# Patient Record
Sex: FEMALE | Race: WHITE | NOT HISPANIC OR LATINO | Employment: UNEMPLOYED | URBAN - METROPOLITAN AREA
[De-identification: names, ages, dates, MRNs, and addresses within clinical notes are randomized per-mention and may not be internally consistent; named-entity substitution may affect disease eponyms.]

---

## 2017-05-14 PROBLEM — M81.0 AGE-RELATED OSTEOPOROSIS WITHOUT CURRENT PATHOLOGICAL FRACTURE: Status: ACTIVE | Noted: 2017-05-14

## 2017-05-22 PROBLEM — M54.40 ACUTE MIDLINE LOW BACK PAIN WITH SCIATICA: Status: ACTIVE | Noted: 2017-05-22

## 2017-05-23 PROBLEM — M48.061 LUMBAR STENOSIS: Status: ACTIVE | Noted: 2017-05-23

## 2017-05-31 PROBLEM — I50.9 CHF (CONGESTIVE HEART FAILURE), NYHA CLASS IV: Status: ACTIVE | Noted: 2017-05-31

## 2017-06-06 PROBLEM — I27.20 PULMONARY HYPERTENSION: Status: ACTIVE | Noted: 2017-06-06

## 2017-09-19 PROBLEM — S72.002A CLOSED LEFT HIP FRACTURE: Status: ACTIVE | Noted: 2017-09-19

## 2017-09-20 PROBLEM — L89.302 DECUBITUS ULCER OF BUTTOCK, STAGE 2: Status: ACTIVE | Noted: 2017-09-20

## 2017-09-22 PROBLEM — R23.9 SUSPECTED PRESSURE INJURY OF DEEP TISSUE: Status: ACTIVE | Noted: 2017-09-22

## 2017-09-23 PROBLEM — I48.0 PAF (PAROXYSMAL ATRIAL FIBRILLATION): Status: ACTIVE | Noted: 2017-09-23

## 2017-10-31 PROBLEM — R41.82 ALTERED MENTAL STATUS: Status: ACTIVE | Noted: 2017-01-01

## 2017-10-31 PROBLEM — E86.0 DEHYDRATION: Status: ACTIVE | Noted: 2017-01-01

## 2017-10-31 PROBLEM — N19 RENAL FAILURE SYNDROME: Status: ACTIVE | Noted: 2017-01-01

## 2017-11-01 PROBLEM — L89.92 PRESSURE ULCER, STAGE 2: Status: ACTIVE | Noted: 2017-01-01

## 2017-11-02 PROBLEM — N30.00 ACUTE CYSTITIS WITHOUT HEMATURIA: Status: ACTIVE | Noted: 2017-01-01

## 2017-11-07 PROBLEM — N19 RENAL FAILURE: Status: RESOLVED | Noted: 2017-01-01 | Resolved: 2017-01-01

## 2018-01-01 ENCOUNTER — HOSPITAL ENCOUNTER (INPATIENT)
Facility: HOSPITAL | Age: 83
LOS: 16 days | DRG: 870 | End: 2018-09-24
Attending: HOSPITALIST | Admitting: HOSPITALIST
Payer: MEDICARE

## 2018-01-01 ENCOUNTER — ANESTHESIA EVENT (OUTPATIENT)
Dept: SURGERY | Facility: HOSPITAL | Age: 83
End: 2018-01-01

## 2018-01-01 ENCOUNTER — ANESTHESIA (OUTPATIENT)
Dept: SURGERY | Facility: HOSPITAL | Age: 83
End: 2018-01-01

## 2018-01-01 ENCOUNTER — ANESTHESIA (OUTPATIENT)
Dept: SURGERY | Facility: HOSPITAL | Age: 83
DRG: 870 | End: 2018-01-01
Payer: MEDICARE

## 2018-01-01 ENCOUNTER — ANESTHESIA EVENT (OUTPATIENT)
Dept: SURGERY | Facility: HOSPITAL | Age: 83
DRG: 870 | End: 2018-01-01
Payer: MEDICARE

## 2018-01-01 VITALS
DIASTOLIC BLOOD PRESSURE: 62 MMHG | TEMPERATURE: 97 F | SYSTOLIC BLOOD PRESSURE: 100 MMHG | OXYGEN SATURATION: 19 % | BODY MASS INDEX: 46.3 KG/M2 | RESPIRATION RATE: 26 BRPM | HEIGHT: 64 IN | WEIGHT: 271.19 LBS

## 2018-01-01 DIAGNOSIS — R07.9 CHEST PAIN AT REST: ICD-10-CM

## 2018-01-01 DIAGNOSIS — E87.5 HYPERKALEMIA: ICD-10-CM

## 2018-01-01 DIAGNOSIS — M86.9 OSTEOMYELITIS: ICD-10-CM

## 2018-01-01 DIAGNOSIS — R65.21 SEPTIC SHOCK: ICD-10-CM

## 2018-01-01 DIAGNOSIS — R07.9 CHEST PAIN: ICD-10-CM

## 2018-01-01 DIAGNOSIS — J96.01 ACUTE HYPOXEMIC RESPIRATORY FAILURE: Primary | ICD-10-CM

## 2018-01-01 DIAGNOSIS — R78.81 GRAM-NEGATIVE BACTEREMIA: ICD-10-CM

## 2018-01-01 DIAGNOSIS — G06.1 SPINAL EPIDURAL ABSCESS: ICD-10-CM

## 2018-01-01 DIAGNOSIS — M46.20 SPINAL ABSCESS: ICD-10-CM

## 2018-01-01 DIAGNOSIS — A41.9 SEPTIC SHOCK: ICD-10-CM

## 2018-01-01 DIAGNOSIS — I26.99 PE (PULMONARY THROMBOEMBOLISM): ICD-10-CM

## 2018-01-01 DIAGNOSIS — N17.9 AKI (ACUTE KIDNEY INJURY): ICD-10-CM

## 2018-01-01 DIAGNOSIS — I50.9 HEART FAILURE: ICD-10-CM

## 2018-01-01 DIAGNOSIS — J96.02 ACUTE HYPERCAPNIC RESPIRATORY FAILURE: ICD-10-CM

## 2018-01-01 DIAGNOSIS — R57.9 SHOCK: ICD-10-CM

## 2018-01-01 LAB
ABO + RH BLD: NORMAL
ALBUMIN SERPL BCP-MCNC: 1.5 G/DL
ALBUMIN SERPL BCP-MCNC: 1.6 G/DL
ALBUMIN SERPL BCP-MCNC: 1.7 G/DL
ALBUMIN SERPL BCP-MCNC: 1.8 G/DL
ALBUMIN SERPL BCP-MCNC: 1.9 G/DL
ALBUMIN SERPL BCP-MCNC: 2 G/DL
ALBUMIN SERPL BCP-MCNC: 2.1 G/DL
ALBUMIN SERPL BCP-MCNC: 2.2 G/DL
ALLENS TEST: ABNORMAL
ALLENS TEST: NORMAL
ALP SERPL-CCNC: 100 U/L
ALP SERPL-CCNC: 100 U/L
ALP SERPL-CCNC: 106 U/L
ALP SERPL-CCNC: 109 U/L
ALP SERPL-CCNC: 110 U/L
ALP SERPL-CCNC: 114 U/L
ALP SERPL-CCNC: 117 U/L
ALP SERPL-CCNC: 120 U/L
ALP SERPL-CCNC: 123 U/L
ALP SERPL-CCNC: 135 U/L
ALP SERPL-CCNC: 140 U/L
ALP SERPL-CCNC: 144 U/L
ALT SERPL W/O P-5'-P-CCNC: 13 U/L
ALT SERPL W/O P-5'-P-CCNC: 25 U/L
ALT SERPL W/O P-5'-P-CCNC: 43 U/L
ALT SERPL W/O P-5'-P-CCNC: 52 U/L
ALT SERPL W/O P-5'-P-CCNC: 6 U/L
ALT SERPL W/O P-5'-P-CCNC: 7 U/L
ALT SERPL W/O P-5'-P-CCNC: 8 U/L
ANION GAP SERPL CALC-SCNC: 10 MMOL/L
ANION GAP SERPL CALC-SCNC: 11 MMOL/L
ANION GAP SERPL CALC-SCNC: 12 MMOL/L
ANION GAP SERPL CALC-SCNC: 13 MMOL/L
ANION GAP SERPL CALC-SCNC: 14 MMOL/L
ANION GAP SERPL CALC-SCNC: 15 MMOL/L
ANION GAP SERPL CALC-SCNC: 16 MMOL/L
ANION GAP SERPL CALC-SCNC: 17 MMOL/L
ANION GAP SERPL CALC-SCNC: 18 MMOL/L
ANION GAP SERPL CALC-SCNC: 19 MMOL/L
ANION GAP SERPL CALC-SCNC: 20 MMOL/L
ANION GAP SERPL CALC-SCNC: 21 MMOL/L
ANION GAP SERPL CALC-SCNC: 21 MMOL/L
ANION GAP SERPL CALC-SCNC: 5 MMOL/L
ANION GAP SERPL CALC-SCNC: 6 MMOL/L
ANION GAP SERPL CALC-SCNC: 7 MMOL/L
ANION GAP SERPL CALC-SCNC: 8 MMOL/L
ANION GAP SERPL CALC-SCNC: 9 MMOL/L
ANISOCYTOSIS BLD QL SMEAR: SLIGHT
ANISOCYTOSIS BLD QL SMEAR: SLIGHT
APPEARANCE FLD: NORMAL
APTT BLDCRRT: 22.3 SEC
APTT BLDCRRT: 23.2 SEC
APTT BLDCRRT: 23.5 SEC
APTT BLDCRRT: 24.2 SEC
APTT BLDCRRT: 24.5 SEC
APTT BLDCRRT: 27.2 SEC
APTT BLDCRRT: 30.4 SEC
APTT BLDCRRT: 34.3 SEC
APTT BLDCRRT: 37.1 SEC
APTT BLDCRRT: 45.6 SEC
APTT BLDCRRT: 46.9 SEC
APTT BLDCRRT: 57.9 SEC
APTT BLDCRRT: 86.1 SEC
APTT BLDCRRT: 95.3 SEC
APTT BLDCRRT: <21 SEC
AST SERPL-CCNC: 10 U/L
AST SERPL-CCNC: 12 U/L
AST SERPL-CCNC: 12 U/L
AST SERPL-CCNC: 13 U/L
AST SERPL-CCNC: 13 U/L
AST SERPL-CCNC: 16 U/L
AST SERPL-CCNC: 17 U/L
AST SERPL-CCNC: 18 U/L
AST SERPL-CCNC: 21 U/L
AST SERPL-CCNC: 51 U/L
AST SERPL-CCNC: 71 U/L
AST SERPL-CCNC: 72 U/L
BACTERIA #/AREA URNS AUTO: ABNORMAL /HPF
BACTERIA BLD CULT: NORMAL
BACTERIA SPEC AEROBE CULT: NO GROWTH
BACTERIA SPEC ANAEROBE CULT: NORMAL
BACTERIA UR CULT: NORMAL
BASOPHILS # BLD AUTO: 0.01 K/UL
BASOPHILS # BLD AUTO: 0.02 K/UL
BASOPHILS # BLD AUTO: 0.03 K/UL
BASOPHILS # BLD AUTO: 0.04 K/UL
BASOPHILS # BLD AUTO: 0.05 K/UL
BASOPHILS # BLD AUTO: 0.05 K/UL
BASOPHILS NFR BLD: 0.1 %
BASOPHILS NFR BLD: 0.2 %
BILIRUB DIRECT SERPL-MCNC: 0.4 MG/DL
BILIRUB DIRECT SERPL-MCNC: 0.5 MG/DL
BILIRUB DIRECT SERPL-MCNC: 0.5 MG/DL
BILIRUB DIRECT SERPL-MCNC: 0.7 MG/DL
BILIRUB DIRECT SERPL-MCNC: 0.8 MG/DL
BILIRUB DIRECT SERPL-MCNC: 0.9 MG/DL
BILIRUB DIRECT SERPL-MCNC: 1.1 MG/DL
BILIRUB SERPL-MCNC: 0.5 MG/DL
BILIRUB SERPL-MCNC: 0.6 MG/DL
BILIRUB SERPL-MCNC: 0.7 MG/DL
BILIRUB SERPL-MCNC: 0.7 MG/DL
BILIRUB SERPL-MCNC: 0.8 MG/DL
BILIRUB SERPL-MCNC: 0.9 MG/DL
BILIRUB SERPL-MCNC: 1.2 MG/DL
BILIRUB SERPL-MCNC: 1.3 MG/DL
BILIRUB SERPL-MCNC: 1.6 MG/DL
BILIRUB UR QL STRIP: NEGATIVE
BLD GP AB SCN CELLS X3 SERPL QL: NORMAL
BLD PROD TYP BPU: NORMAL
BLOOD UNIT EXPIRATION DATE: NORMAL
BLOOD UNIT TYPE CODE: 5100
BLOOD UNIT TYPE: NORMAL
BNP SERPL-MCNC: 756 PG/ML
BNP SERPL-MCNC: 972 PG/ML
BODY FLD TYPE: NORMAL
BODY FLUID COMMENTS: NORMAL
BUN SERPL-MCNC: 10 MG/DL
BUN SERPL-MCNC: 11 MG/DL
BUN SERPL-MCNC: 12 MG/DL
BUN SERPL-MCNC: 13 MG/DL
BUN SERPL-MCNC: 13 MG/DL
BUN SERPL-MCNC: 14 MG/DL
BUN SERPL-MCNC: 15 MG/DL
BUN SERPL-MCNC: 15 MG/DL
BUN SERPL-MCNC: 17 MG/DL
BUN SERPL-MCNC: 18 MG/DL
BUN SERPL-MCNC: 19 MG/DL
BUN SERPL-MCNC: 20 MG/DL
BUN SERPL-MCNC: 20 MG/DL
BUN SERPL-MCNC: 21 MG/DL
BUN SERPL-MCNC: 22 MG/DL
BUN SERPL-MCNC: 27 MG/DL
BUN SERPL-MCNC: 27 MG/DL
BUN SERPL-MCNC: 3 MG/DL
BUN SERPL-MCNC: 4 MG/DL
BUN SERPL-MCNC: 52 MG/DL
BUN SERPL-MCNC: 52 MG/DL
BUN SERPL-MCNC: 53 MG/DL
BUN SERPL-MCNC: 53 MG/DL
BUN SERPL-MCNC: 54 MG/DL
BUN SERPL-MCNC: 54 MG/DL
BUN SERPL-MCNC: 55 MG/DL
BUN SERPL-MCNC: 55 MG/DL
BUN SERPL-MCNC: 56 MG/DL
BUN SERPL-MCNC: 56 MG/DL
BUN SERPL-MCNC: 57 MG/DL
BUN SERPL-MCNC: 58 MG/DL
BUN SERPL-MCNC: 59 MG/DL
BUN SERPL-MCNC: 59 MG/DL
BUN SERPL-MCNC: 6 MG/DL
BUN SERPL-MCNC: 6 MG/DL
BUN SERPL-MCNC: 60 MG/DL
BUN SERPL-MCNC: 62 MG/DL
BUN SERPL-MCNC: 63 MG/DL
BUN SERPL-MCNC: 64 MG/DL
BUN SERPL-MCNC: 65 MG/DL
BUN SERPL-MCNC: 65 MG/DL
BUN SERPL-MCNC: 66 MG/DL
BUN SERPL-MCNC: 67 MG/DL
BUN SERPL-MCNC: 68 MG/DL
BUN SERPL-MCNC: 7 MG/DL
BUN SERPL-MCNC: 70 MG/DL
BUN SERPL-MCNC: 72 MG/DL
BUN SERPL-MCNC: 73 MG/DL
BUN SERPL-MCNC: 76 MG/DL
BUN SERPL-MCNC: 77 MG/DL
BUN SERPL-MCNC: 79 MG/DL
BUN SERPL-MCNC: 8 MG/DL
BUN SERPL-MCNC: 8 MG/DL
BUN SERPL-MCNC: 84 MG/DL
BUN SERPL-MCNC: 86 MG/DL
BUN SERPL-MCNC: 9 MG/DL
BUN SERPL-MCNC: 92 MG/DL
BURR CELLS BLD QL SMEAR: ABNORMAL
BURR CELLS BLD QL SMEAR: ABNORMAL
CALCIUM SERPL-MCNC: 7.2 MG/DL
CALCIUM SERPL-MCNC: 7.3 MG/DL
CALCIUM SERPL-MCNC: 7.5 MG/DL
CALCIUM SERPL-MCNC: 7.6 MG/DL
CALCIUM SERPL-MCNC: 7.6 MG/DL
CALCIUM SERPL-MCNC: 7.7 MG/DL
CALCIUM SERPL-MCNC: 7.7 MG/DL
CALCIUM SERPL-MCNC: 7.8 MG/DL
CALCIUM SERPL-MCNC: 7.9 MG/DL
CALCIUM SERPL-MCNC: 8 MG/DL
CALCIUM SERPL-MCNC: 8 MG/DL
CALCIUM SERPL-MCNC: 8.1 MG/DL
CALCIUM SERPL-MCNC: 8.2 MG/DL
CALCIUM SERPL-MCNC: 8.3 MG/DL
CALCIUM SERPL-MCNC: 8.4 MG/DL
CALCIUM SERPL-MCNC: 8.5 MG/DL
CALCIUM SERPL-MCNC: 8.6 MG/DL
CALCIUM SERPL-MCNC: 8.7 MG/DL
CALCIUM SERPL-MCNC: 8.8 MG/DL
CALCIUM SERPL-MCNC: 8.8 MG/DL
CALCIUM SERPL-MCNC: 8.9 MG/DL
CALCIUM SERPL-MCNC: 9.1 MG/DL
CALCIUM SERPL-MCNC: 9.3 MG/DL
CALCIUM SERPL-MCNC: 9.5 MG/DL
CALCIUM SERPL-MCNC: 9.5 MG/DL
CALCIUM SERPL-MCNC: 9.6 MG/DL
CHLORIDE SERPL-SCNC: 102 MMOL/L
CHLORIDE SERPL-SCNC: 102 MMOL/L
CHLORIDE SERPL-SCNC: 105 MMOL/L
CHLORIDE SERPL-SCNC: 106 MMOL/L
CHLORIDE SERPL-SCNC: 106 MMOL/L
CHLORIDE SERPL-SCNC: 107 MMOL/L
CHLORIDE SERPL-SCNC: 108 MMOL/L
CHLORIDE SERPL-SCNC: 109 MMOL/L
CHLORIDE SERPL-SCNC: 110 MMOL/L
CHLORIDE SERPL-SCNC: 111 MMOL/L
CHLORIDE SERPL-SCNC: 112 MMOL/L
CHLORIDE SERPL-SCNC: 113 MMOL/L
CHLORIDE SERPL-SCNC: 114 MMOL/L
CHLORIDE SERPL-SCNC: 83 MMOL/L
CHLORIDE SERPL-SCNC: 84 MMOL/L
CHLORIDE SERPL-SCNC: 85 MMOL/L
CHLORIDE SERPL-SCNC: 87 MMOL/L
CHLORIDE SERPL-SCNC: 88 MMOL/L
CHLORIDE SERPL-SCNC: 90 MMOL/L
CHLORIDE SERPL-SCNC: 94 MMOL/L
CLARITY UR REFRACT.AUTO: ABNORMAL
CO2 SERPL-SCNC: 14 MMOL/L
CO2 SERPL-SCNC: 15 MMOL/L
CO2 SERPL-SCNC: 16 MMOL/L
CO2 SERPL-SCNC: 17 MMOL/L
CO2 SERPL-SCNC: 17 MMOL/L
CO2 SERPL-SCNC: 18 MMOL/L
CO2 SERPL-SCNC: 19 MMOL/L
CO2 SERPL-SCNC: 20 MMOL/L
CO2 SERPL-SCNC: 21 MMOL/L
CO2 SERPL-SCNC: 22 MMOL/L
CO2 SERPL-SCNC: 23 MMOL/L
CO2 SERPL-SCNC: 24 MMOL/L
CO2 SERPL-SCNC: 25 MMOL/L
CO2 SERPL-SCNC: 26 MMOL/L
CO2 SERPL-SCNC: 27 MMOL/L
CO2 SERPL-SCNC: 28 MMOL/L
CODING SYSTEM: NORMAL
COLOR FLD: NORMAL
COLOR UR AUTO: YELLOW
CORTIS SERPL-MCNC: 17 UG/DL
CREAT SERPL-MCNC: 0.4 MG/DL
CREAT SERPL-MCNC: 0.4 MG/DL
CREAT SERPL-MCNC: 0.5 MG/DL
CREAT SERPL-MCNC: 0.6 MG/DL
CREAT SERPL-MCNC: 0.7 MG/DL
CREAT SERPL-MCNC: 0.8 MG/DL
CREAT SERPL-MCNC: 0.9 MG/DL
CREAT SERPL-MCNC: 1 MG/DL
CREAT SERPL-MCNC: 1.1 MG/DL
CREAT SERPL-MCNC: 1.2 MG/DL
CREAT SERPL-MCNC: 1.2 MG/DL
CREAT SERPL-MCNC: 1.3 MG/DL
CREAT SERPL-MCNC: 1.4 MG/DL
CREAT SERPL-MCNC: 1.5 MG/DL
CREAT SERPL-MCNC: 1.6 MG/DL
CREAT SERPL-MCNC: 1.7 MG/DL
CREAT SERPL-MCNC: 2.9 MG/DL
CREAT SERPL-MCNC: 3 MG/DL
CREAT SERPL-MCNC: 3.1 MG/DL
CREAT SERPL-MCNC: 3.2 MG/DL
CREAT SERPL-MCNC: 3.3 MG/DL
CREAT SERPL-MCNC: 3.3 MG/DL
CREAT SERPL-MCNC: 3.4 MG/DL
CREAT SERPL-MCNC: 3.6 MG/DL
CREAT SERPL-MCNC: 3.7 MG/DL
CREAT SERPL-MCNC: 3.7 MG/DL
CREAT SERPL-MCNC: 4 MG/DL
CREAT SERPL-MCNC: 4 MG/DL
CREAT SERPL-MCNC: 4.2 MG/DL
CREAT SERPL-MCNC: 4.2 MG/DL
CREAT UR-MCNC: 13 MG/DL
CREAT UR-MCNC: 71 MG/DL
CRP SERPL-MCNC: 87 MG/L
DELSYS: ABNORMAL
DELSYS: NORMAL
DIFFERENTIAL METHOD: ABNORMAL
DISPENSE STATUS: NORMAL
EOSINOPHIL # BLD AUTO: 0 K/UL
EOSINOPHIL # BLD AUTO: 0.1 K/UL
EOSINOPHIL # BLD AUTO: 0.1 K/UL
EOSINOPHIL # BLD AUTO: 0.2 K/UL
EOSINOPHIL # BLD AUTO: 0.3 K/UL
EOSINOPHIL # BLD AUTO: 0.3 K/UL
EOSINOPHIL NFR BLD: 0 %
EOSINOPHIL NFR BLD: 0.1 %
EOSINOPHIL NFR BLD: 0.2 %
EOSINOPHIL NFR BLD: 0.3 %
EOSINOPHIL NFR BLD: 0.3 %
EOSINOPHIL NFR BLD: 0.5 %
EOSINOPHIL NFR BLD: 0.9 %
EOSINOPHIL NFR BLD: 1.3 %
EP: 8
ERYTHROCYTE [DISTWIDTH] IN BLOOD BY AUTOMATED COUNT: 17.2 %
ERYTHROCYTE [DISTWIDTH] IN BLOOD BY AUTOMATED COUNT: 17.3 %
ERYTHROCYTE [DISTWIDTH] IN BLOOD BY AUTOMATED COUNT: 17.4 %
ERYTHROCYTE [DISTWIDTH] IN BLOOD BY AUTOMATED COUNT: 17.4 %
ERYTHROCYTE [DISTWIDTH] IN BLOOD BY AUTOMATED COUNT: 17.5 %
ERYTHROCYTE [DISTWIDTH] IN BLOOD BY AUTOMATED COUNT: 17.5 %
ERYTHROCYTE [DISTWIDTH] IN BLOOD BY AUTOMATED COUNT: 17.6 %
ERYTHROCYTE [DISTWIDTH] IN BLOOD BY AUTOMATED COUNT: 17.8 %
ERYTHROCYTE [DISTWIDTH] IN BLOOD BY AUTOMATED COUNT: 18.1 %
ERYTHROCYTE [DISTWIDTH] IN BLOOD BY AUTOMATED COUNT: 18.7 %
ERYTHROCYTE [DISTWIDTH] IN BLOOD BY AUTOMATED COUNT: 19.1 %
ERYTHROCYTE [DISTWIDTH] IN BLOOD BY AUTOMATED COUNT: 19.5 %
ERYTHROCYTE [DISTWIDTH] IN BLOOD BY AUTOMATED COUNT: 19.8 %
ERYTHROCYTE [DISTWIDTH] IN BLOOD BY AUTOMATED COUNT: 19.9 %
ERYTHROCYTE [DISTWIDTH] IN BLOOD BY AUTOMATED COUNT: 20 %
ERYTHROCYTE [SEDIMENTATION RATE] IN BLOOD BY WESTERGREN METHOD: 12 MM/H
ERYTHROCYTE [SEDIMENTATION RATE] IN BLOOD BY WESTERGREN METHOD: 16 MM/H
ERYTHROCYTE [SEDIMENTATION RATE] IN BLOOD BY WESTERGREN METHOD: 20 MM/H
ERYTHROCYTE [SEDIMENTATION RATE] IN BLOOD BY WESTERGREN METHOD: 24 MM/H
ERYTHROCYTE [SEDIMENTATION RATE] IN BLOOD BY WESTERGREN METHOD: 60 MM/HR
ERYTHROCYTE [SEDIMENTATION RATE] IN BLOOD BY WESTERGREN METHOD: 9.5 MM/H
EST. GFR  (AFRICAN AMERICAN): 10.8 ML/MIN/1.73 M^2
EST. GFR  (AFRICAN AMERICAN): 10.8 ML/MIN/1.73 M^2
EST. GFR  (AFRICAN AMERICAN): 11.5 ML/MIN/1.73 M^2
EST. GFR  (AFRICAN AMERICAN): 11.5 ML/MIN/1.73 M^2
EST. GFR  (AFRICAN AMERICAN): 12.6 ML/MIN/1.73 M^2
EST. GFR  (AFRICAN AMERICAN): 12.6 ML/MIN/1.73 M^2
EST. GFR  (AFRICAN AMERICAN): 13.1 ML/MIN/1.73 M^2
EST. GFR  (AFRICAN AMERICAN): 14 ML/MIN/1.73 M^2
EST. GFR  (AFRICAN AMERICAN): 14.5 ML/MIN/1.73 M^2
EST. GFR  (AFRICAN AMERICAN): 14.5 ML/MIN/1.73 M^2
EST. GFR  (AFRICAN AMERICAN): 15.1 ML/MIN/1.73 M^2
EST. GFR  (AFRICAN AMERICAN): 15.7 ML/MIN/1.73 M^2
EST. GFR  (AFRICAN AMERICAN): 16.3 ML/MIN/1.73 M^2
EST. GFR  (AFRICAN AMERICAN): 17 ML/MIN/1.73 M^2
EST. GFR  (AFRICAN AMERICAN): 32.4 ML/MIN/1.73 M^2
EST. GFR  (AFRICAN AMERICAN): 34.8 ML/MIN/1.73 M^2
EST. GFR  (AFRICAN AMERICAN): 37.7 ML/MIN/1.73 M^2
EST. GFR  (AFRICAN AMERICAN): 40.9 ML/MIN/1.73 M^2
EST. GFR  (AFRICAN AMERICAN): 44.8 ML/MIN/1.73 M^2
EST. GFR  (AFRICAN AMERICAN): 49.3 ML/MIN/1.73 M^2
EST. GFR  (AFRICAN AMERICAN): 49.3 ML/MIN/1.73 M^2
EST. GFR  (AFRICAN AMERICAN): 54.8 ML/MIN/1.73 M^2
EST. GFR  (AFRICAN AMERICAN): >60 ML/MIN/1.73 M^2
EST. GFR  (NON AFRICAN AMERICAN): 10 ML/MIN/1.73 M^2
EST. GFR  (NON AFRICAN AMERICAN): 10 ML/MIN/1.73 M^2
EST. GFR  (NON AFRICAN AMERICAN): 11 ML/MIN/1.73 M^2
EST. GFR  (NON AFRICAN AMERICAN): 11 ML/MIN/1.73 M^2
EST. GFR  (NON AFRICAN AMERICAN): 11.3 ML/MIN/1.73 M^2
EST. GFR  (NON AFRICAN AMERICAN): 12.1 ML/MIN/1.73 M^2
EST. GFR  (NON AFRICAN AMERICAN): 12.6 ML/MIN/1.73 M^2
EST. GFR  (NON AFRICAN AMERICAN): 12.6 ML/MIN/1.73 M^2
EST. GFR  (NON AFRICAN AMERICAN): 13.1 ML/MIN/1.73 M^2
EST. GFR  (NON AFRICAN AMERICAN): 13.6 ML/MIN/1.73 M^2
EST. GFR  (NON AFRICAN AMERICAN): 14.1 ML/MIN/1.73 M^2
EST. GFR  (NON AFRICAN AMERICAN): 14.7 ML/MIN/1.73 M^2
EST. GFR  (NON AFRICAN AMERICAN): 28.1 ML/MIN/1.73 M^2
EST. GFR  (NON AFRICAN AMERICAN): 30.2 ML/MIN/1.73 M^2
EST. GFR  (NON AFRICAN AMERICAN): 32.7 ML/MIN/1.73 M^2
EST. GFR  (NON AFRICAN AMERICAN): 35.5 ML/MIN/1.73 M^2
EST. GFR  (NON AFRICAN AMERICAN): 38.8 ML/MIN/1.73 M^2
EST. GFR  (NON AFRICAN AMERICAN): 42.8 ML/MIN/1.73 M^2
EST. GFR  (NON AFRICAN AMERICAN): 42.8 ML/MIN/1.73 M^2
EST. GFR  (NON AFRICAN AMERICAN): 47.5 ML/MIN/1.73 M^2
EST. GFR  (NON AFRICAN AMERICAN): 53.3 ML/MIN/1.73 M^2
EST. GFR  (NON AFRICAN AMERICAN): 9.4 ML/MIN/1.73 M^2
EST. GFR  (NON AFRICAN AMERICAN): 9.4 ML/MIN/1.73 M^2
EST. GFR  (NON AFRICAN AMERICAN): >60 ML/MIN/1.73 M^2
ESTIMATED AVG GLUCOSE: 171 MG/DL
ESTIMATED PA SYSTOLIC PRESSURE: 87.59
FIO2: 100
FIO2: 30
FIO2: 40
FIO2: 60
FIO2: 70
FLOW: 10
GLUCOSE SERPL-MCNC: 100 MG/DL
GLUCOSE SERPL-MCNC: 102 MG/DL
GLUCOSE SERPL-MCNC: 105 MG/DL
GLUCOSE SERPL-MCNC: 113 MG/DL
GLUCOSE SERPL-MCNC: 117 MG/DL
GLUCOSE SERPL-MCNC: 129 MG/DL
GLUCOSE SERPL-MCNC: 134 MG/DL
GLUCOSE SERPL-MCNC: 138 MG/DL
GLUCOSE SERPL-MCNC: 139 MG/DL
GLUCOSE SERPL-MCNC: 141 MG/DL
GLUCOSE SERPL-MCNC: 141 MG/DL
GLUCOSE SERPL-MCNC: 143 MG/DL
GLUCOSE SERPL-MCNC: 145 MG/DL
GLUCOSE SERPL-MCNC: 145 MG/DL
GLUCOSE SERPL-MCNC: 149 MG/DL
GLUCOSE SERPL-MCNC: 151 MG/DL
GLUCOSE SERPL-MCNC: 154 MG/DL
GLUCOSE SERPL-MCNC: 157 MG/DL
GLUCOSE SERPL-MCNC: 157 MG/DL
GLUCOSE SERPL-MCNC: 160 MG/DL
GLUCOSE SERPL-MCNC: 160 MG/DL
GLUCOSE SERPL-MCNC: 162 MG/DL
GLUCOSE SERPL-MCNC: 163 MG/DL
GLUCOSE SERPL-MCNC: 165 MG/DL
GLUCOSE SERPL-MCNC: 165 MG/DL
GLUCOSE SERPL-MCNC: 166 MG/DL
GLUCOSE SERPL-MCNC: 167 MG/DL
GLUCOSE SERPL-MCNC: 169 MG/DL
GLUCOSE SERPL-MCNC: 169 MG/DL
GLUCOSE SERPL-MCNC: 172 MG/DL
GLUCOSE SERPL-MCNC: 173 MG/DL
GLUCOSE SERPL-MCNC: 178 MG/DL
GLUCOSE SERPL-MCNC: 182 MG/DL
GLUCOSE SERPL-MCNC: 184 MG/DL
GLUCOSE SERPL-MCNC: 192 MG/DL
GLUCOSE SERPL-MCNC: 199 MG/DL
GLUCOSE SERPL-MCNC: 200 MG/DL
GLUCOSE SERPL-MCNC: 212 MG/DL
GLUCOSE SERPL-MCNC: 213 MG/DL
GLUCOSE SERPL-MCNC: 214 MG/DL
GLUCOSE SERPL-MCNC: 215 MG/DL
GLUCOSE SERPL-MCNC: 216 MG/DL
GLUCOSE SERPL-MCNC: 219 MG/DL
GLUCOSE SERPL-MCNC: 221 MG/DL
GLUCOSE SERPL-MCNC: 223 MG/DL
GLUCOSE SERPL-MCNC: 223 MG/DL
GLUCOSE SERPL-MCNC: 228 MG/DL
GLUCOSE SERPL-MCNC: 231 MG/DL
GLUCOSE SERPL-MCNC: 232 MG/DL
GLUCOSE SERPL-MCNC: 234 MG/DL
GLUCOSE SERPL-MCNC: 236 MG/DL
GLUCOSE SERPL-MCNC: 238 MG/DL
GLUCOSE SERPL-MCNC: 240 MG/DL
GLUCOSE SERPL-MCNC: 243 MG/DL
GLUCOSE SERPL-MCNC: 245 MG/DL
GLUCOSE SERPL-MCNC: 253 MG/DL
GLUCOSE SERPL-MCNC: 254 MG/DL
GLUCOSE SERPL-MCNC: 274 MG/DL
GLUCOSE SERPL-MCNC: 275 MG/DL
GLUCOSE SERPL-MCNC: 280 MG/DL
GLUCOSE SERPL-MCNC: 307 MG/DL
GLUCOSE SERPL-MCNC: 77 MG/DL
GLUCOSE SERPL-MCNC: 81 MG/DL
GLUCOSE SERPL-MCNC: 89 MG/DL
GLUCOSE SERPL-MCNC: 91 MG/DL
GLUCOSE SERPL-MCNC: 93 MG/DL
GLUCOSE SERPL-MCNC: 96 MG/DL
GLUCOSE UR QL STRIP: NEGATIVE
GRAM STN SPEC: NORMAL
HBA1C MFR BLD HPLC: 7.6 %
HCO3 UR-SCNC: 19.7 MMOL/L (ref 24–28)
HCO3 UR-SCNC: 19.9 MMOL/L (ref 24–28)
HCO3 UR-SCNC: 22.5 MMOL/L (ref 24–28)
HCO3 UR-SCNC: 23.1 MMOL/L (ref 24–28)
HCO3 UR-SCNC: 23.5 MMOL/L (ref 24–28)
HCO3 UR-SCNC: 23.5 MMOL/L (ref 24–28)
HCO3 UR-SCNC: 24.4 MMOL/L (ref 24–28)
HCO3 UR-SCNC: 24.9 MMOL/L (ref 24–28)
HCO3 UR-SCNC: 25.1 MMOL/L (ref 24–28)
HCO3 UR-SCNC: 25.5 MMOL/L (ref 24–28)
HCO3 UR-SCNC: 26.4 MMOL/L (ref 24–28)
HCO3 UR-SCNC: 26.5 MMOL/L (ref 24–28)
HCO3 UR-SCNC: 26.8 MMOL/L (ref 24–28)
HCO3 UR-SCNC: 27.5 MMOL/L (ref 24–28)
HCO3 UR-SCNC: 27.6 MMOL/L (ref 24–28)
HCO3 UR-SCNC: 29.1 MMOL/L (ref 24–28)
HCO3 UR-SCNC: 30 MMOL/L (ref 24–28)
HCO3 UR-SCNC: 30.5 MMOL/L (ref 24–28)
HCO3 UR-SCNC: 7.4 MMOL/L (ref 24–28)
HCT VFR BLD AUTO: 21.9 %
HCT VFR BLD AUTO: 22 %
HCT VFR BLD AUTO: 22.1 %
HCT VFR BLD AUTO: 22.3 %
HCT VFR BLD AUTO: 23 %
HCT VFR BLD AUTO: 23.7 %
HCT VFR BLD AUTO: 24.2 %
HCT VFR BLD AUTO: 24.3 %
HCT VFR BLD AUTO: 24.8 %
HCT VFR BLD AUTO: 25.2 %
HCT VFR BLD AUTO: 25.2 %
HCT VFR BLD AUTO: 26.5 %
HCT VFR BLD AUTO: 26.6 %
HCT VFR BLD AUTO: 26.7 %
HCT VFR BLD AUTO: 26.8 %
HCT VFR BLD AUTO: 26.8 %
HCT VFR BLD AUTO: 27 %
HCT VFR BLD AUTO: 27 %
HCT VFR BLD AUTO: 27.2 %
HCT VFR BLD AUTO: 27.7 %
HGB BLD-MCNC: 6.8 G/DL
HGB BLD-MCNC: 6.8 G/DL
HGB BLD-MCNC: 7 G/DL
HGB BLD-MCNC: 7.1 G/DL
HGB BLD-MCNC: 7.3 G/DL
HGB BLD-MCNC: 7.3 G/DL
HGB BLD-MCNC: 7.6 G/DL
HGB BLD-MCNC: 7.8 G/DL
HGB BLD-MCNC: 8.1 G/DL
HGB BLD-MCNC: 8.1 G/DL
HGB BLD-MCNC: 8.2 G/DL
HGB BLD-MCNC: 8.4 G/DL
HGB BLD-MCNC: 8.5 G/DL
HGB BLD-MCNC: 8.7 G/DL
HGB BLD-MCNC: 8.8 G/DL
HGB BLD-MCNC: 8.8 G/DL
HGB UR QL STRIP: ABNORMAL
IMM GRANULOCYTES # BLD AUTO: 0.12 K/UL
IMM GRANULOCYTES # BLD AUTO: 0.14 K/UL
IMM GRANULOCYTES # BLD AUTO: 0.14 K/UL
IMM GRANULOCYTES # BLD AUTO: 0.17 K/UL
IMM GRANULOCYTES # BLD AUTO: 0.18 K/UL
IMM GRANULOCYTES # BLD AUTO: 0.2 K/UL
IMM GRANULOCYTES # BLD AUTO: 0.23 K/UL
IMM GRANULOCYTES # BLD AUTO: 0.25 K/UL
IMM GRANULOCYTES # BLD AUTO: 0.28 K/UL
IMM GRANULOCYTES # BLD AUTO: 0.29 K/UL
IMM GRANULOCYTES # BLD AUTO: 0.29 K/UL
IMM GRANULOCYTES # BLD AUTO: 0.31 K/UL
IMM GRANULOCYTES # BLD AUTO: 0.35 K/UL
IMM GRANULOCYTES # BLD AUTO: 0.41 K/UL
IMM GRANULOCYTES # BLD AUTO: 0.41 K/UL
IMM GRANULOCYTES # BLD AUTO: 0.61 K/UL
IMM GRANULOCYTES # BLD AUTO: 0.61 K/UL
IMM GRANULOCYTES NFR BLD AUTO: 0.7 %
IMM GRANULOCYTES NFR BLD AUTO: 0.9 %
IMM GRANULOCYTES NFR BLD AUTO: 1.1 %
IMM GRANULOCYTES NFR BLD AUTO: 1.3 %
IMM GRANULOCYTES NFR BLD AUTO: 1.4 %
IMM GRANULOCYTES NFR BLD AUTO: 1.5 %
IMM GRANULOCYTES NFR BLD AUTO: 1.6 %
IMM GRANULOCYTES NFR BLD AUTO: 1.7 %
IMM GRANULOCYTES NFR BLD AUTO: 1.8 %
IMM GRANULOCYTES NFR BLD AUTO: 2.1 %
IMM GRANULOCYTES NFR BLD AUTO: 2.1 %
INR PPP: 1.1
INR PPP: 1.2
INR PPP: 1.3
INR PPP: 1.3
INR PPP: 1.4
IP: 16
IP: 20
IT: 1
KETONES UR QL STRIP: NEGATIVE
LACTATE SERPL-SCNC: 0.7 MMOL/L
LACTATE SERPL-SCNC: 0.8 MMOL/L
LACTATE SERPL-SCNC: 0.9 MMOL/L
LACTATE SERPL-SCNC: 1.1 MMOL/L
LEUKOCYTE ESTERASE UR QL STRIP: ABNORMAL
LYMPHOCYTES # BLD AUTO: 0.5 K/UL
LYMPHOCYTES # BLD AUTO: 0.5 K/UL
LYMPHOCYTES # BLD AUTO: 0.6 K/UL
LYMPHOCYTES # BLD AUTO: 0.6 K/UL
LYMPHOCYTES # BLD AUTO: 0.7 K/UL
LYMPHOCYTES # BLD AUTO: 0.8 K/UL
LYMPHOCYTES # BLD AUTO: 0.9 K/UL
LYMPHOCYTES # BLD AUTO: 1 K/UL
LYMPHOCYTES # BLD AUTO: 1.1 K/UL
LYMPHOCYTES # BLD AUTO: 1.1 K/UL
LYMPHOCYTES # BLD AUTO: 1.2 K/UL
LYMPHOCYTES # BLD AUTO: 1.2 K/UL
LYMPHOCYTES # BLD AUTO: 1.5 K/UL
LYMPHOCYTES NFR BLD: 11.9 %
LYMPHOCYTES NFR BLD: 3 %
LYMPHOCYTES NFR BLD: 3.6 %
LYMPHOCYTES NFR BLD: 3.9 %
LYMPHOCYTES NFR BLD: 3.9 %
LYMPHOCYTES NFR BLD: 4.1 %
LYMPHOCYTES NFR BLD: 4.2 %
LYMPHOCYTES NFR BLD: 5.1 %
LYMPHOCYTES NFR BLD: 5.1 %
LYMPHOCYTES NFR BLD: 5.4 %
LYMPHOCYTES NFR BLD: 5.5 %
LYMPHOCYTES NFR BLD: 5.5 %
LYMPHOCYTES NFR BLD: 5.7 %
LYMPHOCYTES NFR BLD: 5.8 %
LYMPHOCYTES NFR BLD: 6.3 %
LYMPHOCYTES NFR BLD: 6.5 %
LYMPHOCYTES NFR BLD: 9.6 %
MAGNESIUM SERPL-MCNC: 1.5 MG/DL
MAGNESIUM SERPL-MCNC: 1.6 MG/DL
MAGNESIUM SERPL-MCNC: 1.7 MG/DL
MAGNESIUM SERPL-MCNC: 1.8 MG/DL
MAGNESIUM SERPL-MCNC: 1.9 MG/DL
MAGNESIUM SERPL-MCNC: 2 MG/DL
MAGNESIUM SERPL-MCNC: 2 MG/DL
MAGNESIUM SERPL-MCNC: 2.1 MG/DL
MAP: 12
MCH RBC QN AUTO: 24.7 PG
MCH RBC QN AUTO: 25.2 PG
MCH RBC QN AUTO: 25.4 PG
MCH RBC QN AUTO: 25.5 PG
MCH RBC QN AUTO: 25.7 PG
MCH RBC QN AUTO: 25.7 PG
MCH RBC QN AUTO: 25.8 PG
MCH RBC QN AUTO: 25.8 PG
MCH RBC QN AUTO: 25.9 PG
MCH RBC QN AUTO: 26.2 PG
MCH RBC QN AUTO: 26.4 PG
MCHC RBC AUTO-ENTMCNC: 29.2 G/DL
MCHC RBC AUTO-ENTMCNC: 30.1 G/DL
MCHC RBC AUTO-ENTMCNC: 30.6 G/DL
MCHC RBC AUTO-ENTMCNC: 30.8 G/DL
MCHC RBC AUTO-ENTMCNC: 31.4 G/DL
MCHC RBC AUTO-ENTMCNC: 31.4 G/DL
MCHC RBC AUTO-ENTMCNC: 31.5 G/DL
MCHC RBC AUTO-ENTMCNC: 31.5 G/DL
MCHC RBC AUTO-ENTMCNC: 31.7 G/DL
MCHC RBC AUTO-ENTMCNC: 31.8 G/DL
MCHC RBC AUTO-ENTMCNC: 31.8 G/DL
MCHC RBC AUTO-ENTMCNC: 32 G/DL
MCHC RBC AUTO-ENTMCNC: 32.1 G/DL
MCHC RBC AUTO-ENTMCNC: 32.1 G/DL
MCHC RBC AUTO-ENTMCNC: 32.7 G/DL
MCHC RBC AUTO-ENTMCNC: 32.8 G/DL
MCHC RBC AUTO-ENTMCNC: 32.8 G/DL
MCHC RBC AUTO-ENTMCNC: 33.9 G/DL
MCHC RBC AUTO-ENTMCNC: 34.4 G/DL
MCHC RBC AUTO-ENTMCNC: 34.7 G/DL
MCV RBC AUTO: 74 FL
MCV RBC AUTO: 75 FL
MCV RBC AUTO: 75 FL
MCV RBC AUTO: 77 FL
MCV RBC AUTO: 78 FL
MCV RBC AUTO: 78 FL
MCV RBC AUTO: 79 FL
MCV RBC AUTO: 79 FL
MCV RBC AUTO: 80 FL
MCV RBC AUTO: 81 FL
MCV RBC AUTO: 82 FL
MCV RBC AUTO: 83 FL
MCV RBC AUTO: 84 FL
MICROSCOPIC COMMENT: ABNORMAL
MIN VOL: 6.37
MIN VOL: 6.6
MODE: ABNORMAL
MODE: NORMAL
MONOCYTES # BLD AUTO: 0.4 K/UL
MONOCYTES # BLD AUTO: 0.5 K/UL
MONOCYTES # BLD AUTO: 0.7 K/UL
MONOCYTES # BLD AUTO: 0.8 K/UL
MONOCYTES # BLD AUTO: 0.9 K/UL
MONOCYTES # BLD AUTO: 1 K/UL
MONOCYTES # BLD AUTO: 1 K/UL
MONOCYTES # BLD AUTO: 1.2 K/UL
MONOCYTES # BLD AUTO: 1.2 K/UL
MONOCYTES # BLD AUTO: 1.3 K/UL
MONOCYTES # BLD AUTO: 1.5 K/UL
MONOCYTES # BLD AUTO: 1.6 K/UL
MONOCYTES # BLD AUTO: 1.6 K/UL
MONOCYTES NFR BLD: 1.9 %
MONOCYTES NFR BLD: 1.9 %
MONOCYTES NFR BLD: 2.6 %
MONOCYTES NFR BLD: 3.2 %
MONOCYTES NFR BLD: 3.7 %
MONOCYTES NFR BLD: 4.3 %
MONOCYTES NFR BLD: 4.6 %
MONOCYTES NFR BLD: 5.7 %
MONOCYTES NFR BLD: 5.7 %
MONOCYTES NFR BLD: 5.9 %
MONOCYTES NFR BLD: 6.1 %
MONOCYTES NFR BLD: 6.3 %
MONOCYTES NFR BLD: 6.5 %
MONOCYTES NFR BLD: 8.3 %
MONOCYTES NFR BLD: 9.1 %
MONOCYTES NFR BLD: 9.3 %
NEUTROPHILS # BLD AUTO: 12.2 K/UL
NEUTROPHILS # BLD AUTO: 12.2 K/UL
NEUTROPHILS # BLD AUTO: 12.9 K/UL
NEUTROPHILS # BLD AUTO: 13.6 K/UL
NEUTROPHILS # BLD AUTO: 14.1 K/UL
NEUTROPHILS # BLD AUTO: 14.7 K/UL
NEUTROPHILS # BLD AUTO: 15.1 K/UL
NEUTROPHILS # BLD AUTO: 15.2 K/UL
NEUTROPHILS # BLD AUTO: 15.5 K/UL
NEUTROPHILS # BLD AUTO: 15.9 K/UL
NEUTROPHILS # BLD AUTO: 16.9 K/UL
NEUTROPHILS # BLD AUTO: 18.2 K/UL
NEUTROPHILS # BLD AUTO: 18.3 K/UL
NEUTROPHILS # BLD AUTO: 19.1 K/UL
NEUTROPHILS # BLD AUTO: 19.1 K/UL
NEUTROPHILS # BLD AUTO: 22.3 K/UL
NEUTROPHILS # BLD AUTO: 22.3 K/UL
NEUTROPHILS # BLD AUTO: 24.5 K/UL
NEUTROPHILS # BLD AUTO: 24.5 K/UL
NEUTROPHILS # BLD AUTO: 8.4 K/UL
NEUTROPHILS # BLD AUTO: 9.9 K/UL
NEUTROPHILS NFR BLD: 76.9 %
NEUTROPHILS NFR BLD: 79.6 %
NEUTROPHILS NFR BLD: 83.7 %
NEUTROPHILS NFR BLD: 85.5 %
NEUTROPHILS NFR BLD: 85.8 %
NEUTROPHILS NFR BLD: 85.8 %
NEUTROPHILS NFR BLD: 86.1 %
NEUTROPHILS NFR BLD: 86.1 %
NEUTROPHILS NFR BLD: 86.3 %
NEUTROPHILS NFR BLD: 86.3 %
NEUTROPHILS NFR BLD: 87.4 %
NEUTROPHILS NFR BLD: 87.8 %
NEUTROPHILS NFR BLD: 87.9 %
NEUTROPHILS NFR BLD: 88.2 %
NEUTROPHILS NFR BLD: 89 %
NEUTROPHILS NFR BLD: 89.7 %
NEUTROPHILS NFR BLD: 90.7 %
NEUTROPHILS NFR BLD: 92.3 %
NEUTROPHILS NFR BLD: 92.3 %
NEUTROPHILS NFR BLD: 93 %
NEUTROPHILS NFR BLD: 93.4 %
NITRITE UR QL STRIP: NEGATIVE
NRBC BLD-RTO: 0 /100 WBC
NRBC BLD-RTO: 1 /100 WBC
NRBC BLD-RTO: 1 /100 WBC
OSMOLALITY SERPL: 269 MOSM/KG
OSMOLALITY UR: 280 MOSM/KG
PCO2 BLDA: 23.9 MMHG (ref 35–45)
PCO2 BLDA: 33.8 MMHG (ref 35–45)
PCO2 BLDA: 34.5 MMHG (ref 35–45)
PCO2 BLDA: 36.3 MMHG (ref 35–45)
PCO2 BLDA: 36.9 MMHG (ref 35–45)
PCO2 BLDA: 37.9 MMHG (ref 35–45)
PCO2 BLDA: 38.5 MMHG (ref 35–45)
PCO2 BLDA: 39.3 MMHG (ref 35–45)
PCO2 BLDA: 39.5 MMHG (ref 35–45)
PCO2 BLDA: 42.5 MMHG (ref 35–45)
PCO2 BLDA: 44.3 MMHG (ref 35–45)
PCO2 BLDA: 45.5 MMHG (ref 35–45)
PCO2 BLDA: 45.7 MMHG (ref 35–45)
PCO2 BLDA: 48 MMHG (ref 35–45)
PCO2 BLDA: 48.4 MMHG (ref 35–45)
PCO2 BLDA: 49.6 MMHG (ref 35–45)
PCO2 BLDA: 52.5 MMHG (ref 35–45)
PCO2 BLDA: 56.2 MMHG (ref 35–45)
PCO2 BLDA: 79.2 MMHG (ref 35–45)
PEEP: 5
PH SMN: 7.1 [PH] (ref 7.35–7.45)
PH SMN: 7.19 [PH] (ref 7.35–7.45)
PH SMN: 7.26 [PH] (ref 7.35–7.45)
PH SMN: 7.28 [PH] (ref 7.35–7.45)
PH SMN: 7.29 [PH] (ref 7.35–7.45)
PH SMN: 7.31 [PH] (ref 7.35–7.45)
PH SMN: 7.33 [PH] (ref 7.35–7.45)
PH SMN: 7.34 [PH] (ref 7.35–7.45)
PH SMN: 7.35 [PH] (ref 7.35–7.45)
PH SMN: 7.35 [PH] (ref 7.35–7.45)
PH SMN: 7.37 [PH] (ref 7.35–7.45)
PH SMN: 7.39 [PH] (ref 7.35–7.45)
PH SMN: 7.4 [PH] (ref 7.35–7.45)
PH SMN: 7.42 [PH] (ref 7.35–7.45)
PH SMN: 7.42 [PH] (ref 7.35–7.45)
PH SMN: 7.44 [PH] (ref 7.35–7.45)
PH SMN: 7.46 [PH] (ref 7.35–7.45)
PH SMN: 7.48 [PH] (ref 7.35–7.45)
PH SMN: 7.52 [PH] (ref 7.35–7.45)
PH UR STRIP: 5 [PH] (ref 5–8)
PHOSPHATE SERPL-MCNC: 1.7 MG/DL
PHOSPHATE SERPL-MCNC: 1.9 MG/DL
PHOSPHATE SERPL-MCNC: 1.9 MG/DL
PHOSPHATE SERPL-MCNC: 2 MG/DL
PHOSPHATE SERPL-MCNC: 2.7 MG/DL
PHOSPHATE SERPL-MCNC: 2.9 MG/DL
PHOSPHATE SERPL-MCNC: 3 MG/DL
PHOSPHATE SERPL-MCNC: 3.1 MG/DL
PHOSPHATE SERPL-MCNC: 3.2 MG/DL
PHOSPHATE SERPL-MCNC: 3.4 MG/DL
PHOSPHATE SERPL-MCNC: 3.5 MG/DL
PHOSPHATE SERPL-MCNC: 3.8 MG/DL
PHOSPHATE SERPL-MCNC: 3.8 MG/DL
PHOSPHATE SERPL-MCNC: 4.1 MG/DL
PHOSPHATE SERPL-MCNC: 4.4 MG/DL
PHOSPHATE SERPL-MCNC: 4.5 MG/DL
PHOSPHATE SERPL-MCNC: 4.6 MG/DL
PHOSPHATE SERPL-MCNC: 4.9 MG/DL
PHOSPHATE SERPL-MCNC: 5 MG/DL
PHOSPHATE SERPL-MCNC: 5.1 MG/DL
PHOSPHATE SERPL-MCNC: 5.6 MG/DL
PHOSPHATE SERPL-MCNC: 5.7 MG/DL
PHOSPHATE SERPL-MCNC: 5.9 MG/DL
PHOSPHATE SERPL-MCNC: 6 MG/DL
PHOSPHATE SERPL-MCNC: 6 MG/DL
PHOSPHATE SERPL-MCNC: 6.1 MG/DL
PHOSPHATE SERPL-MCNC: 6.5 MG/DL
PHOSPHATE SERPL-MCNC: 7.1 MG/DL
PHOSPHATE SERPL-MCNC: 7.2 MG/DL
PHOSPHATE SERPL-MCNC: <1 MG/DL
PIP: 20
PIP: 25
PIP: 32
PLATELET # BLD AUTO: 143 K/UL
PLATELET # BLD AUTO: 158 K/UL
PLATELET # BLD AUTO: 177 K/UL
PLATELET # BLD AUTO: 180 K/UL
PLATELET # BLD AUTO: 183 K/UL
PLATELET # BLD AUTO: 193 K/UL
PLATELET # BLD AUTO: 193 K/UL
PLATELET # BLD AUTO: 198 K/UL
PLATELET # BLD AUTO: 209 K/UL
PLATELET # BLD AUTO: 211 K/UL
PLATELET # BLD AUTO: 213 K/UL
PLATELET # BLD AUTO: 213 K/UL
PLATELET # BLD AUTO: 221 K/UL
PLATELET # BLD AUTO: 221 K/UL
PLATELET # BLD AUTO: 225 K/UL
PLATELET # BLD AUTO: 254 K/UL
PLATELET # BLD AUTO: 264 K/UL
PLATELET # BLD AUTO: 264 K/UL
PLATELET # BLD AUTO: 265 K/UL
PLATELET # BLD AUTO: 265 K/UL
PLATELET # BLD AUTO: 273 K/UL
PLATELET # BLD AUTO: 273 K/UL
PMV BLD AUTO: 10 FL
PMV BLD AUTO: 10.4 FL
PMV BLD AUTO: 10.5 FL
PMV BLD AUTO: 10.5 FL
PMV BLD AUTO: 10.6 FL
PMV BLD AUTO: 10.7 FL
PMV BLD AUTO: 10.7 FL
PMV BLD AUTO: 10.8 FL
PMV BLD AUTO: 11.1 FL
PMV BLD AUTO: 11.3 FL
PMV BLD AUTO: 11.4 FL
PMV BLD AUTO: 9.5 FL
PMV BLD AUTO: 9.5 FL
PMV BLD AUTO: 9.6 FL
PMV BLD AUTO: 9.7 FL
PMV BLD AUTO: 9.8 FL
PMV BLD AUTO: 9.9 FL
PMV BLD AUTO: 9.9 FL
PO2 BLDA: 108 MMHG (ref 80–100)
PO2 BLDA: 125 MMHG (ref 80–100)
PO2 BLDA: 145 MMHG (ref 80–100)
PO2 BLDA: 161 MMHG (ref 80–100)
PO2 BLDA: 207 MMHG (ref 80–100)
PO2 BLDA: 223 MMHG (ref 80–100)
PO2 BLDA: 234 MMHG (ref 80–100)
PO2 BLDA: 31 MMHG (ref 40–60)
PO2 BLDA: 35 MMHG (ref 40–60)
PO2 BLDA: 39 MMHG (ref 40–60)
PO2 BLDA: 49 MMHG (ref 40–60)
PO2 BLDA: 66 MMHG (ref 80–100)
PO2 BLDA: 67 MMHG (ref 80–100)
PO2 BLDA: 72 MMHG (ref 80–100)
PO2 BLDA: 78 MMHG (ref 80–100)
PO2 BLDA: 78 MMHG (ref 80–100)
PO2 BLDA: 86 MMHG (ref 80–100)
PO2 BLDA: 88 MMHG (ref 80–100)
PO2 BLDA: 92 MMHG (ref 80–100)
POC BE: -1 MMOL/L
POC BE: -22 MMOL/L
POC BE: -3 MMOL/L
POC BE: -4 MMOL/L
POC BE: -6 MMOL/L
POC BE: -6 MMOL/L
POC BE: 0 MMOL/L
POC BE: 1 MMOL/L
POC BE: 2 MMOL/L
POC BE: 2 MMOL/L
POC BE: 3 MMOL/L
POC BE: 3 MMOL/L
POC BE: 5 MMOL/L
POC SATURATED O2: 100 % (ref 95–100)
POC SATURATED O2: 54 % (ref 95–100)
POC SATURATED O2: 65 % (ref 95–100)
POC SATURATED O2: 70 % (ref 95–100)
POC SATURATED O2: 77 % (ref 95–100)
POC SATURATED O2: 86 % (ref 95–100)
POC SATURATED O2: 91 % (ref 95–100)
POC SATURATED O2: 94 % (ref 95–100)
POC SATURATED O2: 96 % (ref 95–100)
POC SATURATED O2: 96 % (ref 95–100)
POC SATURATED O2: 97 % (ref 95–100)
POC SATURATED O2: 97 % (ref 95–100)
POC SATURATED O2: 98 % (ref 95–100)
POC SATURATED O2: 99 % (ref 95–100)
POC SATURATED O2: 99 % (ref 95–100)
POC TCO2: 21 MMOL/L (ref 23–27)
POC TCO2: 21 MMOL/L (ref 23–27)
POC TCO2: 24 MMOL/L (ref 23–27)
POC TCO2: 25 MMOL/L (ref 23–27)
POC TCO2: 25 MMOL/L (ref 23–27)
POC TCO2: 25 MMOL/L (ref 24–29)
POC TCO2: 26 MMOL/L (ref 23–27)
POC TCO2: 27 MMOL/L (ref 23–27)
POC TCO2: 28 MMOL/L (ref 23–27)
POC TCO2: 28 MMOL/L (ref 23–27)
POC TCO2: 28 MMOL/L (ref 24–29)
POC TCO2: 29 MMOL/L (ref 23–27)
POC TCO2: 29 MMOL/L (ref 24–29)
POC TCO2: 30 MMOL/L (ref 23–27)
POC TCO2: 32 MMOL/L (ref 23–27)
POC TCO2: 32 MMOL/L (ref 24–29)
POC TCO2: 8 MMOL/L (ref 23–27)
POCT GLUCOSE: 103 MG/DL (ref 70–110)
POCT GLUCOSE: 103 MG/DL (ref 70–110)
POCT GLUCOSE: 107 MG/DL (ref 70–110)
POCT GLUCOSE: 109 MG/DL (ref 70–110)
POCT GLUCOSE: 109 MG/DL (ref 70–110)
POCT GLUCOSE: 111 MG/DL (ref 70–110)
POCT GLUCOSE: 112 MG/DL (ref 70–110)
POCT GLUCOSE: 113 MG/DL (ref 70–110)
POCT GLUCOSE: 125 MG/DL (ref 70–110)
POCT GLUCOSE: 126 MG/DL (ref 70–110)
POCT GLUCOSE: 128 MG/DL (ref 70–110)
POCT GLUCOSE: 129 MG/DL (ref 70–110)
POCT GLUCOSE: 131 MG/DL (ref 70–110)
POCT GLUCOSE: 132 MG/DL (ref 70–110)
POCT GLUCOSE: 132 MG/DL (ref 70–110)
POCT GLUCOSE: 134 MG/DL (ref 70–110)
POCT GLUCOSE: 140 MG/DL (ref 70–110)
POCT GLUCOSE: 141 MG/DL (ref 70–110)
POCT GLUCOSE: 143 MG/DL (ref 70–110)
POCT GLUCOSE: 144 MG/DL (ref 70–110)
POCT GLUCOSE: 145 MG/DL (ref 70–110)
POCT GLUCOSE: 145 MG/DL (ref 70–110)
POCT GLUCOSE: 146 MG/DL (ref 70–110)
POCT GLUCOSE: 146 MG/DL (ref 70–110)
POCT GLUCOSE: 147 MG/DL (ref 70–110)
POCT GLUCOSE: 148 MG/DL (ref 70–110)
POCT GLUCOSE: 149 MG/DL (ref 70–110)
POCT GLUCOSE: 150 MG/DL (ref 70–110)
POCT GLUCOSE: 152 MG/DL (ref 70–110)
POCT GLUCOSE: 154 MG/DL (ref 70–110)
POCT GLUCOSE: 154 MG/DL (ref 70–110)
POCT GLUCOSE: 156 MG/DL (ref 70–110)
POCT GLUCOSE: 157 MG/DL (ref 70–110)
POCT GLUCOSE: 161 MG/DL (ref 70–110)
POCT GLUCOSE: 166 MG/DL (ref 70–110)
POCT GLUCOSE: 167 MG/DL (ref 70–110)
POCT GLUCOSE: 167 MG/DL (ref 70–110)
POCT GLUCOSE: 168 MG/DL (ref 70–110)
POCT GLUCOSE: 169 MG/DL (ref 70–110)
POCT GLUCOSE: 170 MG/DL (ref 70–110)
POCT GLUCOSE: 175 MG/DL (ref 70–110)
POCT GLUCOSE: 177 MG/DL (ref 70–110)
POCT GLUCOSE: 178 MG/DL (ref 70–110)
POCT GLUCOSE: 179 MG/DL (ref 70–110)
POCT GLUCOSE: 183 MG/DL (ref 70–110)
POCT GLUCOSE: 185 MG/DL (ref 70–110)
POCT GLUCOSE: 185 MG/DL (ref 70–110)
POCT GLUCOSE: 188 MG/DL (ref 70–110)
POCT GLUCOSE: 189 MG/DL (ref 70–110)
POCT GLUCOSE: 191 MG/DL (ref 70–110)
POCT GLUCOSE: 193 MG/DL (ref 70–110)
POCT GLUCOSE: 194 MG/DL (ref 70–110)
POCT GLUCOSE: 198 MG/DL (ref 70–110)
POCT GLUCOSE: 202 MG/DL (ref 70–110)
POCT GLUCOSE: 203 MG/DL (ref 70–110)
POCT GLUCOSE: 204 MG/DL (ref 70–110)
POCT GLUCOSE: 205 MG/DL (ref 70–110)
POCT GLUCOSE: 206 MG/DL (ref 70–110)
POCT GLUCOSE: 211 MG/DL (ref 70–110)
POCT GLUCOSE: 214 MG/DL (ref 70–110)
POCT GLUCOSE: 217 MG/DL (ref 70–110)
POCT GLUCOSE: 221 MG/DL (ref 70–110)
POCT GLUCOSE: 222 MG/DL (ref 70–110)
POCT GLUCOSE: 226 MG/DL (ref 70–110)
POCT GLUCOSE: 228 MG/DL (ref 70–110)
POCT GLUCOSE: 232 MG/DL (ref 70–110)
POCT GLUCOSE: 233 MG/DL (ref 70–110)
POCT GLUCOSE: 235 MG/DL (ref 70–110)
POCT GLUCOSE: 235 MG/DL (ref 70–110)
POCT GLUCOSE: 239 MG/DL (ref 70–110)
POCT GLUCOSE: 240 MG/DL (ref 70–110)
POCT GLUCOSE: 240 MG/DL (ref 70–110)
POCT GLUCOSE: 249 MG/DL (ref 70–110)
POCT GLUCOSE: 256 MG/DL (ref 70–110)
POCT GLUCOSE: 256 MG/DL (ref 70–110)
POCT GLUCOSE: 258 MG/DL (ref 70–110)
POCT GLUCOSE: 261 MG/DL (ref 70–110)
POCT GLUCOSE: 267 MG/DL (ref 70–110)
POCT GLUCOSE: 272 MG/DL (ref 70–110)
POCT GLUCOSE: 273 MG/DL (ref 70–110)
POCT GLUCOSE: 280 MG/DL (ref 70–110)
POCT GLUCOSE: 282 MG/DL (ref 70–110)
POCT GLUCOSE: 287 MG/DL (ref 70–110)
POCT GLUCOSE: 288 MG/DL (ref 70–110)
POCT GLUCOSE: 289 MG/DL (ref 70–110)
POCT GLUCOSE: 290 MG/DL (ref 70–110)
POCT GLUCOSE: 292 MG/DL (ref 70–110)
POCT GLUCOSE: 295 MG/DL (ref 70–110)
POCT GLUCOSE: 298 MG/DL (ref 70–110)
POCT GLUCOSE: 302 MG/DL (ref 70–110)
POCT GLUCOSE: 305 MG/DL (ref 70–110)
POCT GLUCOSE: 305 MG/DL (ref 70–110)
POCT GLUCOSE: 316 MG/DL (ref 70–110)
POCT GLUCOSE: 317 MG/DL (ref 70–110)
POCT GLUCOSE: 326 MG/DL (ref 70–110)
POCT GLUCOSE: 332 MG/DL (ref 70–110)
POCT GLUCOSE: 82 MG/DL (ref 70–110)
POCT GLUCOSE: 87 MG/DL (ref 70–110)
POCT GLUCOSE: 89 MG/DL (ref 70–110)
POCT GLUCOSE: 91 MG/DL (ref 70–110)
POCT GLUCOSE: 96 MG/DL (ref 70–110)
POCT GLUCOSE: 98 MG/DL (ref 70–110)
POCT GLUCOSE: 99 MG/DL (ref 70–110)
POIKILOCYTOSIS BLD QL SMEAR: SLIGHT
POIKILOCYTOSIS BLD QL SMEAR: SLIGHT
POTASSIUM SERPL-SCNC: 3.7 MMOL/L
POTASSIUM SERPL-SCNC: 3.8 MMOL/L
POTASSIUM SERPL-SCNC: 3.9 MMOL/L
POTASSIUM SERPL-SCNC: 3.9 MMOL/L
POTASSIUM SERPL-SCNC: 4 MMOL/L
POTASSIUM SERPL-SCNC: 4 MMOL/L
POTASSIUM SERPL-SCNC: 4.1 MMOL/L
POTASSIUM SERPL-SCNC: 4.2 MMOL/L
POTASSIUM SERPL-SCNC: 4.3 MMOL/L
POTASSIUM SERPL-SCNC: 4.4 MMOL/L
POTASSIUM SERPL-SCNC: 4.5 MMOL/L
POTASSIUM SERPL-SCNC: 4.6 MMOL/L
POTASSIUM SERPL-SCNC: 4.7 MMOL/L
POTASSIUM SERPL-SCNC: 4.8 MMOL/L
POTASSIUM SERPL-SCNC: 4.9 MMOL/L
POTASSIUM SERPL-SCNC: 5 MMOL/L
POTASSIUM SERPL-SCNC: 5 MMOL/L
POTASSIUM SERPL-SCNC: 5.1 MMOL/L
POTASSIUM SERPL-SCNC: 5.2 MMOL/L
POTASSIUM SERPL-SCNC: 5.2 MMOL/L
POTASSIUM SERPL-SCNC: 5.3 MMOL/L
POTASSIUM SERPL-SCNC: 5.3 MMOL/L
POTASSIUM SERPL-SCNC: 5.4 MMOL/L
POTASSIUM SERPL-SCNC: 5.6 MMOL/L
POTASSIUM SERPL-SCNC: 5.7 MMOL/L
POTASSIUM SERPL-SCNC: 6.1 MMOL/L
POTASSIUM UR-SCNC: 28 MMOL/L
POTASSIUM UR-SCNC: 34 MMOL/L
PROCALCITONIN SERPL IA-MCNC: 1.67 NG/ML
PROCALCITONIN SERPL IA-MCNC: 19.87 NG/ML
PROT SERPL-MCNC: 5 G/DL
PROT SERPL-MCNC: 5.2 G/DL
PROT SERPL-MCNC: 5.2 G/DL
PROT SERPL-MCNC: 5.3 G/DL
PROT SERPL-MCNC: 5.3 G/DL
PROT SERPL-MCNC: 5.4 G/DL
PROT SERPL-MCNC: 5.4 G/DL
PROT SERPL-MCNC: 5.5 G/DL
PROT SERPL-MCNC: 5.7 G/DL
PROT UR QL STRIP: NEGATIVE
PROTHROMBIN TIME: 11.7 SEC
PROTHROMBIN TIME: 11.9 SEC
PROTHROMBIN TIME: 11.9 SEC
PROTHROMBIN TIME: 12.1 SEC
PROTHROMBIN TIME: 12.2 SEC
PROTHROMBIN TIME: 12.3 SEC
PROTHROMBIN TIME: 12.4 SEC
PROTHROMBIN TIME: 12.6 SEC
PROTHROMBIN TIME: 13.3 SEC
PROTHROMBIN TIME: 13.3 SEC
PROTHROMBIN TIME: 13.8 SEC
PS: 5
RBC # BLD AUTO: 2.7 M/UL
RBC # BLD AUTO: 2.7 M/UL
RBC # BLD AUTO: 2.75 M/UL
RBC # BLD AUTO: 2.84 M/UL
RBC # BLD AUTO: 2.88 M/UL
RBC # BLD AUTO: 2.9 M/UL
RBC # BLD AUTO: 2.95 M/UL
RBC # BLD AUTO: 2.96 M/UL
RBC # BLD AUTO: 2.98 M/UL
RBC # BLD AUTO: 3.01 M/UL
RBC # BLD AUTO: 3.07 M/UL
RBC # BLD AUTO: 3.19 M/UL
RBC # BLD AUTO: 3.19 M/UL
RBC # BLD AUTO: 3.23 M/UL
RBC # BLD AUTO: 3.24 M/UL
RBC # BLD AUTO: 3.25 M/UL
RBC # BLD AUTO: 3.29 M/UL
RBC # BLD AUTO: 3.3 M/UL
RBC # BLD AUTO: 3.37 M/UL
RBC # BLD AUTO: 3.37 M/UL
RBC # BLD AUTO: 3.45 M/UL
RBC # BLD AUTO: 3.45 M/UL
RBC #/AREA URNS AUTO: 64 /HPF (ref 0–4)
RETIRED EF AND QEF - SEE NOTES: 55 (ref 55–65)
SAMPLE: ABNORMAL
SAMPLE: NORMAL
SITE: ABNORMAL
SITE: NORMAL
SODIUM SERPL-SCNC: 117 MMOL/L
SODIUM SERPL-SCNC: 118 MMOL/L
SODIUM SERPL-SCNC: 119 MMOL/L
SODIUM SERPL-SCNC: 120 MMOL/L
SODIUM SERPL-SCNC: 121 MMOL/L
SODIUM SERPL-SCNC: 121 MMOL/L
SODIUM SERPL-SCNC: 122 MMOL/L
SODIUM SERPL-SCNC: 123 MMOL/L
SODIUM SERPL-SCNC: 123 MMOL/L
SODIUM SERPL-SCNC: 124 MMOL/L
SODIUM SERPL-SCNC: 125 MMOL/L
SODIUM SERPL-SCNC: 125 MMOL/L
SODIUM SERPL-SCNC: 126 MMOL/L
SODIUM SERPL-SCNC: 127 MMOL/L
SODIUM SERPL-SCNC: 128 MMOL/L
SODIUM SERPL-SCNC: 129 MMOL/L
SODIUM SERPL-SCNC: 136 MMOL/L
SODIUM SERPL-SCNC: 136 MMOL/L
SODIUM SERPL-SCNC: 137 MMOL/L
SODIUM SERPL-SCNC: 137 MMOL/L
SODIUM SERPL-SCNC: 138 MMOL/L
SODIUM SERPL-SCNC: 139 MMOL/L
SODIUM SERPL-SCNC: 140 MMOL/L
SODIUM SERPL-SCNC: 141 MMOL/L
SODIUM SERPL-SCNC: 142 MMOL/L
SODIUM SERPL-SCNC: 143 MMOL/L
SODIUM SERPL-SCNC: 144 MMOL/L
SODIUM UR-SCNC: <20 MMOL/L
SP GR UR STRIP: 1.02 (ref 1–1.03)
SP02: 100
SP02: 94
SP02: 97
SP02: 99
SP02: 99
SPONT RATE: 14
SPONT RATE: 18
SPONT RATE: 23
SQUAMOUS #/AREA URNS AUTO: 1 /HPF
T4 SERPL-MCNC: 3.2 UG/DL
TRANS ERYTHROCYTES VOL PATIENT: NORMAL ML
TRICUSPID VALVE REGURGITATION: ABNORMAL
TROPONIN I SERPL DL<=0.01 NG/ML-MCNC: 0.01 NG/ML
TROPONIN I SERPL DL<=0.01 NG/ML-MCNC: 0.03 NG/ML
TROPONIN I SERPL DL<=0.01 NG/ML-MCNC: 0.24 NG/ML
TROPONIN I SERPL DL<=0.01 NG/ML-MCNC: 0.29 NG/ML
TSH SERPL DL<=0.005 MIU/L-ACNC: 3.96 UIU/ML
URN SPEC COLLECT METH UR: ABNORMAL
UROBILINOGEN UR STRIP-ACNC: NEGATIVE EU/DL
UUN UR-MCNC: 143 MG/DL
UUN UR-MCNC: 303 MG/DL
UUN UR-MCNC: 369 MG/DL
VANCOMYCIN SERPL-MCNC: 10.1 UG/ML
VANCOMYCIN SERPL-MCNC: 13.1 UG/ML
VANCOMYCIN SERPL-MCNC: 19.3 UG/ML
VANCOMYCIN SERPL-MCNC: 19.5 UG/ML
VANCOMYCIN SERPL-MCNC: 20 UG/ML
VANCOMYCIN SERPL-MCNC: 20.5 UG/ML
VANCOMYCIN SERPL-MCNC: 22 UG/ML
VANCOMYCIN SERPL-MCNC: 22.3 UG/ML
VANCOMYCIN SERPL-MCNC: 27 UG/ML
VANCOMYCIN SERPL-MCNC: 27.7 UG/ML
VANCOMYCIN SERPL-MCNC: 33 UG/ML
VANCOMYCIN TROUGH SERPL-MCNC: 22.6 UG/ML
VT: 380
VT: 400
WBC # BLD AUTO: 10.57 K/UL
WBC # BLD AUTO: 12.85 K/UL
WBC # BLD AUTO: 13.7 K/UL
WBC # BLD AUTO: 14.2 K/UL
WBC # BLD AUTO: 14.24 K/UL
WBC # BLD AUTO: 14.24 K/UL
WBC # BLD AUTO: 15.84 K/UL
WBC # BLD AUTO: 16.02 K/UL
WBC # BLD AUTO: 16.11 K/UL
WBC # BLD AUTO: 16.65 K/UL
WBC # BLD AUTO: 17.36 K/UL
WBC # BLD AUTO: 17.56 K/UL
WBC # BLD AUTO: 18.13 K/UL
WBC # BLD AUTO: 19.17 K/UL
WBC # BLD AUTO: 20.25 K/UL
WBC # BLD AUTO: 20.58 K/UL
WBC # BLD AUTO: 22.1 K/UL
WBC # BLD AUTO: 22.1 K/UL
WBC # BLD AUTO: 24.16 K/UL
WBC # BLD AUTO: 24.16 K/UL
WBC # BLD AUTO: 28.53 K/UL
WBC # BLD AUTO: 28.53 K/UL
WBC # FLD: NORMAL /CU MM
WBC #/AREA URNS AUTO: 48 /HPF (ref 0–5)
WBC CLUMPS UR QL AUTO: ABNORMAL
YEAST UR QL AUTO: ABNORMAL

## 2018-01-01 PROCEDURE — 99233 SBSQ HOSP IP/OBS HIGH 50: CPT | Mod: ,,, | Performed by: INTERNAL MEDICINE

## 2018-01-01 PROCEDURE — 36620 INSERTION CATHETER ARTERY: CPT

## 2018-01-01 PROCEDURE — 80048 BASIC METABOLIC PNL TOTAL CA: CPT | Mod: 91

## 2018-01-01 PROCEDURE — 92610 EVALUATE SWALLOWING FUNCTION: CPT

## 2018-01-01 PROCEDURE — 63600175 PHARM REV CODE 636 W HCPCS: Performed by: PHYSICIAN ASSISTANT

## 2018-01-01 PROCEDURE — 86901 BLOOD TYPING SEROLOGIC RH(D): CPT

## 2018-01-01 PROCEDURE — 99900035 HC TECH TIME PER 15 MIN (STAT)

## 2018-01-01 PROCEDURE — 63600175 PHARM REV CODE 636 W HCPCS: Performed by: HOSPITALIST

## 2018-01-01 PROCEDURE — 94660 CPAP INITIATION&MGMT: CPT

## 2018-01-01 PROCEDURE — 99291 CRITICAL CARE FIRST HOUR: CPT | Mod: ,,, | Performed by: PHYSICIAN ASSISTANT

## 2018-01-01 PROCEDURE — 86920 COMPATIBILITY TEST SPIN: CPT

## 2018-01-01 PROCEDURE — 43752 NASAL/OROGASTRIC W/TUBE PLMT: CPT

## 2018-01-01 PROCEDURE — 87040 BLOOD CULTURE FOR BACTERIA: CPT | Mod: 59

## 2018-01-01 PROCEDURE — 83880 ASSAY OF NATRIURETIC PEPTIDE: CPT

## 2018-01-01 PROCEDURE — 99233 SBSQ HOSP IP/OBS HIGH 50: CPT | Mod: ,,, | Performed by: PHYSICIAN ASSISTANT

## 2018-01-01 PROCEDURE — 83605 ASSAY OF LACTIC ACID: CPT

## 2018-01-01 PROCEDURE — 87070 CULTURE OTHR SPECIMN AEROBIC: CPT

## 2018-01-01 PROCEDURE — 84443 ASSAY THYROID STIM HORMONE: CPT

## 2018-01-01 PROCEDURE — 20000000 HC ICU ROOM

## 2018-01-01 PROCEDURE — 84100 ASSAY OF PHOSPHORUS: CPT

## 2018-01-01 PROCEDURE — 85025 COMPLETE CBC W/AUTO DIFF WBC: CPT

## 2018-01-01 PROCEDURE — 27000190 HC CPAP FULL FACE MASK W/VALVE

## 2018-01-01 PROCEDURE — 25000003 PHARM REV CODE 250: Performed by: NURSE PRACTITIONER

## 2018-01-01 PROCEDURE — 37799 UNLISTED PX VASCULAR SURGERY: CPT

## 2018-01-01 PROCEDURE — 80076 HEPATIC FUNCTION PANEL: CPT

## 2018-01-01 PROCEDURE — 94761 N-INVAS EAR/PLS OXIMETRY MLT: CPT

## 2018-01-01 PROCEDURE — 25000242 PHARM REV CODE 250 ALT 637 W/ HCPCS: Performed by: NURSE PRACTITIONER

## 2018-01-01 PROCEDURE — 83880 ASSAY OF NATRIURETIC PEPTIDE: CPT | Mod: 91

## 2018-01-01 PROCEDURE — 85610 PROTHROMBIN TIME: CPT

## 2018-01-01 PROCEDURE — 25000003 PHARM REV CODE 250: Performed by: GENERAL ACUTE CARE HOSPITAL

## 2018-01-01 PROCEDURE — 27000221 HC OXYGEN, UP TO 24 HOURS

## 2018-01-01 PROCEDURE — 85730 THROMBOPLASTIN TIME PARTIAL: CPT

## 2018-01-01 PROCEDURE — 82803 BLOOD GASES ANY COMBINATION: CPT

## 2018-01-01 PROCEDURE — 25000003 PHARM REV CODE 250: Performed by: STUDENT IN AN ORGANIZED HEALTH CARE EDUCATION/TRAINING PROGRAM

## 2018-01-01 PROCEDURE — 90945 DIALYSIS ONE EVALUATION: CPT

## 2018-01-01 PROCEDURE — 84133 ASSAY OF URINE POTASSIUM: CPT

## 2018-01-01 PROCEDURE — 25000003 PHARM REV CODE 250: Performed by: INTERNAL MEDICINE

## 2018-01-01 PROCEDURE — 83735 ASSAY OF MAGNESIUM: CPT | Mod: 91

## 2018-01-01 PROCEDURE — 99499 UNLISTED E&M SERVICE: CPT | Mod: ,,, | Performed by: NEUROLOGICAL SURGERY

## 2018-01-01 PROCEDURE — 90945 DIALYSIS ONE EVALUATION: CPT | Mod: GC,,, | Performed by: INTERNAL MEDICINE

## 2018-01-01 PROCEDURE — 80069 RENAL FUNCTION PANEL: CPT

## 2018-01-01 PROCEDURE — 99233 SBSQ HOSP IP/OBS HIGH 50: CPT | Mod: ,,, | Performed by: GENERAL ACUTE CARE HOSPITAL

## 2018-01-01 PROCEDURE — 94010 BREATHING CAPACITY TEST: CPT

## 2018-01-01 PROCEDURE — 5A1955Z RESPIRATORY VENTILATION, GREATER THAN 96 CONSECUTIVE HOURS: ICD-10-PCS | Performed by: INTERNAL MEDICINE

## 2018-01-01 PROCEDURE — 97530 THERAPEUTIC ACTIVITIES: CPT

## 2018-01-01 PROCEDURE — 85730 THROMBOPLASTIN TIME PARTIAL: CPT | Mod: 91

## 2018-01-01 PROCEDURE — 94640 AIRWAY INHALATION TREATMENT: CPT

## 2018-01-01 PROCEDURE — 25000003 PHARM REV CODE 250: Performed by: PHYSICIAN ASSISTANT

## 2018-01-01 PROCEDURE — 85027 COMPLETE CBC AUTOMATED: CPT | Mod: 91

## 2018-01-01 PROCEDURE — 83735 ASSAY OF MAGNESIUM: CPT

## 2018-01-01 PROCEDURE — 80100008 HC CRRT DAILY MAINTENANCE

## 2018-01-01 PROCEDURE — P9021 RED BLOOD CELLS UNIT: HCPCS

## 2018-01-01 PROCEDURE — 63600175 PHARM REV CODE 636 W HCPCS: Performed by: STUDENT IN AN ORGANIZED HEALTH CARE EDUCATION/TRAINING PROGRAM

## 2018-01-01 PROCEDURE — 27100171 HC OXYGEN HIGH FLOW UP TO 24 HOURS

## 2018-01-01 PROCEDURE — 99291 CRITICAL CARE FIRST HOUR: CPT | Mod: GC,,, | Performed by: INTERNAL MEDICINE

## 2018-01-01 PROCEDURE — 63600175 PHARM REV CODE 636 W HCPCS: Performed by: INTERNAL MEDICINE

## 2018-01-01 PROCEDURE — 25000003 PHARM REV CODE 250

## 2018-01-01 PROCEDURE — 84300 ASSAY OF URINE SODIUM: CPT

## 2018-01-01 PROCEDURE — 97606 NEG PRS WND THER DME>50 SQCM: CPT

## 2018-01-01 PROCEDURE — 63600175 PHARM REV CODE 636 W HCPCS: Performed by: NURSE PRACTITIONER

## 2018-01-01 PROCEDURE — 99233 SBSQ HOSP IP/OBS HIGH 50: CPT | Mod: GC,,, | Performed by: HOSPITALIST

## 2018-01-01 PROCEDURE — C9113 INJ PANTOPRAZOLE SODIUM, VIA: HCPCS | Performed by: PHYSICIAN ASSISTANT

## 2018-01-01 PROCEDURE — 93010 ELECTROCARDIOGRAM REPORT: CPT | Mod: ,,, | Performed by: INTERNAL MEDICINE

## 2018-01-01 PROCEDURE — 36556 INSERT NON-TUNNEL CV CATH: CPT

## 2018-01-01 PROCEDURE — 84145 PROCALCITONIN (PCT): CPT

## 2018-01-01 PROCEDURE — G8997 SWALLOW GOAL STATUS: HCPCS | Mod: CI

## 2018-01-01 PROCEDURE — 80202 ASSAY OF VANCOMYCIN: CPT

## 2018-01-01 PROCEDURE — 90945 DIALYSIS ONE EVALUATION: CPT | Mod: ,,, | Performed by: INTERNAL MEDICINE

## 2018-01-01 PROCEDURE — 99232 SBSQ HOSP IP/OBS MODERATE 35: CPT | Mod: ,,, | Performed by: INTERNAL MEDICINE

## 2018-01-01 PROCEDURE — 94003 VENT MGMT INPAT SUBQ DAY: CPT

## 2018-01-01 PROCEDURE — 82533 TOTAL CORTISOL: CPT

## 2018-01-01 PROCEDURE — 93306 TTE W/DOPPLER COMPLETE: CPT | Mod: 26,,, | Performed by: INTERNAL MEDICINE

## 2018-01-01 PROCEDURE — 99223 1ST HOSP IP/OBS HIGH 75: CPT | Mod: ,,, | Performed by: INTERNAL MEDICINE

## 2018-01-01 PROCEDURE — 83036 HEMOGLOBIN GLYCOSYLATED A1C: CPT

## 2018-01-01 PROCEDURE — 86140 C-REACTIVE PROTEIN: CPT

## 2018-01-01 PROCEDURE — 99291 CRITICAL CARE FIRST HOUR: CPT | Mod: ,,, | Performed by: NURSE PRACTITIONER

## 2018-01-01 PROCEDURE — 80048 BASIC METABOLIC PNL TOTAL CA: CPT

## 2018-01-01 PROCEDURE — 76937 US GUIDE VASCULAR ACCESS: CPT

## 2018-01-01 PROCEDURE — 99900026 HC AIRWAY MAINTENANCE (STAT)

## 2018-01-01 PROCEDURE — 85652 RBC SED RATE AUTOMATED: CPT

## 2018-01-01 PROCEDURE — 27200966 HC CLOSED SUCTION SYSTEM

## 2018-01-01 PROCEDURE — G8996 SWALLOW CURRENT STATUS: HCPCS | Mod: CK

## 2018-01-01 PROCEDURE — 83930 ASSAY OF BLOOD OSMOLALITY: CPT

## 2018-01-01 PROCEDURE — 99291 CRITICAL CARE FIRST HOUR: CPT | Mod: 25,GC,, | Performed by: INTERNAL MEDICINE

## 2018-01-01 PROCEDURE — 87205 SMEAR GRAM STAIN: CPT | Mod: 59

## 2018-01-01 PROCEDURE — 36620 INSERTION CATHETER ARTERY: CPT | Mod: 59,,, | Performed by: INTERNAL MEDICINE

## 2018-01-01 PROCEDURE — 36415 COLL VENOUS BLD VENIPUNCTURE: CPT

## 2018-01-01 PROCEDURE — 84540 ASSAY OF URINE/UREA-N: CPT

## 2018-01-01 PROCEDURE — 36593 DECLOT VASCULAR DEVICE: CPT

## 2018-01-01 PROCEDURE — 25000003 PHARM REV CODE 250: Performed by: HOSPITALIST

## 2018-01-01 PROCEDURE — 25000242 PHARM REV CODE 250 ALT 637 W/ HCPCS

## 2018-01-01 PROCEDURE — 87075 CULTR BACTERIA EXCEPT BLOOD: CPT

## 2018-01-01 PROCEDURE — 83935 ASSAY OF URINE OSMOLALITY: CPT

## 2018-01-01 PROCEDURE — 99292 CRITICAL CARE ADDL 30 MIN: CPT | Mod: 25,,, | Performed by: NURSE PRACTITIONER

## 2018-01-01 PROCEDURE — 25000242 PHARM REV CODE 250 ALT 637 W/ HCPCS: Performed by: STUDENT IN AN ORGANIZED HEALTH CARE EDUCATION/TRAINING PROGRAM

## 2018-01-01 PROCEDURE — 99232 SBSQ HOSP IP/OBS MODERATE 35: CPT | Mod: ,,, | Performed by: PHYSICIAN ASSISTANT

## 2018-01-01 PROCEDURE — 36600 WITHDRAWAL OF ARTERIAL BLOOD: CPT

## 2018-01-01 PROCEDURE — 87040 BLOOD CULTURE FOR BACTERIA: CPT

## 2018-01-01 PROCEDURE — 82570 ASSAY OF URINE CREATININE: CPT

## 2018-01-01 PROCEDURE — 0BH17EZ INSERTION OF ENDOTRACHEAL AIRWAY INTO TRACHEA, VIA NATURAL OR ARTIFICIAL OPENING: ICD-10-PCS | Performed by: INTERNAL MEDICINE

## 2018-01-01 PROCEDURE — 84484 ASSAY OF TROPONIN QUANT: CPT

## 2018-01-01 PROCEDURE — 63600175 PHARM REV CODE 636 W HCPCS: Mod: JG | Performed by: INTERNAL MEDICINE

## 2018-01-01 PROCEDURE — 94002 VENT MGMT INPAT INIT DAY: CPT

## 2018-01-01 PROCEDURE — 81001 URINALYSIS AUTO W/SCOPE: CPT

## 2018-01-01 PROCEDURE — 93005 ELECTROCARDIOGRAM TRACING: CPT

## 2018-01-01 PROCEDURE — 80069 RENAL FUNCTION PANEL: CPT | Mod: 91

## 2018-01-01 PROCEDURE — 11000001 HC ACUTE MED/SURG PRIVATE ROOM

## 2018-01-01 PROCEDURE — 93306 TTE W/DOPPLER COMPLETE: CPT

## 2018-01-01 PROCEDURE — 99499 UNLISTED E&M SERVICE: CPT | Mod: ,,, | Performed by: PHYSICIAN ASSISTANT

## 2018-01-01 PROCEDURE — 87076 CULTURE ANAEROBE IDENT EACH: CPT

## 2018-01-01 PROCEDURE — P9047 ALBUMIN (HUMAN), 25%, 50ML: HCPCS | Mod: JG | Performed by: INTERNAL MEDICINE

## 2018-01-01 PROCEDURE — 97162 PT EVAL MOD COMPLEX 30 MIN: CPT

## 2018-01-01 PROCEDURE — 99222 1ST HOSP IP/OBS MODERATE 55: CPT | Mod: ,,, | Performed by: INTERNAL MEDICINE

## 2018-01-01 PROCEDURE — 93041 RHYTHM ECG TRACING: CPT

## 2018-01-01 PROCEDURE — 85610 PROTHROMBIN TIME: CPT | Mod: 91

## 2018-01-01 PROCEDURE — 94150 VITAL CAPACITY TEST: CPT

## 2018-01-01 PROCEDURE — 36800 INSERTION OF CANNULA: CPT | Mod: ,,, | Performed by: NURSE PRACTITIONER

## 2018-01-01 PROCEDURE — 36620 INSERTION CATHETER ARTERY: CPT | Mod: 59,,, | Performed by: NURSE PRACTITIONER

## 2018-01-01 PROCEDURE — 99292 CRITICAL CARE ADDL 30 MIN: CPT | Mod: 25,,, | Performed by: INTERNAL MEDICINE

## 2018-01-01 PROCEDURE — 87106 FUNGI IDENTIFICATION YEAST: CPT

## 2018-01-01 PROCEDURE — 94644 CONT INHLJ TX 1ST HOUR: CPT

## 2018-01-01 PROCEDURE — 63600175 PHARM REV CODE 636 W HCPCS

## 2018-01-01 PROCEDURE — 99223 1ST HOSP IP/OBS HIGH 75: CPT | Mod: ,,, | Performed by: HOSPITALIST

## 2018-01-01 PROCEDURE — 31500 INSERT EMERGENCY AIRWAY: CPT | Mod: ,,, | Performed by: INTERNAL MEDICINE

## 2018-01-01 PROCEDURE — 97166 OT EVAL MOD COMPLEX 45 MIN: CPT

## 2018-01-01 PROCEDURE — 009U3ZZ DRAINAGE OF SPINAL CANAL, PERCUTANEOUS APPROACH: ICD-10-PCS | Performed by: RADIOLOGY

## 2018-01-01 PROCEDURE — 27100092 HC HIGH FLOW DELIVERY CANNULA

## 2018-01-01 PROCEDURE — S0030 INJECTION, METRONIDAZOLE: HCPCS | Performed by: PHYSICIAN ASSISTANT

## 2018-01-01 PROCEDURE — 02HV33Z INSERTION OF INFUSION DEVICE INTO SUPERIOR VENA CAVA, PERCUTANEOUS APPROACH: ICD-10-PCS | Performed by: INTERNAL MEDICINE

## 2018-01-01 PROCEDURE — 89051 BODY FLUID CELL COUNT: CPT

## 2018-01-01 PROCEDURE — 84436 ASSAY OF TOTAL THYROXINE: CPT

## 2018-01-01 PROCEDURE — 87088 URINE BACTERIA CULTURE: CPT

## 2018-01-01 PROCEDURE — 63600175 PHARM REV CODE 636 W HCPCS: Performed by: GENERAL ACUTE CARE HOSPITAL

## 2018-01-01 PROCEDURE — 85025 COMPLETE CBC W/AUTO DIFF WBC: CPT | Mod: 91

## 2018-01-01 PROCEDURE — 87086 URINE CULTURE/COLONY COUNT: CPT

## 2018-01-01 PROCEDURE — 99291 CRITICAL CARE FIRST HOUR: CPT | Mod: 25,,, | Performed by: INTERNAL MEDICINE

## 2018-01-01 PROCEDURE — 36569 INSJ PICC 5 YR+ W/O IMAGING: CPT

## 2018-01-01 PROCEDURE — C1751 CATH, INF, PER/CENT/MIDLINE: HCPCS

## 2018-01-01 PROCEDURE — 99291 CRITICAL CARE FIRST HOUR: CPT | Mod: 25,,, | Performed by: NURSE PRACTITIONER

## 2018-01-01 PROCEDURE — 99231 SBSQ HOSP IP/OBS SF/LOW 25: CPT | Mod: GC,,, | Performed by: NEUROLOGICAL SURGERY

## 2018-01-01 PROCEDURE — 87102 FUNGUS ISOLATION CULTURE: CPT

## 2018-01-01 PROCEDURE — 99233 SBSQ HOSP IP/OBS HIGH 50: CPT | Mod: ,,, | Performed by: HOSPITALIST

## 2018-01-01 PROCEDURE — 84132 ASSAY OF SERUM POTASSIUM: CPT

## 2018-01-01 PROCEDURE — 99292 CRITICAL CARE ADDL 30 MIN: CPT | Mod: ,,, | Performed by: INTERNAL MEDICINE

## 2018-01-01 PROCEDURE — 84484 ASSAY OF TROPONIN QUANT: CPT | Mod: 91

## 2018-01-01 RX ORDER — MAGNESIUM SULFATE HEPTAHYDRATE 40 MG/ML
2 INJECTION, SOLUTION INTRAVENOUS ONCE
Status: COMPLETED | OUTPATIENT
Start: 2018-01-01 | End: 2018-01-01

## 2018-01-01 RX ORDER — HYDROCODONE BITARTRATE AND ACETAMINOPHEN 500; 5 MG/1; MG/1
TABLET ORAL
Status: DISCONTINUED | OUTPATIENT
Start: 2018-01-01 | End: 2018-01-01 | Stop reason: HOSPADM

## 2018-01-01 RX ORDER — HEPARIN SODIUM 5000 [USP'U]/ML
5000 INJECTION, SOLUTION INTRAVENOUS; SUBCUTANEOUS EVERY 8 HOURS
Status: DISCONTINUED | OUTPATIENT
Start: 2018-01-01 | End: 2018-01-01

## 2018-01-01 RX ORDER — INSULIN ASPART 100 [IU]/ML
3 INJECTION, SOLUTION INTRAVENOUS; SUBCUTANEOUS
Status: DISCONTINUED | OUTPATIENT
Start: 2018-01-01 | End: 2018-01-01 | Stop reason: HOSPADM

## 2018-01-01 RX ORDER — ETOMIDATE 2 MG/ML
INJECTION INTRAVENOUS
Status: DISCONTINUED
Start: 2018-01-01 | End: 2018-01-01 | Stop reason: WASHOUT

## 2018-01-01 RX ORDER — PROPOFOL 10 MG/ML
5 INJECTION, EMULSION INTRAVENOUS CONTINUOUS
Status: DISCONTINUED | OUTPATIENT
Start: 2018-01-01 | End: 2018-01-01

## 2018-01-01 RX ORDER — FUROSEMIDE 40 MG/1
40 TABLET ORAL EVERY MORNING
Status: DISCONTINUED | OUTPATIENT
Start: 2018-01-01 | End: 2018-01-01

## 2018-01-01 RX ORDER — IBUPROFEN 200 MG
16 TABLET ORAL
Status: DISCONTINUED | OUTPATIENT
Start: 2018-01-01 | End: 2018-01-01 | Stop reason: HOSPADM

## 2018-01-01 RX ORDER — OXYCODONE HYDROCHLORIDE 5 MG/1
10 TABLET ORAL EVERY 8 HOURS PRN
Status: DISCONTINUED | OUTPATIENT
Start: 2018-01-01 | End: 2018-01-01

## 2018-01-01 RX ORDER — IPRATROPIUM BROMIDE AND ALBUTEROL SULFATE 2.5; .5 MG/3ML; MG/3ML
SOLUTION RESPIRATORY (INHALATION)
Status: COMPLETED
Start: 2018-01-01 | End: 2018-01-01

## 2018-01-01 RX ORDER — SUCCINYLCHOLINE CHLORIDE 20 MG/ML
INJECTION INTRAMUSCULAR; INTRAVENOUS
Status: DISCONTINUED
Start: 2018-01-01 | End: 2018-01-01 | Stop reason: WASHOUT

## 2018-01-01 RX ORDER — SPIRONOLACTONE 50 MG/1
50 TABLET, FILM COATED ORAL DAILY
COMMUNITY

## 2018-01-01 RX ORDER — PANTOPRAZOLE SODIUM 40 MG/1
40 FOR SUSPENSION ORAL DAILY
Status: DISCONTINUED | OUTPATIENT
Start: 2018-01-01 | End: 2018-01-01 | Stop reason: HOSPADM

## 2018-01-01 RX ORDER — INSULIN ASPART 100 [IU]/ML
6 INJECTION, SOLUTION INTRAVENOUS; SUBCUTANEOUS 2 TIMES DAILY
Status: DISCONTINUED | OUTPATIENT
Start: 2018-01-01 | End: 2018-01-01

## 2018-01-01 RX ORDER — PHENYLEPHRINE HCL IN 0.9% NACL 1 MG/10 ML
SYRINGE (ML) INTRAVENOUS
Status: COMPLETED
Start: 2018-01-01 | End: 2018-01-01

## 2018-01-01 RX ORDER — ATORVASTATIN CALCIUM 20 MG/1
40 TABLET, FILM COATED ORAL NIGHTLY
Status: DISCONTINUED | OUTPATIENT
Start: 2018-01-01 | End: 2018-01-01

## 2018-01-01 RX ORDER — NOREPINEPHRINE BITARTRATE/D5W 4MG/250ML
0.02 PLASTIC BAG, INJECTION (ML) INTRAVENOUS CONTINUOUS
Status: DISCONTINUED | OUTPATIENT
Start: 2018-01-01 | End: 2018-01-01

## 2018-01-01 RX ORDER — HYDROCODONE BITARTRATE AND ACETAMINOPHEN 7.5; 325 MG/1; MG/1
1 TABLET ORAL EVERY 6 HOURS PRN
Status: DISCONTINUED | OUTPATIENT
Start: 2018-01-01 | End: 2018-01-01

## 2018-01-01 RX ORDER — CLOPIDOGREL BISULFATE 75 MG/1
75 TABLET ORAL EVERY MORNING
Status: DISCONTINUED | OUTPATIENT
Start: 2018-01-01 | End: 2018-01-01

## 2018-01-01 RX ORDER — CARVEDILOL 3.12 MG/1
3.12 TABLET ORAL 2 TIMES DAILY
Status: DISCONTINUED | OUTPATIENT
Start: 2018-01-01 | End: 2018-01-01

## 2018-01-01 RX ORDER — NOREPINEPHRINE BITARTRATE/D5W 4MG/250ML
PLASTIC BAG, INJECTION (ML) INTRAVENOUS
Status: COMPLETED
Start: 2018-01-01 | End: 2018-01-01

## 2018-01-01 RX ORDER — HEPARIN SODIUM,PORCINE/D5W 25000/250
12 INTRAVENOUS SOLUTION INTRAVENOUS CONTINUOUS
Status: DISCONTINUED | OUTPATIENT
Start: 2018-01-01 | End: 2018-01-01

## 2018-01-01 RX ORDER — CEFEPIME HYDROCHLORIDE 2 G/1
2 INJECTION, POWDER, FOR SOLUTION INTRAVENOUS EVERY 24 HOURS
Status: DISCONTINUED | OUTPATIENT
Start: 2018-01-01 | End: 2018-01-01

## 2018-01-01 RX ORDER — GABAPENTIN 100 MG/1
100 CAPSULE ORAL 3 TIMES DAILY
Status: DISCONTINUED | OUTPATIENT
Start: 2018-01-01 | End: 2018-01-01

## 2018-01-01 RX ORDER — SEVELAMER CARBONATE 800 MG/1
800 TABLET, FILM COATED ORAL
Status: DISCONTINUED | OUTPATIENT
Start: 2018-01-01 | End: 2018-01-01

## 2018-01-01 RX ORDER — ROCURONIUM BROMIDE 10 MG/ML
INJECTION, SOLUTION INTRAVENOUS
Status: COMPLETED
Start: 2018-01-01 | End: 2018-01-01

## 2018-01-01 RX ORDER — FUROSEMIDE 10 MG/ML
160 INJECTION INTRAMUSCULAR; INTRAVENOUS 2 TIMES DAILY
Status: DISCONTINUED | OUTPATIENT
Start: 2018-01-01 | End: 2018-01-01

## 2018-01-01 RX ORDER — HYDROCODONE BITARTRATE AND ACETAMINOPHEN 500; 5 MG/1; MG/1
TABLET ORAL CONTINUOUS
Status: ACTIVE | OUTPATIENT
Start: 2018-01-01 | End: 2018-01-01

## 2018-01-01 RX ORDER — CHLORHEXIDINE GLUCONATE ORAL RINSE 1.2 MG/ML
15 SOLUTION DENTAL 2 TIMES DAILY
Status: DISCONTINUED | OUTPATIENT
Start: 2018-01-01 | End: 2018-01-01

## 2018-01-01 RX ORDER — METOPROLOL TARTRATE 1 MG/ML
5 INJECTION, SOLUTION INTRAVENOUS ONCE
Status: COMPLETED | OUTPATIENT
Start: 2018-01-01 | End: 2018-01-01

## 2018-01-01 RX ORDER — MICONAZOLE NITRATE 2 %
POWDER (GRAM) TOPICAL 2 TIMES DAILY
Status: DISCONTINUED | OUTPATIENT
Start: 2018-01-01 | End: 2018-01-01 | Stop reason: HOSPADM

## 2018-01-01 RX ORDER — HALOPERIDOL 5 MG/ML
5 INJECTION INTRAMUSCULAR ONCE
Status: COMPLETED | OUTPATIENT
Start: 2018-01-01 | End: 2018-01-01

## 2018-01-01 RX ORDER — EPINEPHRINE 0.1 MG/ML
0.5 INJECTION INTRAVENOUS ONCE
Status: DISCONTINUED | OUTPATIENT
Start: 2018-01-01 | End: 2018-01-01 | Stop reason: HOSPADM

## 2018-01-01 RX ORDER — METOPROLOL TARTRATE 1 MG/ML
INJECTION, SOLUTION INTRAVENOUS
Status: DISPENSED
Start: 2018-01-01 | End: 2018-01-01

## 2018-01-01 RX ORDER — HYDROCODONE BITARTRATE AND ACETAMINOPHEN 500; 5 MG/1; MG/1
TABLET ORAL CONTINUOUS
Status: DISCONTINUED | OUTPATIENT
Start: 2018-01-01 | End: 2018-01-01

## 2018-01-01 RX ORDER — ADENOSINE 3 MG/ML
6 INJECTION, SOLUTION INTRAVENOUS ONCE
Status: COMPLETED | OUTPATIENT
Start: 2018-01-01 | End: 2018-01-01

## 2018-01-01 RX ORDER — ROCURONIUM BROMIDE 10 MG/ML
100 INJECTION, SOLUTION INTRAVENOUS ONCE
Status: COMPLETED | OUTPATIENT
Start: 2018-01-01 | End: 2018-01-01

## 2018-01-01 RX ORDER — GLUCAGON 1 MG
1 KIT INJECTION
Status: DISCONTINUED | OUTPATIENT
Start: 2018-01-01 | End: 2018-01-01 | Stop reason: HOSPADM

## 2018-01-01 RX ORDER — POLYETHYLENE GLYCOL 3350 17 G/17G
17 POWDER, FOR SOLUTION ORAL DAILY
Status: DISCONTINUED | OUTPATIENT
Start: 2018-01-01 | End: 2018-01-01

## 2018-01-01 RX ORDER — FLUDROCORTISONE ACETATE 0.1 MG/1
100 TABLET ORAL DAILY
Status: DISCONTINUED | OUTPATIENT
Start: 2018-01-01 | End: 2018-01-01

## 2018-01-01 RX ORDER — METOPROLOL TARTRATE 25 MG/1
25 TABLET, FILM COATED ORAL 4 TIMES DAILY
Status: DISCONTINUED | OUTPATIENT
Start: 2018-01-01 | End: 2018-01-01

## 2018-01-01 RX ORDER — ISOSORBIDE DINITRATE 10 MG/1
20 TABLET ORAL 2 TIMES DAILY
Status: DISCONTINUED | OUTPATIENT
Start: 2018-01-01 | End: 2018-01-01

## 2018-01-01 RX ORDER — METOLAZONE 10 MG/1
10 TABLET ORAL 2 TIMES DAILY
Status: DISCONTINUED | OUTPATIENT
Start: 2018-01-01 | End: 2018-01-01

## 2018-01-01 RX ORDER — FUROSEMIDE 10 MG/ML
80 INJECTION INTRAMUSCULAR; INTRAVENOUS ONCE
Status: COMPLETED | OUTPATIENT
Start: 2018-01-01 | End: 2018-01-01

## 2018-01-01 RX ORDER — LEVOTHYROXINE SODIUM 75 UG/1
150 TABLET ORAL
Status: DISCONTINUED | OUTPATIENT
Start: 2018-01-01 | End: 2018-01-01

## 2018-01-01 RX ORDER — INSULIN ASPART 100 [IU]/ML
2 INJECTION, SOLUTION INTRAVENOUS; SUBCUTANEOUS
Status: DISCONTINUED | OUTPATIENT
Start: 2018-01-01 | End: 2018-01-01

## 2018-01-01 RX ORDER — FUROSEMIDE 10 MG/ML
100 INJECTION INTRAMUSCULAR; INTRAVENOUS 2 TIMES DAILY
Status: DISCONTINUED | OUTPATIENT
Start: 2018-01-01 | End: 2018-01-01

## 2018-01-01 RX ORDER — DULOXETIN HYDROCHLORIDE 30 MG/1
30 CAPSULE, DELAYED RELEASE ORAL DAILY
Status: DISCONTINUED | OUTPATIENT
Start: 2018-01-01 | End: 2018-01-01

## 2018-01-01 RX ORDER — SEVELAMER CARBONATE 800 MG/1
800 TABLET, FILM COATED ORAL
Status: DISCONTINUED | OUTPATIENT
Start: 2018-01-01 | End: 2018-01-01 | Stop reason: SDUPTHER

## 2018-01-01 RX ORDER — HEPARIN SODIUM,PORCINE/D5W 25000/250
12 INTRAVENOUS SOLUTION INTRAVENOUS CONTINUOUS
Status: DISCONTINUED | OUTPATIENT
Start: 2018-01-01 | End: 2018-01-01 | Stop reason: HOSPADM

## 2018-01-01 RX ORDER — FENTANYL CITRATE/PF 100MCG/2ML
25 SYRINGE (ML) INTRAVENOUS
Status: DISCONTINUED | OUTPATIENT
Start: 2018-01-01 | End: 2018-01-01 | Stop reason: HOSPADM

## 2018-01-01 RX ORDER — HYDROMORPHONE HYDROCHLORIDE 2 MG/ML
0.2 INJECTION, SOLUTION INTRAMUSCULAR; INTRAVENOUS; SUBCUTANEOUS ONCE
Status: COMPLETED | OUTPATIENT
Start: 2018-01-01 | End: 2018-01-01

## 2018-01-01 RX ORDER — CIPROFLOXACIN 2 MG/ML
400 INJECTION, SOLUTION INTRAVENOUS
Status: DISCONTINUED | OUTPATIENT
Start: 2018-01-01 | End: 2018-01-01

## 2018-01-01 RX ORDER — FUROSEMIDE 10 MG/ML
160 INJECTION INTRAMUSCULAR; INTRAVENOUS ONCE
Status: COMPLETED | OUTPATIENT
Start: 2018-01-01 | End: 2018-01-01

## 2018-01-01 RX ORDER — ONDANSETRON 2 MG/ML
4 INJECTION INTRAMUSCULAR; INTRAVENOUS EVERY 8 HOURS PRN
Status: DISCONTINUED | OUTPATIENT
Start: 2018-01-01 | End: 2018-01-01 | Stop reason: HOSPADM

## 2018-01-01 RX ORDER — METOPROLOL TARTRATE 50 MG/1
50 TABLET ORAL 3 TIMES DAILY
Status: DISCONTINUED | OUTPATIENT
Start: 2018-01-01 | End: 2018-01-01 | Stop reason: HOSPADM

## 2018-01-01 RX ORDER — METOPROLOL TARTRATE 1 MG/ML
5 INJECTION, SOLUTION INTRAVENOUS EVERY 6 HOURS
Status: DISCONTINUED | OUTPATIENT
Start: 2018-01-01 | End: 2018-01-01

## 2018-01-01 RX ORDER — PROPOFOL 10 MG/ML
INJECTION, EMULSION INTRAVENOUS
Status: COMPLETED
Start: 2018-01-01 | End: 2018-01-01

## 2018-01-01 RX ORDER — FUROSEMIDE 10 MG/ML
40 INJECTION INTRAMUSCULAR; INTRAVENOUS 2 TIMES DAILY
Status: DISCONTINUED | OUTPATIENT
Start: 2018-01-01 | End: 2018-01-01

## 2018-01-01 RX ORDER — METOPROLOL TARTRATE 1 MG/ML
INJECTION, SOLUTION INTRAVENOUS
Status: COMPLETED
Start: 2018-01-01 | End: 2018-01-01

## 2018-01-01 RX ORDER — FUROSEMIDE 10 MG/ML
80 INJECTION INTRAMUSCULAR; INTRAVENOUS ONCE
Status: DISCONTINUED | OUTPATIENT
Start: 2018-01-01 | End: 2018-01-01

## 2018-01-01 RX ORDER — PROPOFOL 10 MG/ML
INJECTION, EMULSION INTRAVENOUS
Status: DISCONTINUED
Start: 2018-01-01 | End: 2018-01-01 | Stop reason: WASHOUT

## 2018-01-01 RX ORDER — FUROSEMIDE 10 MG/ML
140 INJECTION INTRAMUSCULAR; INTRAVENOUS ONCE
Status: COMPLETED | OUTPATIENT
Start: 2018-01-01 | End: 2018-01-01

## 2018-01-01 RX ORDER — HEPARIN SODIUM 5000 [USP'U]/ML
5000 INJECTION, SOLUTION INTRAVENOUS; SUBCUTANEOUS EVERY 8 HOURS
Status: DISPENSED | OUTPATIENT
Start: 2018-01-01 | End: 2018-01-01

## 2018-01-01 RX ORDER — INSULIN ASPART 100 [IU]/ML
0-5 INJECTION, SOLUTION INTRAVENOUS; SUBCUTANEOUS EVERY 6 HOURS PRN
Status: DISCONTINUED | OUTPATIENT
Start: 2018-01-01 | End: 2018-01-01

## 2018-01-01 RX ORDER — PANTOPRAZOLE SODIUM 40 MG/1
40 TABLET, DELAYED RELEASE ORAL EVERY MORNING
Status: DISCONTINUED | OUTPATIENT
Start: 2018-01-01 | End: 2018-01-01

## 2018-01-01 RX ORDER — PANTOPRAZOLE SODIUM 40 MG/10ML
40 INJECTION, POWDER, LYOPHILIZED, FOR SOLUTION INTRAVENOUS DAILY
Status: DISCONTINUED | OUTPATIENT
Start: 2018-01-01 | End: 2018-01-01

## 2018-01-01 RX ORDER — DIAZEPAM 2 MG/1
2 TABLET ORAL ONCE
Status: COMPLETED | OUTPATIENT
Start: 2018-01-01 | End: 2018-01-01

## 2018-01-01 RX ORDER — INSULIN ASPART 100 [IU]/ML
0-5 INJECTION, SOLUTION INTRAVENOUS; SUBCUTANEOUS
Status: DISCONTINUED | OUTPATIENT
Start: 2018-01-01 | End: 2018-01-01

## 2018-01-01 RX ORDER — METRONIDAZOLE 500 MG/100ML
500 INJECTION, SOLUTION INTRAVENOUS
Status: DISCONTINUED | OUTPATIENT
Start: 2018-01-01 | End: 2018-01-01

## 2018-01-01 RX ORDER — CARVEDILOL 25 MG/1
25 TABLET ORAL 2 TIMES DAILY
Status: DISCONTINUED | OUTPATIENT
Start: 2018-01-01 | End: 2018-01-01

## 2018-01-01 RX ORDER — FENTANYL CITRATE-0.9 % NACL/PF 10 MCG/ML
12.5 PLASTIC BAG, INJECTION (ML) INTRAVENOUS CONTINUOUS
Status: DISCONTINUED | OUTPATIENT
Start: 2018-01-01 | End: 2018-01-01

## 2018-01-01 RX ORDER — ETOMIDATE 2 MG/ML
INJECTION INTRAVENOUS
Status: COMPLETED
Start: 2018-01-01 | End: 2018-01-01

## 2018-01-01 RX ORDER — OXYCODONE HYDROCHLORIDE 5 MG/1
10 TABLET ORAL EVERY 6 HOURS PRN
Status: DISCONTINUED | OUTPATIENT
Start: 2018-01-01 | End: 2018-01-01

## 2018-01-01 RX ORDER — ALBUTEROL SULFATE 0.83 MG/ML
10 SOLUTION RESPIRATORY (INHALATION) ONCE
Status: COMPLETED | OUTPATIENT
Start: 2018-01-01 | End: 2018-01-01

## 2018-01-01 RX ORDER — FLUCONAZOLE 2 MG/ML
400 INJECTION, SOLUTION INTRAVENOUS
Status: DISCONTINUED | OUTPATIENT
Start: 2018-01-01 | End: 2018-01-01 | Stop reason: ALTCHOICE

## 2018-01-01 RX ORDER — FLUCONAZOLE 2 MG/ML
200 INJECTION, SOLUTION INTRAVENOUS
Status: DISCONTINUED | OUTPATIENT
Start: 2018-01-01 | End: 2018-01-01

## 2018-01-01 RX ORDER — NALOXONE HCL 0.4 MG/ML
0.4 VIAL (ML) INJECTION ONCE
Status: COMPLETED | OUTPATIENT
Start: 2018-01-01 | End: 2018-01-01

## 2018-01-01 RX ORDER — INSULIN ASPART 100 [IU]/ML
1-10 INJECTION, SOLUTION INTRAVENOUS; SUBCUTANEOUS
Status: DISCONTINUED | OUTPATIENT
Start: 2018-01-01 | End: 2018-01-01

## 2018-01-01 RX ORDER — VANCOMYCIN HCL IN 5 % DEXTROSE 1.5G/250ML
15 PLASTIC BAG, INJECTION (ML) INTRAVENOUS ONCE
Status: COMPLETED | OUTPATIENT
Start: 2018-01-01 | End: 2018-01-01

## 2018-01-01 RX ORDER — ROCURONIUM BROMIDE 10 MG/ML
INJECTION, SOLUTION INTRAVENOUS
Status: DISCONTINUED
Start: 2018-01-01 | End: 2018-01-01 | Stop reason: WASHOUT

## 2018-01-01 RX ORDER — NOREPINEPHRINE BITARTRATE/D5W 4MG/250ML
0.02 PLASTIC BAG, INJECTION (ML) INTRAVENOUS CONTINUOUS
Status: DISCONTINUED | OUTPATIENT
Start: 2018-01-01 | End: 2018-01-01 | Stop reason: HOSPADM

## 2018-01-01 RX ORDER — FLUCONAZOLE 2 MG/ML
400 INJECTION, SOLUTION INTRAVENOUS ONCE
Status: COMPLETED | OUTPATIENT
Start: 2018-01-01 | End: 2018-01-01

## 2018-01-01 RX ORDER — IBUPROFEN 200 MG
24 TABLET ORAL
Status: DISCONTINUED | OUTPATIENT
Start: 2018-01-01 | End: 2018-01-01 | Stop reason: HOSPADM

## 2018-01-01 RX ORDER — ALBUMIN HUMAN 250 G/1000ML
25 SOLUTION INTRAVENOUS ONCE
Status: COMPLETED | OUTPATIENT
Start: 2018-01-01 | End: 2018-01-01

## 2018-01-01 RX ORDER — METOLAZONE 10 MG/1
10 TABLET ORAL ONCE
Status: COMPLETED | OUTPATIENT
Start: 2018-01-01 | End: 2018-01-01

## 2018-01-01 RX ORDER — IPRATROPIUM BROMIDE AND ALBUTEROL SULFATE 2.5; .5 MG/3ML; MG/3ML
3 SOLUTION RESPIRATORY (INHALATION) EVERY 4 HOURS
Status: DISCONTINUED | OUTPATIENT
Start: 2018-01-01 | End: 2018-01-01 | Stop reason: HOSPADM

## 2018-01-01 RX ORDER — FUROSEMIDE 10 MG/ML
140 INJECTION INTRAMUSCULAR; INTRAVENOUS EVERY 6 HOURS
Status: DISCONTINUED | OUTPATIENT
Start: 2018-01-01 | End: 2018-01-01

## 2018-01-01 RX ORDER — SODIUM CHLORIDE 0.9 % (FLUSH) 0.9 %
5 SYRINGE (ML) INJECTION
Status: DISCONTINUED | OUTPATIENT
Start: 2018-01-01 | End: 2018-01-01

## 2018-01-01 RX ORDER — NALOXONE HCL 0.4 MG/ML
VIAL (ML) INJECTION
Status: DISPENSED
Start: 2018-01-01 | End: 2018-01-01

## 2018-01-01 RX ORDER — ADENOSINE 3 MG/ML
INJECTION, SOLUTION INTRAVENOUS
Status: COMPLETED
Start: 2018-01-01 | End: 2018-01-01

## 2018-01-01 RX ORDER — LIDOCAINE HYDROCHLORIDE 10 MG/ML
INJECTION INFILTRATION; PERINEURAL
Status: COMPLETED
Start: 2018-01-01 | End: 2018-01-01

## 2018-01-01 RX ORDER — DEXMEDETOMIDINE HYDROCHLORIDE 4 UG/ML
0.2 INJECTION, SOLUTION INTRAVENOUS CONTINUOUS
Status: DISCONTINUED | OUTPATIENT
Start: 2018-01-01 | End: 2018-01-01

## 2018-01-01 RX ORDER — LANOLIN ALCOHOL/MO/W.PET/CERES
400 CREAM (GRAM) TOPICAL ONCE
Status: COMPLETED | OUTPATIENT
Start: 2018-01-01 | End: 2018-01-01

## 2018-01-01 RX ORDER — GABAPENTIN 100 MG/1
100 CAPSULE ORAL 2 TIMES DAILY
Status: DISCONTINUED | OUTPATIENT
Start: 2018-01-01 | End: 2018-01-01

## 2018-01-01 RX ORDER — PANTOPRAZOLE SODIUM 40 MG/1
40 FOR SUSPENSION ORAL DAILY
Status: DISCONTINUED | OUTPATIENT
Start: 2018-01-01 | End: 2018-01-01

## 2018-01-01 RX ORDER — LEVOTHYROXINE SODIUM 150 UG/1
150 TABLET ORAL
Status: DISCONTINUED | OUTPATIENT
Start: 2018-01-01 | End: 2018-01-01 | Stop reason: HOSPADM

## 2018-01-01 RX ORDER — LIDOCAINE HYDROCHLORIDE 10 MG/ML
1 INJECTION INFILTRATION; PERINEURAL ONCE
Status: COMPLETED | OUTPATIENT
Start: 2018-01-01 | End: 2018-01-01

## 2018-01-01 RX ORDER — ATROPINE SULFATE 0.1 MG/ML
INJECTION INTRAVENOUS
Status: DISCONTINUED
Start: 2018-01-01 | End: 2018-01-01 | Stop reason: HOSPADM

## 2018-01-01 RX ORDER — VANCOMYCIN HCL IN 5 % DEXTROSE 1.5G/250ML
1500 PLASTIC BAG, INJECTION (ML) INTRAVENOUS
Status: DISCONTINUED | OUTPATIENT
Start: 2018-01-01 | End: 2018-01-01

## 2018-01-01 RX ORDER — PHENYLEPHRINE HCL IN 0.9% NACL 1 MG/10 ML
SYRINGE (ML) INTRAVENOUS
Status: DISCONTINUED
Start: 2018-01-01 | End: 2018-01-01 | Stop reason: HOSPADM

## 2018-01-01 RX ORDER — VANCOMYCIN HCL IN 5 % DEXTROSE 1G/250ML
1000 PLASTIC BAG, INJECTION (ML) INTRAVENOUS
Status: DISCONTINUED | OUTPATIENT
Start: 2018-01-01 | End: 2018-01-01

## 2018-01-01 RX ORDER — ATROPINE SULFATE 1 MG/ML
1 INJECTION, SOLUTION INTRAMUSCULAR; INTRAVENOUS; SUBCUTANEOUS ONCE
Status: DISCONTINUED | OUTPATIENT
Start: 2018-01-01 | End: 2018-01-01 | Stop reason: HOSPADM

## 2018-01-01 RX ORDER — FUROSEMIDE 10 MG/ML
120 INJECTION INTRAMUSCULAR; INTRAVENOUS ONCE
Status: DISCONTINUED | OUTPATIENT
Start: 2018-01-01 | End: 2018-01-01

## 2018-01-01 RX ORDER — FENTANYL CITRATE 50 UG/ML
INJECTION, SOLUTION INTRAMUSCULAR; INTRAVENOUS
Status: COMPLETED
Start: 2018-01-01 | End: 2018-01-01

## 2018-01-01 RX ORDER — OXYCODONE HYDROCHLORIDE 5 MG/1
5 TABLET ORAL EVERY 6 HOURS PRN
Status: DISCONTINUED | OUTPATIENT
Start: 2018-01-01 | End: 2018-01-01

## 2018-01-01 RX ORDER — INSULIN ASPART 100 [IU]/ML
0-5 INJECTION, SOLUTION INTRAVENOUS; SUBCUTANEOUS EVERY 4 HOURS PRN
Status: DISCONTINUED | OUTPATIENT
Start: 2018-01-01 | End: 2018-01-01 | Stop reason: HOSPADM

## 2018-01-01 RX ORDER — FUROSEMIDE 10 MG/ML
40 INJECTION INTRAMUSCULAR; INTRAVENOUS ONCE
Status: COMPLETED | OUTPATIENT
Start: 2018-01-01 | End: 2018-01-01

## 2018-01-01 RX ORDER — HEPARIN SODIUM 5000 [USP'U]/ML
5000 INJECTION, SOLUTION INTRAVENOUS; SUBCUTANEOUS EVERY 12 HOURS
Status: DISCONTINUED | OUTPATIENT
Start: 2018-01-01 | End: 2018-01-01

## 2018-01-01 RX ORDER — SILDENAFIL CITRATE 20 MG/1
10 TABLET ORAL 3 TIMES DAILY
Status: DISCONTINUED | OUTPATIENT
Start: 2018-01-01 | End: 2018-01-01

## 2018-01-01 RX ADMIN — SODIUM PHOSPHATE, MONOBASIC, MONOHYDRATE AND SODIUM PHOSPHATE, DIBASIC, ANHYDROUS 20.01 MMOL: 276; 142 INJECTION, SOLUTION INTRAVENOUS at 01:09

## 2018-01-01 RX ADMIN — METRONIDAZOLE 500 MG: 500 INJECTION, SOLUTION INTRAVENOUS at 09:09

## 2018-01-01 RX ADMIN — FUROSEMIDE 140 MG: 10 INJECTION, SOLUTION INTRAMUSCULAR; INTRAVENOUS at 12:09

## 2018-01-01 RX ADMIN — INSULIN ASPART 4 UNITS: 100 INJECTION, SOLUTION INTRAVENOUS; SUBCUTANEOUS at 08:09

## 2018-01-01 RX ADMIN — OXYCODONE HYDROCHLORIDE 10 MG: 5 TABLET ORAL at 03:09

## 2018-01-01 RX ADMIN — PROPOFOL 10 MCG/KG/MIN: 10 INJECTION, EMULSION INTRAVENOUS at 08:09

## 2018-01-01 RX ADMIN — PROPOFOL 20 MCG/KG/MIN: 10 INJECTION, EMULSION INTRAVENOUS at 10:09

## 2018-01-01 RX ADMIN — Medication 3 MCG/KG/MIN: at 07:09

## 2018-01-01 RX ADMIN — METOPROLOL TARTRATE 50 MG: 50 TABLET ORAL at 08:09

## 2018-01-01 RX ADMIN — PIPERACILLIN AND TAZOBACTAM 4.5 G: 4; .5 INJECTION, POWDER, LYOPHILIZED, FOR SOLUTION INTRAVENOUS; PARENTERAL at 08:09

## 2018-01-01 RX ADMIN — ISOSORBIDE DINITRATE 20 MG: 20 TABLET ORAL at 08:09

## 2018-01-01 RX ADMIN — IPRATROPIUM BROMIDE AND ALBUTEROL SULFATE 3 ML: .5; 3 SOLUTION RESPIRATORY (INHALATION) at 12:09

## 2018-01-01 RX ADMIN — PANTOPRAZOLE SODIUM 40 MG: 40 TABLET, DELAYED RELEASE ORAL at 06:09

## 2018-01-01 RX ADMIN — INSULIN ASPART 2 UNITS: 100 INJECTION, SOLUTION INTRAVENOUS; SUBCUTANEOUS at 07:09

## 2018-01-01 RX ADMIN — ADENOSINE 6 MG: 3 INJECTION, SOLUTION INTRAVENOUS at 10:09

## 2018-01-01 RX ADMIN — METOPROLOL TARTRATE 5 MG: 1 INJECTION, SOLUTION INTRAVENOUS at 05:09

## 2018-01-01 RX ADMIN — INSULIN DETEMIR 5 UNITS: 100 INJECTION, SOLUTION SUBCUTANEOUS at 09:09

## 2018-01-01 RX ADMIN — FUROSEMIDE 140 MG: 10 INJECTION, SOLUTION INTRAMUSCULAR; INTRAVENOUS at 05:09

## 2018-01-01 RX ADMIN — DIAZEPAM 2 MG: 2 TABLET ORAL at 03:09

## 2018-01-01 RX ADMIN — IPRATROPIUM BROMIDE AND ALBUTEROL SULFATE 3 ML: .5; 3 SOLUTION RESPIRATORY (INHALATION) at 11:09

## 2018-01-01 RX ADMIN — GABAPENTIN 100 MG: 100 CAPSULE ORAL at 10:09

## 2018-01-01 RX ADMIN — SODIUM CHLORIDE: 0.9 INJECTION, SOLUTION INTRAVENOUS at 04:09

## 2018-01-01 RX ADMIN — PIPERACILLIN AND TAZOBACTAM 4.5 G: 4; .5 INJECTION, POWDER, LYOPHILIZED, FOR SOLUTION INTRAVENOUS; PARENTERAL at 05:09

## 2018-01-01 RX ADMIN — ATORVASTATIN CALCIUM 40 MG: 20 TABLET, FILM COATED ORAL at 11:09

## 2018-01-01 RX ADMIN — FUROSEMIDE 140 MG: 10 INJECTION, SOLUTION INTRAMUSCULAR; INTRAVENOUS at 06:09

## 2018-01-01 RX ADMIN — PIPERACILLIN AND TAZOBACTAM 4.5 G: 4; .5 INJECTION, POWDER, LYOPHILIZED, FOR SOLUTION INTRAVENOUS; PARENTERAL at 01:09

## 2018-01-01 RX ADMIN — HEPARIN SODIUM AND DEXTROSE 26 UNITS/KG/HR: 10000; 5 INJECTION INTRAVENOUS at 04:09

## 2018-01-01 RX ADMIN — INSULIN ASPART 2 UNITS: 100 INJECTION, SOLUTION INTRAVENOUS; SUBCUTANEOUS at 04:09

## 2018-01-01 RX ADMIN — LEVOTHYROXINE SODIUM 150 MCG: 150 TABLET ORAL at 06:09

## 2018-01-01 RX ADMIN — MICONAZOLE NITRATE: 20 POWDER TOPICAL at 08:09

## 2018-01-01 RX ADMIN — IPRATROPIUM BROMIDE AND ALBUTEROL SULFATE 3 ML: .5; 3 SOLUTION RESPIRATORY (INHALATION) at 03:09

## 2018-01-01 RX ADMIN — HALOPERIDOL LACTATE 5 MG: 5 INJECTION, SOLUTION INTRAMUSCULAR at 01:09

## 2018-01-01 RX ADMIN — FLUDROCORTISONE ACETATE 100 MCG: 0.1 TABLET ORAL at 12:09

## 2018-01-01 RX ADMIN — LEVOTHYROXINE SODIUM 150 MCG: 75 TABLET ORAL at 05:09

## 2018-01-01 RX ADMIN — INSULIN ASPART 6 UNITS: 100 INJECTION, SOLUTION INTRAVENOUS; SUBCUTANEOUS at 05:09

## 2018-01-01 RX ADMIN — INSULIN ASPART 3 UNITS: 100 INJECTION, SOLUTION INTRAVENOUS; SUBCUTANEOUS at 12:09

## 2018-01-01 RX ADMIN — Medication 0.02 MCG/KG/MIN: at 08:09

## 2018-01-01 RX ADMIN — CHLORHEXIDINE GLUCONATE 0.12% ORAL RINSE 15 ML: 1.2 LIQUID ORAL at 09:09

## 2018-01-01 RX ADMIN — INSULIN ASPART 1 UNITS: 100 INJECTION, SOLUTION INTRAVENOUS; SUBCUTANEOUS at 11:09

## 2018-01-01 RX ADMIN — LEVOTHYROXINE SODIUM 150 MCG: 75 TABLET ORAL at 06:09

## 2018-01-01 RX ADMIN — HEPARIN SODIUM 5000 UNITS: 5000 INJECTION, SOLUTION INTRAVENOUS; SUBCUTANEOUS at 09:09

## 2018-01-01 RX ADMIN — PANTOPRAZOLE SODIUM 40 MG: 40 GRANULE, DELAYED RELEASE ORAL at 08:09

## 2018-01-01 RX ADMIN — INSULIN ASPART 3 UNITS: 100 INJECTION, SOLUTION INTRAVENOUS; SUBCUTANEOUS at 04:09

## 2018-01-01 RX ADMIN — CEFEPIME 2 G: 2 INJECTION, POWDER, FOR SOLUTION INTRAVENOUS at 01:09

## 2018-01-01 RX ADMIN — METOLAZONE 10 MG: 10 TABLET ORAL at 08:09

## 2018-01-01 RX ADMIN — FENTANYL CITRATE 50 MCG/ML: 50 INJECTION, SOLUTION INTRAMUSCULAR; INTRAVENOUS at 07:09

## 2018-01-01 RX ADMIN — VANCOMYCIN HYDROCHLORIDE 1500 MG: 10 INJECTION, POWDER, LYOPHILIZED, FOR SOLUTION INTRAVENOUS at 09:09

## 2018-01-01 RX ADMIN — IPRATROPIUM BROMIDE AND ALBUTEROL SULFATE 3 ML: .5; 3 SOLUTION RESPIRATORY (INHALATION) at 08:09

## 2018-01-01 RX ADMIN — HYDROCORTISONE SODIUM SUCCINATE 100 MG: 100 INJECTION, POWDER, FOR SOLUTION INTRAMUSCULAR; INTRAVENOUS at 09:09

## 2018-01-01 RX ADMIN — SODIUM CHLORIDE 19.5 UNITS/HR: 9 INJECTION, SOLUTION INTRAVENOUS at 03:09

## 2018-01-01 RX ADMIN — INSULIN ASPART 4 UNITS: 100 INJECTION, SOLUTION INTRAVENOUS; SUBCUTANEOUS at 10:09

## 2018-01-01 RX ADMIN — CHLORHEXIDINE GLUCONATE 0.12% ORAL RINSE 15 ML: 1.2 LIQUID ORAL at 08:09

## 2018-01-01 RX ADMIN — METOPROLOL TARTRATE 25 MG: 25 TABLET ORAL at 04:09

## 2018-01-01 RX ADMIN — NOREPINEPHRINE BITARTRATE 0.08 MCG/KG/MIN: 1 INJECTION, SOLUTION, CONCENTRATE INTRAVENOUS at 11:09

## 2018-01-01 RX ADMIN — SILDENAFIL CITRATE 10 MG: 20 TABLET, FILM COATED ORAL at 03:09

## 2018-01-01 RX ADMIN — NOREPINEPHRINE BITARTRATE: 1 INJECTION, SOLUTION, CONCENTRATE INTRAVENOUS at 05:09

## 2018-01-01 RX ADMIN — SILDENAFIL CITRATE 10 MG: 20 TABLET, FILM COATED ORAL at 09:09

## 2018-01-01 RX ADMIN — INSULIN DETEMIR 5 UNITS: 100 INJECTION, SOLUTION SUBCUTANEOUS at 08:09

## 2018-01-01 RX ADMIN — FLUDROCORTISONE ACETATE 100 MCG: 0.1 TABLET ORAL at 08:09

## 2018-01-01 RX ADMIN — HYDROCORTISONE SODIUM SUCCINATE 100 MG: 100 INJECTION, POWDER, FOR SOLUTION INTRAMUSCULAR; INTRAVENOUS at 01:09

## 2018-01-01 RX ADMIN — ALBUMIN HUMAN 25 G: 0.25 SOLUTION INTRAVENOUS at 05:09

## 2018-01-01 RX ADMIN — SODIUM CHLORIDE: 0.9 INJECTION, SOLUTION INTRAVENOUS at 02:09

## 2018-01-01 RX ADMIN — METOPROLOL TARTRATE 5 MG: 5 INJECTION, SOLUTION INTRAVENOUS at 11:09

## 2018-01-01 RX ADMIN — DULOXETINE HYDROCHLORIDE 30 MG: 30 CAPSULE, DELAYED RELEASE ORAL at 08:09

## 2018-01-01 RX ADMIN — FUROSEMIDE 140 MG: 10 INJECTION, SOLUTION INTRAMUSCULAR; INTRAVENOUS at 04:09

## 2018-01-01 RX ADMIN — INSULIN ASPART 2 UNITS: 100 INJECTION, SOLUTION INTRAVENOUS; SUBCUTANEOUS at 08:09

## 2018-01-01 RX ADMIN — METOLAZONE 10 MG: 10 TABLET ORAL at 02:09

## 2018-01-01 RX ADMIN — PANTOPRAZOLE SODIUM 40 MG: 40 INJECTION, POWDER, FOR SOLUTION INTRAVENOUS at 09:09

## 2018-01-01 RX ADMIN — SILDENAFIL CITRATE 10 MG: 20 TABLET, FILM COATED ORAL at 02:09

## 2018-01-01 RX ADMIN — FUROSEMIDE 80 MG: 10 INJECTION, SOLUTION INTRAMUSCULAR; INTRAVENOUS at 11:09

## 2018-01-01 RX ADMIN — METOLAZONE 10 MG: 10 TABLET ORAL at 04:09

## 2018-01-01 RX ADMIN — SODIUM CHLORIDE 2.9 UNITS/HR: 9 INJECTION, SOLUTION INTRAVENOUS at 04:09

## 2018-01-01 RX ADMIN — SODIUM PHOSPHATE, MONOBASIC, MONOHYDRATE 39.99 MMOL: 276; 142 INJECTION, SOLUTION INTRAVENOUS at 05:09

## 2018-01-01 RX ADMIN — POLYETHYLENE GLYCOL 3350 17 G: 17 POWDER, FOR SOLUTION ORAL at 12:09

## 2018-01-01 RX ADMIN — SODIUM CHLORIDE: 0.9 INJECTION, SOLUTION INTRAVENOUS at 11:09

## 2018-01-01 RX ADMIN — Medication 200 MCG/HR: at 05:09

## 2018-01-01 RX ADMIN — ISOSORBIDE DINITRATE 20 MG: 20 TABLET ORAL at 10:09

## 2018-01-01 RX ADMIN — FLUCONAZOLE 200 MG: 2 INJECTION, SOLUTION INTRAVENOUS at 09:09

## 2018-01-01 RX ADMIN — HYDROMORPHONE HYDROCHLORIDE 0.2 MG: 2 INJECTION INTRAMUSCULAR; INTRAVENOUS; SUBCUTANEOUS at 12:09

## 2018-01-01 RX ADMIN — IPRATROPIUM BROMIDE AND ALBUTEROL SULFATE 3 ML: .5; 3 SOLUTION RESPIRATORY (INHALATION) at 04:09

## 2018-01-01 RX ADMIN — AMIODARONE HYDROCHLORIDE 1 MG/MIN: 1.8 INJECTION, SOLUTION INTRAVENOUS at 07:09

## 2018-01-01 RX ADMIN — INSULIN DETEMIR 5 UNITS: 100 INJECTION, SOLUTION SUBCUTANEOUS at 01:09

## 2018-01-01 RX ADMIN — FUROSEMIDE 40 MG: 10 INJECTION, SOLUTION INTRAMUSCULAR; INTRAVENOUS at 05:09

## 2018-01-01 RX ADMIN — SODIUM POLYSTYRENE SULFONATE 15 G: 15 SUSPENSION ORAL; RECTAL at 09:09

## 2018-01-01 RX ADMIN — IPRATROPIUM BROMIDE AND ALBUTEROL SULFATE 3 ML: .5; 3 SOLUTION RESPIRATORY (INHALATION) at 07:09

## 2018-01-01 RX ADMIN — CIPROFLOXACIN 400 MG: 2 INJECTION, SOLUTION INTRAVENOUS at 06:09

## 2018-01-01 RX ADMIN — FUROSEMIDE 40 MG: 10 INJECTION, SOLUTION INTRAMUSCULAR; INTRAVENOUS at 02:09

## 2018-01-01 RX ADMIN — PIPERACILLIN AND TAZOBACTAM 4.5 G: 4; .5 INJECTION, POWDER, LYOPHILIZED, FOR SOLUTION INTRAVENOUS; PARENTERAL at 06:09

## 2018-01-01 RX ADMIN — PROPOFOL 20 MCG/KG/MIN: 10 INJECTION, EMULSION INTRAVENOUS at 04:09

## 2018-01-01 RX ADMIN — METOPROLOL TARTRATE 5 MG: 5 INJECTION, SOLUTION INTRAVENOUS at 05:09

## 2018-01-01 RX ADMIN — MICONAZOLE NITRATE: 20 POWDER TOPICAL at 09:09

## 2018-01-01 RX ADMIN — HEPARIN SODIUM AND DEXTROSE 12 UNITS/KG/HR: 10000; 5 INJECTION INTRAVENOUS at 03:09

## 2018-01-01 RX ADMIN — SODIUM CHLORIDE: 0.9 INJECTION, SOLUTION INTRAVENOUS at 03:09

## 2018-01-01 RX ADMIN — AMIODARONE HYDROCHLORIDE 0.5 MG/MIN: 1.8 INJECTION, SOLUTION INTRAVENOUS at 08:09

## 2018-01-01 RX ADMIN — PIPERACILLIN AND TAZOBACTAM 4.5 G: 4; .5 INJECTION, POWDER, LYOPHILIZED, FOR SOLUTION INTRAVENOUS; PARENTERAL at 09:09

## 2018-01-01 RX ADMIN — HYDROCORTISONE SODIUM SUCCINATE 100 MG: 100 INJECTION, POWDER, FOR SOLUTION INTRAMUSCULAR; INTRAVENOUS at 10:09

## 2018-01-01 RX ADMIN — MICONAZOLE NITRATE: 20 POWDER TOPICAL at 10:09

## 2018-01-01 RX ADMIN — PIPERACILLIN AND TAZOBACTAM 4.5 G: 4; .5 INJECTION, POWDER, LYOPHILIZED, FOR SOLUTION INTRAVENOUS; PARENTERAL at 04:09

## 2018-01-01 RX ADMIN — SODIUM CHLORIDE 2.5 UNITS/HR: 9 INJECTION, SOLUTION INTRAVENOUS at 05:09

## 2018-01-01 RX ADMIN — VANCOMYCIN HYDROCHLORIDE 1000 MG: 1 INJECTION, POWDER, LYOPHILIZED, FOR SOLUTION INTRAVENOUS at 04:09

## 2018-01-01 RX ADMIN — NOREPINEPHRINE BITARTRATE: 1 INJECTION, SOLUTION, CONCENTRATE INTRAVENOUS at 02:09

## 2018-01-01 RX ADMIN — ISOSORBIDE DINITRATE 20 MG: 20 TABLET ORAL at 09:09

## 2018-01-01 RX ADMIN — DULOXETINE HYDROCHLORIDE 30 MG: 30 CAPSULE, DELAYED RELEASE ORAL at 09:09

## 2018-01-01 RX ADMIN — METOPROLOL TARTRATE 25 MG: 25 TABLET ORAL at 08:09

## 2018-01-01 RX ADMIN — PROPOFOL 5 MCG/KG/MIN: 10 INJECTION, EMULSION INTRAVENOUS at 01:09

## 2018-01-01 RX ADMIN — ONDANSETRON 4 MG: 2 INJECTION INTRAMUSCULAR; INTRAVENOUS at 06:09

## 2018-01-01 RX ADMIN — INSULIN ASPART 3 UNITS: 100 INJECTION, SOLUTION INTRAVENOUS; SUBCUTANEOUS at 03:09

## 2018-01-01 RX ADMIN — AMIODARONE HYDROCHLORIDE 1 MG/MIN: 1.8 INJECTION, SOLUTION INTRAVENOUS at 04:09

## 2018-01-01 RX ADMIN — FUROSEMIDE 100 MG: 10 INJECTION, SOLUTION INTRAMUSCULAR; INTRAVENOUS at 10:09

## 2018-01-01 RX ADMIN — LEVOTHYROXINE SODIUM 150 MCG: 150 TABLET ORAL at 05:09

## 2018-01-01 RX ADMIN — VANCOMYCIN HYDROCHLORIDE 1500 MG: 10 INJECTION, POWDER, LYOPHILIZED, FOR SOLUTION INTRAVENOUS at 10:09

## 2018-01-01 RX ADMIN — Medication 0.02 MCG/KG/MIN: at 09:09

## 2018-01-01 RX ADMIN — SILDENAFIL CITRATE 10 MG: 20 TABLET, FILM COATED ORAL at 08:09

## 2018-01-01 RX ADMIN — INSULIN ASPART 4 UNITS: 100 INJECTION, SOLUTION INTRAVENOUS; SUBCUTANEOUS at 04:09

## 2018-01-01 RX ADMIN — MAGNESIUM SULFATE IN WATER 2 G: 40 INJECTION, SOLUTION INTRAVENOUS at 05:09

## 2018-01-01 RX ADMIN — INSULIN ASPART 2 UNITS: 100 INJECTION, SOLUTION INTRAVENOUS; SUBCUTANEOUS at 11:09

## 2018-01-01 RX ADMIN — METOPROLOL TARTRATE 5 MG: 1 INJECTION, SOLUTION INTRAVENOUS at 02:09

## 2018-01-01 RX ADMIN — PROPOFOL 5 MCG/KG/MIN: 10 INJECTION, EMULSION INTRAVENOUS at 04:09

## 2018-01-01 RX ADMIN — INSULIN ASPART 3 UNITS: 100 INJECTION, SOLUTION INTRAVENOUS; SUBCUTANEOUS at 08:09

## 2018-01-01 RX ADMIN — LIDOCAINE HYDROCHLORIDE 1 ML: 10 INJECTION, SOLUTION INFILTRATION; PERINEURAL at 03:09

## 2018-01-01 RX ADMIN — SODIUM CHLORIDE: 0.9 INJECTION, SOLUTION INTRAVENOUS at 12:09

## 2018-01-01 RX ADMIN — METOPROLOL TARTRATE 5 MG: 5 INJECTION, SOLUTION INTRAVENOUS at 10:09

## 2018-01-01 RX ADMIN — HEPARIN SODIUM 5000 UNITS: 5000 INJECTION, SOLUTION INTRAVENOUS; SUBCUTANEOUS at 08:09

## 2018-01-01 RX ADMIN — ALBUTEROL SULFATE 10 MG: 2.5 SOLUTION RESPIRATORY (INHALATION) at 04:09

## 2018-01-01 RX ADMIN — SODIUM CHLORIDE: 0.9 INJECTION, SOLUTION INTRAVENOUS at 01:09

## 2018-01-01 RX ADMIN — PROPOFOL 5 MCG/KG/MIN: 10 INJECTION, EMULSION INTRAVENOUS at 11:09

## 2018-01-01 RX ADMIN — CARVEDILOL 3.12 MG: 3.12 TABLET, FILM COATED ORAL at 10:09

## 2018-01-01 RX ADMIN — INSULIN ASPART 2 UNITS: 100 INJECTION, SOLUTION INTRAVENOUS; SUBCUTANEOUS at 05:09

## 2018-01-01 RX ADMIN — INSULIN ASPART 6 UNITS: 100 INJECTION, SOLUTION INTRAVENOUS; SUBCUTANEOUS at 12:09

## 2018-01-01 RX ADMIN — Medication 0.02 MCG/KG/MIN: at 10:09

## 2018-01-01 RX ADMIN — OXYCODONE HYDROCHLORIDE 10 MG: 5 TABLET ORAL at 02:09

## 2018-01-01 RX ADMIN — INSULIN ASPART 2 UNITS: 100 INJECTION, SOLUTION INTRAVENOUS; SUBCUTANEOUS at 06:09

## 2018-01-01 RX ADMIN — PANTOPRAZOLE SODIUM 40 MG: 40 TABLET, DELAYED RELEASE ORAL at 05:09

## 2018-01-01 RX ADMIN — CARVEDILOL 3.12 MG: 3.12 TABLET, FILM COATED ORAL at 11:09

## 2018-01-01 RX ADMIN — PANTOPRAZOLE SODIUM 40 MG: 40 INJECTION, POWDER, FOR SOLUTION INTRAVENOUS at 08:09

## 2018-01-01 RX ADMIN — METOPROLOL TARTRATE 5 MG: 1 INJECTION, SOLUTION INTRAVENOUS at 10:09

## 2018-01-01 RX ADMIN — METOPROLOL TARTRATE 5 MG: 5 INJECTION, SOLUTION INTRAVENOUS at 07:09

## 2018-01-01 RX ADMIN — FLUCONAZOLE 200 MG: 2 INJECTION, SOLUTION INTRAVENOUS at 08:09

## 2018-01-01 RX ADMIN — FUROSEMIDE 160 MG: 10 INJECTION, SOLUTION INTRAMUSCULAR; INTRAVENOUS at 10:09

## 2018-01-01 RX ADMIN — OXYCODONE HYDROCHLORIDE 10 MG: 5 TABLET ORAL at 06:09

## 2018-01-01 RX ADMIN — INSULIN DETEMIR 15 UNITS: 100 INJECTION, SOLUTION SUBCUTANEOUS at 09:09

## 2018-01-01 RX ADMIN — RIVAROXABAN 15 MG: 15 TABLET, FILM COATED ORAL at 11:09

## 2018-01-01 RX ADMIN — HYDROCODONE BITARTRATE AND ACETAMINOPHEN 1 TABLET: 7.5; 325 TABLET ORAL at 06:09

## 2018-01-01 RX ADMIN — MAGNESIUM OXIDE TAB 400 MG (241.3 MG ELEMENTAL MG) 400 MG: 400 (241.3 MG) TAB at 06:09

## 2018-01-01 RX ADMIN — METOLAZONE 10 MG: 10 TABLET ORAL at 09:09

## 2018-01-01 RX ADMIN — VANCOMYCIN HYDROCHLORIDE 1250 MG: 10 INJECTION, POWDER, LYOPHILIZED, FOR SOLUTION INTRAVENOUS at 09:09

## 2018-01-01 RX ADMIN — HEPARIN SODIUM AND DEXTROSE 12 UNITS/KG/HR: 10000; 5 INJECTION INTRAVENOUS at 12:09

## 2018-01-01 RX ADMIN — INSULIN ASPART 3 UNITS: 100 INJECTION, SOLUTION INTRAVENOUS; SUBCUTANEOUS at 11:09

## 2018-01-01 RX ADMIN — ATORVASTATIN CALCIUM 40 MG: 20 TABLET, FILM COATED ORAL at 08:09

## 2018-01-01 RX ADMIN — ATORVASTATIN CALCIUM 40 MG: 20 TABLET, FILM COATED ORAL at 12:09

## 2018-01-01 RX ADMIN — INSULIN ASPART 0 UNITS: 100 INJECTION, SOLUTION INTRAVENOUS; SUBCUTANEOUS at 12:09

## 2018-01-01 RX ADMIN — HEPARIN SODIUM 5000 UNITS: 5000 INJECTION, SOLUTION INTRAVENOUS; SUBCUTANEOUS at 05:09

## 2018-01-01 RX ADMIN — ATORVASTATIN CALCIUM 40 MG: 20 TABLET, FILM COATED ORAL at 09:09

## 2018-01-01 RX ADMIN — IPRATROPIUM BROMIDE AND ALBUTEROL SULFATE 3 ML: .5; 3 SOLUTION RESPIRATORY (INHALATION) at 06:09

## 2018-01-01 RX ADMIN — INSULIN DETEMIR 15 UNITS: 100 INJECTION, SOLUTION SUBCUTANEOUS at 11:09

## 2018-01-01 RX ADMIN — SODIUM CHLORIDE: 0.9 INJECTION, SOLUTION INTRAVENOUS at 05:09

## 2018-01-01 RX ADMIN — HEPARIN SODIUM AND DEXTROSE 26 UNITS/KG/HR: 10000; 5 INJECTION INTRAVENOUS at 03:09

## 2018-01-01 RX ADMIN — INSULIN ASPART 1 UNITS: 100 INJECTION, SOLUTION INTRAVENOUS; SUBCUTANEOUS at 04:09

## 2018-01-01 RX ADMIN — CLOPIDOGREL 75 MG: 75 TABLET, FILM COATED ORAL at 06:09

## 2018-01-01 RX ADMIN — CEFEPIME 2 G: 2 INJECTION, POWDER, FOR SOLUTION INTRAVENOUS at 02:09

## 2018-01-01 RX ADMIN — OXYCODONE HYDROCHLORIDE 5 MG: 5 TABLET ORAL at 11:09

## 2018-01-01 RX ADMIN — FLUCONAZOLE IN SODIUM CHLORIDE 400 MG: 2 INJECTION, SOLUTION INTRAVENOUS at 05:09

## 2018-01-01 RX ADMIN — HYDROCORTISONE SODIUM SUCCINATE 100 MG: 100 INJECTION, POWDER, FOR SOLUTION INTRAMUSCULAR; INTRAVENOUS at 05:09

## 2018-01-01 RX ADMIN — FUROSEMIDE 140 MG: 10 INJECTION, SOLUTION INTRAMUSCULAR; INTRAVENOUS at 09:09

## 2018-01-01 RX ADMIN — Medication 0.02 MCG/KG/MIN: at 12:09

## 2018-01-01 RX ADMIN — METOPROLOL TARTRATE 5 MG: 5 INJECTION, SOLUTION INTRAVENOUS at 03:09

## 2018-01-01 RX ADMIN — HEPARIN SODIUM AND DEXTROSE 24 UNITS/KG/HR: 10000; 5 INJECTION INTRAVENOUS at 06:09

## 2018-01-01 RX ADMIN — AMIODARONE HYDROCHLORIDE 150 MG: 1.5 INJECTION, SOLUTION INTRAVENOUS at 03:09

## 2018-01-01 RX ADMIN — OXYCODONE HYDROCHLORIDE 10 MG: 5 TABLET ORAL at 10:09

## 2018-01-01 RX ADMIN — HEPARIN SODIUM AND DEXTROSE 18 UNITS/KG/HR: 10000; 5 INJECTION INTRAVENOUS at 05:09

## 2018-01-01 RX ADMIN — MAGNESIUM SULFATE IN WATER 2 G: 40 INJECTION, SOLUTION INTRAVENOUS at 11:09

## 2018-01-01 RX ADMIN — CEFEPIME 2 G: 2 INJECTION, POWDER, FOR SOLUTION INTRAVENOUS at 08:09

## 2018-01-01 RX ADMIN — CEFEPIME 2 G: 2 INJECTION, POWDER, FOR SOLUTION INTRAVENOUS at 12:09

## 2018-01-01 RX ADMIN — NALOXONE HYDROCHLORIDE 0.4 MG: 0.4 INJECTION, SOLUTION INTRAMUSCULAR; INTRAVENOUS; SUBCUTANEOUS at 04:09

## 2018-01-01 RX ADMIN — ALTEPLASE 4 MG: 2.2 INJECTION, POWDER, LYOPHILIZED, FOR SOLUTION INTRAVENOUS at 06:09

## 2018-01-01 RX ADMIN — GABAPENTIN 100 MG: 100 CAPSULE ORAL at 09:09

## 2018-01-01 RX ADMIN — DIAZEPAM 2 MG: 2 TABLET ORAL at 08:09

## 2018-01-01 RX ADMIN — NOREPINEPHRINE BITARTRATE: 1 INJECTION, SOLUTION, CONCENTRATE INTRAVENOUS at 10:09

## 2018-01-01 RX ADMIN — METOPROLOL TARTRATE 25 MG: 25 TABLET ORAL at 12:09

## 2018-01-01 RX ADMIN — GABAPENTIN 100 MG: 100 CAPSULE ORAL at 08:09

## 2018-01-01 RX ADMIN — FUROSEMIDE 160 MG: 10 INJECTION, SOLUTION INTRAMUSCULAR; INTRAVENOUS at 03:09

## 2018-01-01 RX ADMIN — SILDENAFIL CITRATE 10 MG: 20 TABLET, FILM COATED ORAL at 10:09

## 2018-01-01 RX ADMIN — HEPARIN SODIUM 5000 UNITS: 5000 INJECTION, SOLUTION INTRAVENOUS; SUBCUTANEOUS at 01:09

## 2018-01-01 RX ADMIN — DEXMEDETOMIDINE HYDROCHLORIDE 0.2 MCG/KG/HR: 100 INJECTION, SOLUTION, CONCENTRATE INTRAVENOUS at 11:09

## 2018-01-01 RX ADMIN — TRAZODONE HYDROCHLORIDE 25 MG: 50 TABLET ORAL at 08:09

## 2018-01-01 RX ADMIN — AMIODARONE HYDROCHLORIDE 0.5 MG/MIN: 1.8 INJECTION, SOLUTION INTRAVENOUS at 01:09

## 2018-01-01 RX ADMIN — NOREPINEPHRINE BITARTRATE: 1 INJECTION, SOLUTION, CONCENTRATE INTRAVENOUS at 09:09

## 2018-01-01 RX ADMIN — INSULIN ASPART 2 UNITS: 100 INJECTION, SOLUTION INTRAVENOUS; SUBCUTANEOUS at 12:09

## 2018-01-01 RX ADMIN — HYDROCODONE BITARTRATE AND ACETAMINOPHEN 1 TABLET: 7.5; 325 TABLET ORAL at 12:09

## 2018-01-01 RX ADMIN — OXYCODONE HYDROCHLORIDE 5 MG: 5 TABLET ORAL at 06:09

## 2018-01-01 RX ADMIN — PIPERACILLIN AND TAZOBACTAM 4.5 G: 4; .5 INJECTION, POWDER, LYOPHILIZED, FOR SOLUTION INTRAVENOUS; PARENTERAL at 12:09

## 2018-01-01 RX ADMIN — CHLOROTHIAZIDE SODIUM 500 MG: 500 INJECTION, POWDER, LYOPHILIZED, FOR SOLUTION INTRAVENOUS at 12:09

## 2018-01-01 RX ADMIN — OXYCODONE HYDROCHLORIDE 10 MG: 5 TABLET ORAL at 08:09

## 2018-01-01 RX ADMIN — FUROSEMIDE 100 MG: 10 INJECTION, SOLUTION INTRAMUSCULAR; INTRAVENOUS at 06:09

## 2018-01-01 RX ADMIN — Medication 50 MCG/HR: at 04:09

## 2018-01-01 RX ADMIN — HYDROCORTISONE SODIUM SUCCINATE 100 MG: 100 INJECTION, POWDER, FOR SOLUTION INTRAMUSCULAR; INTRAVENOUS at 12:09

## 2018-01-01 RX ADMIN — Medication 50 MCG/HR: at 02:09

## 2018-05-15 PROBLEM — M48.062 SPINAL STENOSIS OF LUMBAR REGION WITH NEUROGENIC CLAUDICATION: Status: ACTIVE | Noted: 2018-01-01

## 2018-06-05 PROBLEM — Z98.1 S/P LUMBAR FUSION: Status: ACTIVE | Noted: 2018-01-01

## 2018-06-06 PROBLEM — L30.4 INTERTRIGO: Status: ACTIVE | Noted: 2018-01-01

## 2018-06-12 PROBLEM — R63.8 ALTERATION IN NUTRITION: Status: ACTIVE | Noted: 2018-01-01

## 2018-06-13 PROBLEM — R63.8 ALTERATION IN NUTRITION: Status: ACTIVE | Noted: 2018-01-01

## 2018-07-02 PROBLEM — T81.49XA POSTOPERATIVE WOUND INFECTION: Status: ACTIVE | Noted: 2018-01-01

## 2018-07-03 PROBLEM — L89.92 PRESSURE INJURY OF SKIN, STAGE 2: Status: ACTIVE | Noted: 2018-01-01

## 2018-07-03 PROBLEM — T14.8XXA NONTRAUMATIC TEAR OF SKIN: Status: ACTIVE | Noted: 2018-01-01

## 2018-07-03 PROBLEM — R63.8 INCREASED NUTRITIONAL NEEDS: Status: ACTIVE | Noted: 2018-01-01

## 2018-07-03 PROBLEM — E87.5 HYPERKALEMIA: Status: ACTIVE | Noted: 2018-01-01

## 2018-07-04 PROBLEM — N17.9 AKI (ACUTE KIDNEY INJURY): Status: ACTIVE | Noted: 2018-01-01

## 2018-07-04 PROBLEM — D62 ACUTE BLOOD LOSS ANEMIA: Status: ACTIVE | Noted: 2018-01-01

## 2018-07-06 PROBLEM — J96.11 CHRONIC RESPIRATORY FAILURE WITH HYPOXIA: Status: ACTIVE | Noted: 2018-01-01

## 2018-07-06 PROBLEM — L89.312 DECUBITUS ULCER OF RIGHT BUTTOCK, STAGE 2: Status: ACTIVE | Noted: 2018-01-01

## 2018-07-07 PROBLEM — T14.8XXA NONTRAUMATIC TEAR OF SKIN: Status: RESOLVED | Noted: 2018-01-01 | Resolved: 2018-01-01

## 2018-07-07 PROBLEM — E87.5 HYPERKALEMIA: Status: RESOLVED | Noted: 2018-01-01 | Resolved: 2018-01-01

## 2018-07-07 PROBLEM — A49.02 MRSA (METHICILLIN RESISTANT STAPHYLOCOCCUS AUREUS) INFECTION: Status: ACTIVE | Noted: 2018-01-01

## 2018-07-08 PROBLEM — J96.11 CHRONIC RESPIRATORY FAILURE WITH HYPOXIA: Status: RESOLVED | Noted: 2018-01-01 | Resolved: 2018-01-01

## 2018-07-08 PROBLEM — N17.9 AKI (ACUTE KIDNEY INJURY): Status: RESOLVED | Noted: 2018-01-01 | Resolved: 2018-01-01

## 2018-08-01 PROBLEM — T81.40XA POSTOPERATIVE INFECTION: Status: ACTIVE | Noted: 2018-01-01

## 2018-08-03 PROBLEM — T81.89XA NON-HEALING SURGICAL WOUND: Status: ACTIVE | Noted: 2018-01-01

## 2018-09-07 PROBLEM — E86.9 VOLUME DEPLETION: Status: ACTIVE | Noted: 2018-01-01

## 2018-09-08 PROBLEM — M46.20 SPINAL ABSCESS: Status: ACTIVE | Noted: 2018-01-01

## 2018-09-08 PROBLEM — N17.0 ATN (ACUTE TUBULAR NECROSIS): Status: ACTIVE | Noted: 2018-01-01

## 2018-09-08 PROBLEM — E86.0 DEHYDRATION: Status: RESOLVED | Noted: 2017-01-01 | Resolved: 2018-01-01

## 2018-09-08 PROBLEM — E87.1 HYPONATREMIA: Status: ACTIVE | Noted: 2018-01-01

## 2018-09-08 PROBLEM — I50.9 CHF (CONGESTIVE HEART FAILURE), NYHA CLASS IV: Chronic | Status: ACTIVE | Noted: 2017-05-31

## 2018-09-08 PROBLEM — G06.1 SPINAL EPIDURAL ABSCESS: Status: ACTIVE | Noted: 2018-01-01

## 2018-09-08 NOTE — PLAN OF CARE
Outside Hospital Acceptance Note      Patient Name:  Medina Frazier, MRN [id number]    Accepting Physician: Hali De La Rosa MD    Acceptance Date: 9/8/2018    Transferring Physician/Midlevel Provider: Dr. Sean Russo, [facility] Medicine    Transferring Institution: [facility]    Reason for Transfer: Needs Neurosurg/Ortho Spine for spinal epidural abscess    HPI:   Ms. Medina Frazier is a 82 y.o. female with spinal stenosis, T2DM, CHF, CAD, and Afib who originally presented to [facility] on 9/7 with back pain.  She has a complicated history, and has been following with Ortho Dr. Gutiérrez at [facility].  Her complete surgical history is below - but in summary she had a lumbar fusion in June, and developed post-op complications in July.  She had an I&D of the lumbar spine 7/2 where cultures grew Enterobacter cloacae.  PICC line was placed and she was discharged home on IV Cipro to complete a 6 week course, end date 8/14 under the care of Dr. Robert (Infectious Disease). She still has a wound vac in place.  While in the ED, CT lumbar spine 9/7 showed concern for osteo and a fluid collection that is displacing her aorta.  Labs notable for elevated WBC, ESR, CRP, and a UTI.  Currently she is on Cipro from her previous cultures, and was started on Vanc on 9/8.   Neuro exam is benign.    Ortho at [facility] - Dr. Albarran was consulted for evaluation of osteo and the epidural abscess.  He feels like the patient should get transferred for a higher level of care to an institution that has Neurosurg/Ortho Spine, as the Orthopedic surgeon who performed her previous surgeries (Dr. Gutiérrez) is not on call.  Additionally, it seems as though Dr. Gutiérrez would not perform surgery based on the location of the fluid collection.  Neurosurgery Dr. Redd is aware of the patient, and will consult - but does not feel comfortable performing surgery on another surgeon's patient.    06/05/2018   1.  L2-L3 posterolateral spinal  fusion.  2.  L2-L3 interbody fusion (TLIF).  3.  L2-L3 application of intervertebral device.  4.  L2-L3 posterior spinal instrumentation placement.  5.  L2-L3 laminectomy, decompression, foraminotomy and partial facetectomy.  6.  Decompression of spinal nerve roots at L2-L3.  7.  Use of allograft.  8.  Use of autograft.  9.  Use of C-arm.     07/02/2018  1.  Irrigation and debridement of lumbar spine wound     08/07/2018:  1.  L2 to L3 exploration of spinal fusion.  2.  L2 to L3 hardware removal, lumbar spine.  3.  Irrigation and debridement of lumbar spine.  4.  Revision laminectomy, decompression, foraminotomy, facetectomy, lumbar spine.  5.  Placement of wound VAC, lumbar spine.      Vitals:   BP 11/56, HR 74, 99% on 2L NC    Labs:   WBC 14  H/H 8/26  Plt 205    Na 122  K 6 - No EKG changes.  S/p shifting   Creatinine 2.3, baseline 1    UA dirty  Urine culture pending  Blood cx requested    ESR 60    HbA1c 7.8    Imaging:   CT Lumbar Spine 9/6:    Findings:  There is a retrolisthesis at L2-3.  Intervertebral cage device is present, and there are pedicle screw tracks at L2 and L3.  There is bony destruction surrounding the intervertebral cage device suggestive of osteomyelitis.  There is an fluid collection extending anteriorly from L2-3 superiorly to the level of L1.  This fluid collection displaces the aorta anteriorly, suspicious for paraspinous abscess.  This fluid collection probably stents posteriorly as well with findings suspicious for epidural abscess.  MRI with contrast is suggested to further evaluate.  This fluid collection may track even further posteriorly into the posterior paraspinous soft tissues.  Antibiotic pellets are present posterior to the spinal canal.  The L2 spinous process appears to have been destroyed.  A few foci of air are present posterior the spinal canal which could be iatrogenic or related to infection with a gas-forming organism.  The remainder lumbar spine demonstrates  multilevel disc space narrowing and osteophytes.  There are posterior antibiotic pellets approaching the posterior skin surface.           Impression:  Retrolisthesis and lucencies surrounding intervertebral cage device at L2-3, worrisome for osteomyelitis.  There is a fluid collection extending anteriorly and superiorly suggestive of a paraspinous abscess.  There is likely intraspinous extension as well with findings concerning for possible epidural abscess.  Follow-up MRI with contrast is recommended.       Allergies:  is allergic to codeine and penicillins.    Recommended Interventions Prior To Transfer:  · Blood cultures  · Kayexalate for hyperkalemia    To Do On Arrival:  · Consult Neurosurgery - on call for spine.  On Monday there might need to be a Dr. Gutiérrez/[facility] conversation regarding a plan of care if surgery is indicated, otherwise consider IR consult for abscess drainage  · Continue IV abx (Cipro and check random Vanc prior to additional dosing with JEY) and consult ID  · MRI lumbar spine with contrast per Radiology recs from CT scan 9/6 if able based on renal function  · Repeat BMP to check K  · F/u urine and blood culture  · Check renal ultrasound for JEY    Hali De La Rosa MD  [facility] Medicine  Cell:  [phone]  Spectra:  [phone]  Pager:  [phone]

## 2018-09-09 NOTE — HPI
Medina Frazier is an 82 year old female who underwent L2-3 fusion on 6/5/18. She did well until shortly after staples removed,  drainage from the lower part of the incision was noted. On 7/2 she went to the OR for an I&D. Purulence was encountered. Surgical cultures grew Enterobacter cloacae. She was seen by Dr. Lenz, ID provider who started patient on IV Ciprofloxacin for 6 weeks. Patient's infection did not resolve and wound continued to drain. She was taken back to the OR on 8/7 for hardware removal, however appears on most recent imaging an intervertebral cage remains in place. No new surgical cultures obtained at that time. She was continued on Cipro. Patient's back pain has worsened over the past week and is now wrapping around to her anterior abdomen. She presented to an [facility]  ED in [place] for evaluation. 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 Labs notable for elevated WBC, ESR, CRP and pyuria. Procalc 19. JEY.  Blood and urine culture negative.  She was transferred here for higher level of care. Currently she is on Cipro from her previous cultures, and was started on Vanc on 9/8. Patient denies fevers, chills, sweats. Reports severe back pain and lower extremity weakness. Magaña in place.    Of note, patient has a documented PCN allergy. She said she has tolerated PCN in the recent past without adverse reactions. 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 No problems since.

## 2018-09-09 NOTE — ASSESSMENT & PLAN NOTE
Na+ 122 today, patient asymptomatic  Etiology may be due to dehydration  Will start slow correction <12 per day

## 2018-09-09 NOTE — CONSULTS
[facility]  Infectious Disease  Consult Note    Patient Name: Medina Frazier  MRN: [id number]  Admission Date: 9/8/2018  Hospital Length of Stay: 1 days  Attending Physician: Elina Sandhu MD  Primary Care Provider: Hao Garcia MD       Inpatient consult to Infectious Diseases  Consult performed by: Cora Boyer PA-C  Consult ordered by: Mady Castaneda MD        ID consult received. Chart being reviewed. Full consult note with recommendations to follow.      Thank you,  Cora Boyer PA-C  [phone]

## 2018-09-09 NOTE — SUBJECTIVE & OBJECTIVE
Past Medical History:   Diagnosis Date    Allergic sinusitis     Anticoagulant long-term use     Arthritis     Asthma     CHF (congestive heart failure)     Chronic kidney disease     COPD (chronic obstructive pulmonary disease)     Coronary artery disease     Diabetes mellitus     Encounter for blood transfusion     General anesthetics causing adverse effect in therapeutic use     DELAYED EMERGENCE    GERD (gastroesophageal reflux disease)     HHD (hypertensive heart disease)     High cholesterol     Saxman (hard of hearing)     Hypertension     Hypothyroidism     Lumbar spinal stenosis     MRSA (methicillin resistant Staphylococcus aureus) infection 7/7/2018    MRSA infection     PAST H/O, CULTURE DONE 5/30/18 (+)    On home oxygen therapy     Osteoporosis     Pulmonary hypertension     Sleep apnea     STATES NOT USING C PAP    Spinal stenosis     Thyroid disease     TIA (transient ischemic attack)     UTI (urinary tract infection)     Venous insufficiency     Wears glasses     Wears partial dentures     UPPER FULL, LOWER PARTIAL       Past Surgical History:   Procedure Laterality Date    ANGIOGRAM-CORONARY N/A 6/2/2017    Performed by Yury Bailey MD at [facility] CATH    ANGIOPLASTY  2008    CARDIAC STENTS    APPENDECTOMY      APPLICATION OF WOUND VACUUM-ASSISTED CLOSURE DEVICE N/A 7/2/2018    Procedure: APPLICATION, WOUND VAC;  Surgeon: Jeremie Gutiérrez Jr., MD;  Location: [facility] OR;  Service: Orthopedics;  Laterality: N/A;    APPLICATION OF WOUND VACUUM-ASSISTED CLOSURE DEVICE N/A 8/7/2018    Procedure: APPLICATION, WOUND VAC;  Surgeon: Jeremie Gutiérrez Jr., MD;  Location: [facility] OR;  Service: Orthopedics;  Laterality: N/A;    APPLICATION, DRESSING, WOUND Left 8/11/2013    [facility]    APPLICATION, WOUND VAC N/A 8/7/2018    Performed by Jeremie Gutiérrez Jr., MD at [facility] OR    APPLICATION, WOUND VAC N/A 7/2/2018    Performed by  Jeremie [facility]érrez Jr., [facility] at [facility] OR    ARTHROPLASTY, KNEE, TOTAL Left 3/18/2013    [facility] by [facility] at [facility] OR    ATHERECTOMY N/A 6/5/2017    Performed by Lokesh Thakur MD at [facility] CATH    BLADDER SUSPENSION      BONE GRAFT N/A 6/5/2018    Procedure: BONE GRAFT;  Surgeon: Jeremie Gutiérrez Jr., MD;  Location: [facility] OR;  Service: Orthopedics;  Laterality: N/A;    BONE GRAFT N/A 6/5/2018    Performed by Jeremie G[facility]érrez Jr., [facility] at [facility] OR    CARDIAC SURGERY  1996    CABG    CARDIAC SURGERY      Stents    CORONARY ARTERY BYPASS GRAFT  1996    EXPLORATION, WOUND-lumbar N/A 8/7/2018    Performed by Jeremie [facility] Jr[facility] OR    FRACTURE SURGERY Left     Lt hand    FRACTURE SURGERY Left 1993    Lt Knee    FUSION-TRANSLUMINAL LUMBAR INTERBODY (TLIF) L2-3 W/ INSTRUMENTATION N/A 6/5/2018    Performed by Jerem[facility] Jr[facility] at [facility] OR    HEMORRHOID SURGERY      HEMORRHOID SURGERY      HYSTERECTOMY  1984    INCISION AND DRAINAGE N/A 7/2/2018    Procedure: INCISION AND DRAINAGE (I & D) LUMBAR SPINE W/ANTIBIOTIC BEAD PLACEMENT;  Surgeon: Jeremie Gutiérrez Jr., MD;  Location: [facility] OR;  Service: Orthopedics;  Laterality: N/A;    INCISION AND DRAINAGE (I & D) LUMBAR SPINE W/ANTIBIOTIC BEAD PLACEMENT N/A 7/2/2018    Performed by Jeremie [facility]érrez Jr., [facility] at [facility] OR    INCISION AND DRAINAGE (I & D)-EXTREMITY-LOWER Left 8/11/2013    Performed by [facility] at [facility] OR    INCISION AND DRAINAGE POSTERIOR LUMBAR SPINE  07/02/2018    INSERTION, PICC Right 7/2/2018    Performed by Jeremie [facility]rez Jr., [facility] at [facility] OR    JOINT REPLACEMENT Left     KNEE SURGERY Left     POSTOP TKR INFECTION TX    LUMBAR EPIDURAL INJECTION      OPEN REDUCTION INTERNAL FIXATION-HIP TFN Left 9/21/2017    Performed by LOY Early II, MD at [facility] OR    PERIPHERALLY INSERTED CENTRAL CATHETER INSERTION Right 7/2/2018    Procedure: INSERTION, PICC;  Surgeon:  Jeremie Gutiérrez Jr., MD;  Location: [facility] OR;  Service: Orthopedics;  Laterality: Right;    REMOVAL OF HARDWARE FROM SPINE N/A 8/7/2018    Procedure: REMOVAL, HARDWARE, SPINE;  Surgeon: Jeremie Gutiérrez Jr., MD;  Location: [facility] OR;  Service: Orthopedics;  Laterality: N/A;    REMOVAL, HARDWARE, SPINE N/A 8/7/2018    Performed by Jeremie G[facility]érrez Jr., MD at [facility] OR    REMOVAL, PROSTHESIS, KNEE Left 11/20/2013    [facility] OR    THYROIDECTOMY, PARTIAL      TOTAL KNEE  PROSTHESIS REMOVAL W/ SPACER INSERTION Left     TRANSFORAMINAL LUMBAR INTERBODY FUSION (TLIF) OF SPINE WITH PERCUTANEOUS INSTRUMENTATION N/A 6/5/2018    Procedure: FUSION-TRANSLUMINAL LUMBAR INTERBODY (TLIF) L2-3 W/ INSTRUMENTATION;  Surgeon: Jeremie Gutiérrez Jr., MD;  Location: [facility] OR;  Service: Orthopedics;  Laterality: N/A;    WOUND EXPLORATION N/A 8/7/2018    Procedure: EXPLORATION, WOUND-lumbar;  Surgeon: Jeremie Gutiérrez Jr., MD;  Location: [facility] OR;  Service: Orthopedics;  Laterality: N/A;       Review of patient's allergies indicates:   Allergen Reactions    Codeine Nausea Only    Penicillins Swelling     Able to take amoxil       Medications:  Medications Prior to Admission   Medication Sig    acetaminophen (TYLENOL) 325 MG tablet Take 650 mg by mouth every 4 (four) hours as needed for Pain or Temperature greater than (100).    albuterol (ACCUNEB) 0.63 mg/3 mL Nebu Take 0.63 mg by nebulization every 6 (six) hours as needed (sob/wheezing). Rescue     atorvastatin (LIPITOR) 40 MG tablet Take 40 mg by mouth every evening.     bisacodyl (DULCOLAX, BISACODYL,) 10 mg Supp Place 10 mg rectally every 8 (eight) hours as needed (constipation).    calcium carbonate-simethicone (MAALOX ADVANCED) 1,000-60 mg Chew Take 1 tablet by mouth every 4 (four) hours as needed (heartburn).    carvedilol (COREG) 25 MG tablet Take 25 mg by mouth 2 (two) times daily.     ciprofloxacin,  CIPRO,400mg/200ml D5W IVPB (CIPRO IN D5W) 400 mg/200 mL IVPB Inject 400 mg into the vein every 12 (twelve) hours.    clopidogrel (PLAVIX) 75 mg tablet Take 1 tablet (75 mg total) by mouth once daily. (Patient taking differently: Take 75 mg by mouth every morning. )    diazePAM (VALIUM) 5 MG tablet Take 5 mg by mouth every 8 (eight) hours as needed (spasms).     DULoxetine (CYMBALTA) 30 MG capsule Take 30 mg by mouth once daily.    furosemide (LASIX) 20 MG tablet Take 20 mg by mouth every morning.     HYDROcodone-acetaminophen (NORCO) 7.5-325 mg per tablet Take 1 tablet by mouth every 6 (six) hours as needed for Pain.    insulin glargine (LANTUS) 100 unit/mL injection Inject 30 Units into the skin every evening.     isosorbide dinitrate (ISORDIL) 20 MG tablet Take 20 mg by mouth 2 (two) times daily.     levothyroxine (SYNTHROID) 150 MCG tablet Take 150 mcg by mouth before breakfast.     magnesium hydroxide 400 mg/5 ml (MILK OF MAGNESIA) 400 mg/5 mL Susp Take 30 mLs by mouth daily as needed (constipation).     OMEGA-3/DHA/EPA/FISH OIL (OMEGA-3 FISH OIL ORAL) Take 2 capsules by mouth every evening.     ondansetron (ZOFRAN) 8 MG tablet Take by mouth every 8 (eight) hours as needed for Nausea.    pantoprazole (PROTONIX) 40 MG tablet Take 40 mg by mouth every morning.     polyethylene glycol (GLYCOLAX) 17 gram PwPk Take 17 g by mouth every evening. Mix in 8 ounces of water.If stool is too loose,may use every other night    rivaroxaban (XARELTO) 15 mg Tab Take 1 tablet (15 mg total) by mouth daily with dinner or evening meal.    sildenafil (REVATIO) 20 mg Tab Take 10 mg by mouth 3 (three) times daily.    spironolactone (ALDACTONE) 50 MG tablet Take 50 mg by mouth once daily.    food supplemt, lactose-reduced (ENSURE ACTIVE LIGHT) Liqd Take by mouth as needed. FOR LOW BLOOD SUGAR    heparin flush,porcine,-0.9NaCl 100 unit/mL Kit Inject 5 mLs into the vein once daily. To each port     nut.tx.gluc.intol,lac-free,soy (GLUCERNA SHAKE) Liqd Take by mouth as needed. TX BLOOD SUGAR LOWS OR HIGHS     Antibiotics (From admission, onward)    Start     Stop Route Frequency Ordered    18 1300  ceFEPIme injection 2 g      -- IV Daily 18 1155    18 1115  vancomycin 1.5 g in 5 % dextrose 250 mL IVPB  (Vancomycin IVPB with levels panel)      -- IV Every 24 hours (non-standard times) 18 1010        Antifungals (From admission, onward)    None        Antivirals (From admission, onward)    None           Immunization History   Administered Date(s) Administered    PPD Test 2018       Family History     Problem Relation (Age of Onset)    Diabetes Mother, Brother    Heart disease Father    Stroke Mother        Social History     Socioeconomic History    Marital status:      Spouse name: Not on file    Number of children: Not on file    Years of education: Not on file    Highest education level: Not on file   Social Needs    Financial resource strain: Not on file    Food insecurity - worry: Not on file    Food insecurity - inability: Not on file    Transportation needs - medical: Not on file    Transportation needs - non-medical: Not on file   Occupational History    Not on file   Tobacco Use    Smoking status: Former Smoker     Types: Cigarettes     Last attempt to quit: 3/13/1987     Years since quittin.5    Smokeless tobacco: Never Used   Substance and Sexual Activity    Alcohol use: No     Frequency: Never    Drug use: No    Sexual activity: No   Other Topics Concern    Not on file   Social History Narrative    Not on file     Review of Systems   Constitutional: Positive for activity change, appetite change and fatigue. Negative for chills, diaphoresis and fever.   HENT: Positive for sinus pain. Negative for congestion, ear pain, sore throat and trouble swallowing.    Eyes: Negative for pain and visual disturbance.   Respiratory: Negative for cough, chest  tightness and shortness of breath.    Cardiovascular: Positive for leg swelling. Negative for chest pain and palpitations.   Gastrointestinal: Positive for abdominal distention, abdominal pain and nausea. Negative for diarrhea and vomiting.   Genitourinary: Positive for difficulty urinating. Negative for hematuria.   Musculoskeletal: Positive for back pain and gait problem. Negative for myalgias.   Skin: Positive for wound. Negative for rash.   Neurological: Positive for weakness. Negative for dizziness, light-headedness and numbness.   Psychiatric/Behavioral: Negative for agitation and confusion. The patient is not nervous/anxious.      Objective:     Vital Signs (Most Recent):  Temp: 97.9 °F (36.6 °C) (09/09/18 1154)  Pulse: (!) 59 (09/09/18 1154)  Resp: 16 (09/09/18 1154)  BP: 116/63 (09/09/18 1154)  SpO2: 100 % (09/09/18 1154) Vital Signs (24h Range):  Temp:  [97.7 °F (36.5 °C)-98.9 °F (37.2 °C)] 97.9 °F (36.6 °C)  Pulse:  [58-84] 59  Resp:  [16-22] 16  SpO2:  [97 %-100 %] 100 %  BP: ()/(52-63) 116/63     Weight: 105.2 kg (232 lb)  Body mass index is 39.82 kg/m².    Estimated Creatinine Clearance: 17.1 mL/min (A) (based on SCr of 3.1 mg/dL (H)).    Physical Exam   Constitutional: She is oriented to person, place, and time. She appears well-developed and well-nourished. No distress.   HENT:   Head: Normocephalic and atraumatic.   Mouth/Throat: No oropharyngeal exudate.   Eyes: Conjunctivae are normal. No scleral icterus.   Neck: Neck supple.   Cardiovascular: Normal rate and intact distal pulses. An irregularly irregular rhythm present. Exam reveals no friction rub.   No murmur heard.  Pulmonary/Chest: Effort normal. She has no wheezes. She has rales.   Abdominal: Soft. Bowel sounds are normal. She exhibits no distension. There is tenderness. There is no guarding.   Obese abdomen   Musculoskeletal: She exhibits edema (BLE).   +2 pitting edema   Lymphadenopathy:     She has no cervical adenopathy.    Neurological: She is alert and oriented to person, place, and time.   Skin: Skin is warm and dry. She is not diaphoretic. There is erythema.   Lumbar wound not examined as patient refused   Psychiatric: She has a normal mood and affect. Her behavior is normal. Thought content normal.   Nursing note and vitals reviewed.      Significant Labs: All pertinent labs within the past 24 hours have been reviewed.    Significant Imaging: I have reviewed all pertinent imaging results/findings within the past 24 hours.

## 2018-09-09 NOTE — CONSULTS
[facility]  Infectious Disease  Consult Note    Patient Name: Medina Frazier  MRN: [id number]  Admission Date: 9/8/2018  Hospital Length of Stay: 1 days  Attending Physician: Elina Sandhu MD  Primary Care Provider: Hao Garcia MD     Isolation Status: No active isolations    Patient information was obtained from patient and past medical records.      Consults  Assessment/Plan:     * Spinal epidural abscess    82 year old female with history of L2-3 fusion 6/5 complicated by post operative infection with Enterobacter cloacae. She has been on outpatient IV Ciprofloxacin and has undergone an I&D on 7/2 with hardware removal on 8/7 without resolution of her infection. 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 Blood cultures from 9/8 are also now showing 1/4 bottles positive for GPCs.      Unclear if the patient's worsening infection is indicative of antibiotic failure, developing resistance to Cipro, lack of source control (appears she has retained hardware (intervertebral cage) on imaging), versus a new infection with a different isolate given her current wound and GPCs in the blood. Will broaden her antibiotic coverage for now pending neurosurgical evaluation.     Plan  - Discontinue Ciprofloxacin.   - Start Vancomycin and Cefepime 2 g IV q 24 hours (renal adjusted) - patient reports tolerating PCN in recent past.   - Repeat blood cultures x 2  - Recommend surgical I&D with removal of all remaining hardware if possible for optimal chance of cure of this infection  - Neurosurgery consulted. If taken for surgery please obtain surgical cultures and send for gram stain, aerobic, anaerobic, fungal and AFB.   - ID will follow closely.          Thank you for the consult. Please call for any questions.  Cora Boyer PA-C  Phone:  [phone]  Pager: [phone]    Subjective:     Principal Problem: Spinal epidural abscess    HPI: Medina Frazier is an 82 year old female who underwent L2-3 fusion on 6/5/18. She did well until shortly after staples removed,   drainage from the lower part of the incision was noted. On 7/2 she went to the OR for an I&D. Purulence was encountered. Surgical cultures grew Enterobacter cloacae. She was seen by Dr. Lenz, ID provider who started patient on IV Ciprofloxacin for 6 weeks. Patient's infection did not resolve and wound continued to drain. She was taken back to the OR on 8/7 for hardware removal, however appears on most recent imaging an intervertebral cage remains in place. No new surgical cultures obtained at that time. She was continued on Cipro. Patient's back pain has worsened over the past week and is now wrapping around to her anterior abdomen. She presented to an [facility]  ED in [place] for evaluation. 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 Labs notable for elevated WBC, ESR, CRP and pyuria. Procalc 19. JEY.  Blood and urine culture negative.  She was transferred here for higher level of care. Currently she is on Cipro from her previous cultures, and was started on Vanc on 9/8. Patient denies fevers, chills, sweats. Reports severe back pain and lower extremity weakness. Magaña in place.    Of note, patient has a documented PCN allergy. She said she has tolerated PCN in the recent past without adverse reactions. 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 No problems since.        Past Medical History:   Diagnosis Date    Allergic sinusitis     Anticoagulant long-term use     Arthritis     Asthma     CHF (congestive heart failure)     Chronic kidney disease     COPD (chronic obstructive pulmonary disease)     Coronary artery disease      Diabetes mellitus     Encounter for blood transfusion     General anesthetics causing adverse effect in therapeutic use     DELAYED EMERGENCE    GERD (gastroesophageal reflux disease)     HHD (hypertensive heart disease)     High cholesterol     [place] (hard of hearing)     Hypertension     Hypothyroidism     Lumbar spinal stenosis     MRSA (methicillin resistant Staphylococcus aureus) infection 7/7/2018    MRSA infection     PAST H/O, CULTURE DONE 5/30/18 (+)    On home oxygen therapy     Osteoporosis     Pulmonary hypertension     Sleep apnea     STATES NOT USING C PAP    Spinal stenosis     Thyroid disease     TIA (transient ischemic attack)     UTI (urinary tract infection)     Venous insufficiency     Wears glasses     Wears partial dentures     UPPER FULL, LOWER PARTIAL       Past Surgical History:   Procedure Laterality Date    ANGIOGRAM-CORONARY N/A 6/2/2017    Performed by Yury Bailey MD at [facility] CATH    ANGIOPLASTY  2008    CARDIAC STENTS    APPENDECTOMY      APPLICATION OF WOUND VACUUM-ASSISTED CLOSURE DEVICE N/A 7/2/2018    Procedure: APPLICATION, WOUND VAC;  Surgeon: Jeremie Gutiérrez Jr., MD;  Location: [facility] OR;  Service: Orthopedics;  Laterality: N/A;    APPLICATION OF WOUND VACUUM-ASSISTED CLOSURE DEVICE N/A 8/7/2018    Procedure: APPLICATION, WOUND VAC;  Surgeon: Jeremie Gutiérrez Jr., MD;  Location: [facility] OR;  Service: Orthopedics;  Laterality: N/A;    APPLICATION, DRESSING, WOUND Left 8/11/2013    Per[facility] by [facility]wan [facility] at [facility] OR    APPLICATION, WOUND VAC N/A 8/7/2018    Performed by Jeremie Gutiérrez Jr., MD at [facility] OR    APPLICATION, WOUND VAC N/A 7/2/2018    Performed by Jeremie Gutiérrez Jr., MD at [facility] OR    ARTHROPLASTY, KNEE, TOTAL Left 3/18/2013    [facility] by [facility] at [facility] OR    ATHERECTOMY N/A 6/5/2017    Performed by Lokesh Thakur MD at [facility] CATH    BLADDER SUSPENSION      BONE GRAFT N/A 6/5/2018     Procedure: BONE GRAFT;  Surgeon: Jeremie Gutiérrez Jr., MD;  Location: [facility] OR;  Service: Orthopedics;  Laterality: N/A;    BONE GRAFT N/A 6/5/2018    Performed by Jeremie [facility]érrez Jr., MD at [facility] OR    CARDIAC SURGERY  1996    CABG    CARDIAC SURGERY      Stents    CORONARY ARTERY BYPASS GRAFT  1996    EXPLORATION, WOUND-lumbar N/A 8/7/2018    Performed by Jeremie [facility] Jr., MD at [facility] OR    FRACTURE SURGERY Left     Lt hand    FRACTURE SURGERY Left 1993    Lt Knee    FUSION-TRANSLUMINAL LUMBAR INTERBODY (TLIF) L2-3 W/ INSTRUMENTATION N/A 6/5/2018    Performed by Jeremie [facility]érrez Jr., MD at [facility] OR    HEMORRHOID SURGERY      HEMORRHOID SURGERY      HYSTERECTOMY  1984    INCISION AND DRAINAGE N/A 7/2/2018    Procedure: INCISION AND DRAINAGE (I & D) LUMBAR SPINE W/ANTIBIOTIC BEAD PLACEMENT;  Surgeon: Jeremie Gutiérrez Jr., MD;  Location: [facility] OR;  Service: Orthopedics;  Laterality: N/A;    INCISION AND DRAINAGE (I & D) LUMBAR SPINE W/ANTIBIOTIC BEAD PLACEMENT N/A 7/2/2018    Performed by Jeremie [facility]érrez Jr., MD at [facility] OR    INCISION AND DRAINAGE (I & D)-EXTREMITY-LOWER Left 8/11/2013    [facility] OR    INCISION AND DRAINAGE POSTERIOR LUMBAR SPINE  07/02/2018    INSERTION, PICC Right 7/2/2018    Performed by Jerem[facility] Jr., MD at [facility] OR    JOINT REPLACEMENT Left     KNEE SURGERY Left     POSTOP TKR INFECTION TX    LUMBAR EPIDURAL INJECTION      OPEN REDUCTION INTERNAL FIXATION-HIP TFN Left 9/21/2017    Performed by LOY Early II, MD at [facility] OR    PERIPHERALLY INSERTED CENTRAL CATHETER INSERTION Right 7/2/2018    Procedure: INSERTION, PICC;  Surgeon: Jeremie Gutiérrez Jr., MD;  Location: [facility] OR;  Service: Orthopedics;  Laterality: Right;    REMOVAL OF HARDWARE FROM SPINE N/A 8/7/2018    Procedure: REMOVAL, HARDWARE, SPINE;  Surgeon: Jeremie Gutiérrez Jr., MD;  Location: [facility] OR;  Service: Orthopedics;   Laterality: N/A;    REMOVAL, HARDWARE, SPINE N/A 8/7/2018    Performed by Jerem[facility] Jr., MD at [facility] OR    REMOVAL, PROSTHESIS, KNEE Left 11/20/2013    Performed by [facility] OR    THYROIDECTOMY, PARTIAL      TOTAL KNEE  PROSTHESIS REMOVAL W/ SPACER INSERTION Left     TRANSFORAMINAL LUMBAR INTERBODY FUSION (TLIF) OF SPINE WITH PERCUTANEOUS INSTRUMENTATION N/A 6/5/2018    Procedure: FUSION-TRANSLUMINAL LUMBAR INTERBODY (TLIF) L2-3 W/ INSTRUMENTATION;  Surgeon: Jeremie Gutiérrez Jr., MD;  Location: [facility] OR;  Service: Orthopedics;  Laterality: N/A;    WOUND EXPLORATION N/A 8/7/2018    Procedure: EXPLORATION, WOUND-lumbar;  Surgeon: Jeremie Gutiérrez Jr., MD;  Location: [facility] OR;  Service: Orthopedics;  Laterality: N/A;       Review of patient's allergies indicates:   Allergen Reactions    Codeine Nausea Only    Penicillins Swelling     Able to take amoxil       Medications:  Medications Prior to Admission   Medication Sig    acetaminophen (TYLENOL) 325 MG tablet Take 650 mg by mouth every 4 (four) hours as needed for Pain or Temperature greater than (100).    albuterol (ACCUNEB) 0.63 mg/3 mL Nebu Take 0.63 mg by nebulization every 6 (six) hours as needed (sob/wheezing). Rescue     atorvastatin (LIPITOR) 40 MG tablet Take 40 mg by mouth every evening.     bisacodyl (DULCOLAX, BISACODYL,) 10 mg Supp Place 10 mg rectally every 8 (eight) hours as needed (constipation).    calcium carbonate-simethicone (MAALOX ADVANCED) 1,000-60 mg Chew Take 1 tablet by mouth every 4 (four) hours as needed (heartburn).    carvedilol (COREG) 25 MG tablet Take 25 mg by mouth 2 (two) times daily.     ciprofloxacin, CIPRO,400mg/200ml D5W IVPB (CIPRO IN D5W) 400 mg/200 mL IVPB Inject 400 mg into the vein every 12 (twelve) hours.    clopidogrel (PLAVIX) 75 mg tablet Take 1 tablet (75 mg total) by mouth once daily. (Patient taking differently: Take 75 mg by mouth every morning. )    diazePAM  (VALIUM) 5 MG tablet Take 5 mg by mouth every 8 (eight) hours as needed (spasms).     DULoxetine (CYMBALTA) 30 MG capsule Take 30 mg by mouth once daily.    furosemide (LASIX) 20 MG tablet Take 20 mg by mouth every morning.     HYDROcodone-acetaminophen (NORCO) 7.5-325 mg per tablet Take 1 tablet by mouth every 6 (six) hours as needed for Pain.    insulin glargine (LANTUS) 100 unit/mL injection Inject 30 Units into the skin every evening.     isosorbide dinitrate (ISORDIL) 20 MG tablet Take 20 mg by mouth 2 (two) times daily.     levothyroxine (SYNTHROID) 150 MCG tablet Take 150 mcg by mouth before breakfast.     magnesium hydroxide 400 mg/5 ml (MILK OF MAGNESIA) 400 mg/5 mL Susp Take 30 mLs by mouth daily as needed (constipation).     OMEGA-3/DHA/EPA/FISH OIL (OMEGA-3 FISH OIL ORAL) Take 2 capsules by mouth every evening.     ondansetron (ZOFRAN) 8 MG tablet Take by mouth every 8 (eight) hours as needed for Nausea.    pantoprazole (PROTONIX) 40 MG tablet Take 40 mg by mouth every morning.     polyethylene glycol (GLYCOLAX) 17 gram PwPk Take 17 g by mouth every evening. Mix in 8 ounces of water.If stool is too loose,may use every other night    rivaroxaban (XARELTO) 15 mg Tab Take 1 tablet (15 mg total) by mouth daily with dinner or evening meal.    sildenafil (REVATIO) 20 mg Tab Take 10 mg by mouth 3 (three) times daily.    spironolactone (ALDACTONE) 50 MG tablet Take 50 mg by mouth once daily.    food supplemt, lactose-reduced (ENSURE ACTIVE LIGHT) Liqd Take by mouth as needed. FOR LOW BLOOD SUGAR    heparin flush,porcine,-0.9NaCl 100 unit/mL Kit Inject 5 mLs into the vein once daily. To each port    nut.tx.gluc.intol,lac-free,soy (GLUCERNA SHAKE) Liqd Take by mouth as needed. TX BLOOD SUGAR LOWS OR HIGHS     Antibiotics (From admission, onward)    Start     Stop Route Frequency Ordered    09/09/18 1300  ceFEPIme injection 2 g      -- IV Daily 09/09/18 1155    09/09/18 1115  vancomycin 1.5 g in 5  % dextrose 250 mL IVPB  (Vancomycin IVPB with levels panel)      -- IV Every 24 hours (non-standard times) 18 1010        Antifungals (From admission, onward)    None        Antivirals (From admission, onward)    None           Immunization History   Administered Date(s) Administered    PPD Test 2018       Family History     Problem Relation (Age of Onset)    Diabetes Mother, Brother    Heart disease Father    Stroke Mother        Social History     Socioeconomic History    Marital status:      Spouse name: Not on file    Number of children: Not on file    Years of education: Not on file    Highest education level: Not on file   Social Needs    Financial resource strain: Not on file    Food insecurity - worry: Not on file    Food insecurity - inability: Not on file    Transportation needs - medical: Not on file    Transportation needs - non-medical: Not on file   Occupational History    Not on file   Tobacco Use    Smoking status: Former Smoker     Types: Cigarettes     Last attempt to quit: 3/13/1987     Years since quittin.5    Smokeless tobacco: Never Used   Substance and Sexual Activity    Alcohol use: No     Frequency: Never    Drug use: No    Sexual activity: No   Other Topics Concern    Not on file   Social History Narrative    Not on file     Review of Systems   Constitutional: Positive for activity change, appetite change and fatigue. Negative for chills, diaphoresis and fever.   HENT: Positive for sinus pain. Negative for congestion, ear pain, sore throat and trouble swallowing.    Eyes: Negative for pain and visual disturbance.   Respiratory: Negative for cough, chest tightness and shortness of breath.    Cardiovascular: Positive for leg swelling. Negative for chest pain and palpitations.   Gastrointestinal: Positive for abdominal distention, abdominal pain and nausea. Negative for diarrhea and vomiting.   Genitourinary: Positive for difficulty urinating. Negative  for hematuria.   Musculoskeletal: Positive for back pain and gait problem. Negative for myalgias.   Skin: Positive for wound. Negative for rash.   Neurological: Positive for weakness. Negative for dizziness, light-headedness and numbness.   Psychiatric/Behavioral: Negative for agitation and confusion. The patient is not nervous/anxious.      Objective:     Vital Signs (Most Recent):  Temp: 97.9 °F (36.6 °C) (09/09/18 1154)  Pulse: (!) 59 (09/09/18 1154)  Resp: 16 (09/09/18 1154)  BP: 116/63 (09/09/18 1154)  SpO2: 100 % (09/09/18 1154) Vital Signs (24h Range):  Temp:  [97.7 °F (36.5 °C)-98.9 °F (37.2 °C)] 97.9 °F (36.6 °C)  Pulse:  [58-84] 59  Resp:  [16-22] 16  SpO2:  [97 %-100 %] 100 %  BP: ()/(52-63) 116/63     Weight: 105.2 kg (232 lb)  Body mass index is 39.82 kg/m².    Estimated Creatinine Clearance: 17.1 mL/min (A) (based on SCr of 3.1 mg/dL (H)).    Physical Exam   Constitutional: She is oriented to person, place, and time. She appears well-developed and well-nourished. No distress.   HENT:   Head: Normocephalic and atraumatic.   Mouth/Throat: No oropharyngeal exudate.   Eyes: Conjunctivae are normal. No scleral icterus.   Neck: Neck supple.   Cardiovascular: Normal rate and intact distal pulses. An irregularly irregular rhythm present. Exam reveals no friction rub.   No murmur heard.  Pulmonary/Chest: Effort normal. She has no wheezes. She has rales.   Abdominal: Soft. Bowel sounds are normal. She exhibits no distension. There is tenderness. There is no guarding.   Obese abdomen   Musculoskeletal: She exhibits edema (BLE).   +2 pitting edema   Lymphadenopathy:     She has no cervical adenopathy.   Neurological: She is alert and oriented to person, place, and time.   Skin: Skin is warm and dry. She is not diaphoretic. There is erythema.   Lumbar wound not examined as patient refused   Psychiatric: She has a normal mood and affect. Her behavior is normal. Thought content normal.   Nursing note and vitals  reviewed.      Significant Labs: All pertinent labs within the past 24 hours have been reviewed.    Significant Imaging: I have reviewed all pertinent imaging results/findings within the past 24 hours.

## 2018-09-09 NOTE — ASSESSMENT & PLAN NOTE
Creatinine 2.3 today, Potassium also elevated,   JEY thought to be prerenal 2/2 dehydration but urine Na normal  Renal US ordered to assess possible intrarenal etiology  Will continue to monitor

## 2018-09-09 NOTE — ASSESSMENT & PLAN NOTE
82 year old female with history of L2-3 fusion 6/5 complicated by post operative infection with Enterobacter cloacae. She has been on outpatient IV Ciprofloxacin and has undergone an I&D on 7/2 with hardware removal on 8/7 without resolution of her infection. 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 Blood cultures from 9/8 are also now showing 1/4 bottles positive for GPCs.      Unclear if the patient's worsening infection is indicative of antibiotic failure, developing resistance to Cipro, lack of source control (appears she has retained hardware (intervertebral cage) on imaging), versus a new infection with a different isolate given her current wound and GPCs in the blood. Will broaden her antibiotic coverage for now pending neurosurgical evaluation.     Plan  - Discontinue Ciprofloxacin.   - Start Vancomycin and Cefepime 2 g IV q 24 hours (renal adjusted) - patient reports tolerating PCN in recent past.   - Repeat blood cultures x 2  - Recommend surgical I&D with removal of all remaining hardware if possible for optimal chance of cure of this infection  - Neurosurgery consulted. If taken for surgery please obtain surgical cultures and send for gram stain, aerobic, anaerobic, fungal and AFB.   - ID will follow closely.

## 2018-09-09 NOTE — ASSESSMENT & PLAN NOTE
S/p TLIF L2-3 in 6/2018 with complications from culture + enterobacter cloacae infection discovered on  I&D in July 2nd  Hardware removed in August  Recent ED for continue abdominal pain with recent CT showing concern for osteo and a fluid collection that is displacing her aorta  Transferred from [facility] due to concern for need for higher level of care    Neurosurgery consulted,  will discuss in morning if surgery is indicated

## 2018-09-09 NOTE — HPI
Ms. Medina Frazier is a 82 y.o. female with spinal stenosis, T2DM, CHF, CAD, and Afib who originally presented to [facility] on 9/7 with back pain.  She has a complicated history, and has been following with Ortho Dr. Gutiérrez at [facility].  Her complete surgical history is below - but in summary she had a lumbar fusion in June, and developed post-op complications in July.  She had an I&D of the lumbar spine 7/2 where cultures grew Enterobacter cloacae.  PICC line was placed and she was discharged home on IV Cipro to complete a 6 week course, end date 8/14 under the care of Dr. Robert (Infectious Disease). She still has a wound vac in place.      She stated that pain was a constant pain that wrapped around her abdomen to her back that progressively worsened over the past few weeks. She reports associated nausea but no vomiting, fevers, chills, or bowel changes. The pain progressively worsened to 9/10 for past few days and patient presented to the ED. While in the ED, CT lumbar spine 9/7 showed concern for osteo and a fluid collection that is displacing her aorta.  Labs notable for elevated WBC, ESR, CRP, and a UTI.  Currently she is on Cipro from her previous cultures, and was started on Vanc on 9/8.   Neuro exam is benign.     Ortho at [facility] - Dr. Albarran was consulted for evaluation of osteo and the epidural abscess.  He feels like the patient should get transferred for a higher level of care to an institution that has Neurosurg/Ortho Spine, as the Orthopedic surgeon who performed her previous surgeries (Dr. Gutiérrez) is not on call.  Additionally, it seems as though Dr. Gutiérrez would not perform surgery based on the location of the fluid collection.  Neurosurgery Dr. Redd is aware of the patient, and will consult - but does not feel comfortable performing surgery on another surgeon's patient.

## 2018-09-09 NOTE — SUBJECTIVE & OBJECTIVE
Past Medical History:   Diagnosis Date    Allergic sinusitis     Anticoagulant long-term use     Arthritis     Asthma     CHF (congestive heart failure)     Chronic kidney disease     COPD (chronic obstructive pulmonary disease)     Coronary artery disease     Diabetes mellitus     Encounter for blood transfusion     General anesthetics causing adverse effect in therapeutic use     DELAYED EMERGENCE    GERD (gastroesophageal reflux disease)     HHD (hypertensive heart disease)     High cholesterol     [place] (hard of hearing)     Hypertension     Hypothyroidism     Lumbar spinal stenosis     MRSA (methicillin resistant Staphylococcus aureus) infection 7/7/2018    MRSA infection     PAST H/O, CULTURE DONE 5/30/18 (+)    On home oxygen therapy     Osteoporosis     Pulmonary hypertension     Sleep apnea     STATES NOT USING C PAP    Spinal stenosis     Thyroid disease     TIA (transient ischemic attack)     UTI (urinary tract infection)     Venous insufficiency     Wears glasses     Wears partial dentures     UPPER FULL, LOWER PARTIAL       Past Surgical History:   Procedure Laterality Date    ANGIOGRAM-CORONARY N/A 6/2/2017    Performed by Yury Bailey MD at [facility] CATH    ANGIOPLASTY  2008    CARDIAC STENTS    APPENDECTOMY      APPLICATION OF WOUND VACUUM-ASSISTED CLOSURE DEVICE N/A 7/2/2018    Procedure: APPLICATION, WOUND VAC;  Surgeon: Jeremie Gutiérrez Jr., MD;  Location: [facility] OR;  Service: Orthopedics;  Laterality: N/A;    APPLICATION OF WOUND VACUUM-ASSISTED CLOSURE DEVICE N/A 8/7/2018    Procedure: APPLICATION, WOUND VAC;  Surgeon: Jeremie Gutiérrez Jr., MD;  Location: [facility] OR;  Service: Orthopedics;  Laterality: N/A;    APPLICATION, DRESSING, WOUND Left 8/11/2013    Performed by [facility]wan Liu [facility] at [facility] OR    APPLICATION, WOUND VAC N/A 8/7/2018    Performed by Jeremie Gutiérrez Jr., MD at [facility] OR    APPLICATION, WOUND VAC N/A 7/2/2018    Performed by  Jeremie Gutiérrez Jr., MD at [facility] OR    ARTHROPLASTY, KNEE, TOTAL Left 3/18/2013    Performed by [facility] Liu [facility] at [facility] OR    ATHERECTOMY N/A 6/5/2017    Performed by Lokesh Thakur MD at [facility] CATH    BLADDER SUSPENSION      BONE GRAFT N/A 6/5/2018    Procedure: BONE GRAFT;  Surgeon: Jeremie Gutiérrez Jr., MD;  Location: [facility] OR;  Service: Orthopedics;  Laterality: N/A;    BONE GRAFT N/A 6/5/2018    Performed by Jeremie Gutiérrez Jr., MD at [facility] OR    CARDIAC SURGERY  1996    CABG    CARDIAC SURGERY      Stents    CORONARY ARTERY BYPASS GRAFT  1996    EXPLORATION, WOUND-lumbar N/A 8/7/2018    Performed by Jeremie Gutiérrez Jr., MD at [facility] OR    FRACTURE SURGERY Left     Lt hand    FRACTURE SURGERY Left 1993    Lt Knee    FUSION-TRANSLUMINAL LUMBAR INTERBODY (TLIF) L2-3 W/ INSTRUMENTATION N/A 6/5/2018    Performed by Jeremie Gutiérrez Jr., MD at [facility] OR    HEMORRHOID SURGERY      HEMORRHOID SURGERY      HYSTERECTOMY  1984    INCISION AND DRAINAGE N/A 7/2/2018    Procedure: INCISION AND DRAINAGE (I & D) LUMBAR SPINE W/ANTIBIOTIC BEAD PLACEMENT;  Surgeon: Jeremie Gutiérrez Jr., MD;  Location: [facility] OR;  Service: Orthopedics;  Laterality: N/A;    INCISION AND DRAINAGE (I & D) LUMBAR SPINE W/ANTIBIOTIC BEAD PLACEMENT N/A 7/2/2018    Performed by Jeremie Gutiérrez Jr., MD at [facility] OR    INCISION AND DRAINAGE (I & D)-EXTREMITY-LOWER Left 8/11/2013    Performed by [facility]wan Liu MD at [facility] OR    INCISION AND DRAINAGE POSTERIOR LUMBAR SPINE  07/02/2018    INSERTION, PICC Right 7/2/2018    Performed by Jeremie Gutiérrez Jr., MD at [facility] OR    JOINT REPLACEMENT Left     KNEE SURGERY Left     POSTOP TKR INFECTION TX    LUMBAR EPIDURAL INJECTION      OPEN REDUCTION INTERNAL FIXATION-HIP TFN Left 9/21/2017    Performed by LOY Early II, MD at [facility] OR    PERIPHERALLY INSERTED CENTRAL CATHETER INSERTION Right 7/2/2018    Procedure: INSERTION, PICC;  Surgeon:  Jeremie Gutiérrez Jr., MD;  Location: [facility] OR;  Service: Orthopedics;  Laterality: Right;    REMOVAL OF HARDWARE FROM SPINE N/A 8/7/2018    Procedure: REMOVAL, HARDWARE, SPINE;  Surgeon: Jeremie Gutiérrez Jr., MD;  Location: [facility] OR;  Service: Orthopedics;  Laterality: N/A;    REMOVAL, HARDWARE, SPINE N/A 8/7/2018    Performed by Jeremie Gutiérrez Jr., MD at [facility] OR    REMOVAL, PROSTHESIS, KNEE Left 11/20/2013    [facility] at [facility] OR    THYROIDECTOMY, PARTIAL      TOTAL KNEE  PROSTHESIS REMOVAL W/ SPACER INSERTION Left     TRANSFORAMINAL LUMBAR INTERBODY FUSION (TLIF) OF SPINE WITH PERCUTANEOUS INSTRUMENTATION N/A 6/5/2018    Procedure: FUSION-TRANSLUMINAL LUMBAR INTERBODY (TLIF) L2-3 W/ INSTRUMENTATION;  Surgeon: Jeremie Gutiérrez Jr., MD;  Location: [facility] OR;  Service: Orthopedics;  Laterality: N/A;    WOUND EXPLORATION N/A 8/7/2018    Procedure: EXPLORATION, WOUND-lumbar;  Surgeon: Jeremie Gutiérrez Jr., MD;  Location: [facility] OR;  Service: Orthopedics;  Laterality: N/A;       Review of patient's allergies indicates:   Allergen Reactions    Codeine Nausea Only    Penicillins Swelling     Able to take amoxil       Current Facility-Administered Medications on File Prior to Encounter   Medication    [COMPLETED] 0.9%  NaCl infusion    [COMPLETED] insulin regular injection 10 Units    [COMPLETED] morphine injection 1 mg    [COMPLETED] sodium polystyrene 15 gram/60 mL suspension 15 g    [COMPLETED] vancomycin (VANCOCIN) 1,500 mg in dextrose 5 % 250 mL IVPB    [DISCONTINUED] acetaminophen tablet 650 mg    [DISCONTINUED] albuterol-ipratropium 2.5 mg-0.5 mg/3 mL nebulizer solution 3 mL    [DISCONTINUED] carvedilol tablet 25 mg    [DISCONTINUED] ciprofloxacin (CIPRO)400mg/200ml D5W IVPB 400 mg    [DISCONTINUED] clopidogrel tablet 75 mg    [DISCONTINUED] dextrose 50% injection 12.5 g    [DISCONTINUED] dextrose 50% injection 25 g    [DISCONTINUED] dextrose 50%  injection 25 g    [DISCONTINUED] diazePAM tablet 5 mg    [DISCONTINUED] glucagon (human recombinant) injection 1 mg    [DISCONTINUED] glucose chewable tablet 16 g    [DISCONTINUED] glucose chewable tablet 24 g    [DISCONTINUED] insulin aspart U-100 injection 1-10 Units    [DISCONTINUED] levothyroxine tablet 150 mcg    [DISCONTINUED] magnesium oxide tablet 800 mg    [DISCONTINUED] magnesium oxide tablet 800 mg    [DISCONTINUED] morphine injection 4 mg    [DISCONTINUED] ondansetron injection 8 mg    [DISCONTINUED] potassium chloride 10% oral solution 40 mEq    [DISCONTINUED] potassium chloride 10% oral solution 40 mEq    [DISCONTINUED] potassium chloride 10% oral solution 40 mEq    [DISCONTINUED] potassium, sodium phosphates 280-160-250 mg packet 2 packet    [DISCONTINUED] potassium, sodium phosphates 280-160-250 mg packet 2 packet    [DISCONTINUED] potassium, sodium phosphates 280-160-250 mg packet 2 packet    [DISCONTINUED] promethazine (PHENERGAN) 6.25 mg in dextrose 5 % 50 mL IVPB    [DISCONTINUED] rivaroxaban tablet 15 mg    [DISCONTINUED] senna-docusate 8.6-50 mg per tablet 1 tablet    [DISCONTINUED] sodium chloride 0.9% flush 5 mL    [DISCONTINUED] vancomycin (VANCOCIN) 1000 mg in dextrose 5 % 200 mL IVPB     Current Outpatient Medications on File Prior to Encounter   Medication Sig    acetaminophen (TYLENOL) 325 MG tablet Take 650 mg by mouth every 4 (four) hours as needed for Pain or Temperature greater than (100).    albuterol (ACCUNEB) 0.63 mg/3 mL Nebu Take 0.63 mg by nebulization every 6 (six) hours as needed. Rescue    atorvastatin (LIPITOR) 40 MG tablet Take 40 mg by mouth every evening.     bisacodyl (DULCOLAX, BISACODYL,) 10 mg Supp Place 10 mg rectally every 8 (eight) hours as needed (constipation).    calcium carbonate-simethicone (MAALOX ADVANCED) 1,000-60 mg Chew Take 1 tablet by mouth every 4 (four) hours as needed (heartburn).    carvedilol (COREG) 25 MG tablet Take 25  mg by mouth 2 (two) times daily.     ciprofloxacin, CIPRO,400mg/200ml D5W IVPB (CIPRO IN D5W) 400 mg/200 mL IVPB Inject 400 mg into the vein every 12 (twelve) hours.    CLEVER CHEK TEST STRIPS Strp     clopidogrel (PLAVIX) 75 mg tablet Take 1 tablet (75 mg total) by mouth once daily. (Patient taking differently: Take 75 mg by mouth every morning. )    denosumab (PROLIA) 60 mg/mL Syrg Inject 60 mg into the skin every 6 (six) months. Next dose due 5/2018    diazePAM (VALIUM) 5 MG tablet Take 5 mg by mouth every 8 (eight) hours as needed for Anxiety.    DULoxetine (CYMBALTA) 30 MG capsule Take 30 mg by mouth once daily.    food supplemt, lactose-reduced (ENSURE ACTIVE LIGHT) Liqd Take by mouth as needed. FOR LOW BLOOD SUGAR    furosemide (LASIX) 20 MG tablet Take 40 mg by mouth every morning.     heparin flush,porcine,-0.9NaCl 100 unit/mL Kit Inject 5 mLs into the vein once daily. To each port    HYDROcodone-acetaminophen (NORCO) 5-325 mg per tablet Take 1 tablet by mouth every 6 (six) hours as needed for Pain.    insulin aspart (NOVOLOG) 100 unit/mL injection Inject 5 Units into the skin 2 (two) times daily.     insulin glargine (LANTUS) 100 unit/mL injection Inject 30 Units into the skin every evening.     isosorbide dinitrate (ISORDIL) 20 MG tablet Take 20 mg by mouth 2 (two) times daily.     lancets Misc     levothyroxine (SYNTHROID) 150 MCG tablet Take 150 mcg by mouth before breakfast.     magnesium hydroxide 400 mg/5 ml (MILK OF MAGNESIA) 400 mg/5 mL Susp Take 30 mLs by mouth daily as needed.    nut.tx.gluc.intol,lac-free,soy (GLUCERNA SHAKE) Liqd Take by mouth as needed. TX BLOOD SUGAR LOWS OR HIGHS    OMEGA-3/DHA/EPA/FISH OIL (OMEGA-3 FISH OIL ORAL) Take 2 capsules by mouth every evening.     ondansetron (ZOFRAN) 8 MG tablet Take by mouth every 8 (eight) hours as needed for Nausea.    pantoprazole (PROTONIX) 40 MG tablet Take 40 mg by mouth every morning.     polyethylene glycol (GLYCOLAX)  17 gram PwPk Take 17 g by mouth every evening. Mix in 8 ounces of water.If stool is too loose,may use every other night    rivaroxaban (XARELTO) 15 mg Tab Take 1 tablet (15 mg total) by mouth daily with dinner or evening meal.    sildenafil (REVATIO) 20 mg Tab Take 10 mg by mouth 3 (three) times daily.    [DISCONTINUED] insulin regular 100 unit/mL Inj injection Inject into the skin 4 (four) times daily before meals and nightly. Per sliding scale    [DISCONTINUED] spironolactone (ALDACTONE) 50 MG tablet Take 50 mg by mouth once daily.     Family History     Problem Relation (Age of Onset)    Diabetes Mother, Brother    Heart disease Father    Stroke Mother        Tobacco Use    Smoking status: Former Smoker     Types: Cigarettes     Last attempt to quit: 3/13/1987     Years since quittin.5    Smokeless tobacco: Never Used   Substance and Sexual Activity    Alcohol use: No     Frequency: Never    Drug use: No    Sexual activity: No     Review of Systems   Constitutional: Positive for activity change, appetite change and fatigue. Negative for chills and diaphoresis.   HENT: Positive for sinus pain. Negative for ear pain, sore throat and trouble swallowing.    Eyes: Negative for pain and visual disturbance.   Respiratory: Negative for chest tightness and shortness of breath.    Cardiovascular: Negative for chest pain and palpitations.   Gastrointestinal: Positive for abdominal pain and nausea. Negative for diarrhea and vomiting.   Endocrine: Negative for polydipsia and polyphagia.   Genitourinary: Negative for difficulty urinating and hematuria.   Musculoskeletal: Positive for back pain. Negative for myalgias.   Skin: Negative for rash.   Neurological: Negative for dizziness and light-headedness.   Psychiatric/Behavioral: Negative for confusion and dysphoric mood.     Objective:     Vital Signs (Most Recent):  Temp: 97.7 °F (36.5 °C) (18)  Pulse: 69 (18)  Resp: (!) 22 (18  2046)  BP: (!) 122/57 (09/08/18 2046)  SpO2: 99 % (09/08/18 2046) Vital Signs (24h Range):  Temp:  [96.6 °F (35.9 °C)-98.2 °F (36.8 °C)] 97.7 °F (36.5 °C)  Pulse:  [69-80] 69  Resp:  [17-22] 22  SpO2:  [97 %-99 %] 99 %  BP: ()/(52-67) 122/57        There is no height or weight on file to calculate BMI.    Physical Exam   Constitutional: She is oriented to person, place, and time. She appears well-developed and well-nourished. No distress.   HENT:   Head: Normocephalic and atraumatic.   Right Ear: External ear normal.   Left Ear: External ear normal.   Nose: Nose normal.   Eyes: Conjunctivae and EOM are normal. Right eye exhibits no discharge. Left eye exhibits no discharge. No scleral icterus.   Neck: Neck supple.   Cardiovascular: Normal rate, regular rhythm and normal heart sounds. Exam reveals no gallop and no friction rub.   No murmur heard.  Pulmonary/Chest: Effort normal and breath sounds normal. No stridor. No respiratory distress. She has no wheezes. She has no rales.   Abdominal: Soft. Bowel sounds are normal. She exhibits no distension. There is tenderness. There is no rebound and no guarding.   Musculoskeletal: She exhibits edema.   +2 pitting edema   Lymphadenopathy:     She has no cervical adenopathy.   Neurological: She is alert and oriented to person, place, and time.   Skin: Skin is warm and dry. She is not diaphoretic.   Psychiatric: She has a normal mood and affect. Her behavior is normal. Thought content normal.   Nursing note and vitals reviewed.        CRANIAL NERVES     CN III, IV, VI   Extraocular motions are normal.        Significant Labs:   BMP:   Recent Labs   Lab  09/08/18   1437   GLU  266*   NA  119*   K  5.8*   CL  83*   CO2  28   BUN  53*   CREATININE  2.40*   CALCIUM  7.5*     CBC:   Recent Labs   Lab  09/07/18   1620  09/08/18   0739   WBC  14.30*  14.40*   HGB  8.3*  8.7*   HCT  25.3*  26.6*   PLT  234  205

## 2018-09-09 NOTE — ASSESSMENT & PLAN NOTE
Currently takes glarine 30 units nightly and 5 units aspart  Will continue on dose adjusted regimen

## 2018-09-09 NOTE — PLAN OF CARE
I have seen the patient, discussed the resident's history and physical, assessment and plan. I have personally interviewed and examined the patient at bedside.  Full H&P by Dr. Mady Castaneda to follow, but in summary:    Ms. Medina Frazier is a 82 y.o. female with spinal stenosis s/p multiple lumbar fusions and Enterobacter spinal abscess on IV Cipro who presents as a transfer from [facility] for spine evaluation for an epidural abscess compressing the aorta, as well as osteo.  · Spinal Epidural Abscess:  Consult Neurosurgery.  Dr. Redd aware of patient.  Currently neurologically intact.  Continue IV Cipro.  Check random Vanc in the morning for Vanc dosing given JEY.  ID consulted for antibiotics.  If no surgery, might need IR for an abscess drainage plus/minus consult with Dr. Gutiérrez (patient's Orthopedic Surgeon at [facility]) to determine a plan of care.  · JEY 2/2 ATN, Hyponatremia, Hyperkalemia:  Worsening renal function.  Either 2/2 sepsis or cardiorenal syndrome/hypervolemic hyponatremia.  Check urine and serum osms.  1.5L fluid restriction.  BMP q4h.  Check renal ultrasound.  Strict I&Os and saenz.  Can consider Lasix in the morning after evaluating lab trend if this seems to be more cardiorenal in nature.  Consider Nephro consult.  Avoid Morphine for pain control and excessive insulin given decreased renal clearance.  · Chronic Respiratory Failure:  On 2-3L home oxygen.  Oxygen sats stable but with elevated BNP and CXR with pulmonary congestion.  Can consider Lasix as above.    Dispo:  Pending spine and ID recs.    Hali De La Rosa MD  [facility]  Cell:  [phone]  Spectra:  [phone]  Pager:  [phone]

## 2018-09-09 NOTE — ASSESSMENT & PLAN NOTE
Echo in 7/2018 showed EF 50-55% with diastolic dysfunction  Will continue Coreg 25mg and Isosorbide home dose  Will hold Lasix 2/2 JEY

## 2018-09-09 NOTE — H&P
[facility] Medicine  History & Physical    Patient Name: Medina Frazier  MRN: [id number]  Admission Date: 9/8/2018  Attending Physician: Elina Sandhu MD   Primary Care Provider: Hao Garcia MD    [facility] Medicine Team: [facility] 1 Mady Castaneda MD     Patient information was obtained from patient, relative(s) and ER records.     Subjective:     Principal Problem:Spinal epidural abscess    Chief Complaint: Spinal Abscess       HPI: Ms. Medina Frazier is a 82 y.o. female with spinal stenosis, T2DM, CHF, CAD, and Afib who originally presented to [facility] on 9/7 with back pain.  She has a complicated history, and has been following with Ortho Dr. Gutiérrez at [facility].  Her complete surgical history is below - but in summary she had a lumbar fusion in June, and developed post-op complications in July.  She had an I&D of the lumbar spine 7/2 where cultures grew Enterobacter cloacae.  PICC line was placed and she was discharged home on IV Cipro to complete a 6 week course, end date 8/14 under the care of Dr. Robert (Infectious Disease). She still has a wound vac in place.      She stated that pain was a constant pain that wrapped around her abdomen to her back that progressively worsened over the past few weeks. The pain is worse in the back, 9/10, and described as constant. She reports associated nausea but no vomiting, fevers, chills, or bowel changes. While in the ED, CT lumbar spine 9/7 showed concern for osteo and a fluid collection that is displacing her aorta.  Labs notable for elevated WBC, ESR, CRP, and a UTI.  Currently she is on Cipro from her previous cultures, and was started on Vanc on 9/8.   Neuro exam is benign.     Ortho consulted at [place] evaluation of osteo and the epidural abscess but stated that they felt that patient should get transferred for a higher level of care to an institution that has Neurosurg/Ortho Spine, as the Orthopedic surgeon who  performed her previous surgeries (Dr. Gutiérrez) is not on call.  Additionally, it seems as though Dr. Gutiérrez would not perform surgery based on the location of the fluid collection.  Neurosurgery Dr. Redd is aware of the patient, and will consult - but does not feel comfortable performing surgery on another surgeon's patient.        Past Medical History:   Diagnosis Date    Allergic sinusitis     Anticoagulant long-term use     Arthritis     Asthma     CHF (congestive heart failure)     Chronic kidney disease     COPD (chronic obstructive pulmonary disease)     Coronary artery disease     Diabetes mellitus     Encounter for blood transfusion     General anesthetics causing adverse effect in therapeutic use     DELAYED EMERGENCE    GERD (gastroesophageal reflux disease)     HHD (hypertensive heart disease)     High cholesterol     [place] (hard of hearing)     Hypertension     Hypothyroidism     Lumbar spinal stenosis     MRSA (methicillin resistant Staphylococcus aureus) infection 7/7/2018    MRSA infection     PAST H/O, CULTURE DONE 5/30/18 (+)    On home oxygen therapy     Osteoporosis     Pulmonary hypertension     Sleep apnea     STATES NOT USING C PAP    Spinal stenosis     Thyroid disease     TIA (transient ischemic attack)     UTI (urinary tract infection)     Venous insufficiency     Wears glasses     Wears partial dentures     UPPER FULL, LOWER PARTIAL       Past Surgical History:   Procedure Laterality Date    ANGIOGRAM-CORONARY N/A 6/2/2017    Performed by Yury Bailey MD at [facility] CATH    ANGIOPLASTY  2008    CARDIAC STENTS    APPENDECTOMY      APPLICATION OF WOUND VACUUM-ASSISTED CLOSURE DEVICE N/A 7/2/2018    Procedure: APPLICATION, WOUND VAC;  Surgeon: Jeremie Gutiérrez Jr., MD;  Location: [facility] OR;  Service: Orthopedics;  Laterality: N/A;    APPLICATION OF WOUND VACUUM-ASSISTED CLOSURE DEVICE N/A 8/7/2018    Procedure: APPLICATION, WOUND VAC;  Surgeon: Jeremie SANON  Brock Hamptno MD;  Location: [facility] OR;  Service: Orthopedics;  Laterality: N/A;    APPLICATION, DRESSING, WOUND Left 8/11/2013    Performed by Juwan Liu MD at [facility] OR    APPLICATION, WOUND VAC N/A 8/7/2018    Performed by Jeremie Gutiérrez Jr., MD at [facility] OR    APPLICATION, WOUND VAC N/A 7/2/2018    Performed by Jeremie Gutiérrez Jr., MD at [facility] OR    ARTHROPLASTY, KNEE, TOTAL Left 3/18/2013    Performed by Juwan Liu MD at [facility] OR    ATHERECTOMY N/A 6/5/2017    Performed by Lokesh Thakur MD at [facility] CATH    BLADDER SUSPENSION      BONE GRAFT N/A 6/5/2018    Procedure: BONE GRAFT;  Surgeon: Jeremie Gutiérrez Jr., MD;  Location: [facility] OR;  Service: Orthopedics;  Laterality: N/A;    BONE GRAFT N/A 6/5/2018    Performed by Jeremie Gutiérrez Jr., MD at [facility] OR    CARDIAC SURGERY  1996    CABG    CARDIAC SURGERY      Stents    CORONARY ARTERY BYPASS GRAFT  1996    EXPLORATION, WOUND-lumbar N/A 8/7/2018    Performed by Jeremie Gutiérrez Jr., MD at [facility] OR    FRACTURE SURGERY Left     Lt hand    FRACTURE SURGERY Left 1993    Lt Knee    FUSION-TRANSLUMINAL LUMBAR INTERBODY (TLIF) L2-3 W/ INSTRUMENTATION N/A 6/5/2018    Performed by Jeremie Gutiérrez Jr., MD at [facility] OR    HEMORRHOID SURGERY      HEMORRHOID SURGERY      HYSTERECTOMY  1984    INCISION AND DRAINAGE N/A 7/2/2018    Procedure: INCISION AND DRAINAGE (I & D) LUMBAR SPINE W/ANTIBIOTIC BEAD PLACEMENT;  Surgeon: Jeremie Gutiérrez Jr., MD;  Location: [facility] OR;  Service: Orthopedics;  Laterality: N/A;    INCISION AND DRAINAGE (I & D) LUMBAR SPINE W/ANTIBIOTIC BEAD PLACEMENT N/A 7/2/2018    Performed by Jeremie Gutiérrez Jr., MD at [facility] OR    INCISION AND DRAINAGE (I & D)-EXTREMITY-LOWER Left 8/11/2013    Performed by Juwan Liu MD at [facility] OR    INCISION AND DRAINAGE POSTERIOR LUMBAR SPINE  07/02/2018    INSERTION, PICC Right 7/2/2018    Performed by Jeremie G[facility]érrez Jr., MD at [facility] OR     JOINT REPLACEMENT Left     KNEE SURGERY Left     POSTOP TKR INFECTION TX    LUMBAR EPIDURAL INJECTION      OPEN REDUCTION INTERNAL FIXATION-HIP TFN Left 9/21/2017    Performed by LOY Early II, MD at [facility] OR    PERIPHERALLY INSERTED CENTRAL CATHETER INSERTION Right 7/2/2018    Procedure: INSERTION, PICC;  Surgeon: Jeremie Gutiérrez Jr., MD;  Location: [facility] OR;  Service: Orthopedics;  Laterality: Right;    REMOVAL OF HARDWARE FROM SPINE N/A 8/7/2018    Procedure: REMOVAL, HARDWARE, SPINE;  Surgeon: Jeremie Gutiérrez Jr., MD;  Location: [facility] OR;  Service: Orthopedics;  Laterality: N/A;    REMOVAL, HARDWARE, SPINE N/A 8/7/2018    Performed by Jeremie G[facility]érrez Jr., MD at [facility] OR    REMOVAL, PROSTHESIS, KNEE Left 11/20/2013    Performed by Juwan Liu MD at [facility] OR    THYROIDECTOMY, PARTIAL      TOTAL KNEE  PROSTHESIS REMOVAL W/ SPACER INSERTION Left     TRANSFORAMINAL LUMBAR INTERBODY FUSION (TLIF) OF SPINE WITH PERCUTANEOUS INSTRUMENTATION N/A 6/5/2018    Procedure: FUSION-TRANSLUMINAL LUMBAR INTERBODY (TLIF) L2-3 W/ INSTRUMENTATION;  Surgeon: Jeremie Gutiérrez Jr., MD;  Location: [facility] OR;  Service: Orthopedics;  Laterality: N/A;    WOUND EXPLORATION N/A 8/7/2018    Procedure: EXPLORATION, WOUND-lumbar;  Surgeon: Jeremie Gutiérrez Jr., MD;  Location: [facility] OR;  Service: Orthopedics;  Laterality: N/A;       Review of patient's allergies indicates:   Allergen Reactions    Codeine Nausea Only    Penicillins Swelling     Able to take amoxil       Current Facility-Administered Medications on File Prior to Encounter   Medication    [COMPLETED] 0.9%  NaCl infusion    [COMPLETED] insulin regular injection 10 Units    [COMPLETED] morphine injection 1 mg    [COMPLETED] sodium polystyrene 15 gram/60 mL suspension 15 g    [COMPLETED] vancomycin (VANCOCIN) 1,500 mg in dextrose 5 % 250 mL IVPB    [DISCONTINUED] acetaminophen tablet 650 mg    [DISCONTINUED] albuterol-ipratropium  2.5 mg-0.5 mg/3 mL nebulizer solution 3 mL    [DISCONTINUED] carvedilol tablet 25 mg    [DISCONTINUED] ciprofloxacin (CIPRO)400mg/200ml D5W IVPB 400 mg    [DISCONTINUED] clopidogrel tablet 75 mg    [DISCONTINUED] dextrose 50% injection 12.5 g    [DISCONTINUED] dextrose 50% injection 25 g    [DISCONTINUED] dextrose 50% injection 25 g    [DISCONTINUED] diazePAM tablet 5 mg    [DISCONTINUED] glucagon (human recombinant) injection 1 mg    [DISCONTINUED] glucose chewable tablet 16 g    [DISCONTINUED] glucose chewable tablet 24 g    [DISCONTINUED] insulin aspart U-100 injection 1-10 Units    [DISCONTINUED] levothyroxine tablet 150 mcg    [DISCONTINUED] magnesium oxide tablet 800 mg    [DISCONTINUED] magnesium oxide tablet 800 mg    [DISCONTINUED] morphine injection 4 mg    [DISCONTINUED] ondansetron injection 8 mg    [DISCONTINUED] potassium chloride 10% oral solution 40 mEq    [DISCONTINUED] potassium chloride 10% oral solution 40 mEq    [DISCONTINUED] potassium chloride 10% oral solution 40 mEq    [DISCONTINUED] potassium, sodium phosphates 280-160-250 mg packet 2 packet    [DISCONTINUED] potassium, sodium phosphates 280-160-250 mg packet 2 packet    [DISCONTINUED] potassium, sodium phosphates 280-160-250 mg packet 2 packet    [DISCONTINUED] promethazine (PHENERGAN) 6.25 mg in dextrose 5 % 50 mL IVPB    [DISCONTINUED] rivaroxaban tablet 15 mg    [DISCONTINUED] senna-docusate 8.6-50 mg per tablet 1 tablet    [DISCONTINUED] sodium chloride 0.9% flush 5 mL    [DISCONTINUED] vancomycin (VANCOCIN) 1000 mg in dextrose 5 % 200 mL IVPB     Current Outpatient Medications on File Prior to Encounter   Medication Sig    acetaminophen (TYLENOL) 325 MG tablet Take 650 mg by mouth every 4 (four) hours as needed for Pain or Temperature greater than (100).    albuterol (ACCUNEB) 0.63 mg/3 mL Nebu Take 0.63 mg by nebulization every 6 (six) hours as needed. Rescue    atorvastatin (LIPITOR) 40 MG tablet Take  40 mg by mouth every evening.     bisacodyl (DULCOLAX, BISACODYL,) 10 mg Supp Place 10 mg rectally every 8 (eight) hours as needed (constipation).    calcium carbonate-simethicone (MAALOX ADVANCED) 1,000-60 mg Chew Take 1 tablet by mouth every 4 (four) hours as needed (heartburn).    carvedilol (COREG) 25 MG tablet Take 25 mg by mouth 2 (two) times daily.     ciprofloxacin, CIPRO,400mg/200ml D5W IVPB (CIPRO IN D5W) 400 mg/200 mL IVPB Inject 400 mg into the vein every 12 (twelve) hours.    CLEVER CHEK TEST STRIPS Strp     clopidogrel (PLAVIX) 75 mg tablet Take 1 tablet (75 mg total) by mouth once daily. (Patient taking differently: Take 75 mg by mouth every morning. )    denosumab (PROLIA) 60 mg/mL Syrg Inject 60 mg into the skin every 6 (six) months. Next dose due 5/2018    diazePAM (VALIUM) 5 MG tablet Take 5 mg by mouth every 8 (eight) hours as needed for Anxiety.    DULoxetine (CYMBALTA) 30 MG capsule Take 30 mg by mouth once daily.    food supplemt, lactose-reduced (ENSURE ACTIVE LIGHT) Liqd Take by mouth as needed. FOR LOW BLOOD SUGAR    furosemide (LASIX) 20 MG tablet Take 40 mg by mouth every morning.     heparin flush,porcine,-0.9NaCl 100 unit/mL Kit Inject 5 mLs into the vein once daily. To each port    HYDROcodone-acetaminophen (NORCO) 5-325 mg per tablet Take 1 tablet by mouth every 6 (six) hours as needed for Pain.    insulin aspart (NOVOLOG) 100 unit/mL injection Inject 5 Units into the skin 2 (two) times daily.     insulin glargine (LANTUS) 100 unit/mL injection Inject 30 Units into the skin every evening.     isosorbide dinitrate (ISORDIL) 20 MG tablet Take 20 mg by mouth 2 (two) times daily.     lancets Misc     levothyroxine (SYNTHROID) 150 MCG tablet Take 150 mcg by mouth before breakfast.     magnesium hydroxide 400 mg/5 ml (MILK OF MAGNESIA) 400 mg/5 mL Susp Take 30 mLs by mouth daily as needed.    nut.tx.gluc.intol,lac-free,soy (GLUCERNA SHAKE) Liqd Take by mouth as needed.  TX BLOOD SUGAR LOWS OR HIGHS    OMEGA-3/DHA/EPA/FISH OIL (OMEGA-3 FISH OIL ORAL) Take 2 capsules by mouth every evening.     ondansetron (ZOFRAN) 8 MG tablet Take by mouth every 8 (eight) hours as needed for Nausea.    pantoprazole (PROTONIX) 40 MG tablet Take 40 mg by mouth every morning.     polyethylene glycol (GLYCOLAX) 17 gram PwPk Take 17 g by mouth every evening. Mix in 8 ounces of water.If stool is too loose,may use every other night    rivaroxaban (XARELTO) 15 mg Tab Take 1 tablet (15 mg total) by mouth daily with dinner or evening meal.    sildenafil (REVATIO) 20 mg Tab Take 10 mg by mouth 3 (three) times daily.    [DISCONTINUED] insulin regular 100 unit/mL Inj injection Inject into the skin 4 (four) times daily before meals and nightly. Per sliding scale    [DISCONTINUED] spironolactone (ALDACTONE) 50 MG tablet Take 50 mg by mouth once daily.     Family History     Problem Relation (Age of Onset)    Diabetes Mother, Brother    Heart disease Father    Stroke Mother        Tobacco Use    Smoking status: Former Smoker     Types: Cigarettes     Last attempt to quit: 3/13/1987     Years since quittin.5    Smokeless tobacco: Never Used   Substance and Sexual Activity    Alcohol use: No     Frequency: Never    Drug use: No    Sexual activity: No     Review of Systems   Constitutional: Positive for activity change, appetite change and fatigue. Negative for chills and diaphoresis.   HENT: Positive for sinus pain. Negative for ear pain, sore throat and trouble swallowing.    Eyes: Negative for pain and visual disturbance.   Respiratory: Negative for chest tightness and shortness of breath.    Cardiovascular: Negative for chest pain and palpitations.   Gastrointestinal: Positive for abdominal pain and nausea. Negative for diarrhea and vomiting.   Endocrine: Negative for polydipsia and polyphagia.   Genitourinary: Negative for difficulty urinating and hematuria.   Musculoskeletal: Positive for back  pain. Negative for myalgias.   Skin: Negative for rash.   Neurological: Negative for dizziness and light-headedness.   Psychiatric/Behavioral: Negative for confusion and dysphoric mood.     Objective:     Vital Signs (Most Recent):  Temp: 97.7 °F (36.5 °C) (09/08/18 2046)  Pulse: 69 (09/08/18 2046)  Resp: (!) 22 (09/08/18 2046)  BP: (!) 122/57 (09/08/18 2046)  SpO2: 99 % (09/08/18 2046) Vital Signs (24h Range):  Temp:  [96.6 °F (35.9 °C)-98.2 °F (36.8 °C)] 97.7 °F (36.5 °C)  Pulse:  [69-80] 69  Resp:  [17-22] 22  SpO2:  [97 %-99 %] 99 %  BP: ()/(52-67) 122/57        There is no height or weight on file to calculate BMI.    Physical Exam   Constitutional: She is oriented to person, place, and time. She appears well-developed and well-nourished. No distress.   HENT:   Head: Normocephalic and atraumatic.   Right Ear: External ear normal.   Left Ear: External ear normal.   Nose: Nose normal.   Eyes: Conjunctivae and EOM are normal. Right eye exhibits no discharge. Left eye exhibits no discharge. No scleral icterus.   Neck: Neck supple.   Cardiovascular: Normal rate, regular rhythm and normal heart sounds. Exam reveals no gallop and no friction rub.   No murmur heard.  Pulmonary/Chest: Effort normal and breath sounds normal. No stridor. No respiratory distress. She has no wheezes. She has no rales.   Abdominal: Soft. Bowel sounds are normal. She exhibits no distension. There is tenderness. There is no rebound and no guarding. Diffuse tenderness to light palpation throughout 4 quadrants but no rebound or rigidity    Musculoskeletal: She exhibits edema.   +2 pitting edema in lower extermities;   Lymphadenopathy:     She has no cervical adenopathy.   Neurological: She is alert and oriented to person, place, and time.   Skin: Skin is warm and dry. She is not diaphoretic.   dressing on lumbar area  Psychiatric: She has a normal mood and affect. Her behavior is normal. Thought content normal.   Nursing note and vitals  reviewed.        CRANIAL NERVES     CN III, IV, VI   Extraocular motions are normal.        Significant Labs:   BMP:   Recent Labs   Lab  09/08/18   1437   GLU  266*   NA  119*   K  5.8*   CL  83*   CO2  28   BUN  53*   CREATININE  2.40*   CALCIUM  7.5*     CBC:   Recent Labs   Lab  09/07/18   1620  09/08/18   0739   WBC  14.30*  14.40*   HGB  8.3*  8.7*   HCT  25.3*  26.6*   PLT  234  205               Assessment/Plan:     * Spinal epidural abscess    S/p TLIF L2-3 in 6/2018 with complications from culture + enterobacter cloacae infection discovered on  I&D in July 2nd; Hardware removed in August  On IV antiboitics for 6 weeks via PICC  Recent ED for continue abdominal pain with recent CT showing concern for osteo and a fluid collection that is displacing her aorta  On cipro previously and given Vanco dose in ED   Transferred from [facility] due to concern for need for higher level of care   Neurosurgery consulted, will discuss in morning if surgery is indicated  ID also consulted     Continue cipro IV and vanco, check vanco levels in AM          Hyponatremia    Na+ 121 on initial presentation to ED at OSH, appears to have worsened after patient was given fluids at OSH to 119   Likely due to hypervolemic hyponatremia due to clinical picture with +2 pitting edema  Fluid restriction, strict I/o's; BMPs q4  Consider nephro consult if hyponatremia continues to worsen          JEY (acute kidney injury)    Creatinine 2.2 on initial presentation to OSH, appears to have worsened to today   JEY thought to be prerenal 2/2 dehydration but urine Na normal  Renal US ordered to assess possible intrarenal etiology  Will continue to monitor            Hyperkalemia    Potassium 5.8 today  Likely 2/2 JEY but etiology unclear   Will repeat BMP and assess need to shift K+            PAF (paroxysmal atrial fibrillation)    Will continue on rivaroxabin 15mg daily  Will hold for now          Pulmonary hypertension    On home oxygen 3 L  at home  Continue slidenatil          CHF (congestive heart failure), NYHA class IV    Echo in 7/2018 showed EF 60% with diastolic dysfunction  CXR suggests mild congestion and patient with +2 pitting edema on exam, patient not hypoxia or no increased O2 requirement (currently on home O2 3L)  On Coreg 25mg, furosemide 40mg, and Isosorbide 20mg   Will hold coreg 2/2 JEY  Will hold Lasix for now due to consider for worsening hyponatremia          Coronary artery disease involving native coronary artery of native heart without angina pectoris    S/p stent placement in 2017  Will continue statin and plavix          Diabetes mellitus, type II    Currently takes glarine 30 units nightly and 5 units aspart  Will continue on dose adjusted regimen          Hypertension    Currently normotensive  Will hold Coreg for now          GERD (gastroesophageal reflux disease)    Continue home protonix       Hypothyroid  -    Continue home thyroxine     VTE Risk Mitigation (From admission, onward)        Ordered     rivaroxaban tablet 15 mg  With dinner      09/08/18 7311             Mady Castaneda MD  Department of Hospital Medicine   [facility]

## 2018-09-10 PROBLEM — R40.0 SOMNOLENCE: Status: ACTIVE | Noted: 2018-01-01

## 2018-09-10 NOTE — PT/OT/SLP EVAL
Physical Therapy Evaluation and Treatment    Patient Name:  Medina Frazier   MRN:  [id number]    Recommendations:     Discharge Recommendations:  nursing facility, skilled(in NH)   Discharge Equipment Recommendations: wheelchair, hospital bed, lift device, bedside commode, bath bench   Barriers to discharge: Decreased caregiver support    Assessment:     Medina Frazier is a 82 y.o. female admitted with a medical diagnosis of Spinal epidural abscess.  She presents with the following impairments/functional limitations:  weakness, gait instability, decreased lower extremity function, impaired balance, impaired endurance, impaired coordination, impaired cardiopulmonary response to activity, decreased safety awareness, impaired sensation, impaired self care skills, impaired functional mobilty, edema, pain. Pt performed bed mobility total A (assist x2) and sat EOB with max A. Pt will benefit from skilled PT to improve deficits and increase overall functional mobility.     Rehab Prognosis:  Fair; patient would benefit from acute skilled PT services to address these deficits and reach maximum level of function.      Recent Surgery: Procedure(s) (LRB):  FUSION, SPINE, LUMBAR, TLIF, WITH PERCUTANEOUS INSTRUMENTATION L1-5, depuy, neuromonitoring, 4 post bed (N/A)      Plan:     During this hospitalization, patient to be seen 3 x/week to address the above listed problems via gait training, therapeutic activities, therapeutic exercises, neuromuscular re-education, wheelchair management/training  · Plan of Care Expires:  10/10/18   Plan of Care Reviewed with: patient, spouse, family    Subjective     Communicated with RN prior to session.  Patient found supine in bed and family present upon PT entry to room, agreeable to evaluation.      Chief Complaint: back pain and weakness  Patient comments/goals: return home  Pain/Comfort:  · Pain Rating 1: 6/10  · Location - Side 1: Bilateral  · Location - Orientation 1:  generalized  · Location 1: back  · Pain Addressed 1: Reposition, Distraction  · Pain Rating Post-Intervention 1: 6/10    Patients cultural, spiritual, Oriental orthodox conflicts given the current situation:      Living Environment:  Pt lives with  in raised 1-story house with elevator access and walk-in shower. Pt reports she has been in and out of the hospital since June and recently at NH SNF. Pt reports amb short distances with RW with therapy about ~3weeks ago. Pt report she fell at the NH and has been requiring jane lift for transfers since the fall.   Prior to admission, patients level of function was assist with mobility.  Patient has the following equipment: shower chair.  DME owned (not currently used): none.  Upon discharge, patient will have assistance from family but unable to physically assist.    Objective:     Patient found with: wound vac, oxygen, peripheral IV     General Precautions: Standard, fall   Orthopedic Precautions:spinal precautions   Braces: LSO     Exams:  · Cognitive Exam:  Patient is oriented to Person and Place  · Gross Motor Coordination:  WFL  · Postural Exam:  Patient presented with the following abnormalities:    · -       Rounded shoulders  · -       Forward head  · Sensation:    · -       Intact  light/touch B LE  · Skin Integrity/Edema:      · -       Edema: Pitting B hips down  · RLE ROM: WFL  · RLE Strength: WFL except ankle DF 3+/5  · LLE ROM: WFL  · LLE Strength: WFL    Functional Mobility:  Bed Mobility:     · Rolling Left:  total assistance and of 2 persons  · Rolling Right: total assistance and of 2 persons  · Scooting: total assistance and of 2 persons  · Supine to Sit: total assistance and of 2 persons  · Sit to Supine: total assistance and of 2 persons    AM-PAC 6 CLICK MOBILITY  Total Score:6       Therapeutic Activities and Exercises:  PT/OT obtained history in AM and returned in PM when LSO present for OOB assessment.   Pt sat EOB with max A with post lean; pt  LSO unable to fit around pt abd, PA notified.   Pt and family educated on:  -role of PT/POC  -safety with mobility  -pressure relief in bed  -elevated B LE and ankle pumps to decrease swelling    Patient left HOB elevated with all lines intact, call button in reach, RN notified and family present.    GOALS:   Multidisciplinary Problems     Physical Therapy Goals        Problem: Physical Therapy Goal    Goal Priority Disciplines Outcome Goal Variances Interventions   Physical Therapy Goal     PT, PT/OT Ongoing (interventions implemented as appropriate)     Description:  Goals to be met by: 2018     Patient will increase functional independence with mobility by performin. Supine to sit with Moderate Assistance  2. Sit to supine with Moderate Assistance  3. Sit to stand transfer with Maximum Assistance  4. Bed to chair transfer with Maximum Assistance  5. Wheelchair propulsion x50 feet with Contact Guard Assistance using bilateral uppper extremities  6. Lower extremity exercise program x15 reps per handout, with assistance as needed                      History:     Past Medical History:   Diagnosis Date    Allergic sinusitis     Anticoagulant long-term use     Arthritis     Asthma     CHF (congestive heart failure)     Chronic kidney disease     COPD (chronic obstructive pulmonary disease)     Coronary artery disease     Diabetes mellitus     Encounter for blood transfusion     General anesthetics causing adverse effect in therapeutic use     DELAYED EMERGENCE    GERD (gastroesophageal reflux disease)     HHD (hypertensive heart disease)     High cholesterol     [place] (hard of hearing)     Hypertension     Hypothyroidism     Lumbar spinal stenosis     MRSA (methicillin resistant Staphylococcus aureus) infection 2018    MRSA infection     PAST H/O, CULTURE DONE 18 (+)    On home oxygen therapy     Osteoporosis     Pulmonary hypertension     Sleep apnea     STATES NOT USING C  PAP    Spinal stenosis     Thyroid disease     TIA (transient ischemic attack)     UTI (urinary tract infection)     Venous insufficiency     Wears glasses     Wears partial dentures     UPPER FULL, LOWER PARTIAL       Past Surgical History:   Procedure Laterality Date    ANGIOGRAM-CORONARY N/A 6/2/2017    Performed by [facility] at [facility] CATH    ANGIOPLASTY  2008    CARDIAC STENTS    APPENDECTOMY      APPLICATION OF WOUND VACUUM-ASSISTED CLOSURE DEVICE N/A 7/2/2018    Procedure: APPLICATION, WOUND VAC;  Surgeon: Jeremie Gutiérrez Jr., MD;  Location: [facility] OR;  Service: Orthopedics;  Laterality: N/A;    APPLICATION OF WOUND VACUUM-ASSISTED CLOSURE DEVICE N/A 8/7/2018    Procedure: APPLICATION, WOUND VAC;  Surgeon: Jeremie Gutiérrez Jr., MD;  Location: [facility] OR;  Service: Orthopedics;  Laterality: N/A;    APPLICATION, DRESSING, WOUND Left 8/11/2013    [facility] OR    APPLICATION, WOUND VAC N/A 8/7/2018    Performed by [facility] Jr., MD at [facility] OR    APPLICATION, WOUND VAC N/A 7/2/2018    Performed by [facility] Jr., MD at [facility] OR    ARTHROPLASTY, KNEE, TOTAL Left 3/18/2013    [facility] at [facility] OR    ATHERECTOMY N/A 6/5/2017    Performed by Lokesh Thakur MD at [facility] CATH    BLADDER SUSPENSION      BONE GRAFT N/A 6/5/2018    Procedure: BONE GRAFT;  Surgeon: Jeremie Gutiérrez Jr., MD;  Location: [facility] OR;  Service: Orthopedics;  Laterality: N/A;    BONE GRAFT N/A 6/5/2018    Performed by [facility]ie [facility]rez Jr., MD at [facility] OR    CARDIAC SURGERY  1996    CABG    CARDIAC SURGERY      Stents    CORONARY ARTERY BYPASS GRAFT  1996    EXPLORATION, WOUND-lumbar N/A 8/7/2018    Performed by [facility]ie [facility]rez Jr., MD at [facility] OR    FRACTURE SURGERY Left     Lt hand    FRACTURE SURGERY Left 1993    Lt Knee    FUSION-TRANSLUMINAL LUMBAR INTERBODY (TLIF) L2-3 W/ INSTRUMENTATION N/A 6/5/2018    Performed  by [facility] at [facility] OR    HEMORRHOID SURGERY      HEMORRHOID SURGERY      HYSTERECTOMY  1984    INCISION AND DRAINAGE N/A 7/2/2018    Procedure: INCISION AND DRAINAGE (I & D) LUMBAR SPINE W/ANTIBIOTIC BEAD PLACEMENT;  Surgeon: Jeremie Gutiérrez Jr., MD;  Location: [facility] OR;  Service: Orthopedics;  Laterality: N/A;    INCISION AND DRAINAGE (I & D) LUMBAR SPINE W/ANTIBIOTIC BEAD PLACEMENT N/A 7/2/2018    Performed by [facility] Jr., MD at [facility] OR    INCISION AND DRAINAGE (I & D)-EXTREMITY-LOWER Left 8/11/2013    [facility] at [facility] OR    INCISION AND DRAINAGE POSTERIOR LUMBAR SPINE  07/02/2018    INSERTION, PICC Right 7/2/2018    Performed by [facility] Jr., MD at [facility] OR    JOINT REPLACEMENT Left     KNEE SURGERY Left     POSTOP TKR INFECTION TX    LUMBAR EPIDURAL INJECTION      OPEN REDUCTION INTERNAL FIXATION-HIP TFN Left 9/21/2017    Performed by LOY Early II, MD at [facility] OR    PERIPHERALLY INSERTED CENTRAL CATHETER INSERTION Right 7/2/2018    Procedure: INSERTION, PICC;  Surgeon: Jeremie Gutiérrez Jr., MD;  Location: [facility] OR;  Service: Orthopedics;  Laterality: Right;    REMOVAL OF HARDWARE FROM SPINE N/A 8/7/2018    Procedure: REMOVAL, HARDWARE, SPINE;  Surgeon: Jeremie Gutiérrez Jr., MD;  Location: [facility] OR;  Service: Orthopedics;  Laterality: N/A;    REMOVAL, HARDWARE, SPINE N/A 8/7/2018    Performed by [facility] Jr., MD at [facility] OR    REMOVAL, PROSTHESIS, KNEE Left 11/20/2013    [facility] OR    THYROIDECTOMY, PARTIAL      TOTAL KNEE  PROSTHESIS REMOVAL W/ SPACER INSERTION Left     TRANSFORAMINAL LUMBAR INTERBODY FUSION (TLIF) OF SPINE WITH PERCUTANEOUS INSTRUMENTATION N/A 6/5/2018    Procedure: FUSION-TRANSLUMINAL LUMBAR INTERBODY (TLIF) L2-3 W/ INSTRUMENTATION;  Surgeon: Jeremie Gutiérrez Jr., MD;  Location: [facility] OR;  Service: Orthopedics;  Laterality: N/A;     WOUND EXPLORATION N/A 8/7/2018    Procedure: EXPLORATION, WOUND-lumbar;  Surgeon: Jeremie Gutiérrez Jr., MD;  Location: [facility];  Service: Orthopedics;  Laterality: N/A;       Clinical Decision Making:     Decision Making/ Complexity Score   On examination of body system using standardized tests and measures patient presents with 3 or more elements from any of the following: body structures and functions, activity limitations, and/or participation restrictions.  Leading to a clinical presentation that is considered evolving with changing characteristics                              Clinical Decision Making  (Eval Complexity):  Moderate - 14653     Time Tracking:     PT Received On: 09/10/18  PT Start Time: 1116(1259)     PT Stop Time: 1131(1319)  PT Total Time (min): 15 min   PT returned 12:59-13:19  PT Total Time: 35min     Billable Minutes: Evaluation 20 and Therapeutic Activity 10      AARON MILLER, PT  09/10/2018

## 2018-09-10 NOTE — CONSULTS
[facility]  Neurosurgery  Consult Note    Consults  Subjective:     Chief Complaint/Reason for Admission: spinal epidural abscess    History of Present Illness: Ms Medina Frazier is an 81 yo woman with spinal stenosis sp multiple fusions, epidural abscess on IV Cipro who is transferred from [facility] Prior to Admission   Medication Sig Dispense Refill Last Dose    acetaminophen (TYLENOL) 325 MG tablet Take 650 mg by mouth every 4 (four) hours as needed for Pain or Temperature greater than (100).   Past Week at Unknown time    albuterol (ACCUNEB) 0.63 mg/3 mL Nebu Take 0.63 mg by nebulization every 6 (six) hours as needed (sob/wheezing). Rescue    7/2/2018    atorvastatin (LIPITOR) 40 MG tablet Take 40 mg by mouth every evening.    9/8/2018 at Unknown time    bisacodyl (DULCOLAX, BISACODYL,) 10 mg Supp Place 10 mg rectally every 8 (eight) hours as needed (constipation).       calcium carbonate-simethicone (MAALOX ADVANCED) 1,000-60 mg Chew Take 1 tablet by mouth every 4 (four) hours as needed (heartburn).       carvedilol (COREG) 25 MG tablet Take 25 mg by mouth 2 (two) times daily.    9/8/2018 at Unknown time    ciprofloxacin, CIPRO,400mg/200ml D5W IVPB (CIPRO IN D5W) 400 mg/200 mL IVPB Inject 400 mg into the vein every 12 (twelve) hours.   9/7/2018    clopidogrel (PLAVIX) 75 mg tablet Take 1 tablet (75 mg total) by mouth once daily. (Patient taking differently: Take 75 mg by mouth every morning. )   9/7/2018    diazePAM (VALIUM) 5 MG tablet Take 5 mg by mouth every 8 (eight) hours as needed (spasms).        DULoxetine (CYMBALTA) 30 MG capsule Take 30 mg by mouth once daily.   9/8/2018 at Unknown time    furosemide (LASIX) 20 MG tablet Take 20 mg by mouth every morning.    9/8/2018 at Unknown time    HYDROcodone-acetaminophen (NORCO) 7.5-325 mg per tablet Take 1 tablet by mouth every 6 (six) hours as needed for Pain.   9/7/2018    insulin glargine (LANTUS) 100  unit/mL injection Inject 30 Units into the skin every evening.    9/8/2018 at Unknown time    isosorbide dinitrate (ISORDIL) 20 MG tablet Take 20 mg by mouth 2 (two) times daily.    9/8/2018 at Unknown time    levothyroxine (SYNTHROID) 150 MCG tablet Take 150 mcg by mouth before breakfast.    9/8/2018 at Unknown time    magnesium hydroxide 400 mg/5 ml (MILK OF MAGNESIA) 400 mg/5 mL Susp Take 30 mLs by mouth daily as needed (constipation).        OMEGA-3/DHA/EPA/FISH OIL (OMEGA-3 FISH OIL ORAL) Take 2 capsules by mouth every evening.    9/6/2018    ondansetron (ZOFRAN) 8 MG tablet Take by mouth every 8 (eight) hours as needed for Nausea.   9/8/2018 at Unknown time    pantoprazole (PROTONIX) 40 MG tablet Take 40 mg by mouth every morning.    9/8/2018 at Unknown time    polyethylene glycol (GLYCOLAX) 17 gram PwPk Take 17 g by mouth every evening. Mix in 8 ounces of water.If stool is too loose,may use every other night   9/5/2018    rivaroxaban (XARELTO) 15 mg Tab Take 1 tablet (15 mg total) by mouth daily with dinner or evening meal.   9/8/2018 at Unknown time    sildenafil (REVATIO) 20 mg Tab Take 10 mg by mouth 3 (three) times daily.   9/8/2018 at Unknown time    spironolactone (ALDACTONE) 50 MG tablet Take 50 mg by mouth once daily.       food supplemt, lactose-reduced (ENSURE ACTIVE LIGHT) Liqd Take by mouth as needed. FOR LOW BLOOD SUGAR   7/2/2018    heparin flush,porcine,-0.9NaCl 100 unit/mL Kit Inject 5 mLs into the vein once daily. To each port   9/7/2018    nut.tx.gluc.intol,lac-free,soy (GLUCERNA SHAKE) Liqd Take by mouth as needed. TX BLOOD SUGAR LOWS OR HIGHS   7/2/2018       Review of patient's allergies indicates:   Allergen Reactions    Codeine Nausea Only    Penicillins Swelling     Able to take amoxil       Past Medical History:   Diagnosis Date    Allergic sinusitis     Anticoagulant long-term use     Arthritis     Asthma     CHF (congestive heart failure)     Chronic kidney  disease     COPD (chronic obstructive pulmonary disease)     Coronary artery disease     Diabetes mellitus     Encounter for blood transfusion     General anesthetics causing adverse effect in therapeutic use     DELAYED EMERGENCE    GERD (gastroesophageal reflux disease)     HHD (hypertensive heart disease)     High cholesterol     [place] (hard of hearing)     Hypertension     Hypothyroidism     Lumbar spinal stenosis     MRSA (methicillin resistant Staphylococcus aureus) infection 7/7/2018    MRSA infection     PAST H/O, CULTURE DONE 5/30/18 (+)    On home oxygen therapy     Osteoporosis     Pulmonary hypertension     Sleep apnea     STATES NOT USING C PAP    Spinal stenosis     Thyroid disease     TIA (transient ischemic attack)     UTI (urinary tract infection)     Venous insufficiency     Wears glasses     Wears partial dentures     UPPER FULL, LOWER PARTIAL     Past Surgical History:   Procedure Laterality Date    ANGIOGRAM-CORONARY N/A 6/2/2017    [facility] at [facility] CATH    ANGIOPLASTY  2008    CARDIAC STENTS    APPENDECTOMY      APPLICATION OF WOUND VACUUM-ASSISTED CLOSURE DEVICE N/A 7/2/2018    Procedure: APPLICATION, WOUND VAC;  Surgeon: Jeremie Gutiérrez Jr., MD;  Location: [facility] OR;  Service: Orthopedics;  Laterality: N/A;    APPLICATION OF WOUND VACUUM-ASSISTED CLOSURE DEVICE N/A 8/7/2018    Procedure: APPLICATION, WOUND VAC;  Surgeon: Jeremie Gutiérrez Jr., MD;  Location: [facility] OR;  Service: Orthopedics;  Laterality: N/A;    APPLICATION, DRESSING, WOUND Left 8/11/2013    [facility] OR    APPLICATION, WOUND VAC N/A 8/7/2018    Performed by Jerem[facility]érrez Jr., MD at [facility] OR    APPLICATION, WOUND VAC N/A 7/2/2018    Performed by [facility] Jr., MD at [facility] OR    ARTHROPLASTY, KNEE, TOTAL Left 3/18/2013    [facility] at [facility] OR    ATHERECTOMY N/A 6/5/2017    Performed by  [facility]ur [facility] at [facility] CATH    BLADDER SUSPENSION      BONE GRAFT N/A 6/5/2018    Procedure: BONE GRAFT;  Surgeon: Jeremie Gutiérrez Jr., MD;  Location: [facility] OR;  Service: Orthopedics;  Laterality: N/A;    BONE GRAFT N/A 6/5/2018    Performed by Jeremie [facility]érrez Jr., MD at [facility] OR    CARDIAC SURGERY  1996    CABG    CARDIAC SURGERY      Stents    CORONARY ARTERY BYPASS GRAFT  1996    EXPLORATION, WOUND-lumbar N/A 8/7/2018    Performed by Jerem[facility] Jr., [facility] at [facility] OR    FRACTURE SURGERY Left     Lt hand    FRACTURE SURGERY Left 1993    Lt Knee    FUSION-TRANSLUMINAL LUMBAR INTERBODY (TLIF) L2-3 W/ INSTRUMENTATION N/A 6/5/2018    Performed by Jeremie [facility]érrez Jr., MD at [facility] OR    HEMORRHOID SURGERY      HEMORRHOID SURGERY      HYSTERECTOMY  1984    INCISION AND DRAINAGE N/A 7/2/2018    Procedure: INCISION AND DRAINAGE (I & D) LUMBAR SPINE W/ANTIBIOTIC BEAD PLACEMENT;  Surgeon: Jeremie Gutiérrez Jr., MD;  Location: [facility] OR;  Service: Orthopedics;  Laterality: N/A;    INCISION AND DRAINAGE (I & D) LUMBAR SPINE W/ANTIBIOTIC BEAD PLACEMENT N/A 7/2/2018    Performed by Jeremie G[facility]érrez Jr., MD at [facility] OR    INCISION AND DRAINAGE (I & D)-EXTREMITY-LOWER Left 8/11/2013    [facility] at [facility] OR    INCISION AND DRAINAGE POSTERIOR LUMBAR SPINE  07/02/2018    INSERTION, PICC Right 7/2/2018    Performed by Jeremie [facility]érrez Jr., MD at [facility] OR    JOINT REPLACEMENT Left     KNEE SURGERY Left     POSTOP TKR INFECTION TX    LUMBAR EPIDURAL INJECTION      OPEN REDUCTION INTERNAL FIXATION-HIP TFN Left 9/21/2017    Performed by LOY Early II, MD at [facility] OR    PERIPHERALLY INSERTED CENTRAL CATHETER INSERTION Right 7/2/2018    Procedure: INSERTION, PICC;  Surgeon: Jeremie Gutiérrez Jr., MD;  Location: [facility] OR;  Service: Orthopedics;  Laterality: Right;    REMOVAL OF HARDWARE FROM SPINE N/A 8/7/2018    Procedure: REMOVAL, HARDWARE, SPINE;   Surgeon: Jeremie Gutiérrez Jr., MD;  Location: [facility] OR;  Service: Orthopedics;  Laterality: N/A;    REMOVAL, HARDWARE, SPINE N/A 2018    Performed by Jerem[facility] Jr., MD at [facility] OR    REMOVAL, PROSTHESIS, KNEE Left 2013    [facility] OR    THYROIDECTOMY, PARTIAL      TOTAL KNEE  PROSTHESIS REMOVAL W/ SPACER INSERTION Left     TRANSFORAMINAL LUMBAR INTERBODY FUSION (TLIF) OF SPINE WITH PERCUTANEOUS INSTRUMENTATION N/A 2018    Procedure: FUSION-TRANSLUMINAL LUMBAR INTERBODY (TLIF) L2-3 W/ INSTRUMENTATION;  Surgeon: Jeremie Gutiérrez Jr., MD;  Location: [facility] OR;  Service: Orthopedics;  Laterality: N/A;    WOUND EXPLORATION N/A 2018    Procedure: EXPLORATION, WOUND-lumbar;  Surgeon: Jeremie Gutiérrez Jr., MD;  Location: [facility] OR;  Service: Orthopedics;  Laterality: N/A;     Family History     Problem Relation (Age of Onset)    Diabetes Mother, Brother    Heart disease Father    Stroke Mother        Tobacco Use    Smoking status: Former Smoker     Types: Cigarettes     Last attempt to quit: 3/13/1987     Years since quittin.5    Smokeless tobacco: Never Used   Substance and Sexual Activity    Alcohol use: No     Frequency: Never    Drug use: No    Sexual activity: No       Objective:     Weight: 105.2 kg (232 lb)  Body mass index is 39.82 kg/m².  Vital Signs (Most Recent):  Temp: 97.5 °F (36.4 °C) (09/10/18 0520)  Pulse: (!) 58 (09/10/18 0520)  Resp: 17 (09/10/18 0520)  BP: (!) 116/56 (09/10/18 0520)  SpO2: 100 % (09/10/18 0520) Vital Signs (24h Range):  Temp:  [97.5 °F (36.4 °C)-97.9 °F (36.6 °C)] 97.5 °F (36.4 °C)  Pulse:  [55-75] 58  Resp:  [16-19] 17  SpO2:  [97 %-100 %] 100 %  BP: (106-122)/(56-67) 116/56                           Urethral Catheter 18 1700 Double-lumen 18 Fr. (Active)   Site Assessment Clean;Intact 2018  9:59 PM   Collection Container Standard drainage bag 2018  9:59 PM   Securement Method secured to  "top of thigh w/ adhesive device 9/9/2018  9:59 PM   Catheter Care Performed yes 9/9/2018  9:59 PM   Reason for Continuing Urinary Catheterization Chronic Indwelling Urinary Catheter on Admission 9/9/2018  9:59 PM   CAUTI Prevention Bundle StatLock in place w 1" slack;Intact seal between catheter & drainage tubing;Drainage bag off the floor;Green sheeting clip in use;No dependent loops or kinks;Drainage bag not overfilled (<2/3 full);Drainage bag below bladder 9/9/2018  9:59 PM   Output (mL) 175 mL 9/9/2018  5:00 PM       Physical Exam:    Neurological:   GCS15  AO x3  CNII - XII intact  Generalized weakness  SILT  bl presley, ow 2+ DTR         Significant Labs:  Recent Labs   Lab  09/09/18   0202   09/09/18   1554  09/09/18   2045  09/10/18   0220   GLU  182*   < >  280*  275*  245*   NA  119*   < >  118*  119*  120*   K  6.1*   < >  5.6*  5.7*  5.4*   CL  85*   < >  83*  83*  85*   CO2  24   < >  26  28  24   BUN  54*   < >  53*  53*  56*   CREATININE  2.9*   < >  3.1*  3.3*  3.0*   CALCIUM  7.9*   < >  8.1*  8.1*  7.6*   MG  1.5*   --    --    --    --     < > = values in this interval not displayed.     Recent Labs   Lab  09/08/18   0739  09/08/18   2254   WBC  14.40*  13.70*   HGB  8.7*  8.4*   HCT  26.6*  26.5*   PLT  205  221     Recent Labs   Lab  09/08/18   2254   INR  1.2     Microbiology Results (last 7 days)     Procedure Component Value Units Date/Time    Blood culture [[id number]] Collected:  09/09/18 1731    Order Status:  Completed Specimen:  Blood Updated:  09/10/18 0315     Blood Culture, Routine No Growth to date    Narrative:       From 2 different sites 30 minutes apart    Blood culture [[id number]] Collected:  09/09/18 1555    Order Status:  Completed Specimen:  Blood Updated:  09/10/18 0115     Blood Culture, Routine No Growth to date        All pertinent labs from the last 24 hours have been reviewed.    Significant Diagnostics:  I have reviewed all pertinent imaging results/findings within the " past 24 hours.    Assessment/Plan:     * Spinal epidural abscess    82 year old woman with spinal epidural abscess    - please wear brace at all times  - continue to hold xarelto  - continue to hold plavix  - OR Tuesday  - will need medical clearance, spoke with Medicine team about this over the weekend            Thank you for your consult. I will follow-up with patient. Please contact us if you have any additional questions.    Jeremie Verde MD  Neurosurgery  [facility]

## 2018-09-10 NOTE — ASSESSMENT & PLAN NOTE
- Na 117-122 since the past 3 days  - Patient with a history of CHF, on Lasix and aldactone at home.  - On exam, she has bilateral pitting edema with abdominal distention and mild wheezes over both the lower lobes.  - CXR shows pulmonary vascular congestion, ECHO from 7/6 shows diastolic dysfunction  -  (last checked 5 yrs ago, 791)  - Urine Na 10  - Etiology likely hypervolemic hyponatremia 2/2 CHF exacerbation  - Advised fluid restriction 1.5L   - Scheduled Lasix 40mg BID with BMP q4 checks  - Target Na correction <8mEq/24hrs  - As she is A&O x3, at her baseline mentation and not having any seizures, she does not require any hypertonic saline.

## 2018-09-10 NOTE — PLAN OF CARE
Problem: Physical Therapy Goal  Goal: Physical Therapy Goal  Goals to be met by: 2018     Patient will increase functional independence with mobility by performin. Supine to sit with Moderate Assistance  2. Sit to supine with Moderate Assistance  3. Sit to stand transfer with Maximum Assistance  4. Bed to chair transfer with Maximum Assistance  5. Wheelchair propulsion x50 feet with Contact Guard Assistance using bilateral uppper extremities  6. Lower extremity exercise program x15 reps per handout, with assistance as needed    Outcome: Ongoing (interventions implemented as appropriate)  Pt evaluation complete; pt goals set.    AARON MILLER, PT  9/10/2018

## 2018-09-10 NOTE — PT/OT/SLP EVAL
Occupational Therapy   Evaluation    Name: Medina Frazier  MRN: [id number]  Admitting Diagnosis:  Spinal epidural abscess      Recommendations:     Discharge Recommendations: nursing facility, skilled  Discharge Equipment Recommendations:  wheelchair, bedside commode, bath bench, hospital bed, lift device  Barriers to discharge:       History:     Occupational Profile:  Living Environment: Pt lives with her  in a raised house with an elevator and a walk-in shower.  Previous level of function: Pt has been hospitalized with multiple surgeries since June with inconsistent therapy between hospitals and [facility]. States that her last attempt at walking was 3 weeks ago.  Equipment Used at Home:  shower chair  Assistance upon Discharge: family    L2-3 TLIF: 6/'18  I&D: 7/'18  Hardware removal: 8/'18    Past Medical History:   Diagnosis Date    Allergic sinusitis     Anticoagulant long-term use     Arthritis     Asthma     CHF (congestive heart failure)     Chronic kidney disease     COPD (chronic obstructive pulmonary disease)     Coronary artery disease     Diabetes mellitus     Encounter for blood transfusion     General anesthetics causing adverse effect in therapeutic use     DELAYED EMERGENCE    GERD (gastroesophageal reflux disease)     HHD (hypertensive heart disease)     High cholesterol     [place] (hard of hearing)     Hypertension     Hypothyroidism     Lumbar spinal stenosis     MRSA (methicillin resistant Staphylococcus aureus) infection 7/7/2018    MRSA infection     PAST H/O, CULTURE DONE 5/30/18 (+)    On home oxygen therapy     Osteoporosis     Pulmonary hypertension     Sleep apnea     STATES NOT USING C PAP    Spinal stenosis     Thyroid disease     TIA (transient ischemic attack)     UTI (urinary tract infection)     Venous insufficiency     Wears glasses     Wears partial dentures     UPPER FULL, LOWER PARTIAL       Past Surgical History:   Procedure Laterality Date     ANGIOGRAM-CORONARY N/A 6/2/2017    Performed by Yury Bailey MD at [facility] CATH    ANGIOPLASTY  2008    CARDIAC STENTS    APPENDECTOMY      APPLICATION OF WOUND VACUUM-ASSISTED CLOSURE DEVICE N/A 7/2/2018    Procedure: APPLICATION, WOUND VAC;  Surgeon: Jeremie Gutiérrez Jr., MD;  Location: [facility] OR;  Service: Orthopedics;  Laterality: N/A;    APPLICATION OF WOUND VACUUM-ASSISTED CLOSURE DEVICE N/A 8/7/2018    Procedure: APPLICATION, WOUND VAC;  Surgeon: Jeremie Gutiérrez Jr., MD;  Location: [facility] OR;  Service: Orthopedics;  Laterality: N/A;    APPLICATION, DRESSING, WOUND Left 8/11/2013    Performed by [facility]wan [facility] at [facility] OR    APPLICATION, WOUND VAC N/A 8/7/2018    Performed by Jeremie Gutiérrez Jr., MD at [facility] OR    APPLICATION, WOUND VAC N/A 7/2/2018    Performed by Jeremie G[facility]érrez Jr., MD at [facility] OR    ARTHROPLASTY, KNEE, TOTAL Left 3/18/2013    Performed [facility] at [facility] OR    ATHERECTOMY N/A 6/5/2017    Performed by Lokesh Thakur MD at [facility] CATH    BLADDER SUSPENSION      BONE GRAFT N/A 6/5/2018    Procedure: BONE GRAFT;  Surgeon: Jeremie Gutiérrez Jr., MD;  Location: [facility] OR;  Service: Orthopedics;  Laterality: N/A;    BONE GRAFT N/A 6/5/2018    Performed by Jeremie G[facility]érrez Jr., MD at [facility] OR    CARDIAC SURGERY  1996    CABG    CARDIAC SURGERY      Stents    CORONARY ARTERY BYPASS GRAFT  1996    EXPLORATION, WOUND-lumbar N/A 8/7/2018    Performed by Jeremie Gutiérrez Jr., MD at [facility] OR    FRACTURE SURGERY Left     Lt hand    FRACTURE SURGERY Left 1993    Lt Knee    FUSION-TRANSLUMINAL LUMBAR INTERBODY (TLIF) L2-3 W/ INSTRUMENTATION N/A 6/5/2018    Performed by Jeremie G[facility]rez Jr., MD at [facility] OR    HEMORRHOID SURGERY      HEMORRHOID SURGERY      HYSTERECTOMY  1984    INCISION AND DRAINAGE N/A 7/2/2018    Procedure: INCISION AND DRAINAGE (I & D) LUMBAR SPINE W/ANTIBIOTIC BEAD PLACEMENT;  Surgeon: Jeremie Gutiérrez Jr., MD;   [facility];  Service: Orthopedics;  Laterality: N/A;    INCISION AND DRAINAGE (I & D) LUMBAR SPINE W/ANTIBIOTIC BEAD PLACEMENT N/A 7/2/2018    [facility]    INCISION AND DRAINAGE (I & D)-EXTREMITY-LOWER Left 8/11/2013    [facility] at [facility] OR    INCISION AND DRAINAGE POSTERIOR LUMBAR SPINE  07/02/2018    INSERTION, PICC Right 7/2/2018    [facility]    JOINT REPLACEMENT Left     KNEE SURGERY Left     POSTOP TKR INFECTION TX    LUMBAR EPIDURAL INJECTION      OPEN REDUCTION INTERNAL FIXATION-HIP TFN Left 9/21/2017    [facility] LOY [facility] II[facility]    PERIPHERALLY INSERTED CENTRAL CATHETER INSERTION Right 7/2/2018    Procedure: INSERTION, PICC;  Surgeon: Jeremie Guitérrez Jr., MD;  [facility];  Service: Orthopedics;  Laterality: Right;    REMOVAL OF HARDWARE FROM SPINE N/A 8/7/2018    Procedure: REMOVAL, HARDWARE, SPINE;  Surgeon: Jeremie Gutiérrez Jr., MD;  [facility];  Service: Orthopedics;  Laterality: N/A;    REMOVAL, HARDWARE, SPINE N/A 8/7/2018    [facility]    REMOVAL, PROSTHESIS, KNEE Left 11/20/2013    [facility]formed by [facility] at [facility] OR    THYROIDECTOMY, PARTIAL      TOTAL KNEE  PROSTHESIS REMOVAL W/ SPACER INSERTION Left     TRANSFORAMINAL LUMBAR INTERBODY FUSION (TLIF) OF SPINE WITH PERCUTANEOUS INSTRUMENTATION N/A 6/5/2018    Procedure: FUSION-TRANSLUMINAL LUMBAR INTERBODY (TLIF) L2-3 W/ INSTRUMENTATION;  Surgeon: Jeremie Gutiérrez Jr., MD;  [facility];  Service: Orthopedics;  Laterality: N/A;    WOUND EXPLORATION N/A 8/7/2018    Procedure: EXPLORATION, WOUND-lumbar;  Surgeon: Jeremie Gutiérrez Jr., MD;  [facility];  Service: Orthopedics;  Laterality: N/A;       Subjective     Chief Complaint: Pain/weakness  Patient/Family Comments/goals: To  walk.    Pain/Comfort:  · Pain Rating 1: 6/10  · Location 1: back    Patients cultural, spiritual, Muslim conflicts given the current situation:      Objective:     Communicated with: RN prior to session.  Patient found all lines intact and call button in reach and wound vac, oxygen, peripheral IV upon OT entry to room.    General Precautions: Standard, fall   Orthopedic Precautions:    Braces: LSO     Occupational Performance:    Bed Mobility:    · Patient completed Rolling/Turning to Right with maximal assistance  · Patient completed Scooting/Bridging with total assistance  · Patient completed Supine to Sit with total assistance  · Patient completed Sit to Supine with total assistance    Functional Mobility/Transfers:  Unable to attempt at this time.    Activities of Daily Living:  · Feeding:  stand by assistance   · Grooming: moderate assistance for assistance due to weak UEs and unable to hold arms overhead.    Cognitive/Visual Perceptual:  Cognitive/Psychosocial Skills:     -       Oriented to: Person, Place, Time and Situation   -       Safety awareness/insight to disability: impaired     Physical Exam:  Balance:    -       EOB static sitting balance = Poor requiring Max A due to posterior lean. Pt was able to assist with leaning forward when cued.  Skin integrity: Wound lower back with wound vac.  Upper Extremity Range of Motion:     -       Right Upper Extremity: WFL except poor ability to hold UE overhead for more than a few seconds due to weakness.  -       Left Upper Extremity: WFL except poor ability to hold UE overhead for more than a few seconds due to weakness.   Upper Extremity Strength:    -       Right Upper Extremity: WFL except 4/5 grossly  -       Left Upper Extremity: WFL except     AMPAC 6 Click ADL:  AMPAC Total Score: 9    Treatment & Education:  UE ROM/MMT  Bed mobility training / assessment  Sitting balance assessment  Educated on need to wear LSO when sitting EOB/OOB.  Discussed OT  "POC / Post-acute plan  Education:    Patient left supine with all lines intact and call button in reach    Assessment:     Medina Frazier is a 82 y.o. female with a medical diagnosis of Spinal epidural abscess.  She presents with the following performance deficits affecting function: weakness, impaired endurance, gait instability, impaired functional mobilty, decreased safety awareness, pain, decreased upper extremity function, edema, decreased ROM, decreased lower extremity function, impaired self care skills, impaired balance, orthopedic precautions, impaired skin, decreased coordination.    Pt sat EOB with Max-Total A and PT/OT unable to correctly don LSO due to increased abdominal size and requiring a larger-sized brace at this time. Pt currently presents s/p 3 surgeries since  and another back surgery planned for the near future. Due to being in hospitals/nursing homes for so long a period, pt has become extremely debilitated and demonstrates decreased (I) in multiple ADL areas, functional mobility & t/fs as well as decreased overall strength, ROM, endurance and balance. Pt would benefit from skilled OT services as well as SNF placement to address these deficits and to facilitate improving (I) with daily tasks.    Rehab Prognosis: Fair; patient would benefit from acute skilled OT services to address these deficits and reach maximum level of function.         Clinical Decision Makin.  OT Mod:  "Pt evaluation falls under moderate complexity for evaluation coding due to identification of 3-5 performance deficits noted as stated above. Eval required Min/Mod assistance to complete on this date and detailed assessment(s) were utilized. Moreover, an expanded review of history and occupational profile obtained with additional review of cognitive, physical and psychosocial hx."     Plan:     Patient to be seen 3 x/week to address the above listed problems via self-care/home management, therapeutic " activities, therapeutic exercises, neuromuscular re-education  · Plan of Care Expires:    · Plan of Care Reviewed with: patient, spouse, daughter    This Plan of care has been discussed with the patient who was involved in its development and understands and is in agreement with the identified goals and treatment plan    GOALS:   Multidisciplinary Problems     Occupational Therapy Goals        Problem: Occupational Therapy Goal    Goal Priority Disciplines Outcome Interventions   Occupational Therapy Goal     OT, PT/OT Ongoing (interventions implemented as appropriate)    Description:  Goals to be met by: 9/17/18    Patient will increase functional independence with ADLs by performing:    UE Dressing with Moderate Assistance.  Grooming while EOB with Minimal Assistance for EOB balance.  Sitting at edge of bed x15 minutes with Minimal Assistance.  Rolling to Bilateral with Moderate Assistance.   Supine to sit with Maximum Assistance.  Tolerate up in [employer] x 1 hr.                      Time Tracking:     OT Date of Treatment: 09/10/18  OT Start Time: 1116  OT Stop Time: 1153  OT Total Time (min): 37 min    Billable Minutes:Evaluation 20  Therapeutic Activity 17    MIKAYLA Christine  9/10/2018

## 2018-09-10 NOTE — ASSESSMENT & PLAN NOTE
Hyposomolar, hypervolemic hyponatremia, likely secondary decreased effective circulating volume secondary CHF exacerbation, currently patient is w/o seizures, she is somnolent, but per family this is not far from her baseline, during the day today sodium 118 --> 119 over ~4hrs, which is appropriate    Plan/Recommendations:  1) continue lasix diruesis and increase (as outlined in JEY section)  2) q4h sodium  3) repeat urine lytes (sodium and potassium)  4) restrict free water to 1000ccs per 24hrs  5) if acute worsening and neurological changes, then consider hypertonic, but otherwise hold off for now  6) think lasix diuresis will do the trick, but if not enough, then in a day or two could consider adding tolvaptan

## 2018-09-10 NOTE — HPI
Mrs. Frazier is a 82 yr old female with history significant for diastolic HF, CAD s/p VIVIANA Lcx (June 2017), HTN, DM2, and lumbar stenosis s/p lumbar fusion in June 2018 with subsequent hardware infection and I&D on 7/2 and repeat lumbar I&D, hardware removal, decompression and wound vac placement to L2-L3 due to continued epidural abscess. She was discharged recently to a SNF for rehab and continued IV antibiotic therapy. She originally presented to [facility] on 9/8 from [facility] after labwork revealed JEY, hyponatremia. Additionally, CT lumbar spine obtained 9/5 was concerning for persistent osteomyelitis, epidural abscess in L2-L3 region.     She was transferred to [facility] on 9/8 for higher level of care and neurosurgery evaluation. Admission has been complicated by continued JEY with creatinine up trending from baseline 0.8 to 1.0 to 3.0 along with hyponatremia felt to be related to acute on chronic diastolic heart failure, volume overload. Nephrology was consulted and has been assisting with management. She was started on lasix 100 mg BID with minimal urine output response and creatinine slowly uptrending. Additionally, blood cultures obtained 9/9 are growing GNR's, awaiting speciation. Suspect this is enterobacter given continued osteomyelitis of L2-L3, complex fluid collection within laminectomy bed measuring 3x8x5 cm concerning for abscess, and additional prevertebral collection anterior to L1 and L2 vertebral bodies measuring 4x6x1 abutting the abdominal aorta concerning for abscess noted on MRI from 9/10. Neurosurgery were planning on repeat I&D for source control but held off given the above medical issues with JEY, hyponatremia. She has been on vancomycin, cefepime since 9/9 and blood cultures from 9/11 are NGTD.     She was transferred to the MICU on the AM of 9/12 for acute hypercapnic respiratory failure with concern for aspiration event, worsening encephalopathy, and worsening  JEY.    .

## 2018-09-10 NOTE — ASSESSMENT & PLAN NOTE
Per family patient not far from baseline, hyponatremia may be a contributor, but also noted patient with h/o LENA and not using her CPAP, would check an ABG to r/o CO2 retention/narcosis

## 2018-09-10 NOTE — SUBJECTIVE & OBJECTIVE
Medications Prior to Admission   Medication Sig Dispense Refill Last Dose    acetaminophen (TYLENOL) 325 MG tablet Take 650 mg by mouth every 4 (four) hours as needed for Pain or Temperature greater than (100).   Past Week at Unknown time    albuterol (ACCUNEB) 0.63 mg/3 mL Nebu Take 0.63 mg by nebulization every 6 (six) hours as needed (sob/wheezing). Rescue    7/2/2018    atorvastatin (LIPITOR) 40 MG tablet Take 40 mg by mouth every evening.    9/8/2018 at Unknown time    bisacodyl (DULCOLAX, BISACODYL,) 10 mg Supp Place 10 mg rectally every 8 (eight) hours as needed (constipation).       calcium carbonate-simethicone (MAALOX ADVANCED) 1,000-60 mg Chew Take 1 tablet by mouth every 4 (four) hours as needed (heartburn).       carvedilol (COREG) 25 MG tablet Take 25 mg by mouth 2 (two) times daily.    9/8/2018 at Unknown time    ciprofloxacin, CIPRO,400mg/200ml D5W IVPB (CIPRO IN D5W) 400 mg/200 mL IVPB Inject 400 mg into the vein every 12 (twelve) hours.   9/7/2018    clopidogrel (PLAVIX) 75 mg tablet Take 1 tablet (75 mg total) by mouth once daily. (Patient taking differently: Take 75 mg by mouth every morning. )   9/7/2018    diazePAM (VALIUM) 5 MG tablet Take 5 mg by mouth every 8 (eight) hours as needed (spasms).        DULoxetine (CYMBALTA) 30 MG capsule Take 30 mg by mouth once daily.   9/8/2018 at Unknown time    furosemide (LASIX) 20 MG tablet Take 20 mg by mouth every morning.    9/8/2018 at Unknown time    HYDROcodone-acetaminophen (NORCO) 7.5-325 mg per tablet Take 1 tablet by mouth every 6 (six) hours as needed for Pain.   9/7/2018    insulin glargine (LANTUS) 100 unit/mL injection Inject 30 Units into the skin every evening.    9/8/2018 at Unknown time    isosorbide dinitrate (ISORDIL) 20 MG tablet Take 20 mg by mouth 2 (two) times daily.    9/8/2018 at Unknown time    levothyroxine (SYNTHROID) 150 MCG tablet Take 150 mcg by mouth before breakfast.    9/8/2018 at Unknown time     magnesium hydroxide 400 mg/5 ml (MILK OF MAGNESIA) 400 mg/5 mL Susp Take 30 mLs by mouth daily as needed (constipation).        OMEGA-3/DHA/EPA/FISH OIL (OMEGA-3 FISH OIL ORAL) Take 2 capsules by mouth every evening.    9/6/2018    ondansetron (ZOFRAN) 8 MG tablet Take by mouth every 8 (eight) hours as needed for Nausea.   9/8/2018 at Unknown time    pantoprazole (PROTONIX) 40 MG tablet Take 40 mg by mouth every morning.    9/8/2018 at Unknown time    polyethylene glycol (GLYCOLAX) 17 gram PwPk Take 17 g by mouth every evening. Mix in 8 ounces of water.If stool is too loose,may use every other night   9/5/2018    rivaroxaban (XARELTO) 15 mg Tab Take 1 tablet (15 mg total) by mouth daily with dinner or evening meal.   9/8/2018 at Unknown time    sildenafil (REVATIO) 20 mg Tab Take 10 mg by mouth 3 (three) times daily.   9/8/2018 at Unknown time    spironolactone (ALDACTONE) 50 MG tablet Take 50 mg by mouth once daily.       food supplemt, lactose-reduced (ENSURE ACTIVE LIGHT) Liqd Take by mouth as needed. FOR LOW BLOOD SUGAR   7/2/2018    heparin flush,porcine,-0.9NaCl 100 unit/mL Kit Inject 5 mLs into the vein once daily. To each port   9/7/2018    nut.tx.gluc.intol,lac-free,soy (GLUCERNA SHAKE) Liqd Take by mouth as needed. TX BLOOD SUGAR LOWS OR HIGHS   7/2/2018       Review of patient's allergies indicates:   Allergen Reactions    Codeine Nausea Only    Penicillins Swelling     Able to take amoxil       Past Medical History:   Diagnosis Date    Allergic sinusitis     Anticoagulant long-term use     Arthritis     Asthma     CHF (congestive heart failure)     Chronic kidney disease     COPD (chronic obstructive pulmonary disease)     Coronary artery disease     Diabetes mellitus     Encounter for blood transfusion     General anesthetics causing adverse effect in therapeutic use     DELAYED EMERGENCE    GERD (gastroesophageal reflux disease)     HHD (hypertensive heart disease)     High  cholesterol     [place] (hard of hearing)     Hypertension     Hypothyroidism     Lumbar spinal stenosis     MRSA (methicillin resistant Staphylococcus aureus) infection 7/7/2018    MRSA infection     PAST H/O, CULTURE DONE 5/30/18 (+)    On home oxygen therapy     Osteoporosis     Pulmonary hypertension     Sleep apnea     STATES NOT USING C PAP    Spinal stenosis     Thyroid disease     TIA (transient ischemic attack)     UTI (urinary tract infection)     Venous insufficiency     Wears glasses     Wears partial dentures     UPPER FULL, LOWER PARTIAL     Past Surgical History:   Procedure Laterality Date    ANGIOGRAM-CORONARY N/A 6/2/2017    Performed by Yury Bailey MD at [facility] CATH    ANGIOPLASTY  2008    CARDIAC STENTS    APPENDECTOMY      APPLICATION OF WOUND VACUUM-ASSISTED CLOSURE DEVICE N/A 7/2/2018    Procedure: APPLICATION, WOUND VAC;  Surgeon: Jeremie Gutiérrez Jr., MD;  Location: [facility] OR;  Service: Orthopedics;  Laterality: N/A;    APPLICATION OF WOUND VACUUM-ASSISTED CLOSURE DEVICE N/A 8/7/2018    Procedure: APPLICATION, WOUND VAC;  Surgeon: Jeremie Gutiérrez Jr., MD;  Location: [facility] OR;  Service: Orthopedics;  Laterality: N/A;    APPLICATION, DRESSING, WOUND Left 8/11/2013    Performed [facility] OR    APPLICATION, WOUND VAC N/A 8/7/2018    Performed by Jeremie Gutiérrez Jr., MD at [facility] OR    APPLICATION, WOUND VAC N/A 7/2/2018    Performed by Jeremie G[facility]rez Jr., MD at [facility] OR    ARTHROPLASTY, KNEE, TOTAL Left 3/18/2013    Performed [facility] at [facility] OR    ATHERECTOMY N/A 6/5/2017    Performed by Lokesh Thakur MD at [facility] CATH    BLADDER SUSPENSION      BONE GRAFT N/A 6/5/2018    Procedure: BONE GRAFT;  Surgeon: Jeremie Gutiérrez Jr., MD;  Location: [facility] OR;  Service: Orthopedics;  Laterality: N/A;    BONE GRAFT N/A 6/5/2018    Performed by Jeremie [facility]rez Jr., MD at [facility] OR    CARDIAC SURGERY  1996     CABG    CARDIAC SURGERY      Stents    CORONARY ARTERY BYPASS GRAFT  1996    EXPLORATION, WOUND-lumbar N/A 8/7/2018    [facility]    FRACTURE SURGERY Left     Lt hand    FRACTURE SURGERY Left 1993    Lt Knee    FUSION-TRANSLUMINAL LUMBAR INTERBODY (TLIF) L2-3 W/ INSTRUMENTATION N/A 6/5/2018    [facility]    HEMORRHOID SURGERY      HEMORRHOID SURGERY      HYSTERECTOMY  1984    INCISION AND DRAINAGE N/A 7/2/2018    Procedure: INCISION AND DRAINAGE (I & D) LUMBAR SPINE W/ANTIBIOTIC BEAD PLACEMENT;  Surgeon: Jeremie Gutiérrez Jr., MD;  [facility];  Service: Orthopedics;  Laterality: N/A;    INCISION AND DRAINAGE (I & D) LUMBAR SPINE W/ANTIBIOTIC BEAD PLACEMENT N/A 7/2/2018    [facility]    INCISION AND DRAINAGE (I & D)-EXTREMITY-LOWER Left 8/11/2013    Performed [facility] at [facility] OR    INCISION AND DRAINAGE POSTERIOR LUMBAR SPINE  07/02/2018    INSERTION, PICC Right 7/2/2018    [facility]    JOINT REPLACEMENT Left     KNEE SURGERY Left     POSTOP TKR INFECTION TX    LUMBAR EPIDURAL INJECTION      OPEN REDUCTION INTERNAL FIXATION-HIP TFN Left 9/21/2017    [facility] LOY Early II[facility]    PERIPHERALLY INSERTED CENTRAL CATHETER INSERTION Right 7/2/2018    Procedure: INSERTION, PICC;  Surgeon: Jeremie Gutiérrez Jr., MD;  [facility];  Service: Orthopedics;  Laterality: Right;    REMOVAL OF HARDWARE FROM SPINE N/A 8/7/2018    Procedure: REMOVAL, HARDWARE, SPINE;  Surgeon: Jeremie Gutiérrez Jr., MD;  [facility];  Service: Orthopedics;  Laterality: N/A;    REMOVAL, HARDWARE, SPINE N/A 8/7/2018    [facility]    REMOVAL, PROSTHESIS, KNEE Left 11/20/2013    Performed by [facility] at [facility] OR    THYROIDECTOMY, PARTIAL    "   TOTAL KNEE  PROSTHESIS REMOVAL W/ SPACER INSERTION Left     TRANSFORAMINAL LUMBAR INTERBODY FUSION (TLIF) OF SPINE WITH PERCUTANEOUS INSTRUMENTATION N/A 2018    Procedure: FUSION-TRANSLUMINAL LUMBAR INTERBODY (TLIF) L2-3 W/ INSTRUMENTATION;  Surgeon: Jeremie Gutiérrez Jr., MD;  Location: [facility] OR;  Service: Orthopedics;  Laterality: N/A;    WOUND EXPLORATION N/A 2018    Procedure: EXPLORATION, WOUND-lumbar;  Surgeon: Jeremie Gutiérrez Jr., MD;  Location: [facility] OR;  Service: Orthopedics;  Laterality: N/A;     Family History     Problem Relation (Age of Onset)    Diabetes Mother, Brother    Heart disease Father    Stroke Mother        Tobacco Use    Smoking status: Former Smoker     Types: Cigarettes     Last attempt to quit: 3/13/1987     Years since quittin.5    Smokeless tobacco: Never Used   Substance and Sexual Activity    Alcohol use: No     Frequency: Never    Drug use: No    Sexual activity: No       Objective:     Weight: 105.2 kg (232 lb)  Body mass index is 39.82 kg/m².  Vital Signs (Most Recent):  Temp: 97.5 °F (36.4 °C) (09/10/18 0520)  Pulse: (!) 58 (09/10/18 0520)  Resp: 17 (09/10/18 0520)  BP: (!) 116/56 (09/10/18 0520)  SpO2: 100 % (09/10/18 0520) Vital Signs (24h Range):  Temp:  [97.5 °F (36.4 °C)-97.9 °F (36.6 °C)] 97.5 °F (36.4 °C)  Pulse:  [55-75] 58  Resp:  [16-19] 17  SpO2:  [97 %-100 %] 100 %  BP: (106-122)/(56-67) 116/56                           Urethral Catheter 18 1700 Double-lumen 18 Fr. (Active)   Site Assessment Clean;Intact 2018  9:59 PM   Collection Container Standard drainage bag 2018  9:59 PM   Securement Method secured to top of thigh w/ adhesive device 2018  9:59 PM   Catheter Care Performed yes 2018  9:59 PM   Reason for Continuing Urinary Catheterization Chronic Indwelling Urinary Catheter on Admission 2018  9:59 PM   CAUTI Prevention Bundle StatLock in place w 1" slack;Intact seal between catheter & drainage tubing;Drainage " bag off the floor;Green sheeting clip in use;No dependent loops or kinks;Drainage bag not overfilled (<2/3 full);Drainage bag below bladder 9/9/2018  9:59 PM   Output (mL) 175 mL 9/9/2018  5:00 PM       Physical Exam:    Neurological:   GCS15  AO x3  CNII - XII intact  Generalized weakness  SILT  bl presley, ow 2+ DTR         Significant Labs:  Recent Labs   Lab  09/09/18   0202   09/09/18   1554  09/09/18   2045  09/10/18   0220   GLU  182*   < >  280*  275*  245*   NA  119*   < >  118*  119*  120*   K  6.1*   < >  5.6*  5.7*  5.4*   CL  85*   < >  83*  83*  85*   CO2  24   < >  26  28  24   BUN  54*   < >  53*  53*  56*   CREATININE  2.9*   < >  3.1*  3.3*  3.0*   CALCIUM  7.9*   < >  8.1*  8.1*  7.6*   MG  1.5*   --    --    --    --     < > = values in this interval not displayed.     Recent Labs   Lab  09/08/18   0739  09/08/18   2254   WBC  14.40*  13.70*   HGB  8.7*  8.4*   HCT  26.6*  26.5*   PLT  205  221     Recent Labs   Lab  09/08/18   2254   INR  1.2     Microbiology Results (last 7 days)     Procedure Component Value Units Date/Time    Blood culture [[id number]] Collected:  09/09/18 1731    Order Status:  Completed Specimen:  Blood Updated:  09/10/18 0315     Blood Culture, Routine No Growth to date    Narrative:       From 2 different sites 30 minutes apart    Blood culture [[id number]] Collected:  09/09/18 1555    Order Status:  Completed Specimen:  Blood Updated:  09/10/18 0115     Blood Culture, Routine No Growth to date        All pertinent labs from the last 24 hours have been reviewed.    Significant Diagnostics:  I have reviewed all pertinent imaging results/findings within the past 24 hours.

## 2018-09-10 NOTE — ANESTHESIA PREPROCEDURE EVALUATION
09/10/2018  Medina Frazier is a 82 y.o., female.  Pre-operative evaluation for Procedure(s) (LRB):  FUSION, SPINE, LUMBAR, TLIF, WITH PERCUTANEOUS INSTRUMENTATION L1-5, depuy, neuromonitoring, 4 post bed (N/A)    Medina Frazier is a 82 y.o. female PMHx of with spinal stenosis, GERD, asthma, LENA, T2DM, CHF, CAD, pHTN, and pAfib who originally presented to [facility] on 9/7 with back pain found to have spinal epidural abscess;     S/p S/p TLIF L2-3 in 6/2018 with complications from culture + enterobacter cloacae infection discovered on  I&D in July 2nd; Hardware removed in August.     Patient currently with leukocytosis, Hgb 8.4, and hyponatremia of 119 Na+, on fluid restriction w/o improvement. JEY with creatinin 3.1.        LDA:   Peripherally Inserted Central Catheter Line          PICC Double Lumen 07/02/18 1430 right basilic 69 days      Drain          Urethral Catheter 09/09/18 1700 Double-lumen 18 Fr. less than 1 day          Prev airway: 8/2017  Easy Mask  Mac 3  7.0 ETT  Grade I  1 attempt    Drips: None      Patient Active Problem List   Diagnosis    GERD (gastroesophageal reflux disease)    Hypertension    Diabetes mellitus, type II    Coronary artery disease involving native coronary artery of native heart without angina pectoris    Osteoarthritis    Gait disorder    Diabetic neuropathy    Morbid obesity with body mass index (BMI) of 40.0 to 49.9    Primary localized osteoarthrosis, lower leg    Infected prosthetic knee joint    Anemia    Age-related osteoporosis without current pathological fracture    Acute midline low back pain with sciatica    Lumbar stenosis    CHF (congestive heart failure), NYHA class IV    Pulmonary hypertension    Closed left hip fracture    Decubitus ulcer of buttock, stage 2    Suspected pressure injury of deep tissue    PAF (paroxysmal  atrial fibrillation)    Altered mental status    Pressure ulcer, stage 2    Acute cystitis without hematuria    Spinal stenosis of lumbar region with neurogenic claudication    S/P lumbar fusion    Intertrigo    Alteration in nutrition    Postoperative wound infection    Hyperkalemia    Decubitus ulcer of right buttock, stage 2    Increased nutritional needs    Acute blood loss anemia    JEY (acute kidney injury)    Postoperative infection    Non-healing surgical wound    Volume depletion    Spinal epidural abscess    Hyponatremia    ATN (acute tubular necrosis)       Review of patient's allergies indicates:   Allergen Reactions    Codeine Nausea Only    Penicillins Swelling     Able to take amoxil        No current facility-administered medications on file prior to encounter.      Current Outpatient Medications on File Prior to Encounter   Medication Sig Dispense Refill    acetaminophen (TYLENOL) 325 MG tablet Take 650 mg by mouth every 4 (four) hours as needed for Pain or Temperature greater than (100).      albuterol (ACCUNEB) 0.63 mg/3 mL Nebu Take 0.63 mg by nebulization every 6 (six) hours as needed (sob/wheezing). Rescue       atorvastatin (LIPITOR) 40 MG tablet Take 40 mg by mouth every evening.       bisacodyl (DULCOLAX, BISACODYL,) 10 mg Supp Place 10 mg rectally every 8 (eight) hours as needed (constipation).      calcium carbonate-simethicone (MAALOX ADVANCED) 1,000-60 mg Chew Take 1 tablet by mouth every 4 (four) hours as needed (heartburn).      carvedilol (COREG) 25 MG tablet Take 25 mg by mouth 2 (two) times daily.       ciprofloxacin, CIPRO,400mg/200ml D5W IVPB (CIPRO IN D5W) 400 mg/200 mL IVPB Inject 400 mg into the vein every 12 (twelve) hours.      clopidogrel (PLAVIX) 75 mg tablet Take 1 tablet (75 mg total) by mouth once daily. (Patient taking differently: Take 75 mg by mouth every morning. )      diazePAM (VALIUM) 5 MG tablet Take 5 mg by mouth every 8 (eight)  hours as needed (spasms).       DULoxetine (CYMBALTA) 30 MG capsule Take 30 mg by mouth once daily.      furosemide (LASIX) 20 MG tablet Take 20 mg by mouth every morning.       HYDROcodone-acetaminophen (NORCO) 7.5-325 mg per tablet Take 1 tablet by mouth every 6 (six) hours as needed for Pain.      insulin glargine (LANTUS) 100 unit/mL injection Inject 30 Units into the skin every evening.       isosorbide dinitrate (ISORDIL) 20 MG tablet Take 20 mg by mouth 2 (two) times daily.       levothyroxine (SYNTHROID) 150 MCG tablet Take 150 mcg by mouth before breakfast.       magnesium hydroxide 400 mg/5 ml (MILK OF MAGNESIA) 400 mg/5 mL Susp Take 30 mLs by mouth daily as needed (constipation).       OMEGA-3/DHA/EPA/FISH OIL (OMEGA-3 FISH OIL ORAL) Take 2 capsules by mouth every evening.       ondansetron (ZOFRAN) 8 MG tablet Take by mouth every 8 (eight) hours as needed for Nausea.      pantoprazole (PROTONIX) 40 MG tablet Take 40 mg by mouth every morning.       polyethylene glycol (GLYCOLAX) 17 gram PwPk Take 17 g by mouth every evening. Mix in 8 ounces of water.If stool is too loose,may use every other night      rivaroxaban (XARELTO) 15 mg Tab Take 1 tablet (15 mg total) by mouth daily with dinner or evening meal.      sildenafil (REVATIO) 20 mg Tab Take 10 mg by mouth 3 (three) times daily.      spironolactone (ALDACTONE) 50 MG tablet Take 50 mg by mouth once daily.      food supplemt, lactose-reduced (ENSURE ACTIVE LIGHT) Liqd Take by mouth as needed. FOR LOW BLOOD SUGAR      heparin flush,porcine,-0.9NaCl 100 unit/mL Kit Inject 5 mLs into the vein once daily. To each port      nut.tx.gluc.intol,lac-free,soy (GLUCERNA SHAKE) Liqd Take by mouth as needed. TX BLOOD SUGAR LOWS OR HIGHS         Past Surgical History:   Procedure Laterality Date    ANGIOGRAM-CORONARY N/A 6/2/2017    Performed by [facility] at [facility] CATH    ANGIOPLASTY  2008    CARDIAC STENTS    APPENDECTOMY      APPLICATION  OF WOUND VACUUM-ASSISTED CLOSURE DEVICE N/A 7/2/2018    Procedure: APPLICATION, WOUND VAC;  Surgeon: Jeremie Gutiérrez Jr., MD;  Location: [facility] OR;  Service: Orthopedics;  Laterality: N/A;    APPLICATION OF WOUND VACUUM-ASSISTED CLOSURE DEVICE N/A 8/7/2018    Procedure: APPLICATION, WOUND VAC;  Surgeon: Jeremie Gutiérrez Jr., MD;  Location: [facility] OR;  Service: Orthopedics;  Laterality: N/A;    APPLICATION, DRESSING, WOUND Left 8/11/2013    Performed [facility]wan [facility] OR    APPLICATION, WOUND VAC N/A 8/7/2018    Performed by Jeremie Gutiérrez Jr., MD at [facility] OR    APPLICATION, WOUND VAC N/A 7/2/2018    Performed by Jeremie Gutiérrez Jr., MD at [facility] OR    ARTHROPLASTY, KNEE, TOTAL Left 3/18/2013    Performed [facility] at [facility] OR    ATHERECTOMY N/A 6/5/2017    Performed by Lokesh Thakur MD at [facility] CATH    BLADDER SUSPENSION      BONE GRAFT N/A 6/5/2018    Procedure: BONE GRAFT;  Surgeon: Jeremie Gutiérrez Jr., MD;  Location: [facility] OR;  Service: Orthopedics;  Laterality: N/A;    BONE GRAFT N/A 6/5/2018    Performed by Jeremie Gutiérrez Jr., MD at [facility] OR    CARDIAC SURGERY  1996    CABG    CARDIAC SURGERY      Stents    CORONARY ARTERY BYPASS GRAFT  1996    EXPLORATION, WOUND-lumbar N/A 8/7/2018    Performed by Jeremie Gutiérrez Jr., MD at [facility] OR    FRACTURE SURGERY Left     Lt hand    FRACTURE SURGERY Left 1993    Lt Knee    FUSION-TRANSLUMINAL LUMBAR INTERBODY (TLIF) L2-3 W/ INSTRUMENTATION N/A 6/5/2018    Performed by Jeremie Gutiérrez Jr., MD at [facility] OR    HEMORRHOID SURGERY      HEMORRHOID SURGERY      HYSTERECTOMY  1984    INCISION AND DRAINAGE N/A 7/2/2018    Procedure: INCISION AND DRAINAGE (I & D) LUMBAR SPINE W/ANTIBIOTIC BEAD PLACEMENT;  Surgeon: Jeremie Gutiérrez Jr., MD;  Location: [facility] OR;  Service: Orthopedics;  Laterality: N/A;    INCISION AND DRAINAGE (I & D) LUMBAR SPINE W/ANTIBIOTIC BEAD PLACEMENT N/A 7/2/2018     Performed by Jeremie [facility] Jr., MD at [facility] OR    INCISION AND DRAINAGE (I & D)-EXTREMITY-LOWER Left 8/11/2013    Performed by [facility] at [facility] OR    INCISION AND DRAINAGE POSTERIOR LUMBAR SPINE  07/02/2018    INSERTION, PICC Right 7/2/2018    Performed by Jeremie G[facility]rez Jr., MD at [facility] OR    JOINT REPLACEMENT Left     KNEE SURGERY Left     POSTOP TKR INFECTION TX    LUMBAR EPIDURAL INJECTION      OPEN REDUCTION INTERNAL FIXATION-HIP TFN Left 9/21/2017    Performed by LOY Early II, MD at [facility] OR    PERIPHERALLY INSERTED CENTRAL CATHETER INSERTION Right 7/2/2018    Procedure: INSERTION, PICC;  Surgeon: Jeremie Gutiérrez Jr., MD;  Location: [facility] OR;  Service: Orthopedics;  Laterality: Right;    REMOVAL OF HARDWARE FROM SPINE N/A 8/7/2018    Procedure: REMOVAL, HARDWARE, SPINE;  Surgeon: Jeremie Gutiérrez Jr., MD;  Location: [facility] OR;  Service: Orthopedics;  Laterality: N/A;    REMOVAL, HARDWARE, SPINE N/A 8/7/2018    Performed by Jeremie G[facility]rez Jr., MD at [facility] OR    REMOVAL, PROSTHESIS, KNEE Left 11/20/2013    Performed by [facility] at [facility] OR    THYROIDECTOMY, PARTIAL      TOTAL KNEE  PROSTHESIS REMOVAL W/ SPACER INSERTION Left     TRANSFORAMINAL LUMBAR INTERBODY FUSION (TLIF) OF SPINE WITH PERCUTANEOUS INSTRUMENTATION N/A 6/5/2018    Procedure: FUSION-TRANSLUMINAL LUMBAR INTERBODY (TLIF) L2-3 W/ INSTRUMENTATION;  Surgeon: Jeremie Gutiérrez Jr., MD;  Location: [facility] OR;  Service: Orthopedics;  Laterality: N/A;    WOUND EXPLORATION N/A 8/7/2018    Procedure: EXPLORATION, WOUND-lumbar;  Surgeon: Jeremie Gutiérrez Jr., MD;  Location: [facility] OR;  Service: Orthopedics;  Laterality: N/A;       Social History     Socioeconomic History    Marital status:      Spouse name: Not on file    Number of children: Not on file    Years of education: Not on file    Highest education level: Not on file   Social Needs    Financial resource  strain: Not on file    Food insecurity - worry: Not on file    Food insecurity - inability: Not on file    Transportation needs - medical: Not on file    Transportation needs - non-medical: Not on file   Occupational History    Not on file   Tobacco Use    Smoking status: Former Smoker     Types: Cigarettes     Last attempt to quit: 3/13/1987     Years since quittin.5    Smokeless tobacco: Never Used   Substance and Sexual Activity    Alcohol use: No     Frequency: Never    Drug use: No    Sexual activity: No   Other Topics Concern    Not on file   Social History Narrative    Not on file         Vital Signs Range (Last 24H):  Temp:  [36.4 °C (97.5 °F)-36.6 °C (97.9 °F)]   Pulse:  [55-75]   Resp:  [16-19]   BP: (110-122)/(56-67)   SpO2:  [97 %-100 %]       CBC:   Recent Labs      18   0739  18   2254   WBC  14.40*  13.70*   RBC  3.30*  3.24*   HGB  8.7*  8.4*   HCT  26.6*  26.5*   PLT  205  221   MCV  81*  82   MCH  26.3*  25.9*   MCHC  32.6  31.7*       CMP:   Recent Labs      18   1620  18   0739   18   0202   09/10/18   0220  09/10/18   0708   NA  121*  122*   < >  119*   < >  120*  119*   K  5.7*  6.0*   < >  6.1*   < >  5.4*  5.0   CL  81*  84*   < >  85*   < >  85*  84*   CO2  30*  25   < >  24   < >  24  26   BUN  50*  52*   < >  54*   < >  56*  56*   CREATININE  2.20*  2.30*   < >  2.9*   < >  3.0*  2.9*   GLU  246*  193*   < >  182*   < >  245*  215*   MG   --    --    --   1.5*   --    --   1.6   PHOS   --    --    --   5.1*   --    --   5.7*   CALCIUM  8.1*  7.9*   < >  7.9*   < >  7.6*  7.6*   ALBUMIN  2.8*  2.6*   --    --    --    --    --    PROT  5.7*  5.4*   --    --    --    --    --    ALKPHOS  149*  162*   --    --    --    --    --    ALT  26  28   --    --    --    --    --    AST  19  18   --    --    --    --    --    BILITOT  0.7  1.0   --    --    --    --    --     < > = values in this interval not displayed.       INR  Recent Labs       09/08/18   2254   INR  1.2           Diagnostic Studies:  [facility] 6/2017  LMCA:       Lesion on LMCA: 0% stenosis .     LAD:       Lesion on Prox LAD: Proximal subsection.90% stenosis .       Lesion on Prox LAD: Mid subsection.90% stenosis .       Lesion on Mid LAD: Proximal subsection.100% stenosis .       Lesion on Mid LAD: Mid subsection.20% stenosis .       Lesion on Dist LAD: 0% stenosis .     LCx:       Lesion on Prox CX: 90% stenosis reduced to 0%.The guidewire cross was    successful.       Treatment results:Interventional treatment was successful.    EKG:  Vent. Rate : 074 BPM     Atrial Rate : 222 BPM     P-R Int : 000 ms          QRS Dur : 086 ms      QT Int : 348 ms       P-R-T Axes : 105 109 141 degrees     QTc Int : 386 ms    Atrial flutter with 3:1 A-V conduction  Rightward axis  Nonspecific ST and T wave abnormality  Abnormal ECG  When compared with ECG of 08-SEP-2018 12:30,  No significant change was found  Confirmed by Hung Stafford MD ([id number]) on 9/8/2018 5:55:27 PM    2D Echo:  Final Impressions  1. The study quality is poor.   2. Global left ventricular systolic function is borderline normal. The   left ventricular fractional shortening is 31.6%. The left ventricular   ejection fraction is 60%. Left ventricular diastolic function is   abnormal (stage II pseudonormalization). Noted left ventricular   hypertrophy. Concentric left ventricular hypertrophy is present. It is   mild. LV EF appears to be 50-55%.   3. The right ventricle is moderately enlarged. Right ventricular   systolic function is moderately decreased. Right ventricular diastolic   dimension is 3.5 cms. Right ventricular systolic pressure is 62 mmHg.    4. The left atrium is moderately enlarged. The right atrium is   moderately enlarged.    5. Mild aortic valve stenosis is present. Aortic valve area continuity   equation is 1.6 cm?.    6. Severe (4+) tricuspid regurgitation. Mild (1+) aortic   regurgitation. Mild (1+) pulmonic  regurgitation. Trace mitral   regurgitation.   7. The pulmonary artery systolic pressure is 62 mmHg. Evidence of   pulmonary hypertension is noted.    8. LV global wall motion and systolic function are lower limits of   normal.   9. The Right sided chambers are dilated with impaired RV systolic   function and elevated PA pressure.   10. I don't see a valvular vegetation but certainly can't exclude one   based on this study.       Pre-op Assessment    I have reviewed the Patient Summary Reports.     I have reviewed the Nursing Notes.      Review of Systems  Anesthesia Hx:  Hx of Anesthetic complications  History of prior surgery of interest to airway management or planning: Personal Hx of Anesthesia complications Slow To Awaken/Delayed Emergence and moderate, did not delay extubation, but prolonged PACU stay   Social:  Former Smoker    Cardiovascular:   Hypertension Valvular problems/Murmurs CAD  Dysrhythmias atrial fibrillation CHF pHTN pa systolic 60 in 2017  RV function mod decreased on echo   Pulmonary:   Asthma Sleep Apnea    Renal/:   Chronic Renal Disease, ARF    Hepatic/GI:   GERD    Musculoskeletal:   Arthritis     Neurological:   TIA,    Endocrine:   Diabetes, type 2 Hypothyroidism        Physical Exam  General:  Morbid Obesity    Airway/Jaw/Neck:  Airway Findings: Mouth Opening: Normal Tongue: Normal  General Airway Assessment: Adult  Mallampati: II  Improves to II with phonation.  TM Distance: Normal, at least 6 cm  Jaw/Neck Findings:  Mandibular Fracture: Negative Neck ROM: Normal ROM     Eyes/Ears/Nose:  Eyes/Ears/Nose Findings: Wearing corrective lenses    Dental:  Dental Findings: lower partial dentures, Upper Dentures   Chest/Lungs:  Chest/Lungs Findings: Clear to auscultation, Normal Respiratory Rate     Heart/Vascular:  Heart Findings: Rate: Normal  Rhythm: Regular Rhythm  Sounds: Normal  Vascular Findings:  Edema  Vascular Access: PICC Line  Edema Locations: BLE, RUE       Musculoskeletal:  Musculoskeletal Findings:    Skin:  Skin Findings:  Wound vac in place     Mental Status:  Mental Status Findings:  Cooperative, Alert and Oriented         Anesthesia Plan  Type of Anesthesia, risks & benefits discussed:  Anesthesia Type:  general  Patient's Preference:   Intra-op Monitoring Plan: arterial line and standard ASA monitors  Intra-op Monitoring Plan Comments:   Post Op Pain Control Plan: multimodal analgesia, per primary service following discharge from PACU and IV/PO Opioids PRN  Post Op Pain Control Plan Comments:   Induction:   IV  Beta Blocker:         Informed Consent: Patient representative understands risks and agrees with Anesthesia plan.  Questions answered. Anesthesia consent signed with patient representative.  ASA Score: 4     Day of Surgery Review of History & Physical:        Anesthesia Plan Notes: Patient will need medical optimization prior to surgery.

## 2018-09-10 NOTE — SUBJECTIVE & OBJECTIVE
Interval History:   NAEON  Neurosurgery following, plans for washout and fusion Friday 9/14  On vancomycin and cefepime   vancomycyin level 22.7       Review of Systems   Constitutional: Positive for fatigue. Negative for activity change, appetite change, chills, diaphoresis and fever.   HENT: Negative for congestion, ear pain, sinus pain, sore throat and trouble swallowing.    Eyes: Negative for pain and visual disturbance.   Respiratory: Negative for cough, chest tightness and shortness of breath.    Cardiovascular: Positive for leg swelling. Negative for chest pain and palpitations.   Gastrointestinal: Positive for abdominal distention and abdominal pain. Negative for diarrhea, nausea and vomiting.   Genitourinary: Positive for difficulty urinating. Negative for hematuria.   Musculoskeletal: Positive for back pain and gait problem. Negative for myalgias.   Skin: Positive for wound. Negative for rash.   Neurological: Positive for weakness. Negative for dizziness, light-headedness and numbness.   Psychiatric/Behavioral: Negative for agitation and confusion. The patient is not nervous/anxious.      Objective:     Vital Signs (Most Recent):  Temp: 97.6 °F (36.4 °C) (09/10/18 0700)  Pulse: (!) 58 (09/10/18 0520)  Resp: 18 (09/10/18 0700)  BP: (!) 116/56 (09/10/18 0520)  SpO2: 99 % (09/10/18 0700) Vital Signs (24h Range):  Temp:  [97.5 °F (36.4 °C)-97.7 °F (36.5 °C)] 97.6 °F (36.4 °C)  Pulse:  [55-75] 58  Resp:  [17-19] 18  SpO2:  [98 %-100 %] 99 %  BP: (110-122)/(56-67) 116/56     Weight: 105.2 kg (232 lb)  Body mass index is 39.82 kg/m².    Estimated Creatinine Clearance: 17.1 mL/min (A) (based on SCr of 3.1 mg/dL (H)).    Physical Exam   Constitutional: She is oriented to person, place, and time. She appears well-developed and well-nourished. No distress.   HENT:   Head: Normocephalic and atraumatic.   Mouth/Throat: No oropharyngeal exudate.   Hard of hearing   Eyes: Conjunctivae are normal. No scleral icterus.    Neck: Neck supple.   Cardiovascular: Normal rate and intact distal pulses. An irregularly irregular rhythm present. Exam reveals no friction rub.   No murmur heard.  Pulmonary/Chest: Effort normal. She has no wheezes. She has rales.   Abdominal: Soft. Bowel sounds are normal. She exhibits no distension. There is tenderness. There is no guarding.   Obese abdomen   Musculoskeletal: She exhibits edema (BLE).   +2 pitting edema   Lymphadenopathy:     She has no cervical adenopathy.   Neurological: She is alert and oriented to person, place, and time.   Skin: Skin is warm and dry. She is not diaphoretic. There is erythema.   Lumbar wound not examined as patient refused   Psychiatric: She has a normal mood and affect. Her behavior is normal. Thought content normal.   Nursing note and vitals reviewed.      Significant Labs:   Blood Culture:   Recent Labs   Lab  06/08/18   1349  09/09/18   1555  09/09/18   1731   LABBLOO  No growth after 5 days.  No growth after 5 days.  No Growth to date  No Growth to date     CBC:   Recent Labs   Lab  09/08/18   2254   WBC  13.70*   HGB  8.4*   HCT  26.5*   PLT  221     CMP:   Recent Labs   Lab  09/10/18   0220  09/10/18   0708  09/10/18   0808   NA  120*  119*  118*   K  5.4*  5.0  4.9   CL  85*  84*  83*   CO2  24  26  26   GLU  245*  215*  214*   BUN  56*  56*  55*   CREATININE  3.0*  2.9*  3.1*   CALCIUM  7.6*  7.6*  7.7*   ANIONGAP  11  9  9   EGFRNONAA  14.1*  14.7*  13.6*     Wound Culture:   Recent Labs   Lab  07/02/18   1432   LABAERO  ENTEROBACTER CLOACAE COMPLEX  Rare  For susceptibility see order # [id number]    ENTEROBACTER CLOACAE COMPLEX  Moderate       All pertinent labs within the past 24 hours have been reviewed.    Significant Imaging: I have reviewed all pertinent imaging results/findings within the past 24 hours.

## 2018-09-10 NOTE — ASSESSMENT & PLAN NOTE
Echo in 7/2018 showed EF 50-55% with diastolic dysfunction. Pt now hyponatremic and volume overloaded    - Isosorbide dinitrate 20 mg BID  - Coreg 3.125 BID  - Lasix increased to 100 BID

## 2018-09-10 NOTE — PROGRESS NOTES
[facility]  Infectious Disease  Progress Note    Patient Name: Medina Frazier  MRN: [id number]  Admission Date: 9/8/2018  Length of Stay: 2 days  Attending Physician: Elina Sandhu MD  Primary Care Provider: Hao Garcia MD    Isolation Status: No active isolations  Assessment/Plan:      * Spinal epidural abscess    82 year old female with history of L2-3 fusion 6/5 complicated by post operative infection with Enterobacter cloacae. She has been on outpatient IV Ciprofloxacin and has undergone an I&D on 7/2 with hardware removal on 8/7 without resolution of her infection. 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 Blood cultures from 9/8 are +contaminant.      Unclear if the patient's worsening infection is indicative of antibiotic failure, developing resistance to Cipro, lack of source control (appears she has retained hardware (intervertebral cage) on imaging), versus a new infection . Will continue broad spectrum antibiotics for now.     Plan  - Continue  Vancomycin and Cefepime 2 g IV q 24 hours (renal adjusted). Will discuss with ID pharmacy in regards to abx as hyponatremic. redosed vancomycin [id number]. Repeat trough prior to 3rd dose.   - Repeat blood cultures NGTD  - Neurosurgery following, plans for I&D and fusion 9/14. Please obtain surgical cultures and send for gram stain, aerobic, anaerobic, fungal and AFB.   - ID will follow closely.          Thank you for your consult. I will follow-up with patient. Please contact us if you have any additional questions.    Tawanda Patel PA-C  Infectious Disease  [facility]  [phone]    Subjective:     Principal Problem:Spinal epidural abscess    HPI: Medina Frazier is an 82 year old female who underwent L2-3 fusion on 6/5/18. She did well until shortly after staples  removed,   drainage from the lower part of the incision was noted. On 7/2 she went to the OR for an I&D. Purulence was encountered. Surgical cultures grew Enterobacter cloacae. She was seen by Dr. Lenz, ID provider who started patient on IV Ciprofloxacin for 6 weeks. Patient's infection did not resolve and wound continued to drain. She was taken back to the OR on 8/7 for hardware removal, however appears on most recent imaging an intervertebral cage remains in place. No new surgical cultures obtained at that time. She was continued on Cipro. Patient's back pain has worsened over the past week and is now wrapping around to her anterior abdomen. She presented to an OSH  ED in [place] for evaluation. 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 Labs notable for elevated WBC, ESR, CRP and pyuria. Procalc 19. JEY.  Blood and urine culture negative.  She was transferred here for higher level of care. Currently she is on Cipro from her previous cultures, and was started on Vanc on 9/8. Patient denies fevers, chills, sweats. Reports severe back pain and lower extremity weakness. Magaña in place.    Of note, patient has a documented PCN allergy. She said she has tolerated PCN in the recent past without adverse reactions. 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 No problems since.      Interval History:   NAEON  Neurosurgery following, plans for washout and fusion Friday 9/14  On vancomycin and cefepime   vancomycyin level 22.7       Review of Systems   Constitutional: Positive for fatigue. Negative for activity change, appetite change, chills, diaphoresis and fever.   HENT: Negative for congestion, ear pain, sinus pain, sore throat and trouble swallowing.    Eyes: Negative for pain and visual disturbance.   Respiratory: Negative for cough, chest tightness and  shortness of breath.    Cardiovascular: Positive for leg swelling. Negative for chest pain and palpitations.   Gastrointestinal: Positive for abdominal distention and abdominal pain. Negative for diarrhea, nausea and vomiting.   Genitourinary: Positive for difficulty urinating. Negative for hematuria.   Musculoskeletal: Positive for back pain and gait problem. Negative for myalgias.   Skin: Positive for wound. Negative for rash.   Neurological: Positive for weakness. Negative for dizziness, light-headedness and numbness.   Psychiatric/Behavioral: Negative for agitation and confusion. The patient is not nervous/anxious.      Objective:     Vital Signs (Most Recent):  Temp: 97.6 °F (36.4 °C) (09/10/18 0700)  Pulse: (!) 58 (09/10/18 0520)  Resp: 18 (09/10/18 0700)  BP: (!) 116/56 (09/10/18 0520)  SpO2: 99 % (09/10/18 0700) Vital Signs (24h Range):  Temp:  [97.5 °F (36.4 °C)-97.7 °F (36.5 °C)] 97.6 °F (36.4 °C)  Pulse:  [55-75] 58  Resp:  [17-19] 18  SpO2:  [98 %-100 %] 99 %  BP: (110-122)/(56-67) 116/56     Weight: 105.2 kg (232 lb)  Body mass index is 39.82 kg/m².    Estimated Creatinine Clearance: 17.1 mL/min (A) (based on SCr of 3.1 mg/dL (H)).    Physical Exam   Constitutional: She is oriented to person, place, and time. She appears well-developed and well-nourished. No distress.   HENT:   Head: Normocephalic and atraumatic.   Mouth/Throat: No oropharyngeal exudate.   Hard of hearing   Eyes: Conjunctivae are normal. No scleral icterus.   Neck: Neck supple.   Cardiovascular: Normal rate and intact distal pulses. An irregularly irregular rhythm present. Exam reveals no friction rub.   No murmur heard.  Pulmonary/Chest: Effort normal. She has no wheezes. She has rales.   Abdominal: Soft. Bowel sounds are normal. She exhibits no distension. There is tenderness. There is no guarding.   Obese abdomen   Musculoskeletal: She exhibits edema (BLE).   +2 pitting edema   Lymphadenopathy:     She has no cervical adenopathy.    Neurological: She is alert and oriented to person, place, and time.   Skin: Skin is warm and dry. She is not diaphoretic. There is erythema.   Lumbar wound not examined as patient refused   Psychiatric: She has a normal mood and affect. Her behavior is normal. Thought content normal.   Nursing note and vitals reviewed.      Significant Labs:   Blood Culture:   Recent Labs   Lab  06/08/18   1349  09/09/18   1555  09/09/18   1731   LABBLOO  No growth after 5 days.  No growth after 5 days.  No Growth to date  No Growth to date     CBC:   Recent Labs   Lab  09/08/18   2254   WBC  13.70*   HGB  8.4*   HCT  26.5*   PLT  221     CMP:   Recent Labs   Lab  09/10/18   0220  09/10/18   0708  09/10/18   0808   NA  120*  119*  118*   K  5.4*  5.0  4.9   CL  85*  84*  83*   CO2  24  26  26   GLU  245*  215*  214*   BUN  56*  56*  55*   CREATININE  3.0*  2.9*  3.1*   CALCIUM  7.6*  7.6*  7.7*   ANIONGAP  11  9  9   EGFRNONAA  14.1*  14.7*  13.6*     Wound Culture:   Recent Labs   Lab  07/02/18   1432   LABAERO  ENTEROBACTER CLOACAE COMPLEX  Rare  For susceptibility see order # [id number]    ENTEROBACTER CLOACAE COMPLEX  Moderate       All pertinent labs within the past 24 hours have been reviewed.    Significant Imaging: I have reviewed all pertinent imaging results/findings within the past 24 hours.

## 2018-09-10 NOTE — CONSULTS
[facility]  Critical Care Medicine  Consult Note    Patient Name: Medina Frazier  MRN: [id number]  Admission Date: 9/8/2018  Hospital Length of Stay: 2 days  Code Status: Full Code  Attending Physician: Elina Sandhu MD   Primary Care Provider: Hao Garcia MD   Principal Problem: Spinal epidural abscess    Consults  Subjective:     HPI:  82 yr old female with PMH of HTN, CHF, CAD, T2DM lumbar spinal stenosis s/p fusion presented as a transfer from [facility] for evaluation of spinal epidural abscess.    Critical Care Medicine consulted for hyponatremia possibly requiring hypertonic saline. Patient has a sodium between 117-122 (Baseline >130) since the past 3 days. She is currently at her baseline mentation per her family. No history of seizures/AMS. Diagnosed with diastolic HF for which she takes Lasix and aldactone. Hasn't been taking Lasix since a few days although she did receive a single dose on 9/9. BNP elevated and her CXR shows pulmonary vascular congestion.    Patient had a lumbar fusion in June for a spinal stenosis following which she developed post-op complications. She had an I&D of the lumbar spine with hardware removal on 7/2 where cultures grew Enterobacter cloacae.  PICC line was placed and she was discharged home on IV Cipro to complete a 6 week course, end date 8/14 under the care of Dr. Robert (Infectious Disease). She still has a wound vac in place.    She has been complaining of lower back pain since then with no relief. She reports associated nausea but no vomiting, fevers, chills, or bowel changes. While in [facility] ED, CT lumbar spine 9/7 showed concern for osteo and a fluid collection in the epidural and paraspinal regions that is displacing her aorta. Blood cultures 1/4 grew coagulase negative staph (reported to be a contaminant). Per ID's recs she was started on Cefepime and Vanc. She is schduled for lumbar fusion with I&D on  9/11.          Hospital/ICU Course:  No notes on file    Past Medical History:   Diagnosis Date    Allergic sinusitis     Anticoagulant long-term use     Arthritis     Asthma     CHF (congestive heart failure)     Chronic kidney disease     COPD (chronic obstructive pulmonary disease)     Coronary artery disease     Diabetes mellitus     Encounter for blood transfusion     General anesthetics causing adverse effect in therapeutic use     DELAYED EMERGENCE    GERD (gastroesophageal reflux disease)     HHD (hypertensive heart disease)     High cholesterol     [place] (hard of hearing)     Hypertension     Hypothyroidism     Lumbar spinal stenosis     MRSA (methicillin resistant Staphylococcus aureus) infection 7/7/2018    MRSA infection     PAST H/O, CULTURE DONE 5/30/18 (+)    On home oxygen therapy     Osteoporosis     Pulmonary hypertension     Sleep apnea     STATES NOT USING C PAP    Spinal stenosis     Thyroid disease     TIA (transient ischemic attack)     UTI (urinary tract infection)     Venous insufficiency     Wears glasses     Wears partial dentures     UPPER FULL, LOWER PARTIAL       Past Surgical History:   Procedure Laterality Date    ANGIOGRAM-CORONARY N/A 6/2/2017    Performed by Yury Bailey MD at [facility] CATH    ANGIOPLASTY  2008    CARDIAC STENTS    APPENDECTOMY      APPLICATION OF WOUND VACUUM-ASSISTED CLOSURE DEVICE N/A 7/2/2018    Procedure: APPLICATION, WOUND VAC;  Surgeon: Jeremie Gutiérrez Jr., MD;  Location: [facility] OR;  Service: Orthopedics;  Laterality: N/A;    APPLICATION OF WOUND VACUUM-ASSISTED CLOSURE DEVICE N/A 8/7/2018    Procedure: APPLICATION, WOUND VAC;  Surgeon: Jeremie Gutiérrez Jr., MD;  Location: [facility] OR;  Service: Orthopedics;  Laterality: N/A;    APPLICATION, DRESSING, WOUND Left 8/11/2013    Performed by Juwan Liu [facility] at [facility] OR    APPLICATION, WOUND VAC N/A 8/7/2018    Performed by Jeremie Gutiérrez Jr., MD at [facility] OR     APPLICATION, WOUND VAC N/A 7/2/2018    Performed by Jeremie G[facility]ér[facility] Jr., [facility] at [facility] OR    ARTHROPLASTY, KNEE, TOTAL Left 3/18/2013    Performed by [facility] at [facility] OR    ATHERECTOMY N/A 6/5/2017    Performed by Lokesh Thakur MD at [facility] CATH    BLADDER SUSPENSION      BONE GRAFT N/A 6/5/2018    Procedure: BONE GRAFT;  Surgeon: Jeremie Gutiérrez Jr., MD;  Location: [facility] OR;  Service: Orthopedics;  Laterality: N/A;    BONE GRAFT N/A 6/5/2018    Performed by Jeremie Gutiérrez Jr., MD at [facility] OR    CARDIAC SURGERY  1996    CABG    CARDIAC SURGERY      Stents    CORONARY ARTERY BYPASS GRAFT  1996    EXPLORATION, WOUND-lumbar N/A 8/7/2018    Performed by Jeremie Gutiérrez Jr., MD at [facility] OR    FRACTURE SURGERY Left     Lt hand    FRACTURE SURGERY Left 1993    Lt Knee    FUSION-TRANSLUMINAL LUMBAR INTERBODY (TLIF) L2-3 W/ INSTRUMENTATION N/A 6/5/2018    Performed by Jeremie Gutiérrez Jr., MD at [facility] OR    HEMORRHOID SURGERY      HEMORRHOID SURGERY      HYSTERECTOMY  1984    INCISION AND DRAINAGE N/A 7/2/2018    Procedure: INCISION AND DRAINAGE (I & D) LUMBAR SPINE W/ANTIBIOTIC BEAD PLACEMENT;  Surgeon: Jeremie Gutiérrez Jr., MD;  Location: [facility] OR;  Service: Orthopedics;  Laterality: N/A;    INCISION AND DRAINAGE (I & D) LUMBAR SPINE W/ANTIBIOTIC BEAD PLACEMENT N/A 7/2/2018    Performed by Jeremie Gutiérrez Jr., MD at [facility] OR    INCISION AND DRAINAGE (I & D)-EXTREMITY-LOWER Left 8/11/2013    Performed by [facility] at [facility] OR    INCISION AND DRAINAGE POSTERIOR LUMBAR SPINE  07/02/2018    INSERTION, PICC Right 7/2/2018    Performed by Jeremie Gutiérrez Jr., MD at [facility] OR    JOINT REPLACEMENT Left     KNEE SURGERY Left     POSTOP TKR INFECTION TX    LUMBAR EPIDURAL INJECTION      OPEN REDUCTION INTERNAL FIXATION-HIP TFN Left 9/21/2017    Performed by LYO Early II, MD at [facility] OR    PERIPHERALLY INSERTED CENTRAL CATHETER INSERTION  Right 2018    Procedure: INSERTION, PICC;  Surgeon: Jeremie Gutiérrez Jr., MD;  [facility];  Service: Orthopedics;  Laterality: Right;    REMOVAL OF HARDWARE FROM SPINE N/A 2018    Procedure: REMOVAL, HARDWARE, SPINE;  Surgeon: Jeremie Gutiérrez Jr., MD;  [facility]: [facility];  Service: Orthopedics;  Laterality: N/A;    REMOVAL, HARDWARE, SPINE N/A 2018    Performed by Jeremie G[facility]érrez Jr., [facility] at [facility] OR    REMOVAL, PROSTHESIS, KNEE Left 2013    [facility]formed by [facility] at [facility] OR    THYROIDECTOMY, PARTIAL      TOTAL KNEE  PROSTHESIS REMOVAL W/ SPACER INSERTION Left     TRANSFORAMINAL LUMBAR INTERBODY FUSION (TLIF) OF SPINE WITH PERCUTANEOUS INSTRUMENTATION N/A 2018    Procedure: FUSION-TRANSLUMINAL LUMBAR INTERBODY (TLIF) L2-3 W/ INSTRUMENTATION;  Surgeon: Jeremie Gutiérrez Jr., MD;  [facility]: [facility];  Service: Orthopedics;  Laterality: N/A;    WOUND EXPLORATION N/A 2018    Procedure: EXPLORATION, WOUND-lumbar;  Surgeon: Jeremie Gutiérrez Jr., MD;  [facility]: [facility] OR;  Service: Orthopedics;  Laterality: N/A;       Review of patient's allergies indicates:   Allergen Reactions    Codeine Nausea Only    Penicillins Swelling     Able to take amoxil       Family History     Problem Relation (Age of Onset)    Diabetes Mother, Brother    Heart disease Father    Stroke Mother        Tobacco Use    Smoking status: Former Smoker     Types: Cigarettes     Last attempt to quit: 3/13/1987     Years since quittin.5    Smokeless tobacco: Never Used   Substance and Sexual Activity    Alcohol use: No     Frequency: Never    Drug use: No    Sexual activity: No      Review of Systems   Constitutional: Positive for activity change and fatigue. Negative for chills and fever.   HENT: Negative for trouble swallowing.    Eyes: Negative for visual disturbance.   Respiratory: Negative for cough and shortness of breath.    Cardiovascular: Positive for leg swelling.  Negative for chest pain and palpitations.   Gastrointestinal: Negative for abdominal distention, abdominal pain, constipation, diarrhea, nausea and vomiting.   Genitourinary: Negative for difficulty urinating.   Musculoskeletal: Positive for back pain. Negative for arthralgias.   Neurological: Negative for dizziness, weakness, light-headedness, numbness and headaches.   Psychiatric/Behavioral: Negative for agitation and confusion.     Objective:     Vital Signs (Most Recent):  Temp: 97.6 °F (36.4 °C) (09/10/18 0700)  Pulse: (!) 58 (09/10/18 0520)  Resp: 18 (09/10/18 0700)  BP: 120/70 (09/10/18 0700)  SpO2: 99 % (09/10/18 0700) Vital Signs (24h Range):  Temp:  [97.5 °F (36.4 °C)-97.7 °F (36.5 °C)] 97.6 °F (36.4 °C)  Pulse:  [55-75] 58  Resp:  [17-19] 18  SpO2:  [98 %-100 %] 99 %  BP: (110-122)/(56-70) 120/70   Weight: 105.2 kg (232 lb)  Body mass index is 39.82 kg/m².      Intake/Output Summary (Last 24 hours) at 9/10/2018 1458  Last data filed at 9/10/2018 0525  Gross per 24 hour   Intake --   Output 525 ml   Net -525 ml       Physical Exam   Constitutional: She is oriented to person, place, and time. No distress.   HENT:   Head: Normocephalic.   Eyes: Pupils are equal, round, and reactive to light.   Neck: Normal range of motion.   Cardiovascular: Normal rate and regular rhythm.   Pulmonary/Chest: Effort normal. No accessory muscle usage. No tachypnea. She has wheezes in the right lower field and the left lower field.   Abdominal: Soft. Bowel sounds are normal.   Neurological: She is alert and oriented to person, place, and time. She has normal strength. No sensory deficit.   Dressing over the lumbar spine   Skin: Skin is warm.       Vents:  Oxygen Concentration (%): 32 (09/09/18 0428)  Lines/Drains/Airways     Peripherally Inserted Central Catheter Line                 PICC Double Lumen 07/02/18 1430 right basilic 70 days          Drain                 Urethral Catheter 09/09/18 1700 Double-lumen 18 Fr. less than  1 day          Pressure Ulcer                 Negative Pressure Wound Therapy  33 days              Significant Labs:    CBC/Anemia Profile:  Recent Labs   Lab  09/08/18   2254   WBC  13.70*   HGB  8.4*   HCT  26.5*   PLT  221   MCV  82   RDW  17.4*        Chemistries:  Recent Labs   Lab  09/09/18   0202   09/10/18   0708  09/10/18   0808  09/10/18   1141   NA  119*   < >  119*  118*  118*   K  6.1*   < >  5.0  4.9  5.0   CL  85*   < >  84*  83*  84*   CO2  24   < >  26  26  27   BUN  54*   < >  56*  55*  55*   CREATININE  2.9*   < >  2.9*  3.1*  3.1*   CALCIUM  7.9*   < >  7.6*  7.7*  7.8*   MG  1.5*   --   1.6   --    --    PHOS  5.1*   --   5.7*   --    --     < > = values in this interval not displayed.       All pertinent labs within the past 24 hours have been reviewed.    Significant Imaging: I have reviewed all pertinent imaging results/findings within the past 24 hours.    Assessment/Plan:     Neuro   Lumbar stenosis    - S/p fusion in June 2018 with post-op infectious complication        Diabetic neuropathy    - Continue gabapentin 100mg BID        Cardiac/Vascular   CHF (congestive heart failure), NYHA class IV    - Prior ECHO shows diastolic dysfuunction  - On Lasix 20mg and Aldactone 25mg  - Recommend Lasix 40mg BID  - Continue Coreg 3.125mg BID, per primary        Hypertension    - Continue Coreg 3.125mg BID and isosorbide 20mg BID        Renal/   JEY (acute kidney injury)    - Baseline Cr 1-1.5  - BUN/Cr 55/3.1, has been rising since 9/7  - Possibly 2/2 sepsis vs CRS  - Urine Na 10  - Diurese with Lasix, should expect renal function to improve if CRS.  - Urine lytes, urine studies (for casts).  - BMP q4  - Consider Nephrology consult        Hyponatremia    - Na 117-122 since the past 3 days  - Patient with a history of CHF, on Lasix and aldactone at home.  - On exam, she has bilateral pitting edema with abdominal distention and mild wheezes over both the lower lobes.  - CXR shows pulmonary vascular  congestion, ECHO from 7/6 shows diastolic dysfunction  -  (last checked 5 yrs ago, 791)  - Urine Na 10  - Etiology likely hypervolemic hyponatremia 2/2 CHF exacerbation  - Advised fluid restriction 1.5L   - Scheduled Lasix 40mg BID with BMP q4 checks  - Target Na correction <8mEq/24hrs  - As she is A&O x3, at her baseline mentation and not having any seizures, she does not require any hypertonic saline.          ID   * Spinal epidural abscess    - abx per ID  - management per neurosurgery and primary        Endocrine   Diabetes mellitus, type II    - POC between 200-300   - Continue with Asp2 TIDWM and Lev 17 qHS with LDSSI  - POC AC/HS            Critical Care Daily Checklist:    A: Awake: RASS Goal/Actual Goal:    Actual:     B: Spontaneous Breathing Trial Performed?     C: SAT & SBT Coordinated?  n/a                  D: Delirium: CAM-ICU     E: Early Mobility Performed? No   F: Feeding Goal:    Status:     Current Diet Order   Procedures    Diet NPO    Diet Cardiac Fluid - 800mL     Diabetic also     Order Specific Question:   Fluid restriction:     Answer:   Fluid - 800mL      AS: Analgesia/Sedation n/a   T: Thromboembolic Prophylaxis    H: HOB > 300 Yes   U: Stress Ulcer Prophylaxis (if needed) n/a   G: Glucose Control Asp, Lev, LDSSI   B: Bowel Function Stool Occurrence: 0   I: Indwelling Catheter (Lines & Magaña) Necessity yes   D: De-escalation of Antimicrobials/Pharmacotherapies     Plan for the day/ETD Lasix and fluid restriction    Code Status:  Family/Goals of Care: Full Code  n/a        Critical care was time spent personally by me on the following activities: development of treatment plan with patient or surrogate and bedside caregivers, discussions with consultants, evaluation of patient's response to treatment, examination of patient, ordering and performing treatments and interventions, ordering and review of laboratory studies, ordering and review of radiographic studies, pulse oximetry,  re-evaluation of patient's condition. This critical care time did not overlap with that of any other provider or involve time for any procedures.    Thank you for your consult. I will sign off. Please contact us if you have any additional questions.     Zaki Clements MD  Critical Care Medicine  [facility]

## 2018-09-10 NOTE — PLAN OF CARE
Inpatient consult to Critical Care Medicine  Consult performed by: Zaki Clements MD  Consult ordered by: Britany Patel MD        Consult acknowledged. Full note to follow.      Zaki Clements MD  Critical Care Medicine  [facility]

## 2018-09-10 NOTE — SUBJECTIVE & OBJECTIVE
Past Medical History:   Diagnosis Date    Allergic sinusitis     Anticoagulant long-term use     Arthritis     Asthma     CHF (congestive heart failure)     Chronic kidney disease     COPD (chronic obstructive pulmonary disease)     Coronary artery disease     Diabetes mellitus     Encounter for blood transfusion     General anesthetics causing adverse effect in therapeutic use     DELAYED EMERGENCE    GERD (gastroesophageal reflux disease)     HHD (hypertensive heart disease)     High cholesterol     [place] (hard of hearing)     Hypertension     Hypothyroidism     Lumbar spinal stenosis     MRSA (methicillin resistant Staphylococcus aureus) infection 7/7/2018    MRSA infection     PAST H/O, CULTURE DONE 5/30/18 (+)    On home oxygen therapy     Osteoporosis     Pulmonary hypertension     Sleep apnea     STATES NOT USING C PAP    Spinal stenosis     Thyroid disease     TIA (transient ischemic attack)     UTI (urinary tract infection)     Venous insufficiency     Wears glasses     Wears partial dentures     UPPER FULL, LOWER PARTIAL       Past Surgical History:   Procedure Laterality Date    ANGIOGRAM-CORONARY N/A 6/2/2017    Performed by [facility] at [facility] CATH    ANGIOPLASTY  2008    CARDIAC STENTS    APPENDECTOMY      APPLICATION OF WOUND VACUUM-ASSISTED CLOSURE DEVICE N/A 7/2/2018    Procedure: APPLICATION, WOUND VAC;  Surgeon: Jeremie Gutiérrez Jr., MD;  Location: [facility] OR;  Service: Orthopedics;  Laterality: N/A;    APPLICATION OF WOUND VACUUM-ASSISTED CLOSURE DEVICE N/A 8/7/2018    Procedure: APPLICATION, WOUND VAC;  Surgeon: Jeremie Gutiérrez Jr., MD;  Location: [facility] OR;  Service: Orthopedics;  Laterality: N/A;    APPLICATION, DRESSING, WOUND Left 8/11/2013    Per[facility] by [facility] at [facility] OR    APPLICATION, WOUND VAC N/A 8/7/2018    Performed by Jeremie G[facility]érrez Jr., MD at [facility] OR    APPLICATION, WOUND VAC N/A 7/2/2018    Performed by  Jeremie Gutiérrez Jr., MD at [facility] OR    ARTHROPLASTY, KNEE, TOTAL Left 3/18/2013    Performed by [facility] at [facility] OR    ATHERECTOMY N/A 6/5/2017    Performed by Lokesh Thakur MD at [facility] CATH    BLADDER SUSPENSION      BONE GRAFT N/A 6/5/2018    Procedure: BONE GRAFT;  Surgeon: Jeremie Gutiérrez Jr., MD;  Location: [facility] OR;  Service: Orthopedics;  Laterality: N/A;    BONE GRAFT N/A 6/5/2018    Performed by Jeremie Gutiérrez Jr., MD at [facility] OR    CARDIAC SURGERY  1996    CABG    CARDIAC SURGERY      Stents    CORONARY ARTERY BYPASS GRAFT  1996    EXPLORATION, WOUND-lumbar N/A 8/7/2018    Performed by Jeremie Gutiérrez Jr., MD at [facility] OR    FRACTURE SURGERY Left     Lt hand    FRACTURE SURGERY Left 1993    Lt Knee    FUSION-TRANSLUMINAL LUMBAR INTERBODY (TLIF) L2-3 W/ INSTRUMENTATION N/A 6/5/2018    Performed by Jeremie Gutiérrez Jr., MD at [facility] OR    HEMORRHOID SURGERY      HEMORRHOID SURGERY      HYSTERECTOMY  1984    INCISION AND DRAINAGE N/A 7/2/2018    Procedure: INCISION AND DRAINAGE (I & D) LUMBAR SPINE W/ANTIBIOTIC BEAD PLACEMENT;  Surgeon: Jeremie Gutiérrez Jr., MD;  Location: [facility] OR;  Service: Orthopedics;  Laterality: N/A;    INCISION AND DRAINAGE (I & D) LUMBAR SPINE W/ANTIBIOTIC BEAD PLACEMENT N/A 7/2/2018    Performed by Jeremie Gutiérrez Jr., MD at [facility] OR    INCISION AND DRAINAGE (I & D)-EXTREMITY-LOWER Left 8/11/2013    Performed by [facility] Liu MD at [facility] OR    INCISION AND DRAINAGE POSTERIOR LUMBAR SPINE  07/02/2018    INSERTION, PICC Right 7/2/2018    Performed by Jeremie Gutiérrez Jr., MD at [facility] OR    JOINT REPLACEMENT Left     KNEE SURGERY Left     POSTOP TKR INFECTION TX    LUMBAR EPIDURAL INJECTION      OPEN REDUCTION INTERNAL FIXATION-HIP TFN Left 9/21/2017    Performed by LOY Early II, MD at [facility] OR    PERIPHERALLY INSERTED CENTRAL CATHETER INSERTION Right 7/2/2018    Procedure: INSERTION, PICC;  Surgeon:  Jeremie Gutiérrez Jr., MD;  Location: [facility] OR;  Service: Orthopedics;  Laterality: Right;    REMOVAL OF HARDWARE FROM SPINE N/A 8/7/2018    Procedure: REMOVAL, HARDWARE, SPINE;  Surgeon: Jeremie Gutiérrez Jr., MD;  Location: [facility] OR;  Service: Orthopedics;  Laterality: N/A;    REMOVAL, HARDWARE, SPINE N/A 8/7/2018    Performed by Jeremie Gutiérrez Jr., MD at [facility] OR    REMOVAL, PROSTHESIS, KNEE Left 11/20/2013    Performed by [facility] at [facility] OR    THYROIDECTOMY, PARTIAL      TOTAL KNEE  PROSTHESIS REMOVAL W/ SPACER INSERTION Left     TRANSFORAMINAL LUMBAR INTERBODY FUSION (TLIF) OF SPINE WITH PERCUTANEOUS INSTRUMENTATION N/A 6/5/2018    Procedure: FUSION-TRANSLUMINAL LUMBAR INTERBODY (TLIF) L2-3 W/ INSTRUMENTATION;  Surgeon: Jeremie Gutiérrez Jr., MD;  Location: [facility] OR;  Service: Orthopedics;  Laterality: N/A;    WOUND EXPLORATION N/A 8/7/2018    Procedure: EXPLORATION, WOUND-lumbar;  Surgeon: Jeremie Gutiérrez Jr., MD;  Location: [facility] OR;  Service: Orthopedics;  Laterality: N/A;       Review of patient's allergies indicates:   Allergen Reactions    Codeine Nausea Only    Penicillins Swelling     Able to take amoxil     Current Facility-Administered Medications   Medication Frequency    atorvastatin tablet 40 mg QHS    carvedilol tablet 3.125 mg BID    ceFEPIme injection 2 g Daily    dextrose 50% injection 12.5 g PRN    dextrose 50% injection 25 g PRN    furosemide injection 40 mg BID    gabapentin capsule 100 mg BID    glucagon (human recombinant) injection 1 mg PRN    glucose chewable tablet 16 g PRN    glucose chewable tablet 24 g PRN    insulin aspart U-100 pen 0-5 Units QID (AC + HS) PRN    insulin aspart U-100 pen 2 Units TIDWM    [START ON 9/11/2018] insulin detemir U-100 pen 17 Units Daily    isosorbide dinitrate tablet 20 mg BID    levothyroxine tablet 150 mcg Before breakfast    ondansetron injection 4 mg Q8H PRN    oxyCODONE immediate release  tablet 10 mg Q6H PRN    oxyCODONE immediate release tablet 5 mg Q6H PRN    pantoprazole EC tablet 40 mg QAM    sildenafil 2.5 mg/mL oral suspension TID    sodium chloride 0.9% flush 5 mL PRN    trazodone split tablet 25 mg QHS    [START ON 2018] vancomycin (VANCOCIN) 1,000 mg in sodium chloride 0.9% 250 mL IVPB Q24H     Family History     Problem Relation (Age of Onset)    Diabetes Mother, Brother    Heart disease Father    Stroke Mother        Tobacco Use    Smoking status: Former Smoker     Types: Cigarettes     Last attempt to quit: 3/13/1987     Years since quittin.5    Smokeless tobacco: Never Used   Substance and Sexual Activity    Alcohol use: No     Frequency: Never    Drug use: No    Sexual activity: No     Review of Systems   Constitutional: Negative for chills, fatigue and fever.   Respiratory: Negative for chest tightness and shortness of breath.    Cardiovascular: Positive for leg swelling. Negative for chest pain.   Gastrointestinal: Negative for abdominal distention, abdominal pain, diarrhea and nausea.   Genitourinary: Negative for decreased urine volume, difficulty urinating, flank pain, frequency, hematuria and urgency.   Skin: Negative for rash.   Neurological: Negative for tremors, speech difficulty and weakness.     Objective:     Vital Signs (Most Recent):  Temp: 98.2 °F (36.8 °C) (09/10/18 1508)  Pulse: 63 (09/10/18 1508)  Resp: 16 (09/10/18 1508)  BP: 129/64 (09/10/18 1508)  SpO2: 100 % (09/10/18 1508)  O2 Device (Oxygen Therapy): nasal cannula (09/10/18 1508) Vital Signs (24h Range):  Temp:  [97.5 °F (36.4 °C)-98.2 °F (36.8 °C)] 98.2 °F (36.8 °C)  Pulse:  [55-63] 63  Resp:  [16-19] 16  SpO2:  [99 %-100 %] 100 %  BP: (116-129)/(56-70) 129/64     Weight: 105.2 kg (232 lb) (18 0947)  Body mass index is 39.82 kg/m².  Body surface area is 2.18 meters squared.    I/O last 3 completed shifts:  In: -   Out: 675 [Urine:675]    Physical Exam   HENT:   Head: Atraumatic.    Neck: No JVD present.   Cardiovascular: Normal rate and regular rhythm.   Pulmonary/Chest: Effort normal. She has rales.   Abdominal: Soft. She exhibits no distension.   anasraca   Musculoskeletal: She exhibits edema. She exhibits no tenderness.   Neurological: She is alert.   Skin: Skin is warm.

## 2018-09-10 NOTE — HPI
83yo WF who was to have a lumbar fusion later in the month and was admitted due to abnormal pre-op labs.  Nephrology consulted for JEY (sCr 2.2 on admit, 3.1 at time of consult, normal baseline), also with a sodium of 119 (133 on August 10th). Patient appears somnolent, but wakes up and appropriately answers questions, per family she is at her baseline.  Patient is not complaining of increasing shortness of breath, she in on 3L O2 when seen, this is what she is on at home, she has history of LENA and is supposed to be on CPAP at night, but not compliant.  CXR reviewed and looks like pulmonary edema/congestion on admit.

## 2018-09-10 NOTE — ASSESSMENT & PLAN NOTE
- Baseline Cr 1-1.5  - BUN/Cr 55/3.1, has been rising since 9/7  - Possibly 2/2 sepsis vs CRS  - Urine Na 10  - Diurese with Lasix, should expect renal function to improve if CRS.  - Urine lytes, urine studies (for casts).  - BMP q4  - Consider Nephrology consult

## 2018-09-10 NOTE — PLAN OF CARE
Extended Emergency Contact Information  Primary Emergency Contact: Codey Frazier  Address: [address] [place] [address] [facility]  Home Phone: [phone]  Mobile Phone: [phone]  Relation: Spouse  Secondary Emergency Contact: Sheeba Vang   [facility]  Home Phone: [phone]  Mobile Phone: [phone]  Relation: Daughter    Hao Garcia MD  [address] [address] / [place] [address]    No future appointments.    Payor: MEDICARE / Plan: MEDICARE PART A & B / Product Type: Government /       [id number] [place], [place] - [address]  [address]  [place] [address]  Phone: [phone] Fax: [phone]    [id number] - [place], [place] - [address] AT [facility]  [address]  [place] [address]  Phone: [phone] Fax: [phone]    [facility] - [place]  [address] AT [facility]  [address]  [place] [address]  Phone: [phone] Fax: [phone]    [id number] - [place], [place]  [address] AT [facility]  [address]  [place] [address]  Phone: [phone] Fax: [phone]       09/10/18 1625   Discharge Assessment   Assessment Type Discharge Planning Assessment   Confirmed/corrected address and phone number on facesheet? Yes   Assessment information obtained from? Caregiver;Medical Record   Expected Length of Stay (days) 8   Communicated expected length of stay with patient/caregiver yes   Prior to hospitilization cognitive status: Alert/Oriented   Prior to hospitalization functional status: Partially Dependent;Wheelchair Bound   Current cognitive status: Alert/Oriented   Current Functional Status: Partially Dependent;Wheelchair Bound   Facility Arrived From: Transfer from [facility]. Pt is in [facility] in [place], [place]   Lives With spouse   Able to Return to Prior Arrangements unable to determine at this time (comments)   Is patient able to care for self  after discharge? Unable to determine at this time (comments)   Patient's perception of discharge disposition nursing home   Readmission Within The Last 30 Days no previous admission in last 30 days   Patient currently being followed by outpatient case management? No   Patient currently receives any other outside agency services? No   Equipment Currently Used at Home power chair;walker, rolling   Do you have any problems affording any of your prescribed medications? TBD   Is the patient taking medications as prescribed? yes   Does the patient have transportation home? No   Does the patient receive services at the [facility]? No   Discharge Plan A Skilled Nursing Facility   Discharge Plan B Long-term acute care facility (LTAC)   Patient/Family In Agreement With Plan yes

## 2018-09-10 NOTE — SUBJECTIVE & OBJECTIVE
Past Medical History:   Diagnosis Date    Allergic sinusitis     Anticoagulant long-term use     Arthritis     Asthma     CHF (congestive heart failure)     Chronic kidney disease     COPD (chronic obstructive pulmonary disease)     Coronary artery disease     Diabetes mellitus     Encounter for blood transfusion     General anesthetics causing adverse effect in therapeutic use     DELAYED EMERGENCE    GERD (gastroesophageal reflux disease)     HHD (hypertensive heart disease)     High cholesterol     [place] (hard of hearing)     Hypertension     Hypothyroidism     Lumbar spinal stenosis     MRSA (methicillin resistant Staphylococcus aureus) infection 7/7/2018    MRSA infection     PAST H/O, CULTURE DONE 5/30/18 (+)    On home oxygen therapy     Osteoporosis     Pulmonary hypertension     Sleep apnea     STATES NOT USING C PAP    Spinal stenosis     Thyroid disease     TIA (transient ischemic attack)     UTI (urinary tract infection)     Venous insufficiency     Wears glasses     Wears partial dentures     UPPER FULL, LOWER PARTIAL       Past Surgical History:   Procedure Laterality Date    ANGIOGRAM-CORONARY N/A 6/2/2017    Performed by Yury Bailey MD at [facility] CATH    ANGIOPLASTY  2008    CARDIAC STENTS    APPENDECTOMY      APPLICATION OF WOUND VACUUM-ASSISTED CLOSURE DEVICE N/A 7/2/2018    Procedure: APPLICATION, WOUND VAC;  Surgeon: Jeremie Gutiérrez Jr., MD;  Location: [facility] OR;  Service: Orthopedics;  Laterality: N/A;    APPLICATION OF WOUND VACUUM-ASSISTED CLOSURE DEVICE N/A 8/7/2018    Procedure: APPLICATION, WOUND VAC;  Surgeon: Jeremie Gutiérrez Jr., MD;  Location: [facility] OR;  Service: Orthopedics;  Laterality: N/A;    APPLICATION, DRESSING, WOUND Left 8/11/2013    Performed by [facility]wan [facility] at [facility] OR    APPLICATION, WOUND VAC N/A 8/7/2018    Performed by Jeremie Gutiérrez Jr., MD at [facility] OR    APPLICATION, WOUND VAC N/A 7/2/2018    Performed by  Jeremie G[facility]érrez Jr., MD at [facility] OR    ARTHROPLASTY, KNEE, TOTAL Left 3/18/2013    [facility] by [facility] at [facility] OR    ATHERECTOMY N/A 6/5/2017    Performed by Lokesh Thakur MD at [facility] CATH    BLADDER SUSPENSION      BONE GRAFT N/A 6/5/2018    Procedure: BONE GRAFT;  Surgeon: Jeremie Gutiérrez Jr., MD;  Location: [facility] OR;  Service: Orthopedics;  Laterality: N/A;    BONE GRAFT N/A 6/5/2018    Performed by Jeremie Gutiérrez Jr., MD at [facility] OR    CARDIAC SURGERY  1996    CABG    CARDIAC SURGERY      Stents    CORONARY ARTERY BYPASS GRAFT  1996    EXPLORATION, WOUND-lumbar N/A 8/7/2018    Performed by Jeremie Gutiérrez Jr., MD at [facility] OR    FRACTURE SURGERY Left     Lt hand    FRACTURE SURGERY Left 1993    Lt Knee    FUSION-TRANSLUMINAL LUMBAR INTERBODY (TLIF) L2-3 W/ INSTRUMENTATION N/A 6/5/2018    Performed by Jeremie G[facility]érrez Jr., MD at [facility] OR    HEMORRHOID SURGERY      HEMORRHOID SURGERY      HYSTERECTOMY  1984    INCISION AND DRAINAGE N/A 7/2/2018    Procedure: INCISION AND DRAINAGE (I & D) LUMBAR SPINE W/ANTIBIOTIC BEAD PLACEMENT;  Surgeon: Jeremie Gutiérrez Jr., MD;  Location: [facility] OR;  Service: Orthopedics;  Laterality: N/A;    INCISION AND DRAINAGE (I & D) LUMBAR SPINE W/ANTIBIOTIC BEAD PLACEMENT N/A 7/2/2018    Performed by Jeremie Gutiérrez Jr., MD at [facility] OR    INCISION AND DRAINAGE (I & D)-EXTREMITY-LOWER Left 8/11/2013    Performed by [facility] at [facility] OR    INCISION AND DRAINAGE POSTERIOR LUMBAR SPINE  07/02/2018    INSERTION, PICC Right 7/2/2018    Performed by Jeremie Gutiérrez Jr., MD at [facility] OR    JOINT REPLACEMENT Left     KNEE SURGERY Left     POSTOP TKR INFECTION TX    LUMBAR EPIDURAL INJECTION      OPEN REDUCTION INTERNAL FIXATION-HIP TFN Left 9/21/2017    Performed by LOY Early II, MD at [facility] OR    PERIPHERALLY INSERTED CENTRAL CATHETER INSERTION Right 7/2/2018    Procedure: INSERTION, PICC;  Surgeon:  Jeremie Gutiérrez Jr., MD;  Location: [facility] OR;  Service: Orthopedics;  Laterality: Right;    REMOVAL OF HARDWARE FROM SPINE N/A 2018    Procedure: REMOVAL, HARDWARE, SPINE;  Surgeon: Jeremie Gutiérrez Jr., MD;  Location: [facility] OR;  Service: Orthopedics;  Laterality: N/A;    REMOVAL, HARDWARE, SPINE N/A 2018    Performed by Jeremie G[facility]érrez Jr., MD at [facility] OR    REMOVAL, PROSTHESIS, KNEE Left 2013    [facility] at [facility] OR    THYROIDECTOMY, PARTIAL      TOTAL KNEE  PROSTHESIS REMOVAL W/ SPACER INSERTION Left     TRANSFORAMINAL LUMBAR INTERBODY FUSION (TLIF) OF SPINE WITH PERCUTANEOUS INSTRUMENTATION N/A 2018    Procedure: FUSION-TRANSLUMINAL LUMBAR INTERBODY (TLIF) L2-3 W/ INSTRUMENTATION;  Surgeon: Jeremie Gutiérrez Jr., MD;  Location: [facility] OR;  Service: Orthopedics;  Laterality: N/A;    WOUND EXPLORATION N/A 2018    Procedure: EXPLORATION, WOUND-lumbar;  Surgeon: Jeremie Gutiérrez Jr., MD;  Location: [facility] OR;  Service: Orthopedics;  Laterality: N/A;       Review of patient's allergies indicates:   Allergen Reactions    Codeine Nausea Only    Penicillins Swelling     Able to take amoxil       Family History     Problem Relation (Age of Onset)    Diabetes Mother, Brother    Heart disease Father    Stroke Mother        Tobacco Use    Smoking status: Former Smoker     Types: Cigarettes     Last attempt to quit: 3/13/1987     Years since quittin.5    Smokeless tobacco: Never Used   Substance and Sexual Activity    Alcohol use: No     Frequency: Never    Drug use: No    Sexual activity: No      Review of Systems   Constitutional: Positive for activity change and fatigue. Negative for chills and fever.   HENT: Negative for trouble swallowing.    Eyes: Negative for visual disturbance.   Respiratory: Negative for cough and shortness of breath.    Cardiovascular: Positive for leg swelling. Negative for chest pain and palpitations.    Gastrointestinal: Negative for abdominal distention, abdominal pain, constipation, diarrhea, nausea and vomiting.   Genitourinary: Negative for difficulty urinating.   Musculoskeletal: Positive for back pain. Negative for arthralgias.   Neurological: Negative for dizziness, weakness, light-headedness, numbness and headaches.   Psychiatric/Behavioral: Negative for agitation and confusion.     Objective:     Vital Signs (Most Recent):  Temp: 97.6 °F (36.4 °C) (09/10/18 0700)  Pulse: (!) 58 (09/10/18 0520)  Resp: 18 (09/10/18 0700)  BP: 120/70 (09/10/18 0700)  SpO2: 99 % (09/10/18 0700) Vital Signs (24h Range):  Temp:  [97.5 °F (36.4 °C)-97.7 °F (36.5 °C)] 97.6 °F (36.4 °C)  Pulse:  [55-75] 58  Resp:  [17-19] 18  SpO2:  [98 %-100 %] 99 %  BP: (110-122)/(56-70) 120/70   Weight: 105.2 kg (232 lb)  Body mass index is 39.82 kg/m².      Intake/Output Summary (Last 24 hours) at 9/10/2018 1458  Last data filed at 9/10/2018 0525  Gross per 24 hour   Intake --   Output 525 ml   Net -525 ml       Physical Exam   Constitutional: She is oriented to person, place, and time. No distress.   HENT:   Head: Normocephalic.   Eyes: Pupils are equal, round, and reactive to light.   Neck: Normal range of motion.   Cardiovascular: Normal rate and regular rhythm.   Pulmonary/Chest: Effort normal. No accessory muscle usage. No tachypnea. She has wheezes in the right lower field and the left lower field.   Abdominal: Soft. Bowel sounds are normal.   Neurological: She is alert and oriented to person, place, and time. She has normal strength. No sensory deficit.   Dressing over the lumbar spine   Skin: Skin is warm.       Vents:  Oxygen Concentration (%): 32 (09/09/18 0428)  Lines/Drains/Airways     Peripherally Inserted Central Catheter Line                 PICC Double Lumen 07/02/18 1430 right basilic 70 days          Drain                 Urethral Catheter 09/09/18 1700 Double-lumen 18 Fr. less than 1 day          Pressure Ulcer                  Negative Pressure Wound Therapy  33 days              Significant Labs:    CBC/Anemia Profile:  Recent Labs   Lab  09/08/18   2254   WBC  13.70*   HGB  8.4*   HCT  26.5*   PLT  221   MCV  82   RDW  17.4*        Chemistries:  Recent Labs   Lab  09/09/18   0202   09/10/18   0708  09/10/18   0808  09/10/18   1141   NA  119*   < >  119*  118*  118*   K  6.1*   < >  5.0  4.9  5.0   CL  85*   < >  84*  83*  84*   CO2  24   < >  26  26  27   BUN  54*   < >  56*  55*  55*   CREATININE  2.9*   < >  2.9*  3.1*  3.1*   CALCIUM  7.9*   < >  7.6*  7.7*  7.8*   MG  1.5*   --   1.6   --    --    PHOS  5.1*   --   5.7*   --    --     < > = values in this interval not displayed.       All pertinent labs within the past 24 hours have been reviewed.    Significant Imaging: I have reviewed all pertinent imaging results/findings within the past 24 hours.

## 2018-09-10 NOTE — ASSESSMENT & PLAN NOTE
HYPERKALEMIA    JEY thought to be prerenal 2/2 dehydration but urine Na normal  - Renal showed bilateral chronic renal disease, no hydronephrosis     Plan:  - Urine studies pending (Urea, Creatinine, K)  - Renally dosing meds  - D/C'd Cymbalta 2/2 renal fxn  - Nephrology consulted and appreciate recs:   1) increase lasix diuresis to 100mg IV BID  2) continue strict Is & Os and serial renal panels  3) obtain daily vanco through levels and dose appropriately for renal funtion  4) collect urine, spin down and manually review microscopy

## 2018-09-10 NOTE — ASSESSMENT & PLAN NOTE
82 year old female with history of L2-3 fusion 6/5 complicated by post operative infection with Enterobacter cloacae. She has been on outpatient IV Ciprofloxacin and has undergone an I&D on 7/2 with hardware removal on 8/7 without resolution of her infection. 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 Blood cultures from 9/8 are +contaminant.      Unclear if the patient's worsening infection is indicative of antibiotic failure, developing resistance to Cipro, lack of source control (appears she has retained hardware (intervertebral cage) on imaging), versus a new infection . Will continue broad spectrum antibiotics for now.     Plan  - Continue  Vancomycin and Cefepime 2 g IV q 24 hours (renal adjusted). Will discuss with ID pharmacy in regards to abx as hyponatremic. redosed vancomycin [id number]. Repeat trough prior to 3rd dose.   - Repeat blood cultures NGTD  - Neurosurgery following, plans for I&D and fusion 9/14. Please obtain surgical cultures and send for gram stain, aerobic, anaerobic, fungal and AFB.   - ID will follow closely.

## 2018-09-10 NOTE — SUBJECTIVE & OBJECTIVE
Interval History:   No acute events overnight. Surgery - I&D + fusion postponed to optimize pt medically. Tentatively plan for OR on Friday. Pt continues to be hyponatremic despite fluid restriction. Nephrology consulted for recs.     Review of Systems   Constitutional: Positive for appetite change and fatigue. Negative for chills and diaphoresis.   HENT: Negative for ear pain, sore throat and trouble swallowing.    Eyes: Negative for pain and visual disturbance.   Respiratory: Negative for chest tightness and shortness of breath.    Cardiovascular: Negative for chest pain and palpitations.   Gastrointestinal: Positive for abdominal pain. Negative for diarrhea, nausea and vomiting.   Endocrine: Negative for polydipsia and polyphagia.   Genitourinary: Negative for difficulty urinating and hematuria.   Musculoskeletal: Positive for back pain (improved ). Negative for myalgias.   Skin: Negative for rash.   Neurological: Negative for dizziness and light-headedness.   Psychiatric/Behavioral: Negative for confusion and dysphoric mood.       Objective:     Vital Signs (Most Recent):  Temp: 98.2 °F (36.8 °C) (09/10/18 1508)  Pulse: 63 (09/10/18 1508)  Resp: 16 (09/10/18 1508)  BP: 129/64 (09/10/18 1508)  SpO2: 100 % (09/10/18 1508) Vital Signs (24h Range):  Temp:  [97.5 °F (36.4 °C)-98.2 °F (36.8 °C)] 98.2 °F (36.8 °C)  Pulse:  [55-63] 63  Resp:  [16-19] 16  SpO2:  [99 %-100 %] 100 %  BP: (116-129)/(56-70) 129/64     Weight: 105.2 kg (232 lb)  Body mass index is 39.82 kg/m².    Physical Exam   Constitutional: She is oriented to person, place, and time. She appears well-developed and well-nourished. No distress.   HENT:   Head: Normocephalic and atraumatic.   Right Ear: External ear normal.   Left Ear: External ear normal.   Nose: Nose normal.   Eyes: Conjunctivae and EOM are normal. Right eye exhibits no discharge. Left eye exhibits no discharge. No scleral icterus.   Neck: Neck supple.   Cardiovascular: Normal rate and  regular rhythm.   No murmur heard.  Pulmonary/Chest: Effort normal and breath sounds normal. No respiratory distress. She has no wheezes. She has no rales.   Abdominal: Soft. Bowel sounds are normal. She exhibits no distension. There is no tenderness. There is no guarding.   Musculoskeletal: She exhibits edema.   +2 pitting edema   Lymphadenopathy:     She has no cervical adenopathy.   Neurological: She is alert and oriented to person, place, and time.   Skin: Skin is warm and dry. She is not diaphoretic.   Psychiatric: She has a normal mood and affect.   Nursing note and vitals reviewed.        CRANIAL NERVES     CN III, IV, VI   Extraocular motions are normal.        Significant Labs:   BMP:   Recent Labs   Lab  09/10/18   0708   09/10/18   1546   GLU  215*   < >  213*   NA  119*   < >  119*   K  5.0   < >  5.3*   CL  84*   < >  84*   CO2  26   < >  23   BUN  56*   < >  54*   CREATININE  2.9*   < >  3.1*   CALCIUM  7.6*   < >  8.2*   MG  1.6   --    --     < > = values in this interval not displayed.     CBC:   Recent Labs   Lab  09/08/18   2254   WBC  13.70*   HGB  8.4*   HCT  26.5*   PLT  221

## 2018-09-10 NOTE — ASSESSMENT & PLAN NOTE
- Prior ECHO shows diastolic dysfuunction  - On Lasix 20mg and Aldactone 25mg  - Recommend Lasix 40mg BID  - Continue Coreg 3.125mg BID, per primary

## 2018-09-10 NOTE — CONSULTS
[facility]  Nephrology  Consult Note    Patient Name: Medina Frazier  MRN: [id number]  Admission Date: 9/8/2018  Hospital Length of Stay: 2 days  Attending Provider: Elina Sandhu MD   Primary Care Physician: Hao Garcia MD  Principal Problem:Spinal epidural abscess    Inpatient consult to Nephrology  Consult performed by: Allen Barnes MD  Consult ordered by: Britany Patel MD  Reason for consult: JEY        Subjective:     HPI: 81yo WF who was to have a lumbar fusion later in the month and was admitted due to abnormal pre-op labs.  Nephrology consulted for JEY (sCr 2.2 on admit, 3.1 at time of consult, normal baseline), also with a sodium of 119 (133 on August 10th). Patient appears somnolent, but wakes up and appropriately answers questions, per family she is at her baseline.  Patient is not complaining of increasing shortness of breath, she in on 3L O2 when seen, this is what she is on at home, she has history of LENA and is supposed to be on CPAP at night, but not compliant.  CXR reviewed and looks like pulmonary edema/congestion on admit.    Past Medical History:   Diagnosis Date    Allergic sinusitis     Anticoagulant long-term use     Arthritis     Asthma     CHF (congestive heart failure)     Chronic kidney disease     COPD (chronic obstructive pulmonary disease)     Coronary artery disease     Diabetes mellitus     Encounter for blood transfusion     General anesthetics causing adverse effect in therapeutic use     DELAYED EMERGENCE    GERD (gastroesophageal reflux disease)     HHD (hypertensive heart disease)     High cholesterol     [place] ([place]     Hypertension     Hypothyroidism     Lumbar spinal stenosis     MRSA (methicillin resistant Staphylococcus aureus) infection 7/7/2018    MRSA infection     PAST H/O, CULTURE DONE 5/30/18 (+)    On home oxygen therapy     Osteoporosis     Pulmonary hypertension     Sleep apnea     STATES NOT  USING C PAP    Spinal stenosis     Thyroid disease     TIA (transient ischemic attack)     UTI (urinary tract infection)     Venous insufficiency     Wears glasses     Wears partial dentures     UPPER FULL, LOWER PARTIAL       Past Surgical History:   Procedure Laterality Date    ANGIOGRAM-CORONARY N/A 6/2/2017    Performed by Yury Bailey MD at [facility] CATH    ANGIOPLASTY  2008    CARDIAC STENTS    APPENDECTOMY      APPLICATION OF WOUND VACUUM-ASSISTED CLOSURE DEVICE N/A 7/2/2018    Procedure: APPLICATION, WOUND VAC;  Surgeon: Jeremie Gutiérrez Jr., MD;  [facility] OR;  Service: Orthopedics;  Laterality: N/A;    APPLICATION OF WOUND VACUUM-ASSISTED CLOSURE DEVICE N/A 8/7/2018    Procedure: APPLICATION, WOUND VAC;  Surgeon: Jeremie Gutiérrez Jr., MD;  [facility];  Service: Orthopedics;  Laterality: N/A;    APPLICATION, DRESSING, WOUND Left 8/11/2013    Performed by Juwan Liu MD at [facility] OR    APPLICATION, WOUND VAC N/A 8/7/2018    Performed by Jeremie Gutiérrez Jr., MD at [facility] OR    APPLICATION, WOUND VAC N/A 7/2/2018    Performed by Jeremie Gutiérrez Jr., MD at [facility] OR    ARTHROPLASTY, KNEE, TOTAL Left 3/18/2013    Performed by Juwan Liu MD at [facility] OR    ATHERECTOMY N/A 6/5/2017    Performed by Lokesh Thakur MD at [facility] CATH    BLADDER SUSPENSION      BONE GRAFT N/A 6/5/2018    Procedure: BONE GRAFT;  Surgeon: Jeremie Gutiérrez Jr., MD;  [facility];  Service: Orthopedics;  Laterality: N/A;    BONE GRAFT N/A 6/5/2018    Performed by Jeremie Gutiérrez Jr., MD at [facility] OR    CARDIAC SURGERY  1996    CABG    CARDIAC SURGERY      Stents    CORONARY ARTERY BYPASS GRAFT  1996    EXPLORATION, WOUND-lumbar N/A 8/7/2018    Performed by Jeremie Gutiérrez Jr., MD at [facility] OR    FRACTURE SURGERY Left     Lt hand    FRACTURE SURGERY Left 1993    Lt Knee    FUSION-TRANSLUMINAL LUMBAR INTERBODY (TLIF) L2-3 W/ INSTRUMENTATION N/A 6/5/2018     Performed by Jeremie Gutiérrez Jr., MD at [facility] OR    HEMORRHOID SURGERY      HEMORRHOID SURGERY      HYSTERECTOMY  1984    INCISION AND DRAINAGE N/A 7/2/2018    Procedure: INCISION AND DRAINAGE (I & D) LUMBAR SPINE W/ANTIBIOTIC BEAD PLACEMENT;  Surgeon: Jeremie Gutiérrez Jr., MD;  Location: [facility] OR;  Service: Orthopedics;  Laterality: N/A;    INCISION AND DRAINAGE (I & D) LUMBAR SPINE W/ANTIBIOTIC BEAD PLACEMENT N/A 7/2/2018    Performed by Jeremie Gutiérrez Jr., MD at [facility] OR    INCISION AND DRAINAGE (I & D)-EXTREMITY-LOWER Left 8/11/2013    Performed by [facility] at [facility] OR    INCISION AND DRAINAGE POSTERIOR LUMBAR SPINE  07/02/2018    INSERTION, PICC Right 7/2/2018    Performed by Jeremie Gutiérrez Jr., MD at [facility] OR    JOINT REPLACEMENT Left     KNEE SURGERY Left     POSTOP TKR INFECTION TX    LUMBAR EPIDURAL INJECTION      OPEN REDUCTION INTERNAL FIXATION-HIP TFN Left 9/21/2017    Performed by LOY Early II, MD at [facility] OR    PERIPHERALLY INSERTED CENTRAL CATHETER INSERTION Right 7/2/2018    Procedure: INSERTION, PICC;  Surgeon: Jeremie Gutiérrez Jr., MD;  Location: [facility] OR;  Service: Orthopedics;  Laterality: Right;    REMOVAL OF HARDWARE FROM SPINE N/A 8/7/2018    Procedure: REMOVAL, HARDWARE, SPINE;  Surgeon: Jeremie Gutiérrez Jr., MD;  Location: [facility] OR;  Service: Orthopedics;  Laterality: N/A;    REMOVAL, HARDWARE, SPINE N/A 8/7/2018    Performed by Jeremie Gutiérrez Jr., MD at [facility] OR    REMOVAL, PROSTHESIS, KNEE Left 11/20/2013    Performed by Juwan Liu MD at [facility] OR    THYROIDECTOMY, PARTIAL      TOTAL KNEE  PROSTHESIS REMOVAL W/ SPACER INSERTION Left     TRANSFORAMINAL LUMBAR INTERBODY FUSION (TLIF) OF SPINE WITH PERCUTANEOUS INSTRUMENTATION N/A 6/5/2018    Procedure: FUSION-TRANSLUMINAL LUMBAR INTERBODY (TLIF) L2-3 W/ INSTRUMENTATION;  Surgeon: Jeremie Gutiérrez Jr., MD;  Location: [facility] OR;  Service: Orthopedics;  Laterality:  N/A;    WOUND EXPLORATION N/A 2018    Procedure: EXPLORATION, WOUND-lumbar;  Surgeon: Jeremie Gutiérrez Jr., MD;  Location: [facility];  Service: Orthopedics;  Laterality: N/A;       Review of patient's allergies indicates:   Allergen Reactions    Codeine Nausea Only    Penicillins Swelling     Able to take amoxil     Current Facility-Administered Medications   Medication Frequency    atorvastatin tablet 40 mg QHS    carvedilol tablet 3.125 mg BID    ceFEPIme injection 2 g Daily    dextrose 50% injection 12.5 g PRN    dextrose 50% injection 25 g PRN    furosemide injection 40 mg BID    gabapentin capsule 100 mg BID    glucagon (human recombinant) injection 1 mg PRN    glucose chewable tablet 16 g PRN    glucose chewable tablet 24 g PRN    insulin aspart U-100 pen 0-5 Units QID (AC + HS) PRN    insulin aspart U-100 pen 2 Units TIDWM    [START ON 2018] insulin detemir U-100 pen 17 Units Daily    isosorbide dinitrate tablet 20 mg BID    levothyroxine tablet 150 mcg Before breakfast    ondansetron injection 4 mg Q8H PRN    oxyCODONE immediate release tablet 10 mg Q6H PRN    oxyCODONE immediate release tablet 5 mg Q6H PRN    pantoprazole EC tablet 40 mg QAM    sildenafil 2.5 mg/mL oral suspension TID    sodium chloride 0.9% flush 5 mL PRN    trazodone split tablet 25 mg QHS    [START ON 2018] vancomycin (VANCOCIN) 1,000 mg in sodium chloride 0.9% 250 mL IVPB Q24H     Family History     Problem Relation (Age of Onset)    Diabetes Mother, Brother    Heart disease Father    Stroke Mother        Tobacco Use    Smoking status: Former Smoker     Types: Cigarettes     Last attempt to quit: 3/13/1987     Years since quittin.5    Smokeless tobacco: Never Used   Substance and Sexual Activity    Alcohol use: No     Frequency: Never    Drug use: No    Sexual activity: No     Review of Systems   Constitutional: Negative for chills, fatigue and fever.   Respiratory: Negative for chest  tightness and shortness of breath.    Cardiovascular: Positive for leg swelling. Negative for chest pain.   Gastrointestinal: Negative for abdominal distention, abdominal pain, diarrhea and nausea.   Genitourinary: Negative for decreased urine volume, difficulty urinating, flank pain, frequency, hematuria and urgency.   Skin: Negative for rash.   Neurological: Negative for tremors, speech difficulty and weakness.     Objective:     Vital Signs (Most Recent):  Temp: 98.2 °F (36.8 °C) (09/10/18 1508)  Pulse: 63 (09/10/18 1508)  Resp: 16 (09/10/18 1508)  BP: 129/64 (09/10/18 1508)  SpO2: 100 % (09/10/18 1508)  O2 Device (Oxygen Therapy): nasal cannula (09/10/18 1508) Vital Signs (24h Range):  Temp:  [97.5 °F (36.4 °C)-98.2 °F (36.8 °C)] 98.2 °F (36.8 °C)  Pulse:  [55-63] 63  Resp:  [16-19] 16  SpO2:  [99 %-100 %] 100 %  BP: (116-129)/(56-70) 129/64     Weight: 105.2 kg (232 lb) (09/09/18 0947)  Body mass index is 39.82 kg/m².  Body surface area is 2.18 meters squared.    I/O last 3 completed shifts:  In: -   Out: 675 [Urine:675]    Physical Exam   HENT:   Head: Atraumatic.   Neck: No JVD present.   Cardiovascular: Normal rate and regular rhythm.   Pulmonary/Chest: Effort normal. She has rales.   Abdominal: Soft. She exhibits no distension.   anasraca   Musculoskeletal: She exhibits edema. She exhibits no tenderness.   Neurological: She is alert.   Skin: Skin is warm.           Assessment/Plan:     Somnolence    Per family patient not far from baseline, hyponatremia may be a contributor, but also noted patient with h/o LENA and not using her CPAP, would check an ABG to r/o CO2 retention/narcosis        Hyponatremia    Hyposomolar, hypervolemic hyponatremia, likely secondary decreased effective circulating volume secondary CHF exacerbation, currently patient is w/o seizures, she is somnolent, but per family this is not far from her baseline, during the day today sodium 118 --> 119 over ~4hrs, which is  appropriate    Plan/Recommendations:  1) continue lasix diruesis and increase (as outlined in JEY section)  2) q4h sodium  3) repeat urine lytes (sodium and potassium)  4) restrict free water to 1000ccs per 24hrs  5) if acute worsening and neurological changes, then consider hypertonic, but otherwise hold off for now  6) think lasix diuresis will do the trick, but if not enough, then in a day or two could consider adding tolvaptan         JEY (acute kidney injury)    Non-oliguric, likely multifactorial, the urine sodium of 10, could be consistent with CRS in this patient with what appears to be acute CHF exacerbation and volume overload, however would be careful reading too much in to this urinary sodium value, also consider ATN toxic secondary infection, ischemic ATN, also possibly vancomycin contributing to worsening sCr since admit further noted with WBCs in the urine of > 100, need to r/o infection which would be unlikely to cause an JEY (unless there is a bilateral pyelonephritis, which clinically does not fit, nor consistent with U/S findings), in short, if urine cultures negative need to consider AIN (outpatient abxs), absence of eosinophilia, rash or fever, may make a little less likely, but does not rule out.    Renal ultrasound unremarkable and negative for hydronephrosis.    Plan/Reccomendations:  1) increase lasix diuresis to 100mg IV BID  2) continue strict Is & Os and serial renal panels  3) obtain daily vanco through levels and dose appropriately for renal funtion  4) collect urine, spin down and manually review microscopy        CHF (congestive heart failure), NYHA class IV    Recommend increasing furosemide to 100mg IV q12h            Thank you for your consult. I will follow-up with patient. Please contact us if you have any additional questions.    Allen Barnes MD  Nephrology  [facility]

## 2018-09-10 NOTE — ASSESSMENT & PLAN NOTE
S/p TLIF L2-3 in 6/2018 with complications from culture + enterobacter cloacae infection discovered on  I&D in July 2nd  Hardware removed in August  Recent ED for continue abdominal pain with recent CT showing concern for osteo and a fluid collection that is displacing her aorta  Transferred from [facility] due to concern for need for higher level of care     Plan   - MRI lumbar spine pending  - Neurosurgery consulted, plan for OR on Friday. Pt needs to be medically optimized.

## 2018-09-10 NOTE — PLAN OF CARE
Problem: Occupational Therapy Goal  Goal: Occupational Therapy Goal  Goals to be met by: 9/17/18    Patient will increase functional independence with ADLs by performing:    UE Dressing with Moderate Assistance.  Grooming while EOB with Minimal Assistance for EOB balance.  Sitting at edge of bed x15 minutes with Minimal Assistance.  Rolling to Bilateral with Moderate Assistance.   Supine to sit with Maximum Assistance.  Tolerate up in [employer] x 1 hr.    Outcome: Ongoing (interventions implemented as appropriate)  Evaluation completed and POC established.    MIKAYLA Reyes

## 2018-09-10 NOTE — PROGRESS NOTES
[facility]  Neurosurgery  Progress Note    Subjective:     History of Present Illness: Ms Medina Frazier is an 82yoF with PMHx DMII, HTN, CHF, CAD, CHF, CKD, HLD, GERD, hypothyroidism,  s/p L2-3 TLIF on 6/5/18 by Dr. Gutiérrez at [facility], complicated by infection & subsequent washout &  hardware removal, who is transferred from [facility] for neurosurgical evaluation of L2-3 osteomyelitis.  Following her initial surgery, she was found to have a lumbar incision wound infection that was washed out on 7/2/18.  Surgical cultures grew E. Cloacae & a PICC was placed.  She was discharged on IV Cipro & a wound vac.  She then had hardware removal on 8/7.  She has had progressive worsening of pain & infection since that time.   Most recent CT Lspine shows L2-3 osteomyelitis with paraspinal fluid collection L1-3.      Post-Op Info:  Procedure(s) (LRB):  FUSION, SPINE, LUMBAR, TLIF, WITH PERCUTANEOUS INSTRUMENTATION L1-5, depuy, neuromonitoring, 4 post bed (N/A)       Interval History: Ms. Frazier complains of back pain.  No current leg pain or paresthesias.  She reports she has only been able to take a few steps since her initial surgery & mostly transports via wheelchair.  She feels her BLE weakness is also her baseline since L2-3 fusion.  Magaña in place.    Medications:  Continuous Infusions:  Scheduled Meds:   atorvastatin  40 mg Oral QHS    carvedilol  3.125 mg Oral BID    ceFEPime (MAXIPIME) IVPB  2 g Intravenous Daily    [START ON 9/11/2018] furosemide  100 mg Intravenous BID    gabapentin  100 mg Oral BID    heparin (porcine)  5,000 Units Subcutaneous Q8H    insulin aspart U-100  2 Units Subcutaneous TIDWM    [START ON 9/11/2018] insulin detemir U-100  17 Units Subcutaneous Daily    isosorbide dinitrate  20 mg Oral BID    levothyroxine  150 mcg Oral Before breakfast    pantoprazole  40 mg Oral QAM    sildenafil  10 mg Oral TID    traZODone  25 mg Oral QHS    [START ON 9/11/2018] vancomycin  "(VANCOCIN) IVPB (custom)  1,000 mg Intravenous Q24H     PRN Meds:dextrose 50%, dextrose 50%, glucagon (human recombinant), glucose, glucose, insulin aspart U-100, ondansetron, oxyCODONE, oxyCODONE, sodium chloride 0.9%     Review of Systems  Objective:     Weight: 105.2 kg (232 lb)  Body mass index is 39.82 kg/m².  Vital Signs (Most Recent):  Temp: 98.2 °F (36.8 °C) (09/10/18 1508)  Pulse: 63 (09/10/18 1508)  Resp: 16 (09/10/18 1508)  BP: 129/64 (09/10/18 1508)  SpO2: 100 % (09/10/18 1508) Vital Signs (24h Range):  Temp:  [97.5 °F (36.4 °C)-98.2 °F (36.8 °C)] 98.2 °F (36.8 °C)  Pulse:  [55-63] 63  Resp:  [16-19] 16  SpO2:  [99 %-100 %] 100 %  BP: (116-129)/(56-70) 129/64     Date 09/10/18 0700 - 09/11/18 0659   Shift 0423-8725 7158-1064 4228-3035 24 Hour Total   INTAKE   Shift Total(mL/kg)       OUTPUT   Urine(mL/kg/hr)  350  350   Shift Total(mL/kg)  350(3.3)  350(3.3)   Weight (kg) 105.2 105.2 105.2 105.2                        Urethral Catheter 09/09/18 1700 Double-lumen 18 Fr. (Active)   Site Assessment Clean;Intact 9/10/2018  8:00 AM   Collection Container Standard drainage bag 9/10/2018  8:00 AM   Securement Method secured to top of thigh w/ adhesive device 9/10/2018  8:00 AM   Catheter Care Performed yes 9/10/2018  8:00 AM   Reason for Continuing Urinary Catheterization Chronic Indwelling Urinary Catheter on Admission 9/10/2018  8:00 AM   CAUTI Prevention Bundle StatLock in place w 1" slack;Intact seal between catheter & drainage tubing;Drainage bag off the floor;Green sheeting clip in use;No dependent loops or kinks;Drainage bag not overfilled (<2/3 full) 9/10/2018  8:00 AM   Output (mL) 175 mL 9/9/2018  5:00 PM       Neurosurgery Physical Exam   General: well developed, well nourished, no distress  Head: normocephalic, atraumatic  Neurologic: Alert and oriented. Thought content appropriate  GCS: Motor: 6/Verbal: 5/Eyes: 4 GCS Total: 15  Mental Status: Awake, Alert, Oriented x 4  Language: No " aphasia  Speech: No dysarthria  Cranial nerves: face symmetric, tongue midline, CN II-XII grossly intact.   Eyes: pupils equal, round, reactive to light with accommodation, EOMI  Pulmonary: normal respirations, not labored, no accessory muscles used  Abdomen: soft, non-distended, not tender to palpation  Sensory: intact to light touch throughout  Motor Strength: Moves all extremities spontaneously with good tone.  Full strength upper and lower extremities. No abnormal movements seen.     Strength  Deltoids Triceps Biceps Wrist Extension Wrist Flexion Hand    Upper: R 5/5 5/5 5/5 5/5 5/5 5/5    L 5/5 5/5 5/5 5/5 5/5 5/5     Iliopsoas Quadriceps Knee  Flexion Tibialis  anterior Gastro- cnemius EHL   Lower: R 4-/5 4+/5 4+/5 5/5 5/5 5/5    L 3/5 4-/5 4-/5 5/5 5/5 5/5     LE limited by extensive BLE edema & multiple prior L knee sx  Pronator Drift: no drift noted  Finger-to-nose: Intact bilaterally  Piedra: absent  Clonus: absent  Babinski: absent  Pulses: 2+ and symmetric radial and dorsalis pedis  Skin: warm, dry and intact, no rashes  Pitting edema to BLE 2+        Significant Labs:  Recent Labs   Lab  09/09/18   0202   09/10/18   0708  09/10/18   0808  09/10/18   1141  09/10/18   1546   GLU  182*   < >  215*  214*  254*  213*   NA  119*   < >  119*  118*  118*  119*   K  6.1*   < >  5.0  4.9  5.0  5.3*   CL  85*   < >  84*  83*  84*  84*   CO2  24   < >  26  26  27  23   BUN  54*   < >  56*  55*  55*  54*   CREATININE  2.9*   < >  2.9*  3.1*  3.1*  3.1*   CALCIUM  7.9*   < >  7.6*  7.7*  7.8*  8.2*   MG  1.5*   --   1.6   --    --    --     < > = values in this interval not displayed.     Recent Labs   Lab  09/08/18   2254   WBC  13.70*   HGB  8.4*   HCT  26.5*   PLT  221     Recent Labs   Lab  09/08/18   2254   INR  1.2     Microbiology Results (last 7 days)     Procedure Component Value Units Date/Time    Urine culture [[id number]]     Order Status:  Completed Specimen:  Urine, Catheterized     Blood culture  [[id number]] Collected:  09/09/18 1555    Order Status:  Completed Specimen:  Blood Updated:  09/10/18 1812     Blood Culture, Routine No Growth to date     Blood Culture, Routine No Growth to date    Urine culture [[id number]]     Order Status:  Canceled Specimen:  Urine     Blood culture [[id number]] Collected:  09/09/18 1731    Order Status:  Completed Specimen:  Blood Updated:  09/10/18 0315     Blood Culture, Routine No Growth to date    Narrative:       From 2 different sites 30 minutes apart          Significant Diagnostics:  I personally reviewed imaging & agree with CT Lspine - Retrolisthesis and lucencies surrounding intervertebral cage device at L2-3, worrisome for osteomyelitis.  There is a fluid collection extending anteriorly and superiorly suggestive of a paraspinous abscess.  There is likely intraspinous extension as well with findings concerning for possible epidural abscess.  Follow-up MRI with contrast is recommended.    Assessment/Plan:     * Spinal epidural abscess    Ms. Frazier is an 82yoF with multiple comorbidities s/p L2-3 TLIF on 6/5/18 at [facility], with surgical wound infection with E. cloacea s/p washout & hardware removal, now with worsening osteomyelitis & paraspinal abscess despite weeks of treatment with IV Cipro  -Plan for OR Friday for washout & fusion, pending medical clearance  -Please wear LSO brace at all times  -Please hold Xarelto & Plavix   -Please document medical clearance when appropriate   -Continue vancomycin & cefepime per infectious disease recs  -Medical management per primary team  -Discussed with Dr. Ivania Chinchilla PA-C  Neurosurgery  [facility]

## 2018-09-10 NOTE — HPI
Ms Medina rFazier is an 82yoF with PMHx DMII, HTN, CHF, CAD, CHF, CKD, HLD, GERD, hypothyroidism,  s/p L2-3 TLIF on 6/5/18 by Dr. Gutiérrez at [facility], complicated by infection & subsequent washout &  hardware removal, who is [facility] for neurosurgical evaluation of L2-3 osteomyelitis.  Following her initial surgery, she was found to have a lumbar incision wound infection that was washed out on 7/2/18.  Surgical cultures grew E. Cloacae & a PICC was placed.  She was discharged on IV Cipro & a wound vac.  She then had hardware removal on 8/7.  She has had progressive worsening of pain & infection since that time.   Most recent CT Lspine shows L2-3 osteomyelitis with paraspinal fluid collection L1-3.

## 2018-09-10 NOTE — PHARMACY MED REC
"Admission Medication Reconciliation - Pharmacy Consult Note    The home medication history was taken by [facility]  Based on information gathered and subsequent review by the clinical pharmacist, the items below may need attention.     You may go to "Admission" then "Reconcile Home Medications" tabs to review and/or act upon these items.     Potentially problematic discrepancies with current MAR  o Patient IS taking the following which was not ordered upon admit  o Albuterol 0.63 mg/3 mL - take 0.63mg by nebulization Q6H prn SOB/wheezing  o Calcium carbonate-simethicone 1000-60 mg chewable - Take 1 PO Q4H prn heartburn  o Bisacodyle 10mg Suppository - Place 10 mg rectally Q8H prn constipation  o Magnesium Hydroxide 400mg/5mL suuspension - Take 30 mL PO prn constipation  o Polyethylene glycol powder 17 g PO QPM. If stool is too loose, may use every other night.  Medications Prior to Admission   Medication Sig Dispense Refill Last Dose    acetaminophen (TYLENOL) 325 MG tablet Take 650 mg by mouth every 4 (four) hours as needed for Pain or Temperature greater than (100).   Past Week at Unknown time    albuterol (ACCUNEB) 0.63 mg/3 mL Nebu Take 0.63 mg by nebulization every 6 (six) hours as needed (sob/wheezing). Rescue    7/2/2018    atorvastatin (LIPITOR) 40 MG tablet Take 40 mg by mouth every evening.    9/8/2018 at Unknown time    bisacodyl (DULCOLAX, BISACODYL,) 10 mg Supp Place 10 mg rectally every 8 (eight) hours as needed (constipation).       calcium carbonate-simethicone (MAALOX ADVANCED) 1,000-60 mg Chew Take 1 tablet by mouth every 4 (four) hours as needed (heartburn).       carvedilol (COREG) 25 MG tablet Take 25 mg by mouth 2 (two) times daily.    9/8/2018 at Unknown time    clopidogrel (PLAVIX) 75 mg tablet Take 1 tablet (75 mg total) by mouth once daily. (Patient taking differently: Take 75 mg by mouth every morning. )   9/7/2018    diazePAM (VALIUM) 5 MG tablet Take 5 mg by mouth " every 8 (eight) hours as needed (spasms).        DULoxetine (CYMBALTA) 30 MG capsule Take 30 mg by mouth once daily.   9/8/2018 at Unknown time    furosemide (LASIX) 20 MG tablet Take 20 mg by mouth every morning.    9/8/2018 at Unknown time    HYDROcodone-acetaminophen (NORCO) 7.5-325 mg per tablet Take 1 tablet by mouth every 6 (six) hours as needed for Pain.   9/7/2018    insulin glargine (LANTUS) 100 unit/mL injection Inject 30 Units into the skin every evening.    9/8/2018 at Unknown time    isosorbide dinitrate (ISORDIL) 20 MG tablet Take 20 mg by mouth 2 (two) times daily.    9/8/2018 at Unknown time    levothyroxine (SYNTHROID) 150 MCG tablet Take 150 mcg by mouth before breakfast.    9/8/2018 at Unknown time    magnesium hydroxide 400 mg/5 ml (MILK OF MAGNESIA) 400 mg/5 mL Susp Take 30 mLs by mouth daily as needed (constipation).        OMEGA-3/DHA/EPA/FISH OIL (OMEGA-3 FISH OIL ORAL) Take 2 capsules by mouth every evening.    9/6/2018    ondansetron (ZOFRAN) 8 MG tablet Take by mouth every 8 (eight) hours as needed for Nausea.   9/8/2018 at Unknown time    pantoprazole (PROTONIX) 40 MG tablet Take 40 mg by mouth every morning.    9/8/2018 at Unknown time    polyethylene glycol (GLYCOLAX) 17 gram PwPk Take 17 g by mouth every evening. Mix in 8 ounces of water.If stool is too loose,may use every other night   9/5/2018    rivaroxaban (XARELTO) 15 mg Tab Take 1 tablet (15 mg total) by mouth daily with dinner or evening meal.   9/8/2018 at Unknown time    sildenafil (REVATIO) 20 mg Tab Take 10 mg by mouth 3 (three) times daily.   9/8/2018 at Unknown time    spironolactone (ALDACTONE) 50 MG tablet Take 50 mg by mouth once daily.       ciprofloxacin, CIPRO,400mg/200ml D5W IVPB (CIPRO IN D5W) 400 mg/200 mL IVPB Inject 400 mg into the vein every 12 (twelve) hours.   9/7/2018    food supplemt, lactose-reduced (ENSURE ACTIVE LIGHT) Liqd Take by mouth as needed. FOR LOW BLOOD SUGAR   7/2/2018     heparin flush,porcine,-0.9NaCl 100 unit/mL Kit Inject 5 mLs into the vein once daily. To each port   9/7/2018    nut.tx.gluc.intol,lac-free,soy (GLUCERNA SHAKE) Liqd Take by mouth as needed. TX BLOOD SUGAR LOWS OR HIGHS   7/2/2018         Please address this information as you see fit.  Feel free to contact us if you have any questions or require assistance.    Juan Pablo Gomez, PharmD  EXT [phone]                  .  .

## 2018-09-10 NOTE — ASSESSMENT & PLAN NOTE
82 year old woman with spinal epidural abscess    - please wear brace at all times  - continue to hold xarelto  - continue to hold plavix  - OR Tuesday  - will need medical clearance, spoke with Medicine team about this over the weekend

## 2018-09-11 PROBLEM — R06.89 HYPERCAPNIA: Status: ACTIVE | Noted: 2018-01-01

## 2018-09-11 PROBLEM — R23.9 ALTERATION IN SKIN INTEGRITY RELATED TO SURGICAL INCISION: Status: ACTIVE | Noted: 2018-01-01

## 2018-09-11 NOTE — ASSESSMENT & PLAN NOTE
Ms. Frazier is an 82yoF with multiple comorbidities s/p L2-3 TLIF on 6/5/18 at [facility], with surgical wound infection with E. cloacea s/p washout & hardware removal, now with worsening osteomyelitis & paraspinal abscess despite weeks of treatment with IV Cipro  -No acute neurosurgical intervention  -Plan for OR Friday for washout & fusion, pending medical clearance  -Please wear LSO brace at all times  -Please hold Xarelto & Plavix   -Please document medical clearance when appropriate   -Continue vancomycin & cefepime per infectious disease recs  -Medical management per primary team  -Discussed with Dr. Redd

## 2018-09-11 NOTE — PROGRESS NOTES
[facility] Hospital Medicine  Progress Note    Patient Name: Medina Frazier  MRN: [id number]  Patient Class: IP- Inpatient   Admission Date: 9/8/2018  Length of Stay: 2 days  Attending Physician: Elina Sandhu MD  Primary Care Provider: Hao Garcia MD    [facility] Medicine Team: [facility] 1 Britany Patel MD    Subjective:     Principal Problem:Spinal epidural abscess    HPI:  Ms. Medina Frazier is a 82 y.o. female with spinal stenosis, T2DM, CHF, CAD, and Afib who originally presented to [facility] on 9/7 with back pain.  She has a complicated history, and has been following with Ortho Dr. Gutiérrez at [facility].  Her complete surgical history is below - but in summary she had a lumbar fusion in June, and developed post-op complications in July.  She had an I&D of the lumbar spine 7/2 where cultures grew Enterobacter cloacae.  PICC line was placed and she was discharged home on IV Cipro to complete a 6 week course, end date 8/14 under the care of Dr. Robert (Infectious Disease). She still has a wound vac in place.      She stated that pain was a constant pain that wrapped around her abdomen to her back that progressively worsened over the past few weeks. She reports associated nausea but no vomiting, fevers, chills, or bowel changes. The pain progressively worsened to 9/10 for past few days and patient presented to the ED. While in the ED, CT lumbar spine 9/7 showed concern for osteo and a fluid collection that is displacing her aorta.  Labs notable for elevated WBC, ESR, CRP, and a UTI.  Currently she is on Cipro from her previous cultures, and was started on Vanc on 9/8.   Neuro exam is benign.     Ortho at [facility] - Dr. Albarran was consulted for evaluation of osteo and the epidural abscess.  He feels like the patient should get transferred for a higher level of care to an institution that has Neurosurg/Ortho Spine, as the Orthopedic surgeon who performed her previous  surgeries (Dr. Gutiérrez) is not on call.  Additionally, it seems as though Dr. Gutiérrez would not perform surgery based on the location of the fluid collection.  Neurosurgery Dr. Redd is aware of the patient, and will consult - but does not feel comfortable performing surgery on another surgeon's patient.        Hospital Course:  No notes on file    Interval History:   No acute events overnight. Surgery - I&D + fusion postponed to optimize pt medically. Tentatively plan for OR on Friday. Pt continues to be hyponatremic despite fluid restriction. Nephrology consulted for recs.     Review of Systems   Constitutional: Positive for appetite change and fatigue. Negative for chills and diaphoresis.   HENT: Negative for ear pain, sore throat and trouble swallowing.    Eyes: Negative for pain and visual disturbance.   Respiratory: Negative for chest tightness and shortness of breath.    Cardiovascular: Negative for chest pain and palpitations.   Gastrointestinal: Positive for abdominal pain. Negative for diarrhea, nausea and vomiting.   Endocrine: Negative for polydipsia and polyphagia.   Genitourinary: Negative for difficulty urinating and hematuria.   Musculoskeletal: Positive for back pain (improved ). Negative for myalgias.   Skin: Negative for rash.   Neurological: Negative for dizziness and light-headedness.   Psychiatric/Behavioral: Negative for confusion and dysphoric mood.       Objective:     Vital Signs (Most Recent):  Temp: 98.2 °F (36.8 °C) (09/10/18 1508)  Pulse: 63 (09/10/18 1508)  Resp: 16 (09/10/18 1508)  BP: 129/64 (09/10/18 1508)  SpO2: 100 % (09/10/18 1508) Vital Signs (24h Range):  Temp:  [97.5 °F (36.4 °C)-98.2 °F (36.8 °C)] 98.2 °F (36.8 °C)  Pulse:  [55-63] 63  Resp:  [16-19] 16  SpO2:  [99 %-100 %] 100 %  BP: (116-129)/(56-70) 129/64     Weight: 105.2 kg (232 lb)  Body mass index is 39.82 kg/m².    Physical Exam   Constitutional: She is oriented to person, place, and time. She appears well-developed  and well-nourished. No distress.   HENT:   Head: Normocephalic and atraumatic.   Right Ear: External ear normal.   Left Ear: External ear normal.   Nose: Nose normal.   Eyes: Conjunctivae and EOM are normal. Right eye exhibits no discharge. Left eye exhibits no discharge. No scleral icterus.   Neck: Neck supple.   Cardiovascular: Normal rate and regular rhythm.   No murmur heard.  Pulmonary/Chest: Effort normal and breath sounds normal. No respiratory distress. She has no wheezes. She has no rales.   Abdominal: Soft. Bowel sounds are normal. She exhibits no distension. There is no tenderness. There is no guarding.   Musculoskeletal: She exhibits edema.   +2 pitting edema   Lymphadenopathy:     She has no cervical adenopathy.   Neurological: She is alert and oriented to person, place, and time.   Skin: Skin is warm and dry. She is not diaphoretic.   Psychiatric: She has a normal mood and affect.   Nursing note and vitals reviewed.        CRANIAL NERVES     CN III, IV, VI   Extraocular motions are normal.        Significant Labs:   BMP:   Recent Labs   Lab  09/10/18   0708   09/10/18   1546   GLU  215*   < >  213*   NA  119*   < >  119*   K  5.0   < >  5.3*   CL  84*   < >  84*   CO2  26   < >  23   BUN  56*   < >  54*   CREATININE  2.9*   < >  3.1*   CALCIUM  7.6*   < >  8.2*   MG  1.6   --    --     < > = values in this interval not displayed.     CBC:   Recent Labs   Lab  09/08/18   2254   WBC  13.70*   HGB  8.4*   HCT  26.5*   PLT  221               Assessment/Plan:      * Spinal epidural abscess    S/p TLIF L2-3 in 6/2018 with complications from culture + enterobacter cloacae infection discovered on  I&D in July 2nd  Hardware removed in August  Recent ED for continue abdominal pain with recent CT showing concern for osteo and a fluid collection that is displacing her aorta  Transferred from [facility] due to concern for need for higher level of care     Plan   - MRI lumbar spine pending  - Neurosurgery consulted,  "plan for OR on Friday. Pt needs to be medically optimized.              Hyponatremia    Pt remains hyponatremic despite water restriction.    Plan:  - Fluid restrict: 800 ml/day  - IV Vancomycin preparation changed from D5 to NS  - Nephrology consulted and appreciate recs:   Plan/Recommendations:  1) continue lasix diruesis and increase (as outlined in JEY section)  2) q4h sodium  3) repeat urine lytes (sodium and potassium)  4) restrict free water to 1000ccs per 24hrs  5) if acute worsening and neurological changes, then consider hypertonic, but otherwise hold off for now  6) think lasix diuresis will do the trick, but if not enough, then in a day or two could consider adding tolvaptan         JEY (acute kidney injury)    HYPERKALEMIA    JEY thought to be prerenal 2/2 dehydration but urine Na normal  - Renal showed bilateral chronic renal disease, no hydronephrosis     Plan:  - Urine studies pending (Urea, Creatinine, K)  - Renally dosing meds  - D/C'd Cymbalta 2/2 renal fxn  - Nephrology consulted and appreciate recs:   1) increase lasix diuresis to 100mg IV BID  2) continue strict Is & Os and serial renal panels  3) obtain daily vanco through levels and dose appropriately for renal funtion  4) collect urine, spin down and manually review microscopy        Hyperkalemia    See "JEY"            PAF (paroxysmal atrial fibrillation)    Continue home Rivaroxaban. Will D/C on Thursday for surgery.        Pulmonary hypertension    Continue sildenafil          CHF (congestive heart failure), NYHA class IV    Echo in 7/2018 showed EF 50-55% with diastolic dysfunction. Pt now hyponatremic and volume overloaded    - Isosorbide dinitrate 20 mg BID  - Coreg 3.125 BID  - Lasix increased to 100 BID                Coronary artery disease involving native coronary artery of native heart without angina pectoris    Will continue statin           Diabetes mellitus, type II    - Detemir 17 U daily  - Insulin aspart 2U TIDWM        "   Hypertension    Currently normotensive  - Coreg 3.125 BID          GERD (gastroesophageal reflux disease)    Continue home protonix            VTE Risk Mitigation (From admission, onward)        Ordered     heparin (porcine) injection 5,000 Units  Every 8 hours      09/10/18 1925     Place sequential compression device  Until discontinued      09/09/18 0954              Britany Patel MD  Department of Hospital Medicine   [facility]                      09/10/2018                             STAFF PHYSICIAN NOTE                                   Attending Attestation for Rounds with Resident  I have reviewed and concur with the resident's history, physical, assessment, and plan.  I have personally interviewed and examined the patient at bedside and agree with the resident's findings.                                  ________________________________________                                     REASON FOR ADMISSION:     Patient is 82 y.o.female    Body mass index is 39.82 kg/m².,  Spinal epidural abscess

## 2018-09-11 NOTE — PROGRESS NOTES
[facility] Hospital Medicine  Progress Note    Patient Name: Medina Frazire  MRN: [id number]  Patient Class: IP- Inpatient   Admission Date: 9/8/2018  Length of Stay: 3 days  Attending Physician: Dale Beck, *  Primary Care Provider: Hao Garcia MD    [facility] Medicine Team: [facility] HOSP MED 1 Mary Ann Holcomb MD    Subjective:     Principal Problem:Spinal epidural abscess    HPI:  Ms. Medina Frazier is a 82 y.o. female with spinal stenosis, T2DM, CHF, CAD, and Afib who originally presented to [facility] on 9/7 with back pain.  She has a complicated history, and has been following with Ortho Dr. Gutiérrez at [facility].  Her complete surgical history is below - but in summary she had a lumbar fusion in June, and developed post-op complications in July.  She had an I&D of the lumbar spine 7/2 where cultures grew Enterobacter cloacae.  PICC line was placed and she was discharged home on IV Cipro to complete a 6 week course, end date 8/14 under the care of Dr. Robert (Infectious Disease). She still has a wound vac in place.      She stated that pain was a constant pain that wrapped around her abdomen to her back that progressively worsened over the past few weeks. She reports associated nausea but no vomiting, fevers, chills, or bowel changes. The pain progressively worsened to 9/10 for past few days and patient presented to the ED. While in the ED, CT lumbar spine 9/7 showed concern for osteo and a fluid collection that is displacing her aorta.  Labs notable for elevated WBC, ESR, CRP, and a UTI.  Currently she is on Cipro from her previous cultures, and was started on Vanc on 9/8.   Neuro exam is benign.     Ortho at [facility] - Dr. Albarran was consulted for evaluation of osteo and the epidural abscess.  He feels like the patient should get transferred for a higher level of care to an institution that has Neurosurg/Ortho Spine, as the Orthopedic surgeon who performed her previous  surgeries (Dr. Gutiérrez) is not on call.  Additionally, it seems as though Dr. Gutiérrez would not perform surgery based on the location of the fluid collection.  Neurosurgery Dr. Redd is aware of the patient, and will consult - but does not feel comfortable performing surgery on another surgeon's patient.        Hospital Course:  Transferred from [facility] for NSGY evaluation of osteomyelitis, epidural abscess after recent I&D growing E Cloacae with clinical worsening despite antibiotic therapy as well as hyponatremia. Evaluated by Neurosurgery with plan to take to OR 09/14 if hyponatremia resolved and medically stable. Hyponatremia deemed to be hypontonic, hypervolemic so fluid restriction and loop diuresis was initiated with Nephrology guidance resulting in gradual improvement in sodium.    Pt reported infradiaphragmatic pain on 09/11 at approximately 1100; at that time EKG showed AFL with variable block at a ventricular rate of 59 BPM with no T wave inversions or ST changes. Pt remained hemodynamically stable and troponin was 0.027; pain subsided with PRN oxycodone. Pt was found to be somnolent that evening after having gotten 0.2 mg hydromorphone IV 3 hours prior to exam; somnolence resolved with IV narcan. Pt denied recurrence of pain.    Interval History: See Hospital Course    Review of Systems   Constitutional: Positive for fatigue. Negative for activity change, appetite change, chills, diaphoresis and fever.   HENT: Negative for congestion, ear pain, sinus pain, sore throat and trouble swallowing.    Eyes: Negative for pain and visual disturbance.   Respiratory: Negative for cough, chest tightness and shortness of breath.    Cardiovascular: Positive for chest pain and leg swelling. Negative for palpitations.   Gastrointestinal: Positive for abdominal distention and abdominal pain. Negative for diarrhea, nausea and vomiting.   Genitourinary: Positive for difficulty urinating. Negative for hematuria.    Musculoskeletal: Positive for back pain and gait problem. Negative for myalgias.   Skin: Positive for wound. Negative for rash.   Neurological: Positive for weakness. Negative for dizziness, light-headedness and numbness.   Psychiatric/Behavioral: Negative for agitation and confusion. The patient is not nervous/anxious.      Objective:     Vital Signs (Most Recent):  Temp: 96.5 °F (35.8 °C) (09/11/18 1528)  Pulse: 80 (09/11/18 1528)  Resp: 18 (09/11/18 1202)  BP: (!) 93/52 (09/11/18 1528)  SpO2: (!) 93 % (09/11/18 1528) Vital Signs (24h Range):  Temp:  [96.4 °F (35.8 °C)-98.1 °F (36.7 °C)] 96.5 °F (35.8 °C)  Pulse:  [53-80] 80  Resp:  [16-18] 18  SpO2:  [93 %-99 %] 93 %  BP: ()/(52-63) 93/52     Weight: 105.2 kg (232 lb)  Body mass index is 39.82 kg/m².    Intake/Output Summary (Last 24 hours) at 9/11/2018 1616  Last data filed at 9/11/2018 0500  Gross per 24 hour   Intake 200 ml   Output 550 ml   Net -350 ml      Physical Exam   HENT:   Head: Atraumatic.   Neck: No JVD present.   Cardiovascular: Normal rate and regular rhythm.   Pulmonary/Chest: Effort normal. She has rales.   Abdominal: Soft. She exhibits no distension.   Musculoskeletal: She exhibits edema. She exhibits no tenderness.   Neurological: She is alert.   Skin: Skin is warm.       Significant Labs:   CBC:   Recent Labs   Lab  09/11/18   0642   WBC  17.56*   HGB  8.5*   HCT  26.7*   PLT  180     CMP:   Recent Labs   Lab  09/11/18   0232  09/11/18   0642  09/11/18   1035   NA  120*  120*  121*   K  5.3*  5.2*  5.2*   CL  85*  84*  84*   CO2  25  27  26   GLU  100  89  81   BUN  58*  59*  59*   CREATININE  3.2*  3.2*  3.2*   CALCIUM  7.5*  7.9*  8.1*   ANIONGAP  10  9  11   EGFRNONAA  13.1*  13.1*  13.1*       Significant Imaging: I have reviewed all pertinent imaging results/findings within the past 24 hours.    Assessment/Plan:      * Spinal epidural abscess    S/p TLIF L2-3 in 6/2018 with complications from culture + enterobacter cloacae  "infection discovered on  I&D in July 2nd  Hardware removed in August  Recent ED for continue abdominal pain with recent CT showing concern for osteo and a fluid collection that is displacing her aorta  Transferred from [facility] due to concern for need for higher level of care     Plan   - MRI lumbar spine pending  - Neurosurgery consulted, plan for OR on Friday. Pt needs to be medically optimized.              Hyponatremia    Suspect hypervolemic hyponatremia - ADCHF / volume overload    Plan:  1) increase lasix diuresis to 100mg IV BID  2) continue strict Is & Os and serial renal panels  3) obtain daily vanco through levels and dose appropriately for renal funtion  4) collect urine, spin down and manually review microscopy        JEY (acute kidney injury)    Suspect cardiorenal vs ATN; see hyponatremia, Nephro recs appreciated        CHF (congestive heart failure), NYHA class IV    Echo in 7/2018 showed EF 50-55% with diastolic dysfunction. Pt now hyponatremic and volume overloaded    - Isosorbide dinitrate 20 mg BID  - Coreg 3.125 BID  - Lasix increased to 100 BID        Coronary artery disease involving native coronary artery of native heart without angina pectoris    Will continue statin           Diabetes mellitus, type II    - Detemir 17 U daily  - Insulin aspart 2U TIDWM          Hypertension    Currently normotensive  - Coreg 3.125 BID          Hyperkalemia    See "JEY"            GERD (gastroesophageal reflux disease)    Continue home protonix          PAF (paroxysmal atrial fibrillation)    Rate control with coreg; on hep gtt from home DOAC in anticipation of surgery; scheduled to stop gtt 09/14        Pulmonary hypertension    Continue sildenafil          Somnolence    Noted 09/11 PM, reversed with narcan, suspect JEY contributing to increased plasma levels of opiates; will increase interval for pain medicine and make note in handoff.    May also be related to hypercapnia, will initiate BiPAP qHS      "   Hypercapnia    Unclear chronicity; ABG shows CO2 retention with acidosis; will initiate BiPAP qHS            VTE Risk Mitigation (From admission, onward)        Ordered     heparin 25,000 units in dextrose 5% 250 mL (100 units/mL) infusion LOW INTENSITY nomogram - [facility]  Continuous      09/11/18 0905     heparin 25,000 units in dextrose 5% (100 units/ml) IV bolus from bag - ADDITIONAL PRN BOLUS - 60 units/kg  As needed (PRN)      09/11/18 0905     heparin 25,000 units in dextrose 5% (100 units/ml) IV bolus from bag - ADDITIONAL PRN BOLUS - 30 units/kg  As needed (PRN)      09/11/18 0905     Place sequential compression device  Until discontinued      09/09/18 0954              Mary Ann Holcomb MD  Department of Hospital Medicine   [facility]

## 2018-09-11 NOTE — PROGRESS NOTES
[facility]  Infectious Disease  Progress Note    Patient Name: Medina Frazier  MRN: [id number]  Admission Date: 9/8/2018  Length of Stay: 3 days  Attending Physician: Dale Beck, *  Primary Care Provider: Hao Garcia MD    Isolation Status: No active isolations  Assessment/Plan:      * Spinal epidural abscess    82 year old female with history of L2-3 fusion 6/5 complicated by post operative infection with Enterobacter cloacae. She has been on outpatient IV Ciprofloxacin and has undergone an I&D on 7/2 with hardware removal on 8/7 without resolution of her infection. 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 Blood cultures from 9/8 are +contaminant. Blood cultures 9/9 +GNR. Suspect to be enterobacter as with worsening infection liekly 2/2 source control. Neurosurgery following, plans for surgery on Friday.       Plan  - Continue Vancomycin [id number] and Cefepime 2 g IV q 24 hours (renal adjusted). Vancomycin random 19.5. Will d/c vancomycin 48 hours before surgery (d/c tomorrow).   - Blood cultures 2/4 bottles +GNR. Repeat blood cultures today. Suspect to be from spinal abscess. Will follow identification and susceptibilities and tailor abx accordingly.   -sodium level 120 today. Nephrology following, will follow closely.  - Neurosurgery following, plans for I&D and fusion 9/14. Please obtain surgical cultures and send for gram stain, aerobic, anaerobic, fungal and AFB.   - ID will follow closely.          Thank you for your consult. I will follow-up with patient. Please contact us if you have any additional questions.    Tawanda Patel PA-C  Infectious Disease  [facility]  [phone]    Subjective:     Principal Problem:Spinal epidural abscess    HPI: Medina Frazier is an 82 year old female who underwent  L2-3 fusion on 6/5/18. She did well until shortly after staples removed,   drainage from the lower part of the incision was noted. On 7/2 she went to the OR for an I&D. Purulence was encountered. Surgical cultures grew Enterobacter cloacae. She was seen by Dr. Lenz, ID provider who started patient on IV Ciprofloxacin for 6 weeks. Patient's infection did not resolve and wound continued to drain. She was taken back to the OR on 8/7 for hardware removal, however appears on most recent imaging an intervertebral cage remains in place. No new surgical cultures obtained at that time. She was continued on Cipro. Patient's back pain has worsened over the past week and is now wrapping around to her anterior abdomen. She presented to an OSH  ED in [place] for evaluation. 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 Labs notable for elevated WBC, ESR, CRP and pyuria. Procalc 19. JEY.  Blood and urine culture negative.  She was transferred here for higher level of care. Currently she is on Cipro from her previous cultures, and was started on Vanc on 9/8. Patient denies fevers, chills, sweats. Reports severe back pain and lower extremity weakness. Mag[place] in place.    Of note, patient has a documented PCN allergy. She said she has tolerated PCN in the recent past without adverse reactions. 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 No problems since.      Interval History:   Patient complaining of chest pain this AM. EKG in progress with cardiology at bedside. Pt wound vac changed per wound care.   Blood cultures on 9/9 +GNRs.   Pt is currently on vancomycin and cefepime.  Surgery planned on 9/14.  Sodium level 130 today     Review of Systems   Constitutional: Positive for fatigue. Negative for activity change, appetite change, chills, diaphoresis and fever.   HENT:  Negative for congestion, ear pain, sinus pain, sore throat and trouble swallowing.    Eyes: Negative for pain and visual disturbance.   Respiratory: Negative for cough, chest tightness and shortness of breath.    Cardiovascular: Positive for chest pain and leg swelling. Negative for palpitations.   Gastrointestinal: Positive for abdominal distention and abdominal pain. Negative for diarrhea, nausea and vomiting.   Genitourinary: Positive for difficulty urinating. Negative for hematuria.   Musculoskeletal: Positive for back pain and gait problem. Negative for myalgias.   Skin: Positive for wound. Negative for rash.   Neurological: Positive for weakness. Negative for dizziness, light-headedness and numbness.   Psychiatric/Behavioral: Negative for agitation and confusion. The patient is not nervous/anxious.      Objective:     Vital Signs (Most Recent):  Temp: 97.5 °F (36.4 °C) (09/11/18 0800)  Pulse: 62 (09/11/18 1053)  Resp: 17 (09/11/18 0800)  BP: 114/63 (09/11/18 0800)  SpO2: 97 % (09/11/18 0800) Vital Signs (24h Range):  Temp:  [97.5 °F (36.4 °C)-98.2 °F (36.8 °C)] 97.5 °F (36.4 °C)  Pulse:  [53-72] 62  Resp:  [16-17] 17  SpO2:  [96 %-100 %] 97 %  BP: ()/(52-64) 114/63     Weight: 105.2 kg (232 lb)  Body mass index is 39.82 kg/m².    Estimated Creatinine Clearance: 16.6 mL/min (A) (based on SCr of 3.2 mg/dL (H)).    Physical Exam   Constitutional: She is oriented to person, place, and time. She appears well-developed and well-nourished. No distress.   HENT:   Head: Normocephalic and atraumatic.   Mouth/Throat: No oropharyngeal exudate.   Hard of hearing   Eyes: Conjunctivae are normal. No scleral icterus.   Neck: Neck supple.   Cardiovascular: Normal rate and intact distal pulses. An irregularly irregular rhythm present. Exam reveals no friction rub.   No murmur heard.  Pulmonary/Chest: Effort normal. She has no wheezes. She has no rales.   Abdominal: Soft. Bowel sounds are normal. She exhibits no  distension. There is tenderness. There is no guarding.   Obese abdomen   Musculoskeletal: She exhibits edema (BLE).   +2 pitting edema   Lymphadenopathy:     She has no cervical adenopathy.   Neurological: She is alert and oriented to person, place, and time.   Skin: Skin is warm and dry. She is not diaphoretic. There is erythema.   Lumbar wound not examined as patient refused   Psychiatric: She has a normal mood and affect. Her behavior is normal. Thought content normal.   Nursing note and vitals reviewed.      Significant Labs:   Blood Culture:   Recent Labs   Lab  06/08/18   1349  09/09/18   1555  09/09/18   1731   LABBLOO  No growth after 5 days.  No growth after 5 days.  No Growth to date  No Growth to date  Gram stain terrell bottle: Gram negative rods   Results called to and read back by:Sofia Noriega RN 09/11/2018  06:01     CBC:   Recent Labs   Lab  09/11/18   0642   WBC  17.56*   HGB  8.5*   HCT  26.7*   PLT  180     CMP:   Recent Labs   Lab  09/11/18   0232  09/11/18   0642  09/11/18   1035   NA  120*  120*  121*   K  5.3*  5.2*  5.2*   CL  85*  84*  84*   CO2  25  27  26   GLU  100  89  81   BUN  58*  59*  59*   CREATININE  3.2*  3.2*  3.2*   CALCIUM  7.5*  7.9*  8.1*   ANIONGAP  10  9  11   EGFRNONAA  13.1*  13.1*  13.1*     Urine Culture:   Recent Labs   Lab  06/07/18   1128  09/07/18   2255   LABURIN  No growth  No significant growth     Urine Studies:   Recent Labs   Lab  07/02/18   1205   09/10/18   1701   COLORU  Yellow   < >  Yellow   APPEARANCEUA  Clear   < >  Cloudy*   PHUR  6.0   < >  5.0   SPECGRAV  1.015   < >  1.020   PROTEINUA  Negative   < >  Negative   GLUCUA  Negative   < >  Negative   KETONESU  Negative   < >  Negative   BILIRUBINUA  Negative   < >  Negative   OCCULTUA  Trace*   < >  3+*   NITRITE  Positive*   < >  Negative   UROBILINOGEN  0.2   < >  Negative   LEUKOCYTESUR  Negative   < >  3+*   RBCUA  1   < >  64*   WBCUA  2   < >  48*   BACTERIA  Few*   --   Rare   SQUAMEPITHEL   --     --   1   HYALINECASTS  1   --    --     < > = values in this interval not displayed.     Wound Culture:   Recent Labs   Lab  07/02/18   1432   LABAERO  ENTEROBACTER CLOACAE COMPLEX  Rare  For susceptibility see order # [id number]    ENTEROBACTER CLOACAE COMPLEX  Moderate       All pertinent labs within the past 24 hours have been reviewed.    Significant Imaging: I have reviewed all pertinent imaging results/findings within the past 24 hours.

## 2018-09-11 NOTE — ASSESSMENT & PLAN NOTE
Rate control with coreg; on hep gtt from home DOAC in anticipation of surgery; scheduled to stop gtt 09/14

## 2018-09-11 NOTE — ASSESSMENT & PLAN NOTE
Noted 09/11 PM, reversed with narcan, suspect JEY contributing to increased plasma levels of opiates; will increase interval for pain medicine and make note in handoff.    May also be related to hypercapnia, will initiate BiPAP qHS

## 2018-09-11 NOTE — ASSESSMENT & PLAN NOTE
Pt remains hyponatremic despite water restriction.    Plan:  - Fluid restrict: 800 ml/day  - IV Vancomycin preparation changed from D5 to NS  - Nephrology consulted and appreciate recs:   Plan/Recommendations:  1) continue lasix diruesis and increase (as outlined in JEY section)  2) q4h sodium  3) repeat urine lytes (sodium and potassium)  4) restrict free water to 1000ccs per 24hrs  5) if acute worsening and neurological changes, then consider hypertonic, but otherwise hold off for now  6) think lasix diuresis will do the trick, but if not enough, then in a day or two could consider adding tolvaptan

## 2018-09-11 NOTE — SUBJECTIVE & OBJECTIVE
Interval History: Ms. Frazier complains of back pain.  No current leg pain or paresthesias.  She reports she has only been able to take a few steps since her initial surgery & mostly transports via wheelchair.  She feels her BLE weakness is also her baseline since L2-3 fusion.  Maga[place] in place.    Medications:  Continuous Infusions:  Scheduled Meds:   atorvastatin  40 mg Oral QHS    carvedilol  3.125 mg Oral BID    ceFEPime (MAXIPIME) IVPB  2 g Intravenous Daily    [START ON 9/11/2018] furosemide  100 mg Intravenous BID    gabapentin  100 mg Oral BID    heparin (porcine)  5,000 Units Subcutaneous Q8H    insulin aspart U-100  2 Units Subcutaneous TIDWM    [START ON 9/11/2018] insulin detemir U-100  17 Units Subcutaneous Daily    isosorbide dinitrate  20 mg Oral BID    levothyroxine  150 mcg Oral Before breakfast    pantoprazole  40 mg Oral QAM    sildenafil  10 mg Oral TID    traZODone  25 mg Oral QHS    [START ON 9/11/2018] vancomycin (VANCOCIN) IVPB (custom)  1,000 mg Intravenous Q24H     PRN Meds:dextrose 50%, dextrose 50%, glucagon (human recombinant), glucose, glucose, insulin aspart U-100, ondansetron, oxyCODONE, oxyCODONE, sodium chloride 0.9%     Review of Systems  Objective:     Weight: 105.2 kg (232 lb)  Body mass index is 39.82 kg/m².  Vital Signs (Most Recent):  Temp: 98.2 °F (36.8 °C) (09/10/18 1508)  Pulse: 63 (09/10/18 1508)  Resp: 16 (09/10/18 1508)  BP: 129/64 (09/10/18 1508)  SpO2: 100 % (09/10/18 1508) Vital Signs (24h Range):  Temp:  [97.5 °F (36.4 °C)-98.2 °F (36.8 °C)] 98.2 °F (36.8 °C)  Pulse:  [55-63] 63  Resp:  [16-19] 16  SpO2:  [99 %-100 %] 100 %  BP: (116-129)/(56-70) 129/64     Date 09/10/18 0700 - 09/11/18 0659   Shift 1061-4978 5588-3617 6180-3320 24 Hour Total   INTAKE   Shift Total(mL/kg)       OUTPUT   Urine(mL/kg/hr)  350  350   Shift Total(mL/kg)  350(3.3)  350(3.3)   Weight (kg) 105.2 105.2 105.2 105.2                        Urethral Catheter 09/09/18 1700 Double-lumen  "18 Fr. (Active)   Site Assessment Clean;Intact 9/10/2018  8:00 AM   Collection Container Standard drainage bag 9/10/2018  8:00 AM   Securement Method secured to top of thigh w/ adhesive device 9/10/2018  8:00 AM   Catheter Care Performed yes 9/10/2018  8:00 AM   Reason for Continuing Urinary Catheterization Chronic Indwelling Urinary Catheter on Admission 9/10/2018  8:00 AM   CAUTI Prevention Bundle StatLock in place w 1" slack;Intact seal between catheter & drainage tubing;Drainage bag off the floor;Green sheeting clip in use;No dependent loops or kinks;Drainage bag not overfilled (<2/3 full) 9/10/2018  8:00 AM   Output (mL) 175 mL 9/9/2018  5:00 PM       Neurosurgery Physical Exam   General: well developed, well nourished, no distress  Head: normocephalic, atraumatic  Neurologic: Alert and oriented. Thought content appropriate  GCS: Motor: 6/Verbal: 5/Eyes: 4 GCS Total: 15  Mental Status: Awake, Alert, Oriented x 4  Language: No aphasia  Speech: No dysarthria  Cranial nerves: face symmetric, tongue midline, CN II-XII grossly intact.   Eyes: pupils equal, round, reactive to light with accommodation, EOMI  Pulmonary: normal respirations, not labored, no accessory muscles used  Abdomen: soft, non-distended, not tender to palpation  Sensory: intact to light touch throughout  Motor Strength: Moves all extremities spontaneously with good tone.  Full strength upper and lower extremities. No abnormal movements seen.     Strength  Deltoids Triceps Biceps Wrist Extension Wrist Flexion Hand    Upper: R 5/5 5/5 5/5 5/5 5/5 5/5    L 5/5 5/5 5/5 5/5 5/5 5/5     Iliopsoas Quadriceps Knee  Flexion Tibialis  anterior Gastro- cnemius EHL   Lower: R 4-/5 4+/5 4+/5 5/5 5/5 5/5    L 3/5 4-/5 4-/5 5/5 5/5 5/5     LE limited by extensive BLE edema & multiple prior L knee sx  Pronator Drift: no drift noted  Finger-to-nose: Intact bilaterally  Piedra: absent  Clonus: absent  Babinski: absent  Pulses: 2+ and symmetric radial and " dorsalis pedis  Skin: warm, dry and intact, no rashes  Pitting edema to BLE 2+        Significant Labs:  Recent Labs   Lab  09/09/18   0202   09/10/18   0708  09/10/18   0808  09/10/18   1141  09/10/18   1546   GLU  182*   < >  215*  214*  254*  213*   NA  119*   < >  119*  118*  118*  119*   K  6.1*   < >  5.0  4.9  5.0  5.3*   CL  85*   < >  84*  83*  84*  84*   CO2  24   < >  26  26  27  23   BUN  54*   < >  56*  55*  55*  54*   CREATININE  2.9*   < >  2.9*  3.1*  3.1*  3.1*   CALCIUM  7.9*   < >  7.6*  7.7*  7.8*  8.2*   MG  1.5*   --   1.6   --    --    --     < > = values in this interval not displayed.     Recent Labs   Lab  09/08/18   2254   WBC  13.70*   HGB  8.4*   HCT  26.5*   PLT  221     Recent Labs   Lab  09/08/18   2254   INR  1.2     Microbiology Results (last 7 days)     Procedure Component Value Units Date/Time    Urine culture [[id number]]     Order Status:  Completed Specimen:  Urine, Catheterized     Blood culture [[id number]] Collected:  09/09/18 1555    Order Status:  Completed Specimen:  Blood Updated:  09/10/18 1812     Blood Culture, Routine No Growth to date     Blood Culture, Routine No Growth to date    Urine culture [[id number]]     Order Status:  Canceled Specimen:  Urine     Blood culture [[id number]] Collected:  09/09/18 1731    Order Status:  Completed Specimen:  Blood Updated:  09/10/18 0315     Blood Culture, Routine No Growth to date    Narrative:       From 2 different sites 30 minutes apart          Significant Diagnostics:  I personally reviewed imaging & agree with CT Lspine - Retrolisthesis and lucencies surrounding intervertebral cage device at L2-3, worrisome for osteomyelitis.  There is a fluid collection extending anteriorly and superiorly suggestive of a paraspinous abscess.  There is likely intraspinous extension as well with findings concerning for possible epidural abscess.  Follow-up MRI with contrast is recommended.

## 2018-09-11 NOTE — SUBJECTIVE & OBJECTIVE
Interval History:   Patient complaining of chest pain this AM. EKG in progress with cardiology at bedside. Pt wound vac changed per wound care.   Blood cultures on 9/9 +GNRs.   Pt is currently on vancomycin and cefepime.  Surgery planned on 9/14.  Sodium level 130 today     Review of Systems   Constitutional: Positive for fatigue. Negative for activity change, appetite change, chills, diaphoresis and fever.   HENT: Negative for congestion, ear pain, sinus pain, sore throat and trouble swallowing.    Eyes: Negative for pain and visual disturbance.   Respiratory: Negative for cough, chest tightness and shortness of breath.    Cardiovascular: Positive for chest pain and leg swelling. Negative for palpitations.   Gastrointestinal: Positive for abdominal distention and abdominal pain. Negative for diarrhea, nausea and vomiting.   Genitourinary: Positive for difficulty urinating. Negative for hematuria.   Musculoskeletal: Positive for back pain and gait problem. Negative for myalgias.   Skin: Positive for wound. Negative for rash.   Neurological: Positive for weakness. Negative for dizziness, light-headedness and numbness.   Psychiatric/Behavioral: Negative for agitation and confusion. The patient is not nervous/anxious.      Objective:     Vital Signs (Most Recent):  Temp: 97.5 °F (36.4 °C) (09/11/18 0800)  Pulse: 62 (09/11/18 1053)  Resp: 17 (09/11/18 0800)  BP: 114/63 (09/11/18 0800)  SpO2: 97 % (09/11/18 0800) Vital Signs (24h Range):  Temp:  [97.5 °F (36.4 °C)-98.2 °F (36.8 °C)] 97.5 °F (36.4 °C)  Pulse:  [53-72] 62  Resp:  [16-17] 17  SpO2:  [96 %-100 %] 97 %  BP: ()/(52-64) 114/63     Weight: 105.2 kg (232 lb)  Body mass index is 39.82 kg/m².    Estimated Creatinine Clearance: 16.6 mL/min (A) (based on SCr of 3.2 mg/dL (H)).    Physical Exam   Constitutional: She is oriented to person, place, and time. She appears well-developed and well-nourished. No distress.   HENT:   Head: Normocephalic and atraumatic.    Mouth/Throat: No oropharyngeal exudate.   Hard of hearing   Eyes: Conjunctivae are normal. No scleral icterus.   Neck: Neck supple.   Cardiovascular: Normal rate and intact distal pulses. An irregularly irregular rhythm present. Exam reveals no friction rub.   No murmur heard.  Pulmonary/Chest: Effort normal. She has no wheezes. She has no rales.   Abdominal: Soft. Bowel sounds are normal. She exhibits no distension. There is tenderness. There is no guarding.   Obese abdomen   Musculoskeletal: She exhibits edema (BLE).   +2 pitting edema   Lymphadenopathy:     She has no cervical adenopathy.   Neurological: She is alert and oriented to person, place, and time.   Skin: Skin is warm and dry. She is not diaphoretic. There is erythema.   Lumbar wound not examined as patient refused   Psychiatric: She has a normal mood and affect. Her behavior is normal. Thought content normal.   Nursing note and vitals reviewed.      Significant Labs:   Blood Culture:   Recent Labs   Lab  06/08/18   1349  09/09/18   1555  09/09/18   1731   LABBLOO  No growth after 5 days.  No growth after 5 days.  No Growth to date  No Growth to date  Gram stain terrell bottle: Gram negative rods   Results called to and read back by:Sofia Noriega RN 09/11/2018  06:01     CBC:   Recent Labs   Lab  09/11/18   0642   WBC  17.56*   HGB  8.5*   HCT  26.7*   PLT  180     CMP:   Recent Labs   Lab  09/11/18   0232  09/11/18   0642  09/11/18   1035   NA  120*  120*  121*   K  5.3*  5.2*  5.2*   CL  85*  84*  84*   CO2  25  27  26   GLU  100  89  81   BUN  58*  59*  59*   CREATININE  3.2*  3.2*  3.2*   CALCIUM  7.5*  7.9*  8.1*   ANIONGAP  10  9  11   EGFRNONAA  13.1*  13.1*  13.1*     Urine Culture:   Recent Labs   Lab  06/07/18   1128  09/07/18   2255   LABURIN  No growth  No significant growth     Urine Studies:   Recent Labs   Lab  07/02/18   1205   09/10/18   1701   COLORU  Yellow   < >  Yellow   APPEARANCEUA  Clear   < >  Cloudy*   PHUR  6.0   < >  5.0    SPECGRAV  1.015   < >  1.020   PROTEINUA  Negative   < >  Negative   GLUCUA  Negative   < >  Negative   KETONESU  Negative   < >  Negative   BILIRUBINUA  Negative   < >  Negative   OCCULTUA  Trace*   < >  3+*   NITRITE  Positive*   < >  Negative   UROBILINOGEN  0.2   < >  Negative   LEUKOCYTESUR  Negative   < >  3+*   RBCUA  1   < >  64*   WBCUA  2   < >  48*   BACTERIA  Few*   --   Rare   SQUAMEPITHEL   --    --   1   HYALINECASTS  1   --    --     < > = values in this interval not displayed.     Wound Culture:   Recent Labs   Lab  07/02/18   1432   LABAERO  ENTEROBACTER CLOACAE COMPLEX  Rare  For susceptibility see order # [id number]    ENTEROBACTER CLOACAE COMPLEX  Moderate       All pertinent labs within the past 24 hours have been reviewed.    Significant Imaging: I have reviewed all pertinent imaging results/findings within the past 24 hours.

## 2018-09-11 NOTE — PROGRESS NOTES
A Wound  Consult was received from RN for wound vac change.   The patient was admitted with spinal epidural abscess and has a past medical history of spinal stenosis, DM,   CHF, CAD, and Afib.   Upon assessment, Ms Frazier was complaining of chest pain, EKG in progress.  Neurology MD okayed changing wound vac. The mid-back wound is red/moist/granulation tissue to wound bed, scant amount of sanguineous drainage noted when black foam removed.  Sutures intact to wound bed, No odor noted, no purulent drainage.  Possible surgery scheduled for Friday.   The plan of care was discussed with the patient/family and both verbalized understanding.   The Unit Nurse was notified of the care provided and we discussed the treatment plan.    Dr. Irvin was notified of and approved recommendations for care.     Recommendations:  Continue with wound vac change 2 x week.  Veraflo wound vac with next wound vac change - to cleanse wound bed and possibly improve healing time.       Wound care team to follow alex.  CHARISSA Sarkar RN, CN  [id number]       09/11/18 1020       Incision/Site 08/07/18 1535 Back midline vertical   Date First Assessed/Time First Assessed: 08/07/18 1535   Present Prior to Hospital Arrival?: Yes  Location: Back  Orientation: (c) midline  Incision Type: vertical  Closure Method: (c) Other (see comments)   Wound Image    Incision WDL ex   Dressing Appearance Intact;Clean;Moist drainage   Drainage Amount Scant   Drainage Characteristics/Odor Serosanguineous   Appearance Red;Moist;Granulating;Sutures intact   Red (%), Wound Tissue Color 100 %   Periwound Area Intact;Dry;Redness   Wound Edges Open   Wound Length (cm) 11 cm   Wound Width (cm) 5.5 cm   Wound Depth (cm) 1.5 cm   Wound Volume (cm^3) 90.75 cm^3   Tunneling (depth (cm)/location) 0.4 at center   Care Cleansed with:;Sterile normal saline;Applied:;Skin Barrier   Dressing Foam;Transparent film   Dressing Change Due 09/14/18       Negative Pressure Wound Therapy     Placement Date/Time: 08/07/18 c) 1667   Orientation: midline  Location: (c) Back  Additional Comments: continuous negative pressure at 125 mmhg   NPWT Type Vacuum Therapy   Therapy Setting NPWT Continuous therapy   Pressure Setting NPWT 125 mmHg   Therapy Interventions NPWT Dressing changed;Seal intact   Sponges Inserted NPWT Black;2   Sponges Removed NPWT 3   Skin Interventions   Pressure Reduction Devices Pressure-redistributing mattress utilized;Heel offloading device utilized  (EHOB waffle, heel protectors, wedge ordered)   Pressure Reduction Techniques frequent weight shift encouraged;heels elevated off bed;positioned off wounds;pressure points protected;weight shift assistance provided   Skin Protection Tubing/devices free from skin contact;Skin-to-skin areas padded;Skin sealant/moisture barrier;Incontinence pads

## 2018-09-11 NOTE — HOSPITAL COURSE
9/10: MRI L spine pending, no contrast due to Crt 3.1  9/13: Patient to IR today for drain, NAEON, AFVSS, exam stable, remains intubated  9/14: AF, NAEON, wbc 29 from 22, Na 125 from 122, multiple cultures growing multiple different organisms  9/15: NAEON. Neurologically stable. WV in place. Wound cx grew GNR/GNP. Still intubated and on levo.  9/16: NAEON. Off levo this am. Wound cx grew GNR/GNP.  9/18: naeon  9/20: CXR showed worsening pulmonary edema  9/21: remains intubated  9/22: on bipap this am  9/23: naeon  9/24: AF, HR tachy to 130 overnight, SBP droped to 79, ow VSS, wbc 13 from 10.5, PT/INR elevated, positive 3000cc, abscess culture growing prevotella

## 2018-09-11 NOTE — ASSESSMENT & PLAN NOTE
Suspect hypervolemic hyponatremia - ADCHF / volume overload    Plan:  1) increase lasix diuresis to 100mg IV BID  2) continue strict Is & Os and serial renal panels  3) obtain daily vanco through levels and dose appropriately for renal funtion  4) collect urine, spin down and manually review microscopy

## 2018-09-11 NOTE — ASSESSMENT & PLAN NOTE
S/p TLIF L2-3 in 6/2018 with complications from culture + enterobacter cloacae infection discovered on  I&D in July 2nd  Hardware removed in August  Recent ED for continue abdominal pain with recent CT showing concern for osteo and a fluid collection that is displacing her aorta  Transferred from [facility] due to concern for need for higher level of care     Plan   - MRI lumbar spine pending  - Neurosurgery consulted, plan for OR on Friday. Pt needs to be medically optimized.

## 2018-09-11 NOTE — SUBJECTIVE & OBJECTIVE
Interval History: Mrs. Frazier complains of chest pain with chest heaviness.  Internal medicine resident at bedside.  She is not able to answer many questions today due to concentration on her chest pain.  No new weakness or paresthesias.    Medications:  Continuous Infusions:   heparin (porcine) in D5W 12 Units/kg/hr (09/11/18 1224)     Scheduled Meds:   atorvastatin  40 mg Oral QHS    carvedilol  3.125 mg Oral BID    ceFEPime (MAXIPIME) IVPB  2 g Intravenous Daily    furosemide  100 mg Intravenous BID    gabapentin  100 mg Oral BID    insulin aspart U-100  2 Units Subcutaneous TIDWM    [START ON 9/12/2018] insulin detemir U-100  12 Units Subcutaneous Daily    isosorbide dinitrate  20 mg Oral BID    levothyroxine  150 mcg Oral Before breakfast    pantoprazole  40 mg Oral QAM    polyethylene glycol  17 g Oral Daily    sildenafil  10 mg Oral TID    traZODone  25 mg Oral QHS    vancomycin (VANCOCIN) IVPB (custom)  1,000 mg Intravenous Q24H     PRN Meds:dextrose 50%, dextrose 50%, glucagon (human recombinant), glucose, glucose, heparin (PORCINE), heparin (PORCINE), insulin aspart U-100, ondansetron, oxyCODONE, oxyCODONE, sodium chloride 0.9%     Review of Systems  Objective:     Weight: 105.2 kg (232 lb)  Body mass index is 39.82 kg/m².  Vital Signs (Most Recent):  Temp: 96.4 °F (35.8 °C) (09/11/18 1202)  Pulse: 72 (09/11/18 1202)  Resp: 18 (09/11/18 1202)  BP: (!) 98/55 (09/11/18 1202)  SpO2: 95 % (09/11/18 1202) Vital Signs (24h Range):  Temp:  [96.4 °F (35.8 °C)-98.2 °F (36.8 °C)] 96.4 °F (35.8 °C)  Pulse:  [53-72] 72  Resp:  [16-18] 18  SpO2:  [95 %-100 %] 95 %  BP: ()/(52-64) 98/55                           Urethral Catheter 09/09/18 1700 Double-lumen 18 Fr. (Active)   Site Assessment Clean;Intact 9/10/2018  8:30 PM   Collection Container Standard drainage bag 9/10/2018  8:30 PM   Securement Method secured to top of thigh w/ adhesive device 9/10/2018  8:30 PM   Catheter Care Performed yes  "9/10/2018  8:30 PM   Reason for Continuing Urinary Catheterization Chronic Indwelling Urinary Catheter on Admission 9/10/2018  8:30 PM   CAUTI Prevention Bundle StatLock in place w 1" slack;Intact seal between catheter & drainage tubing;Drainage bag off the floor;Green sheeting clip in use;No dependent loops or kinks;Drainage bag not overfilled (<2/3 full);Drainage bag below bladder 9/10/2018  8:30 PM   Output (mL) 175 mL 9/9/2018  5:00 PM       Neurosurgery Physical Exam   General: well developed, well nourished, no distress  Head: normocephalic, atraumatic  Neurologic: Alert and oriented. Thought content appropriate  GCS: Motor: 6/Verbal: 5/Eyes: 4 GCS Total: 15  Mental Status: Awake, Alert, Oriented x 4  Language: No aphasia  Speech: No dysarthria  Cranial nerves: face symmetric, tongue midline, CN II-XII grossly intact.   Eyes: pupils equal, round, reactive to light with accommodation, EOMI  Pulmonary: normal respirations, not labored, no accessory muscles used  Abdomen: soft, non-distended, not tender to palpation  Sensory: intact to light touch throughout  Motor Strength: Moves all extremities spontaneously with good tone.  Full strength upper and lower extremities. No abnormal movements seen.      Strength   Deltoids Triceps Biceps Wrist Extension Wrist Flexion Hand    Upper: R 5/5 5/5 5/5 5/5 5/5 5/5     L 5/5 5/5 5/5 5/5 5/5 5/5       Iliopsoas Quadriceps Knee  Flexion Tibialis  anterior Gastro- cnemius EHL   Lower: R 4-/5 4+/5 4+/5 5/5 5/5 5/5     L 3/5 4-/5 4-/5 5/5 5/5 5/5      LE limited by extensive BLE edema & multiple prior L knee sx  Pronator Drift: no drift noted  Finger-to-nose: Intact bilaterally  Piedra: absent  Clonus: absent  Babinski: absent  Pulses: 2+ and symmetric radial and dorsalis pedis  Skin: warm, dry and intact, no rashes  Pitting edema to BLE 2+        Significant Labs:  Recent Labs   Lab  09/10/18   0708   09/11/18   0232  09/11/18   0642  09/11/18   1035   GLU  215*   < >  " 100  89  81   NA  119*   < >  120*  120*  121*   K  5.0   < >  5.3*  5.2*  5.2*   CL  84*   < >  85*  84*  84*   CO2  26   < >  25  27  26   BUN  56*   < >  58*  59*  59*   CREATININE  2.9*   < >  3.2*  3.2*  3.2*   CALCIUM  7.6*   < >  7.5*  7.9*  8.1*   MG  1.6   --    --   1.5*   --     < > = values in this interval not displayed.     Recent Labs   Lab  09/11/18   0642   WBC  17.56*   HGB  8.5*   HCT  26.7*   PLT  180     Recent Labs   Lab  09/11/18   0913   INR  1.4*   APTT  30.4     Microbiology Results (last 7 days)     Procedure Component Value Units Date/Time    Blood culture [[id number]] Collected:  09/09/18 1555    Order Status:  Completed Specimen:  Blood Updated:  09/11/18 1331     Blood Culture, Routine Gram stain terrell bottle: Gram negative rods      Blood Culture, Routine Positive results previously called 09/11/2018  13:30    Blood culture [[id number]] Collected:  09/11/18 1011    Order Status:  Sent Specimen:  Blood Updated:  09/11/18 1028    Blood culture [[id number]] Collected:  09/11/18 1003    Order Status:  Sent Specimen:  Blood Updated:  09/11/18 1028    Blood culture [[id number]] Collected:  09/09/18 1731    Order Status:  Completed Specimen:  Blood Updated:  09/11/18 0601     Blood Culture, Routine Gram stain terrell bottle: Gram negative rods      Blood Culture, Routine Results called to and read back by:Sofia Noriega RN 09/11/2018  06:01    Narrative:       From 2 different sites 30 minutes apart    Urine culture [[id number]] Collected:  09/10/18 1701    Order Status:  No result Specimen:  Urine from Catheterized Updated:  09/10/18 1959    Urine culture [[id number]]     Order Status:  Completed Specimen:  Urine, Catheterized     Urine culture [[id number]]     Order Status:  Canceled Specimen:  Urine           Significant Diagnostics:  I personally reviewed MRI Lspine & agree with the findings- Postsurgical changes of prior posterior instrumented fusion at L2-L3 with removal of bipedicular screws.   Interbody cage spacer device remains in place at the L2-L3 disc space with abnormal marrow edema and T1 hypointensity of the L2 and L3 vertebral bodies concerning for infection/osteomyelitis.    Complex fluid collection containing antibiotic impregnated beads within the laminectomy bed with possible linear tract extending to the skin surface.  There is slight mass effect upon the posterior thecal sac, noting definitive evaluation is limited without IV contrast.  This is nonspecific and may represent postoperative seroma, infected postoperative collection, or abscess.    Prevertebral collection concerning for abscess anterior to the L1 and L2 vertebral bodies extending to the posterior margin of the abdominal aorta with dimensions as provided above.

## 2018-09-11 NOTE — PROGRESS NOTES
[facility]  Neurosurgery  Progress Note    Subjective:     History of Present Illness: Ms Medina Frazier is an 82yoF with PMHx DMII, HTN, CHF, CAD, CHF, CKD, HLD, GERD, hypothyroidism,  s/p L2-3 TLIF on 6/5/18 by Dr. Gutiérrez at [facility], complicated by infection & subsequent washout &  hardware removal, who is transferred from [facility] for neurosurgical evaluation of L2-3 osteomyelitis.  Following her initial surgery, she was found to have a lumbar incision wound infection that was washed out on 7/2/18.  Surgical cultures grew E. Cloacae & a PICC was placed.  She was discharged on IV Cipro & a wound vac.  She then had hardware removal on 8/7.  She has had progressive worsening of pain & infection since that time.   Most recent CT Lspine shows L2-3 osteomyelitis with paraspinal fluid collection L1-3.      Post-Op Info:  Procedure(s) (LRB):  FUSION, SPINE, LUMBAR, TLIF, WITH PERCUTANEOUS INSTRUMENTATION L1-5, depuy, neuromonitoring, 4 post bed (N/A)       Interval History: Mrs. Frazier complains of chest pain with chest heaviness.  Internal medicine resident at bedside.  She is not able to answer many questions today due to concentration on her chest pain.  No new weakness or paresthesias.    Medications:  Continuous Infusions:   heparin (porcine) in D5W 12 Units/kg/hr (09/11/18 1224)     Scheduled Meds:   atorvastatin  40 mg Oral QHS    carvedilol  3.125 mg Oral BID    ceFEPime (MAXIPIME) IVPB  2 g Intravenous Daily    furosemide  100 mg Intravenous BID    gabapentin  100 mg Oral BID    insulin aspart U-100  2 Units Subcutaneous TIDWM    [START ON 9/12/2018] insulin detemir U-100  12 Units Subcutaneous Daily    isosorbide dinitrate  20 mg Oral BID    levothyroxine  150 mcg Oral Before breakfast    pantoprazole  40 mg Oral QAM    polyethylene glycol  17 g Oral Daily    sildenafil  10 mg Oral TID    traZODone  25 mg Oral QHS    vancomycin (VANCOCIN) IVPB (custom)  1,000 mg Intravenous Q24H  "    PRN Meds:dextrose 50%, dextrose 50%, glucagon (human recombinant), glucose, glucose, heparin (PORCINE), heparin (PORCINE), insulin aspart U-100, ondansetron, oxyCODONE, oxyCODONE, sodium chloride 0.9%     Review of Systems  Objective:     Weight: 105.2 kg (232 lb)  Body mass index is 39.82 kg/m².  Vital Signs (Most Recent):  Temp: 96.4 °F (35.8 °C) (09/11/18 1202)  Pulse: 72 (09/11/18 1202)  Resp: 18 (09/11/18 1202)  BP: (!) 98/55 (09/11/18 1202)  SpO2: 95 % (09/11/18 1202) Vital Signs (24h Range):  Temp:  [96.4 °F (35.8 °C)-98.2 °F (36.8 °C)] 96.4 °F (35.8 °C)  Pulse:  [53-72] 72  Resp:  [16-18] 18  SpO2:  [95 %-100 %] 95 %  BP: ()/(52-64) 98/55                           Urethral Catheter 09/09/18 1700 Double-lumen 18 Fr. (Active)   Site Assessment Clean;Intact 9/10/2018  8:30 PM   Collection Container Standard drainage bag 9/10/2018  8:30 PM   Securement Method secured to top of thigh w/ adhesive device 9/10/2018  8:30 PM   Catheter Care Performed yes 9/10/2018  8:30 PM   Reason for Continuing Urinary Catheterization Chronic Indwelling Urinary Catheter on Admission 9/10/2018  8:30 PM   CAUTI Prevention Bundle StatLock in place w 1" slack;Intact seal between catheter & drainage tubing;Drainage bag off the floor;Green sheeting clip in use;No dependent loops or kinks;Drainage bag not overfilled (<2/3 full);Drainage bag below bladder 9/10/2018  8:30 PM   Output (mL) 175 mL 9/9/2018  5:00 PM       Neurosurgery Physical Exam   General: well developed, well nourished, no distress  Head: normocephalic, atraumatic  Neurologic: Alert and oriented. Thought content appropriate  GCS: Motor: 6/Verbal: 5/Eyes: 4 GCS Total: 15  Mental Status: Awake, Alert, Oriented x 4  Language: No aphasia  Speech: No dysarthria  Cranial nerves: face symmetric, tongue midline, CN II-XII grossly intact.   Eyes: pupils equal, round, reactive to light with accommodation, EOMI  Pulmonary: normal respirations, not labored, no accessory " muscles used  Abdomen: soft, non-distended, not tender to palpation  Sensory: intact to light touch throughout  Motor Strength: Moves all extremities spontaneously with good tone.  Full strength upper and lower extremities. No abnormal movements seen.      Strength   Deltoids Triceps Biceps Wrist Extension Wrist Flexion Hand    Upper: R 5/5 5/5 5/5 5/5 5/5 5/5     L 5/5 5/5 5/5 5/5 5/5 5/5       Iliopsoas Quadriceps Knee  Flexion Tibialis  anterior Gastro- cnemius EHL   Lower: R 4-/5 4+/5 4+/5 5/5 5/5 5/5     L 3/5 4-/5 4-/5 5/5 5/5 5/5      LE limited by extensive BLE edema & multiple prior L knee sx  Pronator Drift: no drift noted  Finger-to-nose: Intact bilaterally  Piedra: absent  Clonus: absent  Babinski: absent  Pulses: 2+ and symmetric radial and dorsalis pedis  Skin: warm, dry and intact, no rashes  Pitting edema to BLE 2+        Significant Labs:  Recent Labs   Lab  09/10/18   0708   09/11/18   0232  09/11/18   0642  09/11/18   1035   GLU  215*   < >  100  89  81   NA  119*   < >  120*  120*  121*   K  5.0   < >  5.3*  5.2*  5.2*   CL  84*   < >  85*  84*  84*   CO2  26   < >  25  27  26   BUN  56*   < >  58*  59*  59*   CREATININE  2.9*   < >  3.2*  3.2*  3.2*   CALCIUM  7.6*   < >  7.5*  7.9*  8.1*   MG  1.6   --    --   1.5*   --     < > = values in this interval not displayed.     Recent Labs   Lab  09/11/18   0642   WBC  17.56*   HGB  8.5*   HCT  26.7*   PLT  180     Recent Labs   Lab  09/11/18   0913   INR  1.4*   APTT  30.4     Microbiology Results (last 7 days)     Procedure Component Value Units Date/Time    Blood culture [[id number]] Collected:  09/09/18 1555    Order Status:  Completed Specimen:  Blood Updated:  09/11/18 1331     Blood Culture, Routine Gram stain terrell bottle: Gram negative rods      Blood Culture, Routine Positive results previously called 09/11/2018  13:30    Blood culture [[id number]] Collected:  09/11/18 1011    Order Status:  Sent Specimen:  Blood Updated:  09/11/18 1028     Blood culture [[id number]] Collected:  09/11/18 1003    Order Status:  Sent Specimen:  Blood Updated:  09/11/18 1028    Blood culture [[id number]] Collected:  09/09/18 1731    Order Status:  Completed Specimen:  Blood Updated:  09/11/18 0601     Blood Culture, Routine Gram stain terrell bottle: Gram negative rods      Blood Culture, Routine Results called to and read back by:Sofia Noriega RN 09/11/2018  06:01    Narrative:       From 2 different sites 30 minutes apart    Urine culture [[id number]] Collected:  09/10/18 1701    Order Status:  No result Specimen:  Urine from Catheterized Updated:  09/10/18 1959    Urine culture [[id number]]     Order Status:  Completed Specimen:  Urine, Catheterized     Urine culture [[id number]]     Order Status:  Canceled Specimen:  Urine           Significant Diagnostics:  I personally reviewed MRI Lspine & agree with the findings- Postsurgical changes of prior posterior instrumented fusion at L2-L3 with removal of bipedicular screws.  Interbody cage spacer device remains in place at the L2-L3 disc space with abnormal marrow edema and T1 hypointensity of the L2 and L3 vertebral bodies concerning for infection/osteomyelitis.    Complex fluid collection containing antibiotic impregnated beads within the laminectomy bed with possible linear tract extending to the skin surface.  There is slight mass effect upon the posterior thecal sac, noting definitive evaluation is limited without IV contrast.  This is nonspecific and may represent postoperative seroma, infected postoperative collection, or abscess.    Prevertebral collection concerning for abscess anterior to the L1 and L2 vertebral bodies extending to the posterior margin of the abdominal aorta with dimensions as provided above.    Assessment/Plan:     * Spinal epidural abscess    Ms. Frazier is an 82yoF with multiple comorbidities s/p L2-3 TLIF on 6/5/18 at [facility], with surgical wound infection with E. cloacea s/p washout & hardware removal,  now with worsening osteomyelitis & paraspinal abscess despite weeks of treatment with IV Cipro  -No acute neurosurgical intervention  -MRI with L2-3 osteodiscitis, large fluid collection in surgical bed, as well a prevertebral fluid collection L1,2  -Plan for OR Friday for washout & fusion, pending medical clearance  -Please wear LSO brace at all times  -Please hold Xarelto & Plavix   -Please document medical clearance when appropriate   -Continue vancomycin & cefepime per infectious disease recs  -Medical management per primary team  -Discussed with Dr. Ivania Chinchilla PASandieC  Neurosurgery  [facility]

## 2018-09-11 NOTE — ASSESSMENT & PLAN NOTE
Ms. Frazeir is an 82yoF with multiple comorbidities s/p L2-3 TLIF on 6/5/18 at [facility], with surgical wound infection with E. cloacea s/p washout & hardware removal, now with worsening osteomyelitis & paraspinal abscess despite weeks of treatment with IV Cipro  -No acute neurosurgical intervention  -MRI with L2-3 osteodiscitis, large fluid collection in surgical bed, as well a prevertebral fluid collection L1,2  -Plan for OR Friday for washout & fusion, pending medical clearance  -Please wear LSO brace at all times  -Please hold Xarelto & Plavix   -Please document medical clearance when appropriate   -Continue vancomycin & cefepime per infectious disease recs  -Medical management per primary team  -Discussed with Dr. Redd

## 2018-09-11 NOTE — ASSESSMENT & PLAN NOTE
82 year old female with history of L2-3 fusion 6/5 complicated by post operative infection with Enterobacter cloacae. She has been on outpatient IV Ciprofloxacin and has undergone an I&D on 7/2 with hardware removal on 8/7 without resolution of her infection. 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 Blood cultures from 9/8 are +contaminant. Blood cultures 9/9 +GNR. Suspect to be enterobacter as with worsening infection socrates 2/2 source control. Neurosurgery following, plans for surgery on Friday.       Plan  - Continue Vancomycin [id number] and Cefepime 2 g IV q 24 hours (renal adjusted). Vancomycin random 19.5. Will d/c vancomycin 48 hours before surgery (d/c tomorrow).   - Blood cultures 2/4 bottles +GNR. Repeat blood cultures today. Suspect to be from spinal abscess. Will follow identification and susceptibilities and tailor abx accordingly.   -sodium level 120 today. Nephrology following, will follow closely.  - Neurosurgery following, plans for I&D and fusion 9/14. Please obtain surgical cultures and send for gram stain, aerobic, anaerobic, fungal and AFB.   - ID will follow closely.

## 2018-09-11 NOTE — SUBJECTIVE & OBJECTIVE
Interval History: See Hospital Course    Review of Systems   Constitutional: Positive for fatigue. Negative for activity change, appetite change, chills, diaphoresis and fever.   HENT: Negative for congestion, ear pain, sinus pain, sore throat and trouble swallowing.    Eyes: Negative for pain and visual disturbance.   Respiratory: Negative for cough, chest tightness and shortness of breath.    Cardiovascular: Positive for chest pain and leg swelling. Negative for palpitations.   Gastrointestinal: Positive for abdominal distention and abdominal pain. Negative for diarrhea, nausea and vomiting.   Genitourinary: Positive for difficulty urinating. Negative for hematuria.   Musculoskeletal: Positive for back pain and gait problem. Negative for myalgias.   Skin: Positive for wound. Negative for rash.   Neurological: Positive for weakness. Negative for dizziness, light-headedness and numbness.   Psychiatric/Behavioral: Negative for agitation and confusion. The patient is not nervous/anxious.      Objective:     Vital Signs (Most Recent):  Temp: 96.5 °F (35.8 °C) (09/11/18 1528)  Pulse: 80 (09/11/18 1528)  Resp: 18 (09/11/18 1202)  BP: (!) 93/52 (09/11/18 1528)  SpO2: (!) 93 % (09/11/18 1528) Vital Signs (24h Range):  Temp:  [96.4 °F (35.8 °C)-98.1 °F (36.7 °C)] 96.5 °F (35.8 °C)  Pulse:  [53-80] 80  Resp:  [16-18] 18  SpO2:  [93 %-99 %] 93 %  BP: ()/(52-63) 93/52     Weight: 105.2 kg (232 lb)  Body mass index is 39.82 kg/m².    Intake/Output Summary (Last 24 hours) at 9/11/2018 1616  Last data filed at 9/11/2018 0500  Gross per 24 hour   Intake 200 ml   Output 550 ml   Net -350 ml      Physical Exam   HENT:   Head: Atraumatic.   Neck: No JVD present.   Cardiovascular: Normal rate and regular rhythm.   Pulmonary/Chest: Effort normal. She has rales.   Abdominal: Soft. She exhibits no distension.   Musculoskeletal: She exhibits edema. She exhibits no tenderness.   Neurological: She is alert.   Skin: Skin is warm.        Significant Labs:   CBC:   Recent Labs   Lab  09/11/18   0642   WBC  17.56*   HGB  8.5*   HCT  26.7*   PLT  180     CMP:   Recent Labs   Lab  09/11/18   0232  09/11/18   0642  09/11/18   1035   NA  120*  120*  121*   K  5.3*  5.2*  5.2*   CL  85*  84*  84*   CO2  25  27  26   GLU  100  89  81   BUN  58*  59*  59*   CREATININE  3.2*  3.2*  3.2*   CALCIUM  7.5*  7.9*  8.1*   ANIONGAP  10  9  11   EGFRNONAA  13.1*  13.1*  13.1*       Significant Imaging: I have reviewed all pertinent imaging results/findings within the past 24 hours.

## 2018-09-11 NOTE — HOSPITAL COURSE
Transferred from [facility] for NSGY evaluation of osteomyelitis, epidural abscess after recent I&D growing E Cloacae with clinical worsening despite antibiotic therapy as well as hyponatremia. Evaluated by Neurosurgery with plan to take to OR 09/14 if hyponatremia resolved and medically stable. Hyponatremia deemed to be hypontonic, hypervolemic so fluid restriction and loop diuresis was initiated with Nephrology guidance resulting in gradual improvement in sodium.    Pt reported infradiaphragmatic pain on 09/11 at approximately 1100; at that time EKG showed AFL with variable block at a ventricular rate of 59 BPM with no T wave inversions or ST changes. Pt remained hemodynamically stable and troponin was 0.027; pain subsided with PRN oxycodone. Pt was found to be somnolent that evening after having gotten 0.2 mg hydromorphone IV 3 hours prior to exam; somnolence resolved with IV narcan. Pt denied recurrence of pain.

## 2018-09-11 NOTE — PROGRESS NOTES
Called to room by Dr. Martinez, patient not waking up to external rub. Blood pressure 98/53 pulse 79, respirations 8, temperature 97.6, oxygen saturation 93% on O2 3 liters nasal cannula.  Naloxone 0.4 mg given as ordered. Blood pressure 132/60, pulse 79, respirations 12, oxygen saturations 96% on O2  3 liters nasal cannula, Patient talking. ABG's done as ordered. Will continue to monitor.

## 2018-09-11 NOTE — SUBJECTIVE & OBJECTIVE
Interval History: as of this morning had not yet received higher dose lasix, sCr unchanged at 3.2    Review of patient's allergies indicates:   Allergen Reactions    Codeine Nausea Only    Penicillins Swelling     Able to take amoxil     Current Facility-Administered Medications   Medication Frequency    atorvastatin tablet 40 mg QHS    carvedilol tablet 3.125 mg BID    ceFEPIme injection 2 g Daily    dextrose 50% injection 12.5 g PRN    dextrose 50% injection 25 g PRN    furosemide injection 100 mg BID    gabapentin capsule 100 mg BID    glucagon (human recombinant) injection 1 mg PRN    glucose chewable tablet 16 g PRN    glucose chewable tablet 24 g PRN    heparin 25,000 units in dextrose 5% (100 units/ml) IV bolus from bag - ADDITIONAL PRN BOLUS - 30 units/kg PRN    heparin 25,000 units in dextrose 5% (100 units/ml) IV bolus from bag - ADDITIONAL PRN BOLUS - 60 units/kg PRN    heparin 25,000 units in dextrose 5% 250 mL (100 units/mL) infusion LOW INTENSITY nomogram - [facility] Continuous    insulin aspart U-100 pen 0-5 Units QID (AC + HS) PRN    insulin aspart U-100 pen 2 Units TIDWM    [START ON 9/12/2018] insulin detemir U-100 pen 12 Units Daily    isosorbide dinitrate tablet 20 mg BID    levothyroxine tablet 150 mcg Before breakfast    naloxone (NARCAN) 0.4 mg/mL injection     naloxone 0.4 mg/mL injection 0.4 mg Once    ondansetron injection 4 mg Q8H PRN    oxyCODONE immediate release tablet 10 mg Q6H PRN    oxyCODONE immediate release tablet 5 mg Q6H PRN    pantoprazole EC tablet 40 mg QAM    polyethylene glycol packet 17 g Daily    sevelamer carbonate tablet 800 mg TID WM    sevelamer carbonate tablet 800 mg TID WM    sildenafil 2.5 mg/mL oral suspension TID    sodium chloride 0.9% flush 5 mL PRN    trazodone split tablet 25 mg QHS    vancomycin (VANCOCIN) 1,000 mg in dextrose 5 % 250 mL IVPB Q24H       Objective:     Vital Signs (Most Recent):  Temp: 96.5 °F (35.8 °C) (09/11/18  1528)  Pulse: 80 (09/11/18 1528)  Resp: 18 (09/11/18 1202)  BP: (!) 93/52 (09/11/18 1528)  SpO2: (!) 93 % (09/11/18 1528)  O2 Device (Oxygen Therapy): nasal cannula (09/11/18 1528) Vital Signs (24h Range):  Temp:  [96.4 °F (35.8 °C)-98.1 °F (36.7 °C)] 96.5 °F (35.8 °C)  Pulse:  [53-80] 80  Resp:  [16-18] 18  SpO2:  [93 %-99 %] 93 %  BP: ()/(52-63) 93/52     Weight: 105.2 kg (232 lb) (09/09/18 0947)  Body mass index is 39.82 kg/m².  Body surface area is 2.18 meters squared.    I/O last 3 completed shifts:  In: 200 [P.O.:200]  Out: 900 [Urine:900]    Physical Exam   HENT:   Head: Atraumatic.   Neck: No JVD present.   Cardiovascular: Normal rate and regular rhythm.   Pulmonary/Chest: Effort normal. She has rales.   Abdominal: Soft. She exhibits no distension.   Musculoskeletal: She exhibits edema. She exhibits no tenderness.   Neurological: She is alert.   Skin: Skin is warm.

## 2018-09-12 PROBLEM — M46.20 SPINAL ABSCESS: Status: ACTIVE | Noted: 2018-01-01

## 2018-09-12 PROBLEM — G93.41 ENCEPHALOPATHY, METABOLIC: Status: ACTIVE | Noted: 2018-01-01

## 2018-09-12 PROBLEM — J96.02 ACUTE HYPERCAPNIC RESPIRATORY FAILURE: Status: ACTIVE | Noted: 2018-01-01

## 2018-09-12 PROBLEM — E87.29 RESPIRATORY ACIDOSIS: Status: ACTIVE | Noted: 2018-01-01

## 2018-09-12 PROBLEM — R78.81 GRAM-NEGATIVE BACTEREMIA: Status: ACTIVE | Noted: 2018-01-01

## 2018-09-12 PROBLEM — A41.50 SEPSIS DUE TO GRAM NEGATIVE BACTERIA: Status: ACTIVE | Noted: 2018-01-01

## 2018-09-12 PROBLEM — M86.9 OSTEOMYELITIS: Status: ACTIVE | Noted: 2018-01-01

## 2018-09-12 NOTE — PHYSICIAN QUERY
PT Name: Medina Frazier  MR #: [id number]    Physician Query Form -Systemic Infectious Process Clarification     CDS/: Mary Lou RICKETTS,RN,CDI            Contact information:[phone]  This form is a permanent document in the medical record.     Query Date: September 12, 2018     By submitting this query, we are merely seeking further clarification of documentation. Please utilize your independent clinical judgment when addressing the question(s) below.    The Medical record contains the following:     Indicators   Supporting Clinical Findings   Location in Medical Record    HR RR BP Temp      Lactic Acid             Procalcitonin     x WBC                Bands                     CRP      WBC=14.40---->13.70--->17.56---->20.25       Bands=8.0          09/08------------------------------------------>09/12 Labs   x Culture(s)      Blood Culture= Gram Negative Rods               09/09/18 Blood Culture, Routine   x AMS, Confusion, LOC, etc.     She appears lethargic. GCS eye subscore    is 3. GCS verbal subscore is 4. GCS motor    subscore is 6.     Lethargic, will open eyes to voice and      intermittently follow commands with      continued prompting. Oriented to person      only                09/12/18 Critical Care Medicine H&P    Organ Dysfunction / Failure     x Bacteremia or Sepsis / Septic         SEPSIS DUE TO GRAM NEGATIVE         BACTERIA        Septic shock                 09/12/18 Hospital Medicine Assessment and Plan Note           09/12/18 Critical Care Medicine H&P   x Known or Suspected Source of Infection documented    Blood cultures 2/4 bottles +GNR. Repeat     blood cultures today. Suspect to be from     spinal abscess              09/11/18 Infectious Diseases Progress Notes    (Failed) Outpatient Treatment     x Medication     norepinephrine 16 mg in sodium chloride      0.9% 250 mL infusion (titrating)       Freq: Continuous Route: IV      norepinephrine 4 mg in sodium chloride       0.9% 250 mL infusion (titrating)       Freq: Continuous Route: IV      norepinephrine 4 mg in dextrose 5% 250      mL infusion (premix) (titrating)       Rate: 7.9 mL/hr Dose: 0.02 mcg/kg/min      Freq: Continuous Route: IV      vancomycin (VANCOCIN) 1,000 mg in      sodium chloride 0.9% 250 mL IVPB       Dose: 1,000 mg Freq: Every 24 hours     (non-standard times) Route: IV      Indications Comment: epidural abscess      Start: 09/11/18 1030 End: 09/11/18 0209      vancomycin 1.5 g in 5 % dextrose 250 mL      IVPB Dose: 1,500 mg Freq: Every 24      hours (non-standard times) Route: IV      Indications Comment: epidural abscess      Start: 09/09/18 1115 End: 09/10/18 1420                    09/08---------------------------------------->09/12 MAR                   Treatment      Other       Provider, please specify diagnosis or diagnoses associated with above clinical findings.    [  ] Sepsis    [  ] Severe Sepsis with Acute Organ Dysfunction/Failure (please specify           organ dysfunction/failure): ___________________    [  x] Sepsis with Septic Shock    [  ] Other Infectious Disease (please specify): _________________________________    [  ] Clinically Undetermined    Please document in your progress notes daily for the duration of treatment until resolved and include in your discharge summary.

## 2018-09-12 NOTE — SUBJECTIVE & OBJECTIVE
Past Medical History:   Diagnosis Date    Allergic sinusitis     Anticoagulant long-term use     Arthritis     Asthma     CHF (congestive heart failure)     Chronic kidney disease     COPD (chronic obstructive pulmonary disease)     Coronary artery disease     Diabetes mellitus     Encounter for blood transfusion     General anesthetics causing adverse effect in therapeutic use     DELAYED EMERGENCE    GERD (gastroesophageal reflux disease)     HHD (hypertensive heart disease)     High cholesterol     Grindstone (hard of hearing)     Hypertension     Hypothyroidism     Lumbar spinal stenosis     MRSA (methicillin resistant Staphylococcus aureus) infection 7/7/2018    MRSA infection     PAST H/O, CULTURE DONE 5/30/18 (+)    On home oxygen therapy     Osteoporosis     Pulmonary hypertension     Sleep apnea     STATES NOT USING C PAP    Spinal stenosis     Thyroid disease     TIA (transient ischemic attack)     UTI (urinary tract infection)     Venous insufficiency     Wears glasses     Wears partial dentures     UPPER FULL, LOWER PARTIAL       Past Surgical History:   Procedure Laterality Date    ANGIOGRAM-CORONARY N/A 6/2/2017    Performed by Yury Bailey MD at [facility] CATH    ANGIOPLASTY  2008    CARDIAC STENTS    APPENDECTOMY      APPLICATION OF WOUND VACUUM-ASSISTED CLOSURE DEVICE N/A 7/2/2018    Procedure: APPLICATION, WOUND VAC;  Surgeon: Jeremie Gutiérrez Jr., MD;  Location: [facility] OR;  Service: Orthopedics;  Laterality: N/A;    APPLICATION OF WOUND VACUUM-ASSISTED CLOSURE DEVICE N/A 8/7/2018    Procedure: APPLICATION, WOUND VAC;  Surgeon: Jeremie Gutiérrez Jr., MD;  Location: [facility] OR;  Service: Orthopedics;  Laterality: N/A;    APPLICATION, DRESSING, WOUND Left 8/11/2013    Performed by Juwan Liu [facility] at [facility] OR    APPLICATION, WOUND VAC N/A 8/7/2018    Performed by Jeremie Gutiérrez Jr., MD at [facility] OR    APPLICATION, WOUND VAC N/A 7/2/2018    Performed by  Jeremie Gutiérrez Jr., MD at [facility] OR    ARTHROPLASTY, KNEE, TOTAL Left 3/18/2013    Performed by [facility] at [facility] OR    ATHERECTOMY N/A 6/5/2017    Performed by Lokesh Thakur MD at [facility] CATH    BLADDER SUSPENSION      BONE GRAFT N/A 6/5/2018    Procedure: BONE GRAFT;  Surgeon: Jeremie Gutiérrez Jr., MD;  Location: [facility] OR;  Service: Orthopedics;  Laterality: N/A;    BONE GRAFT N/A 6/5/2018    Performed by Jeremie Gutiérrez Jr., MD at [facility] OR    CARDIAC SURGERY  1996    CABG    CARDIAC SURGERY      Stents    CORONARY ARTERY BYPASS GRAFT  1996    EXPLORATION, WOUND-lumbar N/A 8/7/2018    Performed by Jeremie G[facility]érrez Jr., MD at [facility] OR    FRACTURE SURGERY Left     Lt hand    FRACTURE SURGERY Left 1993    Lt Knee    FUSION-TRANSLUMINAL LUMBAR INTERBODY (TLIF) L2-3 W/ INSTRUMENTATION N/A 6/5/2018    Performed by Jeremie G[facility]érrez Jr., MD at [facility] OR    HEMORRHOID SURGERY      HEMORRHOID SURGERY      HYSTERECTOMY  1984    INCISION AND DRAINAGE N/A 7/2/2018    Procedure: INCISION AND DRAINAGE (I & D) LUMBAR SPINE W/ANTIBIOTIC BEAD PLACEMENT;  Surgeon: Jeremie Gutiérrez Jr., MD;  Location: [facility] OR;  Service: Orthopedics;  Laterality: N/A;    INCISION AND DRAINAGE (I & D) LUMBAR SPINE W/ANTIBIOTIC BEAD PLACEMENT N/A 7/2/2018    Performed by Jeremie Gutiérrez Jr., MD at [facility] OR    INCISION AND DRAINAGE (I & D)-EXTREMITY-LOWER Left 8/11/2013    Performed by [facility] Liu [facility] at [facility] OR    INCISION AND DRAINAGE POSTERIOR LUMBAR SPINE  07/02/2018    INSERTION, PICC Right 7/2/2018    Performed by Jeremie G[facility]rez Jr[facility] MD at [facility] OR    JOINT REPLACEMENT Left     KNEE SURGERY Left     POSTOP TKR INFECTION TX    LUMBAR EPIDURAL INJECTION      OPEN REDUCTION INTERNAL FIXATION-HIP TFN Left 9/21/2017    Performed by LOY Early II, MD at [facility] OR    PERIPHERALLY INSERTED CENTRAL CATHETER INSERTION Right 7/2/2018    Procedure: INSERTION, PICC;  Surgeon:  Jeremie Gutiérrez Jr., MD;  Location: [facility] OR;  Service: Orthopedics;  Laterality: Right;    REMOVAL OF HARDWARE FROM SPINE N/A 2018    Procedure: REMOVAL, HARDWARE, SPINE;  Surgeon: Jeremie Gutiérrez Jr., MD;  Location: [facility] OR;  Service: Orthopedics;  Laterality: N/A;    REMOVAL, HARDWARE, SPINE N/A 2018    Performed by Jeremie Gutiérrez Jr., MD at [facility] OR    REMOVAL, PROSTHESIS, KNEE Left 2013    Performed by [facility]wan [facility] at [facility] OR    THYROIDECTOMY, PARTIAL      TOTAL KNEE  PROSTHESIS REMOVAL W/ SPACER INSERTION Left     TRANSFORAMINAL LUMBAR INTERBODY FUSION (TLIF) OF SPINE WITH PERCUTANEOUS INSTRUMENTATION N/A 2018    Procedure: FUSION-TRANSLUMINAL LUMBAR INTERBODY (TLIF) L2-3 W/ INSTRUMENTATION;  Surgeon: Jeremie Gutiérrez Jr., MD;  Location: [facility] OR;  Service: Orthopedics;  Laterality: N/A;    WOUND EXPLORATION N/A 2018    Procedure: EXPLORATION, WOUND-lumbar;  Surgeon: Jeremie Gutiérrez Jr., MD;  Location: [facility] OR;  Service: Orthopedics;  Laterality: N/A;       Review of patient's allergies indicates:   Allergen Reactions    Codeine Nausea Only    Penicillins Swelling     Able to take amoxil       Family History     Problem Relation (Age of Onset)    Diabetes Mother, Brother    Heart disease Father    Stroke Mother        Tobacco Use    Smoking status: Former Smoker     Types: Cigarettes     Last attempt to quit: 3/13/1987     Years since quittin.5    Smokeless tobacco: Never Used   Substance and Sexual Activity    Alcohol use: No     Frequency: Never    Drug use: No    Sexual activity: No      Review of Systems   Constitutional: Negative for chills.        Limited due to encephalopathy (prior to intubation)   Respiratory: Positive for cough and shortness of breath.    Cardiovascular: Negative for chest pain.   Gastrointestinal: Negative for abdominal pain and diarrhea.   Neurological: Negative for headaches.     Objective:     Vital Signs  (Most Recent):  Temp: 96.1 °F (35.6 °C) (09/12/18 1216)  Pulse: 79 (09/12/18 1216)  Resp: 20 (09/12/18 1216)  BP: (!) 126/59 (09/12/18 1104)  SpO2: 100 % (09/12/18 1216) Vital Signs (24h Range):  Temp:  [96.1 °F (35.6 °C)-98.6 °F (37 °C)] 96.1 °F (35.6 °C)  Pulse:  [75-81] 79  Resp:  [8-21] 20  SpO2:  [55 %-100 %] 100 %  BP: ()/(50-76) 126/59   Weight: 105.2 kg (232 lb)  Body mass index is 39.82 kg/m².      Intake/Output Summary (Last 24 hours) at 9/12/2018 1303  Last data filed at 9/12/2018 1200  Gross per 24 hour   Intake 358.5 ml   Output 685 ml   Net -326.5 ml       Physical Exam   Constitutional: She appears well-developed. She appears lethargic. She has a sickly appearance. She appears ill. No distress.   HENT:   Head: Normocephalic and atraumatic.   Eyes: Conjunctivae are normal. Pupils are equal, round, and reactive to light. Right eye exhibits no discharge. Left eye exhibits no discharge. No scleral icterus.   Neck: Neck supple. JVD present. No tracheal deviation present. No thyromegaly present.   Cardiovascular: Normal rate, S1 normal and S2 normal. An irregularly irregular rhythm present.   Pulses:       Radial pulses are 2+ on the right side, and 2+ on the left side.   Anasarca, 4+ pitting edema up to thighs   Pulmonary/Chest: No accessory muscle usage. Tachypnea noted. No respiratory distress. She has decreased breath sounds in the right lower field and the left lower field. She has wheezes in the right middle field and the left middle field. She has rales in the right upper field, the right middle field, the left upper field and the left middle field.   Abdominal: Soft. Bowel sounds are normal. She exhibits no distension. There is no tenderness. There is no guarding.   Lymphadenopathy:     She has no cervical adenopathy.   Neurological: She appears lethargic. GCS eye subscore is 3. GCS verbal subscore is 4. GCS motor subscore is 6.   Lethargic, will open eyes to voice and intermittently follow  commands with continued prompting. Oriented to person only. Will squeeze hands bilaterally, both equally weak. Able to move feet bilaterally and equally. No facial droop noted.    Skin: Skin is dry. She is not diaphoretic. There is pallor.       Vents:  Vent Mode: A/C (09/12/18 1216)  Set Rate: 20 bmp (09/12/18 1216)  Vt Set: 400 mL (09/12/18 1216)  Pressure Support: 0 cmH20 (09/12/18 1216)  PEEP/CPAP: 5 cmH20 (09/12/18 1216)  Oxygen Concentration (%): 70 (09/12/18 1216)  Peak Airway Pressure: 36 cmH2O (09/12/18 1216)  Plateau Pressure: 0 cmH20 (09/12/18 1216)  Total Ve: 8.43 mL (09/12/18 1216)  F/VT Ratio<105 (RSBI): (!) 47.39 (09/12/18 1216)  Lines/Drains/Airways     Peripherally Inserted Central Catheter Line                 PICC Double Lumen 07/02/18 1430 right basilic 71 days          Drain                 Urethral Catheter 09/09/18 1700 Double-lumen 18 Fr. 2 days          Airway                 Airway - Non-Surgical 09/12/18 1012 Endotracheal Tube less than 1 day          Pressure Ulcer                 Negative Pressure Wound Therapy  35 days              Significant Labs:    CBC/Anemia Profile:  Recent Labs   Lab  09/11/18   0642  09/12/18   0803   WBC  17.56*  20.25*   HGB  8.5*  8.7*   HCT  26.7*  27.7*   PLT  180  213   MCV  82  84   RDW  17.6*  17.8*        Chemistries:  Recent Labs   Lab  09/11/18   0642  09/11/18   1035  09/11/18   1538   NA  120*  121*  120*   K  5.2*  5.2*  5.1   CL  84*  84*  85*   CO2  27  26  26   BUN  59*  59*  60*   CREATININE  3.2*  3.2*  3.1*   CALCIUM  7.9*  8.1*  7.8*   MG  1.5*   --    --    PHOS  6.1*   --    --          Significant Imaging: I have reviewed and interpreted all pertinent imaging results/findings within the past 24 hours.

## 2018-09-12 NOTE — NURSING
Went into Patient's room about 0630 to find the patient agitated and wheezing stating she was choking.  Family at bedside stated she felt like something was stuck in her throat but could not cough it up.  o2SATS checked and found to be in the low 60s.  Charge nurse notified and patient was put on a Nonrebreathing mask at 100% oxygen.  Charge nurse called a Rapid and the rapid team was notified.   Patient's o2SATS at the time was 45.  On the Nonrebreather the o2SATS came up to the 90s but the patient still felt like she was choking.  Blood gases and a breathing treatment was ordered. Chest xray was taken.  Rapid team made the call to move her to Neur-ICU.  Report was called down and patient was taken down.

## 2018-09-12 NOTE — ASSESSMENT & PLAN NOTE
--hold BB in setting of shock  --continue statin for now   --s/p VIVIANA to [facility] in June 2018 and on aspirin, plavix prior to admission. Holding in setting of upcoming procedures.

## 2018-09-12 NOTE — ASSESSMENT & PLAN NOTE
-hypervolemic hyponatremia secondary decreased effective circulating volume, likely secondary CHF, urine sodium < 20 is consistent, at this time not getting worse and a little better since admission, think current deterioration in mental status more likely to CO2 retention / CO2 narcosis and not hyponatremia    Plan/Recommendations:  1) lasix diuresis as outlined in CHF section, would anticipate helping with correction, if not adequate, then would recommend adding tolvaptan  2) avoid thiazide diuretics  3) q4h sodium  4) try to mix all medications in NS and not D5 if at all possible  5) would hold off on hypertonic as we feel encephalopathy/AMS is from CO2 retention and not hyponatremia   6) repeat urine electrolytes (potassium and sodium)

## 2018-09-12 NOTE — PT/OT/SLP DISCHARGE
Occupational Therapy Discharge Summary    Medina Frazier  MRN: [id number]   Principal Problem: Acute hypercapnic respiratory failure      Patient Discharged from acute Occupational Therapy on 9/12/18.  Please refer to prior OT note dated 9/10/18 for functional status.    Assessment:      Patient has not met goals.    Objective:     GOALS:   Multidisciplinary Problems     Occupational Therapy Goals        Problem: Occupational Therapy Goal    Goal Priority Disciplines Outcome Interventions   Occupational Therapy Goal     OT, PT/OT Ongoing (interventions implemented as appropriate)    Description:  Goals to be met by: 9/17/18    Patient will increase functional independence with ADLs by performing:    UE Dressing with Moderate Assistance.  Grooming while EOB with Minimal Assistance for EOB balance.  Sitting at edge of bed x15 minutes with Minimal Assistance.  Rolling to Bilateral with Moderate Assistance.   Supine to sit with Maximum Assistance.  Tolerate up in [employer] x 1 hr.                      Reasons for Discontinuation of Therapy Services  Patient is unable to continue work toward goals because of medical or psychosocial complications.      Plan:     Patient Discharged to: t/f to NCC; please re-consult when medically appropriate.    MIKAYLA Taveras  9/12/2018

## 2018-09-12 NOTE — ASSESSMENT & PLAN NOTE
82 year old female with history of L2-3 fusion 6/5 complicated by post operative infection with Enterobacter cloacae. She has been on outpatient IV Ciprofloxacin and has undergone an I&D on 7/2 with hardware removal on 8/7 without resolution of her infection. 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 Blood cultures from 9/8 are +contaminant. Blood cultures 9/9 +GNR. Suspect to be enterobacter as with worsening infection mary kayklbolivar 2/2 source control. Neurosurgery following, tentative plans for surgery on Friday.       Pt transferred to ICU for acute respiratory failure with concern for aspiration event. Pt currently intubated, sedated.     Plan  - Continue Vancomycin [id number] and Cefepime 2 g IV q 24 hours (renal adjusted). Vancomycin random 19.5. Continue current therapy. Check vancomycin trough prior to next dose. vanc trough goal 15-20.   .- Blood cultures 2/4 bottles +GNR. Repeat blood cultures are so far negative Suspect to be from spinal abscess. Will follow identification and susceptibilities and tailor abx accordingly.   - Possible IR drain placement vs OR pending neurosurgery recommendations as patient not medically stable at this time.. Please collect sample to send for gram stain, aerobic, anaerobic, fungal and AFB to help guide abx therapy.   - ID will follow closely.      Discussed with ID staff and primary team. Will follow closely with you.

## 2018-09-12 NOTE — PLAN OF CARE
Problem: Patient Care Overview  Goal: Plan of Care Review  Outcome: Ongoing (interventions implemented as appropriate)  Patient remained free of falls and injuries during shift.  Patient took medications without incident.  Patient had scant urine output during shift at 75ml, Med team informed.  No other concerns noted.

## 2018-09-12 NOTE — ASSESSMENT & PLAN NOTE
--suspect hypervolemic hyponatremia from volume overload.   --continue diuresis, trend BMP's   --repeat TSH, T4 given hypothyroidism history

## 2018-09-12 NOTE — ASSESSMENT & PLAN NOTE
SEPSIS DUE TO GRAM NEGATIVE BACTERIA    S/p TLIF L2-3 in 6/2018 with complications from culture + enterobacter cloacae infection discovered on  I&D in July 2nd. Hardware removed in August  Recent ED for continue abdominal pain with CT showing concern for osteo and a fluid collection that is displacing her aorta. Transferred from [facility] due to concern for need for higher level of care.  - BCx 9/9 GNR 2/4 bottles. Pt on Vanc and Cefepime  - Repeat BCx 9/11 NGTD  - ID consulted and following. Appreciate recs    Plan   - Continue Vanc 1g q24h and Cefepime 2 g q24h  - Neurosurgery consulted, plan for I&D and fusion on Friday 9/14. Surgery pending medical optimization of pt

## 2018-09-12 NOTE — H&P
[facility]  Critical Care Medicine  History & Physical    Patient Name: Medina Frazier  MRN: [id number]  Admission Date: 9/8/2018  Hospital Length of Stay: 4 days  Code Status: Full Code  Attending Physician: Anson Do   Primary Care Provider: Hao Garcia MD   Principal Problem: Spinal epidural abscess    Subjective:     HPI:  Mrs. Frazier is a 82 yr old female with history significant for diastolic HF, CAD s/p VIVIANA Lcx (June 2017), HTN, DM2, and lumbar stenosis s/p lumbar fusion in June 2018 with subsequent hardware infection and I&D on 7/2 and repeat lumbar I&D, hardware removal, decompression and wound vac placement to L2-L3 due to continued epidural abscess. She was discharged recently to a SNF for rehab and continued IV antibiotic therapy. She originally presented to [facility] on 9/8 from [facility] after labwork revealed JEY, hyponatremia. Additionally, CT lumbar spine obtained 9/5 was concerning for persistent osteomyelitis, epidural abscess in L2-L3 region.     She was transferred to [facility] on 9/8 for higher level of care and neurosurgery evaluation. Admission has been complicated by continued JEY with creatinine up trending from baseline 0.8 to 1.0 to 3.0 along with hyponatremia felt to be related to acute on chronic diastolic heart failure, volume overload. Nephrology was consulted and has been assisting with management. She was started on lasix 100 mg BID with minimal urine output response and creatinine slowly uptrending. Additionally, blood cultures obtained 9/9 are growing GNR's, awaiting speciation. Suspect this is enterobacter given continued osteomyelitis of L2-L3, complex fluid collection within laminectomy bed measuring 3x8x5 cm concerning for abscess, and additional prevertebral collection anterior to L1 and L2 vertebral bodies measuring 4x6x1 abutting the abdominal aorta concerning for abscess noted on MRI from 9/10. Neurosurgery were planning on repeat I&D for  source control but held off given the above medical issues with JEY, hyponatremia. She has been on vancomycin, cefepime since 9/9 and blood cultures from 9/11 are NGTD.     She was transferred to the MICU on the AM of 9/12 for acute hypercapnic respiratory failure with concern for aspiration event, worsening encephalopathy, and worsening JEY.    .          Hospital/ICU Course:  Upon transfer to MICU, Mrs. Frazier's respiratory and mental status worsened despite Bipap and lasix trial. She was intubated and she developed shock requiring low dose levophed, felt to be septic in setting of bacteremia. A trialysis line was placed for venous access, vasopressors, and possible need for CRRT. Cefepime and vancomycin continued as ID and neurosurgery following.      Past Medical History:   Diagnosis Date    Allergic sinusitis     Anticoagulant long-term use     Arthritis     Asthma     CHF (congestive heart failure)     Chronic kidney disease     COPD (chronic obstructive pulmonary disease)     Coronary artery disease     Diabetes mellitus     Encounter for blood transfusion     General anesthetics causing adverse effect in therapeutic use     DELAYED EMERGENCE    GERD (gastroesophageal reflux disease)     HHD (hypertensive heart disease)     High cholesterol     [place] (hard of hearing)     Hypertension     Hypothyroidism     Lumbar spinal stenosis     MRSA (methicillin resistant Staphylococcus aureus) infection 7/7/2018    MRSA infection     PAST H/O, CULTURE DONE 5/30/18 (+)    On home oxygen therapy     Osteoporosis     Pulmonary hypertension     Sleep apnea     STATES NOT USING C PAP    Spinal stenosis     Thyroid disease     TIA (transient ischemic attack)     UTI (urinary tract infection)     Venous insufficiency     Wears glasses     Wears partial dentures     UPPER FULL, LOWER PARTIAL       Past Surgical History:   Procedure Laterality Date    ANGIOGRAM-CORONARY N/A 6/2/2017    Performed by  Yury Bailey MD at [facility] CATH    ANGIOPLASTY  2008    CARDIAC STENTS    APPENDECTOMY      APPLICATION OF WOUND VACUUM-ASSISTED CLOSURE DEVICE N/A 7/2/2018    Procedure: APPLICATION, WOUND VAC;  Surgeon: Jeremie Gutiérrez Jr., MD;  Location: [facility] OR;  Service: Orthopedics;  Laterality: N/A;    APPLICATION OF WOUND VACUUM-ASSISTED CLOSURE DEVICE N/A 8/7/2018    Procedure: APPLICATION, WOUND VAC;  Surgeon: Jeremie Gutiérrez Jr., MD;  Location: [facility] OR;  Service: Orthopedics;  Laterality: N/A;    APPLICATION, DRESSING, WOUND Left 8/11/2013    Performed by [facility]wan Liu [facility] at [facility] OR    APPLICATION, WOUND VAC N/A 8/7/2018    Performed by Jeremie Gutiérrez Jr., MD at [facility] OR    APPLICATION, WOUND VAC N/A 7/2/2018    Performed by Jeremie Gutiérrez Jr., MD at [facility] OR    ARTHROPLASTY, KNEE, TOTAL Left 3/18/2013    Performed by [facility] at [facility] OR    ATHERECTOMY N/A 6/5/2017    Performed by Lokesh Thakur MD at [facility] CATH    BLADDER SUSPENSION      BONE GRAFT N/A 6/5/2018    Procedure: BONE GRAFT;  Surgeon: Jeremie Gutiérrez Jr., MD;  Location: [facility] OR;  Service: Orthopedics;  Laterality: N/A;    BONE GRAFT N/A 6/5/2018    Performed by Jeremie Gutiérrez Jr., MD at [facility] OR    CARDIAC SURGERY  1996    CABG    CARDIAC SURGERY      Stents    CORONARY ARTERY BYPASS GRAFT  1996    EXPLORATION, WOUND-lumbar N/A 8/7/2018    Performed by Jeremie Gutiérrez Jr., MD at [facility] OR    FRACTURE SURGERY Left     Lt hand    FRACTURE SURGERY Left 1993    Lt Knee    FUSION-TRANSLUMINAL LUMBAR INTERBODY (TLIF) L2-3 W/ INSTRUMENTATION N/A 6/5/2018    Performed by Jeremie G[facility]rez Jr., MD at [facility] OR    HEMORRHOID SURGERY      HEMORRHOID SURGERY      HYSTERECTOMY  1984    INCISION AND DRAINAGE N/A 7/2/2018    Procedure: INCISION AND DRAINAGE (I & D) LUMBAR SPINE W/ANTIBIOTIC BEAD PLACEMENT;  Surgeon: Jeremie Gutiérrez Jr., MD;  Location: [facility] OR;  Service: Orthopedics;   Laterality: N/A;    INCISION AND DRAINAGE (I & D) LUMBAR SPINE W/ANTIBIOTIC BEAD PLACEMENT N/A 7/2/2018    Performed by Jeremie G[facility]rez Jr., MD at [facility] OR    INCISION AND DRAINAGE (I & D)-EXTREMITY-LOWER Left 8/11/2013    Performed by [facility]wan Liu [facility] at [facility] OR    INCISION AND DRAINAGE POSTERIOR LUMBAR SPINE  07/02/2018    INSERTION, PICC Right 7/2/2018    Performed by Jeremie Gutiérrez Jr., MD at [facility] OR    JOINT REPLACEMENT Left     KNEE SURGERY Left     POSTOP TKR INFECTION TX    LUMBAR EPIDURAL INJECTION      OPEN REDUCTION INTERNAL FIXATION-HIP TFN Left 9/21/2017    Performed by LOY Early II, MD at [facility] OR    PERIPHERALLY INSERTED CENTRAL CATHETER INSERTION Right 7/2/2018    Procedure: INSERTION, PICC;  Surgeon: Jeremie Gutiérrez Jr., MD;  Location: [facility] OR;  Service: Orthopedics;  Laterality: Right;    REMOVAL OF HARDWARE FROM SPINE N/A 8/7/2018    Procedure: REMOVAL, HARDWARE, SPINE;  Surgeon: Jeremie Gutiérrez Jr., MD;  Location: [facility] OR;  Service: Orthopedics;  Laterality: N/A;    REMOVAL, HARDWARE, SPINE N/A 8/7/2018    Performed by Jeremie Gutiérrez Jr., MD at [facility] OR    REMOVAL, PROSTHESIS, KNEE Left 11/20/2013    Performed by [facility] at [facility] OR    THYROIDECTOMY, PARTIAL      TOTAL KNEE  PROSTHESIS REMOVAL W/ SPACER INSERTION Left     TRANSFORAMINAL LUMBAR INTERBODY FUSION (TLIF) OF SPINE WITH PERCUTANEOUS INSTRUMENTATION N/A 6/5/2018    Procedure: FUSION-TRANSLUMINAL LUMBAR INTERBODY (TLIF) L2-3 W/ INSTRUMENTATION;  Surgeon: Jeremie Gutiérrez Jr., MD;  Location: [facility] OR;  Service: Orthopedics;  Laterality: N/A;    WOUND EXPLORATION N/A 8/7/2018    Procedure: EXPLORATION, WOUND-lumbar;  Surgeon: Jeremie Gutiérrez Jr., MD;  Location: [facility] OR;  Service: Orthopedics;  Laterality: N/A;       Review of patient's allergies indicates:   Allergen Reactions    Codeine Nausea Only    Penicillins Swelling     Able to take amoxil        Family History     Problem Relation (Age of Onset)    Diabetes Mother, Brother    Heart disease Father    Stroke Mother        Tobacco Use    Smoking status: Former Smoker     Types: Cigarettes     Last attempt to quit: 3/13/1987     Years since quittin.5    Smokeless tobacco: Never Used   Substance and Sexual Activity    Alcohol use: No     Frequency: Never    Drug use: No    Sexual activity: No      Review of Systems   Constitutional: Negative for chills.        Limited due to encephalopathy (prior to intubation)   Respiratory: Positive for cough and shortness of breath.    Cardiovascular: Negative for chest pain.   Gastrointestinal: Negative for abdominal pain and diarrhea.   Neurological: Negative for headaches.     Objective:     Vital Signs (Most Recent):  Temp: 96.1 °F (35.6 °C) (18 1216)  Pulse: 79 (18 1216)  Resp: 20 (18 121)  BP: (!) 126/59 (18 1104)  SpO2: 100 % (18 1216) Vital Signs (24h Range):  Temp:  [96.1 °F (35.6 °C)-98.6 °F (37 °C)] 96.1 °F (35.6 °C)  Pulse:  [75-81] 79  Resp:  [8-21] 20  SpO2:  [55 %-100 %] 100 %  BP: ()/(50-76) 126/59   Weight: 105.2 kg (232 lb)  Body mass index is 39.82 kg/m².      Intake/Output Summary (Last 24 hours) at 2018 1303  Last data filed at 2018 1200  Gross per 24 hour   Intake 358.5 ml   Output 685 ml   Net -326.5 ml       Physical Exam   Constitutional: She appears well-developed. She appears lethargic. She has a sickly appearance. She appears ill. No distress.   HENT:   Head: Normocephalic and atraumatic.   Eyes: Conjunctivae are normal. Pupils are equal, round, and reactive to light. Right eye exhibits no discharge. Left eye exhibits no discharge. No scleral icterus.   Neck: Neck supple. JVD present. No tracheal deviation present. No thyromegaly present.   Cardiovascular: Normal rate, S1 normal and S2 normal. An irregularly irregular rhythm present.   Pulses:       Radial pulses are 2+ on the right  side, and 2+ on the left side.   Anasarca, 4+ pitting edema up to thighs   Pulmonary/Chest: No accessory muscle usage. Tachypnea noted. No respiratory distress. She has decreased breath sounds in the right lower field and the left lower field. She has wheezes in the right middle field and the left middle field. She has rales in the right upper field, the right middle field, the left upper field and the left middle field.   Abdominal: Soft. Bowel sounds are normal. She exhibits no distension. There is no tenderness. There is no guarding.   Lymphadenopathy:     She has no cervical adenopathy.   Neurological: She appears lethargic. GCS eye subscore is 3. GCS verbal subscore is 4. GCS motor subscore is 6.   Lethargic, will open eyes to voice and intermittently follow commands with continued prompting. Oriented to person only. Will squeeze hands bilaterally, both equally weak. Able to move feet bilaterally and equally. No facial droop noted.    Skin: Skin is dry. She is not diaphoretic. There is pallor.       Vents:  Vent Mode: A/C (09/12/18 1216)  Set Rate: 20 bmp (09/12/18 1216)  Vt Set: 400 mL (09/12/18 1216)  Pressure Support: 0 cmH20 (09/12/18 1216)  PEEP/CPAP: 5 cmH20 (09/12/18 1216)  Oxygen Concentration (%): 70 (09/12/18 1216)  Peak Airway Pressure: 36 cmH2O (09/12/18 1216)  Plateau Pressure: 0 cmH20 (09/12/18 1216)  Total Ve: 8.43 mL (09/12/18 1216)  F/VT Ratio<105 (RSBI): (!) 47.39 (09/12/18 1216)  Lines/Drains/Airways     Peripherally Inserted Central Catheter Line                 PICC Double Lumen 07/02/18 1430 right basilic 71 days          Drain                 Urethral Catheter 09/09/18 1700 Double-lumen 18 Fr. 2 days          Airway                 Airway - Non-Surgical 09/12/18 1012 Endotracheal Tube less than 1 day          Pressure Ulcer                 Negative Pressure Wound Therapy  35 days              Significant Labs:    CBC/Anemia Profile:  Recent Labs   Lab  09/11/18   0642  09/12/18   0803    WBC  17.56*  20.25*   HGB  8.5*  8.7*   HCT  26.7*  27.7*   PLT  180  213   MCV  82  84   RDW  17.6*  17.8*        Chemistries:  Recent Labs   Lab  09/11/18   0642  09/11/18   1035  09/11/18   1538   NA  120*  121*  120*   K  5.2*  5.2*  5.1   CL  84*  84*  85*   CO2  27  26  26   BUN  59*  59*  60*   CREATININE  3.2*  3.2*  3.1*   CALCIUM  7.9*  8.1*  7.8*   MG  1.5*   --    --    PHOS  6.1*   --    --          Significant Imaging: I have reviewed and interpreted all pertinent imaging results/findings within the past 24 hours.    Assessment/Plan:     Neuro   Lumbar stenosis    --s/p lumbar fusion/hardware placement June 2018 with subsequent osteo and abscess. See below        Encephalopathy, metabolic    --multifactorial: hypercapnia, sepsis in setting of receiving opiates/gabapentin with JEY   --non focal, hold in CT head.   --see below for plan   --s/p intubation start low dose propfol and PRN fentanyl for sedation/pain        Derm   Alteration in skin integrity related to surgical incision    --s/p wound vac placement. Continue per neurosurgery        Pulmonary   Pulmonary hypertension    --2/2 diastolic HF. No known lung disease  --diuresis as above          Acute hypercapnic respiratory failure    --2/2 pulmonary edema in setting of acute on chronic diastolic HF, volume overload and opiates/gabapentin administration with JEY  --s/p intubation on 9/12 for failing bipap. Continue volume control ventilation, wean support as tolerated  --increase lasix to attempt better diuresis, appreciate nephrology assistance.           Cardiac/Vascular   Hypertension    --hold coreg, imdur due to above        Coronary artery disease involving native coronary artery of native heart without angina pectoris    --hold BB in setting of shock  --continue statin for now   --s/p VIVIANA to Lcx in June 2018 and on aspirin, plavix prior to admission. Holding in setting of upcoming procedures.             PAF (paroxysmal atrial  fibrillation)    --CHADS vasc 4; hold home rivaroxaban   --will resume heparin gtt for anticoagulation after central/arterial line placement and plans for possible NSGY or IR procedure are known         CHF (congestive heart failure), NYHA class IV    - Prior ECHO shows diastolic dysfuunction  - On Lasix 20mg and Aldactone 25mg  - Recommend Lasix 40mg BID  - Continue Coreg 3.125mg BID, per primary        Renal/   Hyponatremia    --suspect hypervolemic hyponatremia from volume overload.   --continue diuresis, trend BMP's   --repeat TSH, T4 given hypothyroidism history           JEY (acute kidney injury)    --most likely cardiorenal syndrome. Appreciate nephrology assistance  --repeat FeUrea, urine lytes.   --increase lasix to 160 mg BID and assess response          ID   * Spinal epidural abscess    --noted on MRI along with osteo  --appreciate neurosurgery assistance. Discussed plans today given decompensation, bacteremia and concern for needing source control. Possible IR drain placement vs OR pending neurosurgery recommendations  --continue vancomycin, cefepime for now         Osteomyelitis of Lumbar Spine    --see above        Gram-negative bacteremia    --noted on cultures from 9/9; cleared on 9/11  --possibly enterobacter given epidural abscess/osteo  --Abx as above. Intervention per IR vs neurosurgery for attempted source control         Septic shock    --suspect septic given above  --titrate levophed to maintain MAP >65  --Abx as above.   --repeat 2D echo given cardiac history, decompensated state with pulmonary edema          Oncology   Anemia    --likely anemia of chronic disease. Trend Hgb for now  --no signs of acute blood loss  --transfuse for Hgb <7        Endocrine   Diabetes mellitus, type II    --POCT glucose and SSI         GI   GERD (gastroesophageal reflux disease)    --continue PPI           Critical Care Time: 70 minutes  Critical secondary to Patient has a condition that poses threat to life  and bodily function: shock, respiratory failure     Critical care was time spent personally by me on the following activities: development of treatment plan with patient or surrogate and bedside caregivers, discussions with consultants, evaluation of patient's response to treatment, examination of patient, ordering and performing treatments and interventions, ordering and review of laboratory studies, ordering and review of radiographic studies, pulse oximetry, re-evaluation of patient's condition. This critical care time did not overlap with that of any other provider or involve time for any procedures.     Krysta Oliva, NP  Critical Care Medicine  [facility]

## 2018-09-12 NOTE — PROGRESS NOTES
Dr. Krysta Oliva at bedside placing right IJ central line. Notified patient's axillary temp = 96.5.  Will obtain core esophageal temp after line placement and place pt on warm blanket if correlates. VSS. Will continue to monitor.     Manometry for Central Line Procedure     Manometry Performed: yes  Manometry performed by: Dr. Oliva NP

## 2018-09-12 NOTE — ASSESSMENT & PLAN NOTE
Non-oliguric, likely multifactorial, the urine sodium of 10, could be consistent with CRS in this patient with what appears to be acute CHF exacerbation and volume overload, however would be careful reading too much in to this urinary sodium value, also consider ATN toxic secondary infection, ischemic ATN, also possibly vancomycin contributing to worsening sCr since admit further noted with WBCs in the urine of > 100, need to r/o infection which would be unlikely to cause an JEY (unless there is a bilateral pyelonephritis, which clinically does not fit, nor consistent with U/S findings), in short, if urine cultures negative need to consider AIN (outpatient abxs), absence of eosinophilia, rash or fever, may make a little less likely, but does not rule out.    Renal ultrasound unremarkable and negative for hydronephrosis.    Plan/Reccomendations:  1) lasix challend as outlined in CHF section  2) continue strict Is & Os and serial renal panels  3) obtain daily vanco through levels and dose appropriately for renal funtion  4) will collect urine, spin down and manually review microscopy

## 2018-09-12 NOTE — ASSESSMENT & PLAN NOTE
--CHADS vasc 4; hold home rivaroxaban   --will resume heparin gtt for anticoagulation after central/arterial line placement and plans for possible NSGY or IR procedure are known

## 2018-09-12 NOTE — ASSESSMENT & PLAN NOTE
Per family patient not far from baseline, hyponatremia may be a contributor, however would be concerned that CO2 retention / CO2 narcosis is a bigger contributor

## 2018-09-12 NOTE — PROCEDURES
"Medina Frazier is a 82 y.o. female patient.    Temp: 96.1 °F (35.6 °C) (09/12/18 1216)  Pulse: 79 (09/12/18 1216)  Resp: 20 (09/12/18 1216)  BP: (!) 126/59 (09/12/18 1104)  SpO2: 100 % (09/12/18 1216)  Weight: 105.2 kg (232 lb) (09/09/18 0947)  Height: 5' 4" (162.6 cm) (09/09/18 0947)       Central Line  Date/Time: 9/12/2018 11:00 AM  Location procedure was performed: [facility] NEURO CRITICAL CARE  Performed by: Krysta Oliva NP  Assisting provider: Leslie Johnson PA-C  Pre-operative Diagnosis: shock  Post-operative diagnosis: shock  Consent Done: Yes  Time out: Immediately prior to procedure a "time out" was called to verify the correct patient, procedure, equipment, support staff and site/side marked as required.  Indications: vascular access and med administration  Anesthesia: local infiltration    Anesthesia:  Local Anesthetic: lidocaine 1% without epinephrine  Anesthetic total: 2 mL  Preparation: skin prepped with ChloraPrep  Skin prep agent dried: skin prep agent completely dried prior to procedure  Sterile barriers: all five maximum sterile barriers used - cap, mask, sterile gown, sterile gloves, and large sterile sheet  Hand hygiene: hand hygiene performed prior to central venous catheter insertion  Location details: right internal jugular  Catheter type: trialysis  Catheter size: 12 Fr  Catheter Length: 16cm    Ultrasound guidance: yes  Vessel Caliber: large, patent, compressibility normal  Needle advanced into vessel with real time Ultrasound guidance.  Guidewire confirmed in vessel.  Sterile sheath used.  Manometry: Yes  Number of attempts: 1  Estimated blood loss (mL): 15  Post-procedure: line sutured,  chlorhexidine patch,  sterile dressing applied and blood return through all ports  Comments: Awaiting post procedural CXR.           Krysta Oliva  9/12/2018  "

## 2018-09-12 NOTE — EICU
Called to bedside for intubation.  Present are MDs, RNs, and RT.  Physician verification completed.  Time out completed with bedside team.   1009 - etomidate 15 mg given IVP  1009 - rocuronium 100 mg given IVP.  Pt bagged with 100% FiO2.  1011 - intubated per MD Arthur under direct supervision of Md Do.  Glidescope used for intubation.  ETT #7.5 secured at 23 cm to lip.  Color change noted to ETCO2 detector.  Bedside confirms bilat air exchange auscultated.  Pt tolerated procedure well.

## 2018-09-12 NOTE — ASSESSMENT & PLAN NOTE
--most likely cardiorenal syndrome. Appreciate nephrology assistance  --repeat FeUrea, urine lytes.   --increase lasix to 160 mg BID and assess response

## 2018-09-12 NOTE — ASSESSMENT & PLAN NOTE
-give lasix challenge with 160mg IV x 1 if there is a response then can go to 120mg BID or TID, would not recommend thiazied due to hyponatremia, if limited response to lasix then would need to consider ultrasfiltration, but hopeful high dose loop diuretic will be enough

## 2018-09-12 NOTE — ASSESSMENT & PLAN NOTE
On top of likely some degree of chronic hypercapnia secondary poorly controlled LENA (per history patient has only intermittently, if at all, used her home CPAP).    Also and as noted in CHF section patient also has congestion/pulmonary edema on CXR    Plan/Recommendations:  1) NIPPV as per critical care team  2) recommend lasix diuresis for volume overload (see CHF section)

## 2018-09-12 NOTE — PLAN OF CARE
Problem: Patient Care Overview  Goal: Plan of Care Review  Outcome: Ongoing (interventions implemented as appropriate)  POC reviewed with pt and family at 1400. Pt's family verbalized understanding. Questions and concerns addressed. No acute events today. Pt progressing toward goals. Will continue to monitor. See flowsheets for full assessment and VS info. Pt intubated by Dr. De La Rosa with critical care team for respiratory distress, right IJ triaylsis catheter placed by  Dauterive NP. Levophed gtt initiated to maintain MAPs> 65. Propofol gtt initiated and infusing at 20mcg/kg/min.

## 2018-09-12 NOTE — PROGRESS NOTES
Patient intubated with Dr. De La Rosa at bedside and critical care team. RT at bedside with glidescope. Rn administered 15mg of etomidate and 100mg of matthew IVP. Pt tolerated well. ETT #7 placed 23 cm at the lip. Chest xray ordered to confirm placement. VSS. Will continue to monitor closely.

## 2018-09-12 NOTE — ASSESSMENT & PLAN NOTE
Hyposomolar, hypervolemic hyponatremia, likely secondary decreased effective circulating volume secondary CHF exacerbation, currently patient is w/o seizures, she is somnolent, but per family this is not far from her baseline.    Some improvement overnight 118 --> 119 --> 120    Plan/Recommendations:  1) continue lasix diruesis at increased dosing  2) q4h sodium  3) repeat urine lytes (sodium and potassium)  4) restrict free water to 1000ccs per 24hrs  5) if acute worsening and neurological changes, then consider hypertonic, but otherwise hold off for now  6) think lasix diuresis will do the trick, but if not enough, then in a day or two could consider adding tolvaptan

## 2018-09-12 NOTE — CONSULTS
.  Radiology Consult    Medina Frazier is a 82 y.o. female with a history of diastolic heart failure, CAD s/p VIVIANA left circumflex, HTN, DM2, and lumbar stenosis s/p lumbar fusion in June 2018 with subsequent hardware infection and I&D on 07/02 and repeat I&D, hardware removal with decompression and wound vac placement to L2-L3 due to persistent abscess who presented to OSH found to have JEY and CT lumbar spine showing concern for osteomyelitis and spinal abscess L2-L3 region.   Patient was transferred to [facility] for neurosurgery evaluation; however secondary to hyponatremia and JEY unable to intervene. Patient developed hypoxic respiratory failure and transferred to ICU.  IR is consulted for spinal abscess drainage with possible drain placement.   Past Medical History:   Diagnosis Date    Allergic sinusitis     Anticoagulant long-term use     Arthritis     Asthma     CHF (congestive heart failure)     Chronic kidney disease     COPD (chronic obstructive pulmonary disease)     Coronary artery disease     Diabetes mellitus     Diabetic neuropathy 2/27/2013    Encounter for blood transfusion     General anesthetics causing adverse effect in therapeutic use     DELAYED EMERGENCE    GERD (gastroesophageal reflux disease)     HHD (hypertensive heart disease)     High cholesterol     [place] (hard of hearing)     Hypertension     Hypothyroidism     Lumbar spinal stenosis     MRSA (methicillin resistant Staphylococcus aureus) infection 7/7/2018    MRSA infection     PAST H/O, CULTURE DONE 5/30/18 (+)    On home oxygen therapy     Osteoporosis     Pulmonary hypertension     Sleep apnea     STATES NOT USING C PAP    Spinal stenosis     Thyroid disease     TIA (transient ischemic attack)     UTI (urinary tract infection)     Venous insufficiency     Wears glasses     Wears partial dentures     UPPER FULL, LOWER PARTIAL     Past Surgical History:   Procedure Laterality Date    ANGIOGRAM-CORONARY N/A  6/2/2017    Performed by Yury Bailey MD at [facility] CATH    ANGIOPLASTY  2008    CARDIAC STENTS    APPENDECTOMY      APPLICATION OF WOUND VACUUM-ASSISTED CLOSURE DEVICE N/A 7/2/2018    Procedure: APPLICATION, WOUND VAC;  Surgeon: Jeremie Gutiérrez Jr., MD;  Location: [facility] OR;  Service: Orthopedics;  Laterality: N/A;    APPLICATION OF WOUND VACUUM-ASSISTED CLOSURE DEVICE N/A 8/7/2018    Procedure: APPLICATION, WOUND VAC;  Surgeon: Jeremie Gutiérrez Jr., MD;  Location: [facility] OR;  Service: Orthopedics;  Laterality: N/A;    APPLICATION, DRESSING, WOUND Left 8/11/2013    Performed by [facility]wan Liu [facility] at [facility] OR    APPLICATION, WOUND VAC N/A 8/7/2018    Performed by Jeremie Gutiérrez Jr., MD at [facility] OR    APPLICATION, WOUND VAC N/A 7/2/2018    Performed by Jeremie Gutiérrez Jr., MD at [facility] OR    ARTHROPLASTY, KNEE, TOTAL Left 3/18/2013    Performed by [facility] at [facility] OR    ATHERECTOMY N/A 6/5/2017    Performed by Lokesh Thakur MD at [facility] CATH    BLADDER SUSPENSION      BONE GRAFT N/A 6/5/2018    Procedure: BONE GRAFT;  Surgeon: Jeremie Gutiérrez Jr., MD;  Location: [facility] OR;  Service: Orthopedics;  Laterality: N/A;    BONE GRAFT N/A 6/5/2018    Performed by Jeremie Gutiérrez Jr., MD at [facility] OR    CARDIAC SURGERY  1996    CABG    CARDIAC SURGERY      Stents    CORONARY ARTERY BYPASS GRAFT  1996    EXPLORATION, WOUND-lumbar N/A 8/7/2018    Performed by Jeremie Gutiérrez Jr., MD at [facility] OR    FRACTURE SURGERY Left     Lt hand    FRACTURE SURGERY Left 1993    Lt Knee    FUSION-TRANSLUMINAL LUMBAR INTERBODY (TLIF) L2-3 W/ INSTRUMENTATION N/A 6/5/2018    Performed by Jeremie Gutiérrez Jr., MD at [facility] OR    HEMORRHOID SURGERY      HEMORRHOID SURGERY      HYSTERECTOMY  1984    INCISION AND DRAINAGE N/A 7/2/2018    Procedure: INCISION AND DRAINAGE (I & D) LUMBAR SPINE W/ANTIBIOTIC BEAD PLACEMENT;  Surgeon: Jeremie Gutiérrez Jr., MD;  Location: [facility] OR;   Service: Orthopedics;  Laterality: N/A;    INCISION AND DRAINAGE (I & D) LUMBAR SPINE W/ANTIBIOTIC BEAD PLACEMENT N/A 7/2/2018    Performed by Jeremie G[facility]érrez Jr., MD at [facility] OR    INCISION AND DRAINAGE (I & D)-EXTREMITY-LOWER Left 8/11/2013    Performed by [facility] Liu [facility] at [facility] OR    INCISION AND DRAINAGE POSTERIOR LUMBAR SPINE  07/02/2018    INSERTION, PICC Right 7/2/2018    Performed by Jeremie G[facility]érrez Jr., MD at [facility] OR    JOINT REPLACEMENT Left     KNEE SURGERY Left     POSTOP TKR INFECTION TX    LUMBAR EPIDURAL INJECTION      OPEN REDUCTION INTERNAL FIXATION-HIP TFN Left 9/21/2017    Performed by LOY Early II, MD at [facility] OR    PERIPHERALLY INSERTED CENTRAL CATHETER INSERTION Right 7/2/2018    Procedure: INSERTION, PICC;  Surgeon: Jeremie Gutiérrez Jr., MD;  Location: [facility] OR;  Service: Orthopedics;  Laterality: Right;    REMOVAL OF HARDWARE FROM SPINE N/A 8/7/2018    Procedure: REMOVAL, HARDWARE, SPINE;  Surgeon: Jeremie Gutiérrez Jr., MD;  Location: [facility] OR;  Service: Orthopedics;  Laterality: N/A;    REMOVAL, HARDWARE, SPINE N/A 8/7/2018    Performed by Jeremie G[facility]érrez Jr., MD at [facility] OR    REMOVAL, PROSTHESIS, KNEE Left 11/20/2013    Performed by [facility] Liu [facility] at [facility] OR    THYROIDECTOMY, PARTIAL      TOTAL KNEE  PROSTHESIS REMOVAL W/ SPACER INSERTION Left     TRANSFORAMINAL LUMBAR INTERBODY FUSION (TLIF) OF SPINE WITH PERCUTANEOUS INSTRUMENTATION N/A 6/5/2018    Procedure: FUSION-TRANSLUMINAL LUMBAR INTERBODY (TLIF) L2-3 W/ INSTRUMENTATION;  Surgeon: Jeremie Gutéirrez Jr., MD;  Location: [facility] OR;  Service: Orthopedics;  Laterality: N/A;    WOUND EXPLORATION N/A 8/7/2018    Procedure: EXPLORATION, WOUND-lumbar;  Surgeon: Jeremie Gutiérrez Jr., MD;  Location: [facility] OR;  Service: Orthopedics;  Laterality: N/A;       Discussed with primary team, Dr. Do.    Imaging reviewed with Radiology staff, Dr. Plummer.     Procedure: paraspinal  abscess drainage     Scheduled Meds:    albuterol-ipratropium  3 mL Nebulization Q4H    ceFEPime (MAXIPIME) IVPB  2 g Intravenous Daily    chlorhexidine  15 mL Mouth/Throat BID    [START ON 9/13/2018] fluconazole (DIFLUCAN) IVPB  200 mg Intravenous Q24H    fluconazole (DIFLUCAN) IVPB  400 mg Intravenous Once    [START ON 9/13/2018] levothyroxine  150 mcg Per OG tube Before breakfast    pantoprazole  40 mg Oral QAM     Continuous Infusions:    fentanyl 100 mcg/hr (09/12/18 1745)    custom IV infusion builder (for pharmacist use only) 1.97 mL/hr at 09/12/18 1800     PRN Meds:dextrose 50%, dextrose 50%, fentaNYL citrate (PF), glucagon (human recombinant), glucose, glucose, insulin aspart U-100, ondansetron    Allergies:   Review of patient's allergies indicates:   Allergen Reactions    Codeine Nausea Only    Penicillins Swelling     Able to take amoxil       Labs:  Recent Labs   Lab  09/11/18   0913   INR  1.4*       Recent Labs   Lab  09/12/18   0803   WBC  20.25*   HGB  8.7*   HCT  27.7*   MCV  84   PLT  213      Recent Labs   Lab  09/08/18   0739   09/11/18   0642   09/12/18   1504   GLU  193*   < >  89   < >  91   NA  122*   < >  120*   < >  121*   K  6.0*   < >  5.2*   < >  4.9   CL  84*   < >  84*   < >  85*   CO2  25   < >  27   < >  24   BUN  52*   < >  59*   < >  63*   CREATININE  2.30*   < >  3.2*   < >  3.1*   CALCIUM  7.9*   < >  7.9*   < >  7.9*   MG   --    < >  1.5*   --    --    ALT  28   --    --    --    --    AST  18   --    --    --    --    ALBUMIN  2.6*   --    --    --    --    BILITOT  1.0   --    --    --    --     < > = values in this interval not displayed.     Vitals (Most Recent):  Temp: 98.6 °F (37 °C) (09/12/18 1706)  Pulse: 76 (09/12/18 1800)  Resp: 20 (09/12/18 1800)  BP: (!) 121/58 (09/12/18 1800)  SpO2: 98 % (09/12/18 1800)    Plan:   1. NPO after midnight.  2. Hold anticoagulants.  3. Spinal abscess drainage with possible drain left in place.     Darshana Harry, PGY-2

## 2018-09-12 NOTE — ASSESSMENT & PLAN NOTE
--noted on MRI along with osteo  --appreciate neurosurgery assistance. Discussed plans today given decompensation, bacteremia and concern for needing source control. Possible IR drain placement vs OR pending neurosurgery recommendations  --continue vancomycin, cefepime for now

## 2018-09-12 NOTE — ASSESSMENT & PLAN NOTE
--suspect septic given above  --titrate levophed to maintain MAP >65  --Abx as above.   --repeat 2D echo given cardiac history, decompensated state with pulmonary edema

## 2018-09-12 NOTE — SUBJECTIVE & OBJECTIVE
Interval History: concerned for aspiration this morning when given meds, trasnferred to ICU, on BiPap, alert, but somnolent    Review of patient's allergies indicates:   Allergen Reactions    Codeine Nausea Only    Penicillins Swelling     Able to take amoxil     Current Facility-Administered Medications   Medication Frequency    albuterol-ipratropium 2.5 mg-0.5 mg/3 mL nebulizer solution 3 mL Q4H    atorvastatin tablet 40 mg QHS    ceFEPIme injection 2 g Daily    dextrose 50% injection 12.5 g PRN    dextrose 50% injection 25 g PRN    furosemide injection 160 mg BID    glucagon (human recombinant) injection 1 mg PRN    glucose chewable tablet 16 g PRN    glucose chewable tablet 24 g PRN    insulin aspart U-100 pen 0-5 Units QID (AC + HS) PRN    levothyroxine tablet 150 mcg Before breakfast    norepinephrine 4 mg in dextrose 5% 250 mL infusion (premix) (titrating) Continuous    ondansetron injection 4 mg Q8H PRN    pantoprazole EC tablet 40 mg QAM    polyethylene glycol packet 17 g Daily    sevelamer carbonate tablet 800 mg TID WM    sodium chloride 0.9% flush 5 mL PRN    vancomycin (VANCOCIN) 1,000 mg in dextrose 5 % 250 mL IVPB Q24H       Objective:     Vital Signs (Most Recent):  Temp: 96.7 °F (35.9 °C) (09/12/18 0730)  Pulse: 75 (09/12/18 0900)  Resp: 15 (09/12/18 0900)  BP: 119/60 (09/12/18 0900)  SpO2: 96 % (09/12/18 0900)  O2 Device (Oxygen Therapy): BiPAP (09/12/18 0751) Vital Signs (24h Range):  Temp:  [96.4 °F (35.8 °C)-98.6 °F (37 °C)] 96.7 °F (35.9 °C)  Pulse:  [62-81] 75  Resp:  [8-21] 15  SpO2:  [55 %-100 %] 96 %  BP: ()/(52-76) 119/60     Weight: 105.2 kg (232 lb) (09/09/18 0947)  Body mass index is 39.82 kg/m².  Body surface area is 2.18 meters squared.    I/O last 3 completed shifts:  In: 833.5 [P.O.:525; I.V.:58.5; IV Piggyback:250]  Out: 625 [Urine:625]    Physical Exam   HENT:   Head: Atraumatic.   Neck: No JVD present.   Cardiovascular: Normal rate and regular rhythm.    Pulmonary/Chest: She has rales.   On BiPap, coarse sounds   Abdominal: Soft. She exhibits no distension.   Musculoskeletal: She exhibits edema. She exhibits no tenderness.   Neurological: She is alert.   But increasingly somnolent   Skin: Skin is warm.

## 2018-09-12 NOTE — PROGRESS NOTES
Pt received during shift report connected to NRB mask sating 97%. Patient only c/o of back pain. All other VSS. Critical care team notified of patient's arrival.  Skin assessment done pt has wound vac to midline lumbar region.     Critical care team ordered patient to be placed on bipap. RT notified. Family at bedside updated. Will continue to monitor.

## 2018-09-12 NOTE — ASSESSMENT & PLAN NOTE
--2/2 pulmonary edema in setting of acute on chronic diastolic HF, volume overload and opiates/gabapentin administration with JEY  --s/p intubation on 9/12 for failing bipap. Continue volume control ventilation, wean support as tolerated  --increase lasix to attempt better diuresis, appreciate nephrology assistance.

## 2018-09-12 NOTE — ASSESSMENT & PLAN NOTE
--multifactorial: hypercapnia, sepsis in setting of receiving opiates/gabapentin with JEY   --non focal, hold in CT head.   --see below for plan   --s/p intubation start low dose propfol and PRN fentanyl for sedation/pain

## 2018-09-12 NOTE — PROCEDURES
"Medina Frazier is a 82 y.o. female patient.    Temp: 96.1 °F (35.6 °C) (09/12/18 1216)  Pulse: 79 (09/12/18 1216)  Resp: 20 (09/12/18 1216)  BP: (!) 126/59 (09/12/18 1104)  SpO2: 100 % (09/12/18 1216)  Weight: 105.2 kg (232 lb) (09/09/18 0947)  Height: 5' 4" (162.6 cm) (09/09/18 0947)       Intubation  Date/Time: 9/12/2018 12:46 PM  Location procedure was performed: [facility]  Performed by: Stephen Arthur MD  Authorized by: Stephen Arthur MD   Assisting provider: Anson Do MD  Pre-operative diagnosis: Respiratory failure  Post-operative diagnosis: Respiratory failure  Consent Done: Emergent Situation  Indications: respiratory failure  Intubation method: video-assisted  Patient status: paralyzed (RSI)  Preoxygenation: nonrebreather mask  Sedatives: etomidate  Paralytic: rocuronium  Laryngoscope size: Mac 4  Tube size: 7.5 mm  Tube type: cuffed  Number of attempts: 1  Cricoid pressure: yes  Cords visualized: yes  Post-procedure assessment: CO2 detector  Breath sounds: clear and equal  Cuff inflated: yes  ETT to lip: 23 cm  Tube secured with: ETT rangel  Chest x-ray interpreted by me.  Chest x-ray findings: endotracheal tube in appropriate position  Complications: No  Specimens: No  Implants: No          Stephen Arthur  9/12/2018  "

## 2018-09-12 NOTE — CARE UPDATE
Patient with worsening shock and needing uptitration of levophed to maintain MAP >65. Suspect this is related to gram negative bacteremia likely due to lumbar osteo and spinal abscesses. Have discussed with neurosurgery, and due to multiple medical issues (shock, JEY, hyponatremia, respiratory failure), not stable to proceed with surgical intervention in near future. In attempt to gain source control, have asked IR to evaluate for placement of drain in fluid collection within laminectomy bed. Discussed with ID, and will continue Vanco, Cefepime and added fluconazole due to candiduria. PICC line pulled. Although was responding well to diuresis earlier in the day when more hemodynamically stable, will hold on further diuresis due to worsening shock. Still awaiting 2D echo at this time.      Above clinical worsening and complex medical issues discussed with patient's  and daughter, Sheeba, at the bedside.     Above discussed with Dr. Do    Critical Care time: 20 minutes

## 2018-09-12 NOTE — PT/OT/SLP DISCHARGE
Physical Therapy Discharge Summary    Name: Medina Frazier  MRN: [id number]   Principal Problem: Spinal epidural abscess     Patient Discharged from acute Physical Therapy on 2018.  Please refer to prior PT noted date on 09/10/2018 for functional status.     Assessment:     Patient has not met goals.    Objective:     GOALS:   Multidisciplinary Problems     Physical Therapy Goals        Problem: Physical Therapy Goal    Goal Priority Disciplines Outcome Goal Variances Interventions   Physical Therapy Goal     PT, PT/OT Ongoing (interventions implemented as appropriate)     Description:  Goals to be met by: 2018     Patient will increase functional independence with mobility by performin. Supine to sit with Moderate Assistance  2. Sit to supine with Moderate Assistance  3. Sit to stand transfer with Maximum Assistance  4. Bed to chair transfer with Maximum Assistance  5. Wheelchair propulsion x50 feet with Contact Guard Assistance using bilateral uppper extremities  6. Lower extremity exercise program x15 reps per handout, with assistance as needed                      Reasons for Discontinuation of Therapy Services  Patient is unable to continue work toward goals because of medical or psychosocial complications.      Plan:     Patient Discharged to: Transferred to ICU. Re-consult when medically appropriate. .    AARON MILLER, PT  2018

## 2018-09-12 NOTE — HOSPITAL COURSE
Upon transfer to MICU, Mrs. Frazier's respiratory and mental status worsened despite Bipap and lasix trial. She was intubated and she developed shock requiring low dose levophed, felt to be septic in setting of bacteremia. A trialysis line was placed for venous access, vasopressors, and possible need for CRRT. Arterial line placed for frequent ABGs. Cefepime and vancomycin continued as ID and neurosurgery following. Added on flagyl as micro lab thinks BCx 9/9 may be growing anaerobes. IR successfully drained epidural abscess (9/13). Continuing lasix trial as patient still appears volume overloaded. Norepinephrine requirements increasing on 9/14.  Discussed with neurosurgery, would need to be off vasopressors and hemodynamically stable prior to surgical intervention. Family meeting on 9/14, code status changed to DNR.  Weaned off vasopressors on 9/16.  Remains on ventilator due to significant pulmonary edema.  Aggressive diuresis started without significant response. Patient had an episode of A fib RVR and put on amiodarone drip on 9/17. CRRT started 9/17 however patient became hypotensive and bradycardic requiring levophed again. Discontinued amiodarone at that time. Patient successfully weaned off levophed. CRRT ran continuously 9/18. Mucus plug event (9/19) after patient's tube was suctioned. Patient became tachycardic and hypertensive with absent breath sounds bilaterally. Sats decreased into the 70s and tidal volumes less than 100. Patient was bagged and mucus plug was dislodged. Passed SBT and extubated to bipap (9/19). CRRT discontinued and pending diuresis trial.9/20 patient devolved shortness of breath overnight, repeated chest x-ray showed worsening pulmonary edema, patient has decrease urine output despite lasix challenge, contacted nephrology to stat CRRT. Patient receiving continuous CCRT. Bipap at night.

## 2018-09-12 NOTE — PROGRESS NOTES
[facility]  Infectious Disease  Progress Note    Patient Name: Medina Frazier  MRN: [id number]  Admission Date: 9/8/2018  Length of Stay: 4 days  Attending Physician: Anson Do MD  Primary Care Provider: Hao Garcia MD    Isolation Status: No active isolations  Assessment/Plan:      Spinal epidural abscess    82 year old female with history of L2-3 fusion 6/5 complicated by post operative infection with Enterobacter cloacae. She has been on outpatient IV Ciprofloxacin and has undergone an I&D on 7/2 with hardware removal on 8/7 without resolution of her infection. 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 Blood cultures from 9/8 are +contaminant. Blood cultures 9/9 +GNR. Suspect to be enterobacter as with worsening infection liekly 2/2 source control. Neurosurgery following, tentative plans for surgery on Friday.       Pt transferred to ICU for acute respiratory failure with concern for aspiration event. Pt currently intubated, sedated.     Plan  - Continue Vancomycin 3fe38wt and Cefepime 2 g IV q 24 hours (renal adjusted). Vancomycin random 19.5. Continue current therapy. Check vancomycin trough prior to next dose. vanc trough goal 15-20.   .- Blood cultures 2/4 bottles +GNR. Repeat blood cultures are so far negative Suspect to be from spinal abscess. Will follow identification and susceptibilities and tailor abx accordingly.   - Possible IR drain placement vs OR pending neurosurgery recommendations as patient not medically stable at this time.. Please collect sample to send for gram stain, aerobic, anaerobic, fungal and AFB to help guide abx therapy.   - ID will follow closely.      Discussed with ID staff and primary team. Will follow closely with you.             Thank you for your consult. I will follow-up with  patient. Please contact us if you have any additional questions.    Tawanda Patel PA-C  Infectious Disease  [facility]  [phone]    Subjective:     Principal Problem:Acute hypercapnic respiratory failure    HPI: Medina Frazier is an 82 year old female who underwent L2-3 fusion on 6/5/18. She did well until shortly after staples removed,   drainage from the lower part of the incision was noted. On 7/2 she went to the OR for an I&D. Purulence was encountered. Surgical cultures grew Enterobacter cloacae. She was seen by Dr. Lenz, ID provider who started patient on IV Ciprofloxacin for 6 weeks. Patient's infection did not resolve and wound continued to drain. She was taken back to the OR on 8/7 for hardware removal, however appears on most recent imaging an intervertebral cage remains in place. No new surgical cultures obtained at that time. She was continued on Cipro. Patient's back pain has worsened over the past week and is now wrapping around to her anterior abdomen. She presented to an [facility]  ED in [place] for evaluation. 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 Labs notable for elevated WBC, ESR, CRP and pyuria. Procalc 19. JEY.  Blood and urine culture negative.  She was transferred here for higher level of care. Currently she is on Cipro from her previous cultures, and was started on Vanc on 9/8. Patient denies fevers, chills, sweats. Reports severe back pain and lower extremity weakness. [place] in place.    Of note, patient has a documented PCN allergy. She said she has tolerated PCN in the recent past without adverse reactions. 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 No problems since.      Interval History:   Pt transferred to ICU for acute respiratory failure. Currently intubated and sedated  Spoke to primary team, plans  to have PICC line removed and IR consultation as pt not medically stable for surgery.   Continue IV vancomcyin and cefepime     Review of Systems   Unable to perform ROS: Intubated     Objective:     Vital Signs (Most Recent):  Temp: 98.1 °F (36.7 °C) (09/12/18 1541)  Pulse: 78 (09/12/18 1541)  Resp: (!) 4 (09/12/18 1541)  BP: (!) 116/56 (09/12/18 1505)  SpO2: 98 % (09/12/18 1541) Vital Signs (24h Range):  Temp:  [96.1 °F (35.6 °C)-98.6 °F (37 °C)] 98.1 °F (36.7 °C)  Pulse:  [75-81] 78  Resp:  [4-27] 4  SpO2:  [55 %-100 %] 98 %  BP: ()/(50-76) 116/56     Weight: 105.2 kg (232 lb)  Body mass index is 39.82 kg/m².    Estimated Creatinine Clearance: 17.1 mL/min (A) (based on SCr of 3.1 mg/dL (H)).    Physical Exam   Constitutional: She is oriented to person, place, and time. She appears well-developed and well-nourished. No distress.   Intubated, sedated   HENT:   Head: Normocephalic.   Mouth/Throat: No oropharyngeal exudate.   Eyes: No scleral icterus.   Cardiovascular: Normal rate and intact distal pulses. An irregularly irregular rhythm present. Exam reveals no friction rub.   No murmur heard.  Pulmonary/Chest: Effort normal. She has no wheezes. She has rales.   Abdominal: Soft. Bowel sounds are normal. She exhibits no distension. There is no tenderness.   Obese abdomen   Musculoskeletal: She exhibits edema (BLE).   +2 pitting edema   Neurological: She is alert and oriented to person, place, and time.   Skin: Skin is warm and dry. She is not diaphoretic. No erythema.   Lumbar wound not examined at this wound. wound vac in place   Nursing note and vitals reviewed.    Significant Labs:   Blood Culture:   Recent Labs   Lab  06/08/18   1349  09/09/18   1555  09/09/18   1731  09/11/18   1003  09/11/18   1011   LABBLOO  No growth after 5 days.  No growth after 5 days.  Gram stain terrell bottle: Gram negative rods   Positive results previously called 09/11/2018  13:30  Gram stain terrell bottle: Gram negative rods    Results called to and read back by:Sofia Noriega RN 09/11/2018  06:01  No Growth to date  No Growth to date  No Growth to date  No Growth to date     CBC:   Recent Labs   Lab  09/11/18   0642  09/12/18   0803   WBC  17.56*  20.25*   HGB  8.5*  8.7*   HCT  26.7*  27.7*   PLT  180  213     CMP:   Recent Labs   Lab  09/11/18   0642  09/11/18   1035  09/11/18   1538   NA  120*  121*  120*   K  5.2*  5.2*  5.1   CL  84*  84*  85*   CO2  27  26  26   GLU  89  81  77   BUN  59*  59*  60*   CREATININE  3.2*  3.2*  3.1*   CALCIUM  7.9*  8.1*  7.8*   ANIONGAP  9  11  9   EGFRNONAA  13.1*  13.1*  13.6*     Wound Culture:   Recent Labs   Lab  07/02/18   1432   LABAERO  ENTEROBACTER CLOACAE COMPLEX  Rare  For susceptibility see order # [id number]    ENTEROBACTER CLOACAE COMPLEX  Moderate       All pertinent labs within the past 24 hours have been reviewed.    Significant Imaging: I have reviewed all pertinent imaging results/findings within the past 24 hours.

## 2018-09-12 NOTE — SUBJECTIVE & OBJECTIVE
Interval History:   Pt transferred to ICU for acute respiratory failure. Currently intubated and sedated  Spoke to primary team, plans to have PICC line removed and IR consultation as pt not medically stable for surgery.   Continue IV vancomcyin and cefepime     Review of Systems   Unable to perform ROS: Intubated     Objective:     Vital Signs (Most Recent):  Temp: 98.1 °F (36.7 °C) (09/12/18 1541)  Pulse: 78 (09/12/18 1541)  Resp: (!) 4 (09/12/18 1541)  BP: (!) 116/56 (09/12/18 1505)  SpO2: 98 % (09/12/18 1541) Vital Signs (24h Range):  Temp:  [96.1 °F (35.6 °C)-98.6 °F (37 °C)] 98.1 °F (36.7 °C)  Pulse:  [75-81] 78  Resp:  [4-27] 4  SpO2:  [55 %-100 %] 98 %  BP: ()/(50-76) 116/56     Weight: 105.2 kg (232 lb)  Body mass index is 39.82 kg/m².    Estimated Creatinine Clearance: 17.1 mL/min (A) (based on SCr of 3.1 mg/dL (H)).    Physical Exam   Constitutional: She is oriented to person, place, and time. She appears well-developed and well-nourished. No distress.   Intubated, sedated   HENT:   Head: Normocephalic.   Mouth/Throat: No oropharyngeal exudate.   Eyes: No scleral icterus.   Cardiovascular: Normal rate and intact distal pulses. An irregularly irregular rhythm present. Exam reveals no friction rub.   No murmur heard.  Pulmonary/Chest: Effort normal. She has no wheezes. She has rales.   Abdominal: Soft. Bowel sounds are normal. She exhibits no distension. There is no tenderness.   Obese abdomen   Musculoskeletal: She exhibits edema (BLE).   +2 pitting edema   Neurological: She is alert and oriented to person, place, and time.   Skin: Skin is warm and dry. She is not diaphoretic. No erythema.   Lumbar wound not examined at this wound. wound vac in place   Nursing note and vitals reviewed.    Significant Labs:   Blood Culture:   Recent Labs   Lab  06/08/18   1349  09/09/18   1555  09/09/18   1731  09/11/18   1003  09/11/18   1011   LABBLOO  No growth after 5 days.  No growth after 5 days.  Gram stain  terrell bottle: Gram negative rods   Positive results previously called 09/11/2018  13:30  Gram stain terrell bottle: Gram negative rods   Results called to and read back by:Sofia Noriega RN 09/11/2018  06:01  No Growth to date  No Growth to date  No Growth to date  No Growth to date     CBC:   Recent Labs   Lab  09/11/18   0642  09/12/18   0803   WBC  17.56*  20.25*   HGB  8.5*  8.7*   HCT  26.7*  27.7*   PLT  180  213     CMP:   Recent Labs   Lab  09/11/18   0642  09/11/18   1035  09/11/18   1538   NA  120*  121*  120*   K  5.2*  5.2*  5.1   CL  84*  84*  85*   CO2  27  26  26   GLU  89  81  77   BUN  59*  59*  60*   CREATININE  3.2*  3.2*  3.1*   CALCIUM  7.9*  8.1*  7.8*   ANIONGAP  9  11  9   EGFRNONAA  13.1*  13.1*  13.6*     Wound Culture:   Recent Labs   Lab  07/02/18   1432   LABAERO  ENTEROBACTER CLOACAE COMPLEX  Rare  For susceptibility see order # [id number]    ENTEROBACTER CLOACAE COMPLEX  Moderate       All pertinent labs within the past 24 hours have been reviewed.    Significant Imaging: I have reviewed all pertinent imaging results/findings within the past 24 hours.

## 2018-09-12 NOTE — ASSESSMENT & PLAN NOTE
--noted on cultures from 9/9; cleared on 9/11  --possibly enterobacter given epidural abscess/osteo  --Abx as above. Intervention per IR vs neurosurgery for attempted source control

## 2018-09-12 NOTE — PROGRESS NOTES
[facility]  Nephrology  Progress Note    Patient Name: Medina Frazier  MRN: [id number]  Admission Date: 9/8/2018  Hospital Length of Stay: 4 days  Attending Provider: Anson Do MD   Primary Care Physician: Hao Garcia MD  Principal Problem:Spinal epidural abscess    Subjective:     HPI: 83yo WF who was to have a lumbar fusion later in the month and was admitted due to abnormal pre-op labs.  Nephrology consulted for JEY (sCr 2.2 on admit, 3.1 at time of consult, normal baseline), also with a sodium of 119 (133 on August 10th). Patient appears somnolent, but wakes up and appropriately answers questions, per family she is at her baseline.  Patient is not complaining of increasing shortness of breath, she in on 3L O2 when seen, this is what she is on at home, she has history of LENA and is supposed to be on CPAP at night, but not compliant.  CXR reviewed and looks like pulmonary edema/congestion on admit.    Interval History: as of this morning had not yet received higher dose lasix, sCr unchanged at 3.2    Review of patient's allergies indicates:   Allergen Reactions    Codeine Nausea Only    Penicillins Swelling     Able to take amoxil     Current Facility-Administered Medications   Medication Frequency    atorvastatin tablet 40 mg QHS    carvedilol tablet 3.125 mg BID    ceFEPIme injection 2 g Daily    dextrose 50% injection 12.5 g PRN    dextrose 50% injection 25 g PRN    furosemide injection 100 mg BID    gabapentin capsule 100 mg BID    glucagon (human recombinant) injection 1 mg PRN    glucose chewable tablet 16 g PRN    glucose chewable tablet 24 g PRN    heparin 25,000 units in dextrose 5% (100 units/ml) IV bolus from bag - ADDITIONAL PRN BOLUS - 30 units/kg PRN    heparin 25,000 units in dextrose 5% (100 units/ml) IV bolus from bag - ADDITIONAL PRN BOLUS - 60 units/kg PRN    heparin 25,000 units in dextrose 5% 250 mL (100 units/mL) infusion LOW INTENSITY nomogram  - [facility] Continuous    insulin aspart U-100 pen 0-5 Units QID (AC + HS) PRN    insulin aspart U-100 pen 2 Units TIDWM    [START ON 9/12/2018] insulin detemir U-100 pen 12 Units Daily    isosorbide dinitrate tablet 20 mg BID    levothyroxine tablet 150 mcg Before breakfast    naloxone (NARCAN) 0.4 mg/mL injection     naloxone 0.4 mg/mL injection 0.4 mg Once    ondansetron injection 4 mg Q8H PRN    oxyCODONE immediate release tablet 10 mg Q6H PRN    oxyCODONE immediate release tablet 5 mg Q6H PRN    pantoprazole EC tablet 40 mg QAM    polyethylene glycol packet 17 g Daily    sevelamer carbonate tablet 800 mg TID WM    sevelamer carbonate tablet 800 mg TID WM    sildenafil 2.5 mg/mL oral suspension TID    sodium chloride 0.9% flush 5 mL PRN    trazodone split tablet 25 mg QHS    vancomycin (VANCOCIN) 1,000 mg in dextrose 5 % 250 mL IVPB Q24H       Objective:     Vital Signs (Most Recent):  Temp: 96.5 °F (35.8 °C) (09/11/18 1528)  Pulse: 80 (09/11/18 1528)  Resp: 18 (09/11/18 1202)  BP: (!) 93/52 (09/11/18 1528)  SpO2: (!) 93 % (09/11/18 1528)  O2 Device (Oxygen Therapy): nasal cannula (09/11/18 1528) Vital Signs (24h Range):  Temp:  [96.4 °F (35.8 °C)-98.1 °F (36.7 °C)] 96.5 °F (35.8 °C)  Pulse:  [53-80] 80  Resp:  [16-18] 18  SpO2:  [93 %-99 %] 93 %  BP: ()/(52-63) 93/52     Weight: 105.2 kg (232 lb) (09/09/18 0947)  Body mass index is 39.82 kg/m².  Body surface area is 2.18 meters squared.    I/O last 3 completed shifts:  In: 200 [P.O.:200]  Out: 900 [Urine:900]    Physical Exam   HENT:   Head: Atraumatic.   Neck: No JVD present.   Cardiovascular: Normal rate and regular rhythm.   Pulmonary/Chest: Effort normal. She has rales.   Abdominal: Soft. She exhibits no distension.   Musculoskeletal: She exhibits edema. She exhibits no tenderness.   Neurological: She is alert.   Skin: Skin is warm.           Assessment/Plan:     Somnolence    Per family patient not far from baseline, hyponatremia may be a  contributor, however would be concerned that CO2 retention / CO2 narcosis is a bigger contributor        Hyponatremia    Hyposomolar, hypervolemic hyponatremia, likely secondary decreased effective circulating volume secondary CHF exacerbation, currently patient is w/o seizures, she is somnolent, but per family this is not far from her baseline.    Some improvement overnight 118 --> 119 --> 120    Plan/Recommendations:  1) continue lasix diruesis at increased dosing  2) q4h sodium  3) repeat urine lytes (sodium and potassium)  4) restrict free water to 1000ccs per 24hrs  5) if acute worsening and neurological changes, then consider hypertonic, but otherwise hold off for now  6) think lasix diuresis will do the trick, but if not enough, then in a day or two could consider adding tolvaptan         CHF (congestive heart failure), NYHA class IV    Continue high dose IV diuresis, did not get last night, please make sure higher doses give            Thank you for your consult. I will follow-up with patient. Please contact us if you have any additional questions.    Allen Barnes MD  Nephrology  [facility]

## 2018-09-12 NOTE — PROCEDURES
"Medina Frazier is a 82 y.o. female patient.    Temp: 98.2 °F (36.8 °C) (18)  Pulse: 75 (18)  Resp: 13 (18)  BP: (!) 97/49 (18)  SpO2: 99 % (18)  Weight: 105.2 kg (232 lb) (18)  Height: 5' 4" (162.6 cm) (18)       Arterial Line  Date/Time: 2018 3:40 PM  Location procedure was performed: [facility] NEURO CRITICAL CARE  Performed by: Krysta Oliva NP  Authorized by: Krysta Oliva NP   Assisting provider: Leslie Johnson PA-C  Pre-op Diagnosis: shock  Post-operative diagnosis: shock  Consent Done: Yes  Consent: Written consent obtained.  Risks and benefits: risks, benefits and alternatives were discussed  Consent given by: Patient's daughter - Sheeba.  Patient identity confirmed: , MRN and name  Time out: Immediately prior to procedure a "time out" was called to verify the correct patient, procedure, equipment, support staff and site/side marked as required.  Preparation: Patient was prepped and draped in the usual sterile fashion.  Indications: multiple ABGs and hemodynamic monitoring  Location: left radial  Anesthesia: local infiltration    Anesthesia:  Local Anesthetic: lidocaine 1% without epinephrine  Estimated blood loss (mL): 10  Post-procedure: line sutured and dressing applied  Patient tolerance: Patient tolerated the procedure well with no immediate complications          Krysta Oliva  2018  "

## 2018-09-12 NOTE — PROGRESS NOTES
"Responded to Rapid Response in 1107 for patient having difficulty breathing and feeling like she is "choking". Patient talking but in some distress. Sat in the 90s on a NRB. ABG obtained and reported acidosis to Dr. Siu. Nurse reports patient possibly aspirated following morning meds. Patient was on BiPap all night but per Dr. Siu he does not want patient placed back on BiPap because of possible aspiration. Patient to be transferred to the unit. Assisted rapid team in transporting patient to the unit. Report given to KATYA Palomino RT.  "

## 2018-09-12 NOTE — CARE UPDATE
Rapid Response Nurse Note     Rapid Response Metrics:     Admit Date: 2018  LOS: 4  Code Status: Full Code   Date of Consult: 2018  : 1935  Age: 82 y.o.  Weight:   Wt Readings from Last 1 Encounters:   18 105.2 kg (232 lb)     Race:   Sex: female  Bed: [facility] A:   MRN: [id number]  Time Rapid Response Team page Received: 0630  Time Rapid Response Team at Bedside: 0633  Time Rapid Response Team left Bedside: 0715  Was the patient discharged from an ICU this admission? no   Was the patient discharged from a PACU within last 24 hours? no  Did the patient receive conscious sedation/general anesthesia within last 24 hours? no  Was the patient in the ED within the past 24 hours? no  Was the patient started on NIPPV within the past 24 hours? yes  Did this progress into an ARC or CPA: no  Attending Physician: Dale Beck, *  Primary Service: [facility] MED 1  Consult Requested By: Dale Beck, *   Rapid Response Indication(s): Respiratory distress  Disposition: Transfer to [place] [address]    SITUATION:     Reason for Call:   Called to evaluate the patient for Respiratory    BACKGROUND:     Why is the patient in the hospital?: Spinal abscess  What changed?: Hypoxemia this AM    ASSESSMENT:     What did you find: On arrival to bedside, patient sitting in bed, % FiO2 in place. SpO2-98%, non-labored respirations, diminished bilateral breath sounds, coarse breath sound to RML. ABG, chest x-ray, and Neb tx done. ABG results called to Dr. Scott Siu, instructed to transfer patient to [facility]. Results for MARY ELLEN RAHMAN (MRN [id number]) as of 2018 07:38   Ref. Range 2018 06:51   POC PH Latest Ref Range: 7.35 - 7.45  7.187 (LL)   POC PCO2 Latest Ref Range: 35 - 45 mmHg 79.2 (HH)   POC PO2 Latest Ref Range: 80 - 100 mmHg 125 (H)   POC BE Latest Ref Range: -2 to 2 mmol/L 2   POC HCO3 Latest Ref Range: 24 - 28 mmol/L 30.0 (H)   POC SATURATED O2 Latest Ref Range: 95 - 100 %  98   POC TCO2 Latest Ref Range: 23 - 27 mmol/L 32 (H)   Sample Unknown ARTERIAL       RECOMMENDATIONS:     We recommend: [facility] to assume care of patient    FOLLOW-UP/CONTINGENCY PLAN:       Call the rapid response Nurse at x [phone] for additional questions or concerns.      PHYSICIAN ESCALATION:     Orders received and case discussed with Dr. Scott Siu     Disposition: transfer

## 2018-09-12 NOTE — PROGRESS NOTES
[facility]  Nephrology  Progress Note    Patient Name: Medina Frazier  MRN: [id number]  Admission Date: 9/8/2018  Hospital Length of Stay: 4 days  Attending Provider: Anson Do MD   Primary Care Physician: Hao Garcia MD  Principal Problem:Acute hypercapnic respiratory failure    Subjective:     HPI: 83yo WF who was to have a lumbar fusion later in the month and was admitted due to abnormal pre-op labs.  Nephrology consulted for JEY (sCr 2.2 on admit, 3.1 at time of consult, normal baseline), also with a sodium of 119 (133 on August 10th). Patient appears somnolent, but wakes up and appropriately answers questions, per family she is at her baseline.  Patient is not complaining of increasing shortness of breath, she in on 3L O2 when seen, this is what she is on at home, she has history of LENA and is supposed to be on CPAP at night, but not compliant.  CXR reviewed and looks like pulmonary edema/congestion on admit.    Interval History: concerned for aspiration this morning when given meds, trasnferred to ICU, on BiPap, alert, but somnolent    Review of patient's allergies indicates:   Allergen Reactions    Codeine Nausea Only    Penicillins Swelling     Able to take amoxil     Current Facility-Administered Medications   Medication Frequency    albuterol-ipratropium 2.5 mg-0.5 mg/3 mL nebulizer solution 3 mL Q4H    atorvastatin tablet 40 mg QHS    ceFEPIme injection 2 g Daily    dextrose 50% injection 12.5 g PRN    dextrose 50% injection 25 g PRN    furosemide injection 160 mg BID    glucagon (human recombinant) injection 1 mg PRN    glucose chewable tablet 16 g PRN    glucose chewable tablet 24 g PRN    insulin aspart U-100 pen 0-5 Units QID (AC + HS) PRN    levothyroxine tablet 150 mcg Before breakfast    norepinephrine 4 mg in dextrose 5% 250 mL infusion (premix) (titrating) Continuous    ondansetron injection 4 mg Q8H PRN    pantoprazole EC tablet 40 mg QAM     polyethylene glycol packet 17 g Daily    sevelamer carbonate tablet 800 mg TID WM    sodium chloride 0.9% flush 5 mL PRN    vancomycin (VANCOCIN) 1,000 mg in dextrose 5 % 250 mL IVPB Q24H       Objective:     Vital Signs (Most Recent):  Temp: 96.7 °F (35.9 °C) (09/12/18 0730)  Pulse: 75 (09/12/18 0900)  Resp: 15 (09/12/18 0900)  BP: 119/60 (09/12/18 0900)  SpO2: 96 % (09/12/18 0900)  O2 Device (Oxygen Therapy): BiPAP (09/12/18 0751) Vital Signs (24h Range):  Temp:  [96.4 °F (35.8 °C)-98.6 °F (37 °C)] 96.7 °F (35.9 °C)  Pulse:  [62-81] 75  Resp:  [8-21] 15  SpO2:  [55 %-100 %] 96 %  BP: ()/(52-76) 119/60     Weight: 105.2 kg (232 lb) (09/09/18 0947)  Body mass index is 39.82 kg/m².  Body surface area is 2.18 meters squared.    I/O last 3 completed shifts:  In: 833.5 [P.O.:525; I.V.:58.5; IV Piggyback:250]  Out: 625 [Urine:625]    Physical Exam   HENT:   Head: Atraumatic.   Neck: No JVD present.   Cardiovascular: Normal rate and regular rhythm.   Pulmonary/Chest: She has rales.   On BiPap, coarse sounds   Abdominal: Soft. She exhibits no distension.   Musculoskeletal: She exhibits edema. She exhibits no tenderness.   Neurological: She is alert.   But increasingly somnolent   Skin: Skin is warm.       Assessment/Plan:     * Acute hypercapnic respiratory failure    On top of likely some degree of chronic hypercapnia secondary poorly controlled LENA (per history patient has only intermittently, if at all, used her home CPAP).    Also and as noted in CHF section patient also has congestion/pulmonary edema on CXR    Plan/Recommendations:  1) NIPPV as per critical care team  2) recommend lasix diuresis for volume overload (see CHF section)        Hyponatremia    -hypervolemic hyponatremia secondary decreased effective circulating volume, likely secondary CHF, urine sodium < 20 is consistent, at this time not getting worse and a little better since admission, think current deterioration in mental status more likely  to CO2 retention / CO2 narcosis and not hyponatremia    Plan/Recommendations:  1) lasix diuresis as outlined in CHF section, would anticipate helping with correction, if not adequate, then would recommend adding tolvaptan  2) avoid thiazide diuretics  3) q4h sodium  4) try to mix all medications in NS and not D5 if at all possible  5) would hold off on hypertonic as we feel encephalopathy/AMS is from CO2 retention and not hyponatremia   6) repeat urine electrolytes (potassium and sodium)        JEY (acute kidney injury)    Non-oliguric, likely multifactorial, the urine sodium of 10, could be consistent with CRS in this patient with what appears to be acute CHF exacerbation and volume overload, however would be careful reading too much in to this urinary sodium value, also consider ATN toxic secondary infection, ischemic ATN, also possibly vancomycin contributing to worsening sCr since admit further noted with WBCs in the urine of > 100, need to r/o infection which would be unlikely to cause an JEY (unless there is a bilateral pyelonephritis, which clinically does not fit, nor consistent with U/S findings), in short, if urine cultures negative need to consider AIN (outpatient abxs), absence of eosinophilia, rash or fever, may make a little less likely, but does not rule out.    Renal ultrasound unremarkable and negative for hydronephrosis.    Plan/Reccomendations:  1) lasix challend as outlined in CHF section  2) continue strict Is & Os and serial renal panels  3) obtain daily vanco through levels and dose appropriately for renal funtion  4) will collect urine, spin down and manually review microscopy        CHF (congestive heart failure), NYHA class IV    -give lasix challenge with 160mg IV x 1 if there is a response then can go to 120mg BID or TID, would not recommend thiazied due to hyponatremia, if limited response to lasix then would need to consider ultrasfiltration, but hopeful high dose loop diuretic will be  enough            Thank you for your consult. I will follow-up with patient. Please contact us if you have any additional questions.    Allen Barnes MD  Nephrology  [facility]

## 2018-09-13 NOTE — PLAN OF CARE
Problem: Patient Care Overview  Goal: Plan of Care Review  Outcome: Ongoing (interventions implemented as appropriate)  POC reviewed with pt at 0500. Pt's sister verbalized understanding. Questions and concerns addressed. No acute events overnight. Pt progressing toward goals. Will continue to monitor. See flowsheets for full assessment and VS info Pt going to IR today for washout and drain placent

## 2018-09-13 NOTE — CONSULTS
"  [facility]  Adult Nutrition  Consult Note    SUMMARY     Recommendations    Recommendation/Intervention:   1. If unable to extubate >72hrs, recommend starting TF.   Novasource Renal @ goal rate 40mL/hr.   - Initiate @ 10mL/hr and increase by 10mL q4hrs, or as tolerated, until goal rate is reached.   - Hold for residuals >500mL.   - Provides 1920kcals, 87g protein and 688mL free water.     2. If able to extubate and advance diet, recommend Diabetic with texture per SLP recommendations.     RD to monitor.    Goals: Pt to receive nutrition by RD follow up  Nutrition Goal Status: new  Communication of RD Recs: reviewed with RN    Reason for Assessment    Reason for Assessment: consult  Diagnosis: other (see comments)(aspiration, encephalopathy, worsening JEY)  Relevant Medical History: HEpEF, CAD, lumbar stenosis, HTN, T2DM  General Information Comments: Pt intubated. NFPE completed - No signs of malnutrition at this time. Pt with stable weight PTA, now increased weight likely 2/2 fluid status. No TF ordered at this time.  Nutrition Discharge Planning: unable to determine at this time    Nutrition Risk Screen    Nutrition Risk Screen: large or nonhealing wound, burn or pressure ulcer    Nutrition/Diet History    Do you have any cultural, spiritual, Mormon conflicts, given your current situation?: none  Factors Affecting Nutritional Intake: NPO, on mechanical ventilation    Anthropometrics    Temp: 97.9 °F (36.6 °C)  Height Method: Stated  Height: 5' 4" (162.6 cm)  Height (inches): 64 in  Weight Method: Bed Scale  Weight: 127.7 kg (281 lb 8.4 oz)  Weight (lb): 281.53 lb  Ideal Body Weight (IBW), Female: 120 lb  % Ideal Body Weight, Female (lb): 193.33 lb  BMI (Calculated): 39.9  BMI Grade: 35 - 39.9 - obesity - grade II       Lab/Procedures/Meds    Pertinent Labs Reviewed: reviewed  Pertinent Labs Comments: HgbA1c 7.8, Na 123, BUN 62, Cr 3.0, Ph 5.6  Pertinent Medications Reviewed: " reviewed  Pertinent Medications Comments: fentanyl, norepi    Physical Findings/Assessment    Overall Physical Appearance: nourished, obese, edematous, on ventilator support  Tubes: orogastric tube  Skin: incision(s), dermatitis, edema    Estimated/Assessed Needs    Weight Used For Calorie Calculations: 127.7 kg (281 lb 8.4 oz)  Energy Calorie Requirements (kcal): 7310-9897  Energy Need Method: Kcal/kg(11-14kcal/kg)  Protein Requirements: 82-109g(1.5-2.0g/kg)  Weight Used For Protein Calculations: 54.5 kg (120 lb 2.4 oz)  Fluid Requirements (mL): 1mL/kcal or per MD     RDA Method (mL): 1405  CHO Requirement: 50% of total kcals      Nutrition Prescription Ordered    Current Diet Order: NPO    Evaluation of Received Nutrient/Fluid Intake    I/O: -I/O, good UOP, LBM 9/12  % Intake of Estimated Energy Needs: 0 - 25 %  % Meal Intake: NPO    Nutrition Risk    Level of Risk/Frequency of Follow-up: (f/u 2x/week)     Assessment and Plan    Nutrition Problem  Inadequate energy intake    Related to (etiology):   NPO    Signs and Symptoms (as evidenced by):   Pt receiving <85% EEN and EPN.     Nutrition Diagnosis Status:   New       Monitor and Evaluation    Food and Nutrient Intake: energy intake, food and beverage intake, enteral nutrition intake  Food and Nutrient Adminstration: diet order, enteral and parenteral nutrition administration  Anthropometric Measurements: weight, weight change, body mass index  Biochemical Data, Medical Tests and Procedures: electrolyte and renal panel, glucose/endocrine profile, gastrointestinal profile, inflammatory profile, lipid profile  Nutrition-Focused Physical Findings: overall appearance     Nutrition Follow-Up    RD Follow-up?: Yes

## 2018-09-13 NOTE — ASSESSMENT & PLAN NOTE
Non-oliguric, likely multifactorial, the urine sodium of 10, could be consistent with CRS in this patient with what appears to be acute CHF exacerbation and volume overload, however would be careful reading too much in to this urinary sodium value, also consider ATN toxic secondary infection, ischemic ATN, also possibly vancomycin contributing to worsening sCr since admit further noted with WBCs in the urine of > 100, need to r/o infection which would be unlikely to cause an JEY (unless there is a bilateral pyelonephritis, which clinically does not fit, nor consistent with U/S findings), in short, if urine cultures negative need to consider AIN (outpatient abxs), absence of eosinophilia, rash or fever, may make a little less likely, but does not rule out.    Renal ultrasound unremarkable and negative for hydronephrosis.    Excellent response to IV lasix and stable sCr despite significant diuresis    Plan/Reccomendations:  1) continue diuresis with loop diuretics as felt needed for volume status, avoid thiazides  2) serial renal panels, strict Is & Os

## 2018-09-13 NOTE — PROCEDURES
Radiology Post-Procedure Note    Pre Op Diagnosis: Paraspinal abscess  Post Op Diagnosis: Same    Procedure: Paraspinal abscess aspiration    Procedure performed by: Cristobal Farmer MD    Written Informed Consent Obtained: Yes  Specimen Removed: YES 5mL purulent fluid  Estimated Blood Loss: Minimal    Findings:   Successful paraspinal abscess aspiration using CT guidance.  5mL purulent fluid.    Patient tolerated procedure well.    Cristobal Farmer MD  Diagnostic and Interventional Radiologist  Department of Radiology  Pager: [phone]

## 2018-09-13 NOTE — H&P
Inpatient Radiology Pre-procedure Note    History of Present Illness:  Medina Frazier is a 82 y.o. female who presents for L2-L3 paraspinal fluid collection aspiration.    Admission H&P reviewed.  Past Medical History:   Diagnosis Date    Allergic sinusitis     Anticoagulant long-term use     Arthritis     Asthma     CHF (congestive heart failure)     Chronic kidney disease     COPD (chronic obstructive pulmonary disease)     Coronary artery disease     Diabetes mellitus     Diabetic neuropathy 2/27/2013    Encounter for blood transfusion     General anesthetics causing adverse effect in therapeutic use     DELAYED EMERGENCE    GERD (gastroesophageal reflux disease)     HHD (hypertensive heart disease)     High cholesterol     [place] (hard of hearing)     Hypertension     Hypothyroidism     Lumbar spinal stenosis     MRSA (methicillin resistant Staphylococcus aureus) infection 7/7/2018    MRSA infection     PAST H/O, CULTURE DONE 5/30/18 (+)    On home oxygen therapy     Osteoporosis     Pulmonary hypertension     Sleep apnea     STATES NOT USING C PAP    Spinal stenosis     Thyroid disease     TIA (transient ischemic attack)     UTI (urinary tract infection)     Venous insufficiency     Wears glasses     Wears partial dentures     UPPER FULL, LOWER PARTIAL     Past Surgical History:   Procedure Laterality Date    ANGIOGRAM-CORONARY N/A 6/2/2017    Performed by Yury Bailey MD at [facility] CATH    ANGIOPLASTY  2008    CARDIAC STENTS    APPENDECTOMY      APPLICATION OF WOUND VACUUM-ASSISTED CLOSURE DEVICE N/A 7/2/2018    Procedure: APPLICATION, WOUND VAC;  Surgeon: Jeremie Gutiérrez Jr., MD;  Location: [facility] OR;  Service: Orthopedics;  Laterality: N/A;    APPLICATION OF WOUND VACUUM-ASSISTED CLOSURE DEVICE N/A 8/7/2018    Procedure: APPLICATION, WOUND VAC;  Surgeon: Jeremie Gutiérrez Jr., MD;  Location: [facility] OR;  Service: Orthopedics;  Laterality: N/A;    APPLICATION, DRESSING,  WOUND Left 8/11/2013    Per[facility] by [facility]wan [facility] at [facility] OR    APPLICATION, WOUND VAC N/A 8/7/2018    Performed by Jeremie Guti[facility]rez Jr., MD at [facility] OR    APPLICATION, WOUND VAC N/A 7/2/2018    Performed by Jeremie Gutiérrez Jr., MD at [facility] OR    ARTHROPLASTY, KNEE, TOTAL Left 3/18/2013    [facility] at [facility] OR    ATHERECTOMY N/A 6/5/2017    Performed by Lokesh Thakur MD at [facility] CATH    BLADDER SUSPENSION      BONE GRAFT N/A 6/5/2018    Procedure: BONE GRAFT;  Surgeon: Jeremie Gutiérrez Jr., MD;  Location: [facility] OR;  Service: Orthopedics;  Laterality: N/A;    BONE GRAFT N/A 6/5/2018    Performed by Jeremie Gutiérrez Jr., MD at [facility] OR    CARDIAC SURGERY  1996    CABG    CARDIAC SURGERY      Stents    CORONARY ARTERY BYPASS GRAFT  1996    EXPLORATION, WOUND-lumbar N/A 8/7/2018    Performed by Jeremie Gutiérrez Jr., MD at [facility] OR    FRACTURE SURGERY Left     Lt hand    FRACTURE SURGERY Left 1993    Lt Knee    FUSION-TRANSLUMINAL LUMBAR INTERBODY (TLIF) L2-3 W/ INSTRUMENTATION N/A 6/5/2018    Performed by Jeremie Gutiérrez Jr., MD at [facility] OR    HEMORRHOID SURGERY      HEMORRHOID SURGERY      HYSTERECTOMY  1984    INCISION AND DRAINAGE N/A 7/2/2018    Procedure: INCISION AND DRAINAGE (I & D) LUMBAR SPINE W/ANTIBIOTIC BEAD PLACEMENT;  Surgeon: Jeremie Gutiérrez Jr., MD;  Location: [facility] OR;  Service: Orthopedics;  Laterality: N/A;    INCISION AND DRAINAGE (I & D) LUMBAR SPINE W/ANTIBIOTIC BEAD PLACEMENT N/A 7/2/2018    Performed by Jeremie Gutiérrez Jr., MD at [facility] OR    INCISION AND DRAINAGE (I & D)-EXTREMITY-LOWER Left 8/11/2013    [facility] at [facility] OR    INCISION AND DRAINAGE POSTERIOR LUMBAR SPINE  07/02/2018    INSERTION, PICC Right 7/2/2018    Performed by Jeremie Gutiérrez Jr., MD at [facility] OR    JOINT REPLACEMENT Left     KNEE SURGERY Left     POSTOP TKR INFECTION TX    LUMBAR EPIDURAL INJECTION       OPEN REDUCTION INTERNAL FIXATION-HIP TFN Left 9/21/2017    Performed by LOY Early II, MD at [facility] OR    PERIPHERALLY INSERTED CENTRAL CATHETER INSERTION Right 7/2/2018    Procedure: INSERTION, PICC;  Surgeon: Jeremie Gutiérrez Jr., MD;  Location: [facility] OR;  Service: Orthopedics;  Laterality: Right;    REMOVAL OF HARDWARE FROM SPINE N/A 8/7/2018    Procedure: REMOVAL, HARDWARE, SPINE;  Surgeon: Jeremie Gutiérrez Jr., MD;  Location: [facility] OR;  Service: Orthopedics;  Laterality: N/A;    REMOVAL, HARDWARE, SPINE N/A 8/7/2018    Performed by Jeremie G[facility]rez Jr., MD at [facility] OR    REMOVAL, PROSTHESIS, KNEE Left 11/20/2013    [facility] OR    THYROIDECTOMY, PARTIAL      TOTAL KNEE  PROSTHESIS REMOVAL W/ SPACER INSERTION Left     TRANSFORAMINAL LUMBAR INTERBODY FUSION (TLIF) OF SPINE WITH PERCUTANEOUS INSTRUMENTATION N/A 6/5/2018    Procedure: FUSION-TRANSLUMINAL LUMBAR INTERBODY (TLIF) L2-3 W/ INSTRUMENTATION;  Surgeon: Jeremie Gutiérrez Jr., MD;  Location: [facility] OR;  Service: Orthopedics;  Laterality: N/A;    WOUND EXPLORATION N/A 8/7/2018    Procedure: EXPLORATION, WOUND-lumbar;  Surgeon: Jeremie Gutiérrez Jr., MD;  Location: [facility] OR;  Service: Orthopedics;  Laterality: N/A;       Review of Systems:   As documented in primary team H&P    Home Meds:   Prior to Admission medications    Medication Sig Start Date End Date Taking? Authorizing Provider   acetaminophen (TYLENOL) 325 MG tablet Take 650 mg by mouth every 4 (four) hours as needed for Pain or Temperature greater than (100).   Yes Historical Provider, MD   albuterol (ACCUNEB) 0.63 mg/3 mL Nebu Take 0.63 mg by nebulization every 6 (six) hours as needed (sob/wheezing). Rescue    Yes Historical Provider, MD   atorvastatin (LIPITOR) 40 MG tablet Take 40 mg by mouth every evening.    Yes Historical Provider, MD   bisacodyl (DULCOLAX, BISACODYL,) 10 mg Supp Place 10 mg rectally every 8 (eight) hours as  needed (constipation).   Yes Historical Provider, MD   calcium carbonate-simethicone (MAALOX ADVANCED) 1,000-60 mg Chew Take 1 tablet by mouth every 4 (four) hours as needed (heartburn).   Yes Historical Provider, MD   carvedilol (COREG) 25 MG tablet Take 25 mg by mouth 2 (two) times daily.    Yes Historical Provider, MD   clopidogrel (PLAVIX) 75 mg tablet Take 1 tablet (75 mg total) by mouth once daily.  Patient taking differently: Take 75 mg by mouth every morning.  6/7/17 9/9/18 Yes Lokesh Thakur MD   diazePAM (VALIUM) 5 MG tablet Take 5 mg by mouth every 8 (eight) hours as needed (spasms).    Yes Historical Provider, MD   DULoxetine (CYMBALTA) 30 MG capsule Take 30 mg by mouth once daily.   Yes Historical Provider, MD   furosemide (LASIX) 20 MG tablet Take 20 mg by mouth every morning.    Yes Historical Provider, MD   HYDROcodone-acetaminophen (NORCO) 7.5-325 mg per tablet Take 1 tablet by mouth every 6 (six) hours as needed for Pain.   Yes Historical Provider, MD   insulin glargine (LANTUS) 100 unit/mL injection Inject 30 Units into the skin every evening.    Yes Historical Provider, MD   isosorbide dinitrate (ISORDIL) 20 MG tablet Take 20 mg by mouth 2 (two) times daily.    Yes Historical Provider, MD   levothyroxine (SYNTHROID) 150 MCG tablet Take 150 mcg by mouth before breakfast.    Yes Historical Provider, MD   magnesium hydroxide 400 mg/5 ml (MILK OF MAGNESIA) 400 mg/5 mL Susp Take 30 mLs by mouth daily as needed (constipation).    Yes Historical Provider, MD   OMEGA-3/DHA/EPA/FISH OIL (OMEGA-3 FISH OIL ORAL) Take 2 capsules by mouth every evening.    Yes Historical Provider, MD   ondansetron (ZOFRAN) 8 MG tablet Take by mouth every 8 (eight) hours as needed for Nausea.   Yes Historical Provider, MD   pantoprazole (PROTONIX) 40 MG tablet Take 40 mg by mouth every morning.    Yes Historical Provider, MD   polyethylene glycol (GLYCOLAX) 17 gram PwPk Take 17 g by mouth every evening. Mix in 8 ounces of  water.If stool is too loose,may use every other night   Yes Historical Provider, MD   rivaroxaban (XARELTO) 15 mg Tab Take 1 tablet (15 mg total) by mouth daily with dinner or evening meal. 6/7/17  Yes Lokesh Thakur MD   sildenafil (REVATIO) 20 mg Tab Take 10 mg by mouth 3 (three) times daily.   Yes Historical Provider, MD   spironolactone (ALDACTONE) 50 MG tablet Take 50 mg by mouth once daily.   Yes Historical Provider, MD   ciprofloxacin, CIPRO,400mg/200ml D5W IVPB (CIPRO IN D5W) 400 mg/200 mL IVPB Inject 400 mg into the vein every 12 (twelve) hours.    Historical Provider, MD   food supplemt, lactose-reduced (ENSURE ACTIVE LIGHT) Liqd Take by mouth as needed. FOR LOW BLOOD SUGAR    Historical Provider, MD   heparin flush,porcine,-0.9NaCl 100 unit/mL Kit Inject 5 mLs into the vein once daily. To each port    Historical Provider, MD   nut.tx.gluc.intol,lac-free,soy (GLUCERNA SHAKE) Liqd Take by mouth as needed. TX BLOOD SUGAR LOWS OR HIGHS    Historical Provider, MD     Scheduled Meds:    albuterol-ipratropium  3 mL Nebulization Q4H    ceFEPime (MAXIPIME) IVPB  2 g Intravenous Daily    chlorhexidine  15 mL Mouth/Throat BID    fluconazole (DIFLUCAN) IVPB  200 mg Intravenous Q24H    levothyroxine  150 mcg Per OG tube Before breakfast    pantoprazole  40 mg Oral QAM     Continuous Infusions:    fentanyl 200 mcg/hr (09/13/18 0605)    custom IV infusion builder (for pharmacist use only) 9.9 mL/hr at 09/13/18 0605     PRN Meds:dextrose 50%, dextrose 50%, fentaNYL citrate (PF), glucagon (human recombinant), glucose, glucose, insulin aspart U-100, ondansetron  Anticoagulants/Antiplatelets: no anticoagulation    Allergies:   Review of patient's allergies indicates:   Allergen Reactions    Codeine Nausea Only    Penicillins Swelling     Able to take amoxil     Sedation Hx: have not been any systemic reactions    Labs:  Recent Labs   Lab  09/13/18 0141   INR  1.3*       Recent Labs   Lab  09/13/18 0141    WBC  22.10*  22.10*   HGB  8.1*  8.1*   HCT  25.2*  25.2*   MCV  79*  79*   PLT  264  264      Recent Labs   Lab  09/13/18   0141  09/13/18   0536   GLU   --   93   NA   --   123*   K   --   4.6   CL   --   85*   CO2   --   23   BUN   --   62*   CREATININE   --   3.0*   CALCIUM   --   8.1*   MG  1.5*   --    ALT  7*   --    AST  13   --    ALBUMIN  1.7*   --    BILITOT  1.3*   --    BILIDIR  0.9*   --      Vitals:  Temp: 98.4 °F (36.9 °C) (09/13/18 0605)  Pulse: 82 (09/13/18 0605)  Resp: 20 (09/13/18 0405)  BP: (!) 112/58 (09/13/18 0605)  SpO2: 97 % (09/13/18 0605)     Physical Exam:  ASA: III   Mallampati: Patient currently intubated     General: no acute distress  Mental Status: alert and oriented to person, place and time  HEENT: normocephalic, atraumatic  Chest: unlabored breathing  Heart: regular heart rate  Abdomen: nondistended  Extremity: moves all extremities    Plan: lumbar paraspinal fluid collection drainage   Sedation Plan: currently intubated and on moderate sedation with fentanyl    Darshana Harry, PGY-2

## 2018-09-13 NOTE — SUBJECTIVE & OBJECTIVE
Interval History:   Intubated, sedated  S/p IR aspiration this AM  Gram stain +GNRs and gram negative diplococci   On vancomycin, cefepime, flagyl, and fluconazole  Continue current therapy  Vancomycin level supratherapeutic     Review of Systems   Unable to perform ROS: Intubated     Objective:     Vital Signs (Most Recent):  Temp: 97.9 °F (36.6 °C) (09/13/18 1326)  Pulse: 81 (09/13/18 1326)  Resp: 16 (09/13/18 1326)  BP: (!) 107/51 (09/13/18 1300)  SpO2: (!) 91 % (09/13/18 1326) Vital Signs (24h Range):  Temp:  [97.7 °F (36.5 °C)-99.3 °F (37.4 °C)] 97.9 °F (36.6 °C)  Pulse:  [69-83] 81  Resp:  [0-27] 16  SpO2:  [88 %-100 %] 91 %  BP: ()/(49-84) 107/51  Arterial Line BP: (107-137)/(43-57) 109/51     Weight: 127.7 kg (281 lb 8.4 oz)  Body mass index is 48.32 kg/m².    Estimated Creatinine Clearance: 20.5 mL/min (A) (based on SCr of 2.9 mg/dL (H)).    Physical Exam   Constitutional: She appears well-developed and well-nourished. No distress.   Intubated, sedated   Cardiovascular: Normal rate and intact distal pulses. An irregularly irregular rhythm present. Exam reveals no friction rub.   No murmur heard.  Pulmonary/Chest: Effort normal. She has no wheezes. She has rales.   Abdominal: Soft. Bowel sounds are normal. She exhibits no distension.   Obese abdomen   Musculoskeletal: She exhibits edema (BLE).   +2 pitting edema   Skin: Skin is warm and dry. She is not diaphoretic. No erythema.   Lumbar wound not examined at this wound. wound vac in place  Central line in place   Nursing note and vitals reviewed.      Significant Labs:   Blood Culture:   Recent Labs   Lab  06/08/18   1349  09/09/18   1555  09/09/18   1731  09/11/18   1003  09/11/18   1011   LABBLOO  No growth after 5 days.  No growth after 5 days.  Gram stain terrell bottle: Gram negative rods   Positive results previously called 09/11/2018  13:30  Gram stain terrell bottle: Gram negative rods   Results called to and read back by:Sofia Noriega RN 09/11/2018   06:01  No Growth to date  No Growth to date  No Growth to date  No Growth to date  No Growth to date  No Growth to date     CBC:   Recent Labs   Lab  09/12/18   0803  09/13/18   0141   WBC  20.25*  22.10*  22.10*   HGB  8.7*  8.1*  8.1*   HCT  27.7*  25.2*  25.2*   PLT  213  264  264     CMP:   Recent Labs   Lab  09/12/18   2330  09/13/18   0141  09/13/18   0536  09/13/18   1316   NA  122*   --   123*  124*   K  4.9   --   4.6  4.6   CL  85*   --   85*  87*   CO2  23   --   23  24   GLU  96   --   93  105   BUN  65*   --   62*  63*   CREATININE  3.1*   --   3.0*  2.9*   CALCIUM  8.2*   --   8.1*  8.3*   PROT   --   5.3*   --    --    ALBUMIN   --   1.7*   --    --    BILITOT   --   1.3*   --    --    ALKPHOS   --   135   --    --    AST   --   13   --    --    ALT   --   7*   --    --    ANIONGAP  14   --   15  13   EGFRNONAA  13.6*   --   14.1*  14.7*     Wound Culture:   Recent Labs   Lab  07/02/18   1432   LABAERO  ENTEROBACTER CLOACAE COMPLEX  Rare  For susceptibility see order # [id number]    ENTEROBACTER CLOACAE COMPLEX  Moderate       All pertinent labs within the past 24 hours have been reviewed.    Significant Imaging: I have reviewed all pertinent imaging results/findings within the past 24 hours.

## 2018-09-13 NOTE — PHYSICIAN QUERY
PT Name: Medina Frazier  MR #: [id number]    Physician Query Form - Cause and Effect Relationship Clarification      CDS/: Mary Lou RICKETTS,RN,CDI   Contact information:[phone]  This form is a permanent document in the medical record.     Query Date: September 13, 2018    By submitting this query, we are merely seeking further clarification of documentation. Please utilize your independent clinical judgment when addressing the question(s) below.    The Medical record contains the following:  Supporting Clinical Findings   Location in record            UTI     (urinary tract infection)                                                                                            09/08/18 Hospital Medicine Subjective and Objective                                                                                                                              BEN ALBICANS           > 100,000 cfu/ml                fluconazole (DIFLUCAN) IVPB 200 mg 100 mL           Dose: 200 mg          Freq: Every 24 hours (non-standard times)              Route: IV          Indications of Use: Urinary Tract Infection          fluconazole (DIFLUCAN) IVPB 400 mg 200 mL           Dose: 400 mg  Freq: Once Route: IV          Indications of Use: Urinary Tract Infection          Start: 09/12/18 1800 End: 09/12/18 1946                                                                  09/10/18 Urine Culture                   09/08---------------------------->09/13  MAR         Provider, please clarify if there is any correlation between ___UTI______________________ and ___Candida Albicans_______________.           Are the conditions:     [  ] Due to or associated with each other     [  ] Unrelated to each other     [  ] Other (Please Specify): _________________________     [ x ] Clinically Undetermined

## 2018-09-13 NOTE — NURSING
POC reviewed with pt and family at 1400.   Family verbalized understanding.   Questions and concerns addressed.   See flowsheets for full assessment and VS info.     IR this am for procedure.  Levo @ 0.1 mcg/kg/min to maintain MAP > 65.  Fentanyl @ 50 mcg/hr.  Propofol @ 5 mcg/kg/min.  Tube feeds started, currently @ 10 ml/hr.  Saenz replaced with temp sensing saenz.  Bath completed.  BM x 1.

## 2018-09-13 NOTE — PLAN OF CARE
Problem: Patient Care Overview  Goal: Plan of Care Review  Outcome: Ongoing (interventions implemented as appropriate)  Nutrition assessment completed. Please see RD note for details.    Recommendation/Intervention:   1. If unable to extubate >72hrs, recommend starting TF.   Novasource Renal @ goal rate 40mL/hr.   - Initiate @ 10mL/hr and increase by 10mL q4hrs, or as tolerated, until goal rate is reached.   - Hold for residuals >500mL.   - Provides 1920kcals, 87g protein and 688mL free water.     2. If able to extubate and advance diet, recommend Diabetic with texture per SLP recommendations.     RD to monitor.    Goals: Pt to receive nutrition by RD follow up  Nutrition Goal Status: new  Communication of RD Recs: reviewed with RN

## 2018-09-13 NOTE — ASSESSMENT & PLAN NOTE
--likely anemia of chronic disease. Trend Hgb for now  --no signs of acute blood loss  --transfuse for Hgb <7

## 2018-09-13 NOTE — PROGRESS NOTES
Pt arrived with critical care nurse and respiratory team to  for lumbar paraspinal fluid collection drainage.  Name verified using two identifiers.  Allergies verified. Consent in chart.  Will continue to monitor.

## 2018-09-13 NOTE — ASSESSMENT & PLAN NOTE
- Prior ECHO shows diastolic dysfunction (7/2018)  - Echo (9/13) EF 55-60%; concentric remodeling with RV enlargement; PA systolic pressure 88 mm Hg; no WMA  - Diuresed about 2 liters yesterday on 160 mg of lasix  - Continuing diuresis with 1 dose of 80 mg of lasix

## 2018-09-13 NOTE — PROGRESS NOTES
[facility]  Infectious Disease  Progress Note    Patient Name: Medina Frazier  MRN: [id number]  Admission Date: 9/8/2018  Length of Stay: 5 days  Attending Physician: Anson Do MD  Primary Care Provider: Hao Garcia MD    Isolation Status: No active isolations  Assessment/Plan:      Spinal epidural abscess    82 year old female with history of L2-3 fusion 6/5 complicated by post operative infection with Enterobacter cloacae. She has been on outpatient IV Ciprofloxacin and has undergone an I&D on 7/2 with hardware removal on 8/7 without resolution of her infection. 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 Blood cultures from 9/8 are +contaminant. Blood cultures 9/9 +GNR. Suspect to be enterobacter as with worsening infection liekly 2/2 source control. Neurosurgery following, surgery cancelled as pt is not medically stable.       Pt in ICU for acute respiratory failure with concern for aspiration event. Pt currently intubated, sedated. WBC 22K today.     Plan  -  Vancomycin level 27 today. Hold and redose levels once levels are therapeutic.  -  Recommend d/c cefepime and flagyl start Zosyn as blood cultures +prevotella and gram stain from aspiration +GNR and gram negative diplococci. Pt with documented PCN allergy but previously denies having PCN allergy. She has tolerated amoxil in the past. Discussed with primary team. Will start zosyn and monitor closely.   - Blood cultures 2/4 bottles +prevotella.   - Fluconazole added per primary team. Continue current therapy. recommend saenz exchange if able.     Seen and discussed with ID staff. Discussed with primary team. ID will follow closely with you.             Thank you for your consult. I will follow-up with patient. Please contact us if you have any additional  questions.    Tawanda Patel PA-C  Infectious Disease  [facility]  [phone]    Subjective:     Principal Problem:Acute hypercapnic respiratory failure    HPI: Medina Frazier is an 82 year old female who underwent L2-3 fusion on 6/5/18. She did well until shortly after staples removed,   drainage from the lower part of the incision was noted. On 7/2 she went to the OR for an I&D. Purulence was encountered. Surgical cultures grew Enterobacter cloacae. She was seen by Dr. Lenz, ID provider who started patient on IV Ciprofloxacin for 6 weeks. Patient's infection did not resolve and wound continued to drain. She was taken back to the OR on 8/7 for hardware removal, however appears on most recent imaging an intervertebral cage remains in place. No new surgical cultures obtained at that time. She was continued on Cipro. Patient's back pain has worsened over the past week and is now wrapping around to her anterior abdomen. She presented to an OSH  ED in [place] for evaluation. 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 Labs notable for elevated WBC, ESR, CRP and pyuria. Procalc 19. JEY.  Blood and urine culture negative.  She was transferred here for higher level of care. Currently she is on Cipro from her previous cultures, and was started on Vanc on 9/8. Patient denies fevers, chills, sweats. Reports severe back pain and lower extremity weakness. Magaña in place.    Of note, patient has a documented PCN allergy. She said she has tolerated PCN in the recent past without adverse reactions. 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 No problems since.      Interval History:   Intubated, sedated  S/p IR aspiration this AM  Gram stain +GNRs and gram negative diplococci   On vancomycin, cefepime, flagyl, and fluconazole  Continue current  therapy  Vancomycin level supratherapeutic     Review of Systems   Unable to perform ROS: Intubated     Objective:     Vital Signs (Most Recent):  Temp: 97.9 °F (36.6 °C) (09/13/18 1326)  Pulse: 81 (09/13/18 1326)  Resp: 16 (09/13/18 1326)  BP: (!) 107/51 (09/13/18 1300)  SpO2: (!) 91 % (09/13/18 1326) Vital Signs (24h Range):  Temp:  [97.7 °F (36.5 °C)-99.3 °F (37.4 °C)] 97.9 °F (36.6 °C)  Pulse:  [69-83] 81  Resp:  [0-27] 16  SpO2:  [88 %-100 %] 91 %  BP: ()/(49-84) 107/51  Arterial Line BP: (107-137)/(43-57) 109/51     Weight: 127.7 kg (281 lb 8.4 oz)  Body mass index is 48.32 kg/m².    Estimated Creatinine Clearance: 20.5 mL/min (A) (based on SCr of 2.9 mg/dL (H)).    Physical Exam   Constitutional: She appears well-developed and well-nourished. No distress.   Intubated, sedated   Cardiovascular: Normal rate and intact distal pulses. An irregularly irregular rhythm present. Exam reveals no friction rub.   No murmur heard.  Pulmonary/Chest: Effort normal. She has no wheezes. She has rales.   Abdominal: Soft. Bowel sounds are normal. She exhibits no distension.   Obese abdomen   Musculoskeletal: She exhibits edema (BLE).   +2 pitting edema   Skin: Skin is warm and dry. She is not diaphoretic. No erythema.   Lumbar wound not examined at this wound. wound vac in place  Central line in place   Nursing note and vitals reviewed.      Significant Labs:   Blood Culture:   Recent Labs   Lab  06/08/18   1349  09/09/18   1555  09/09/18   1731  09/11/18   1003  09/11/18   1011   LABBLOO  No growth after 5 days.  No growth after 5 days.  Gram stain terrell bottle: Gram negative rods   Positive results previously called 09/11/2018  13:30  Gram stain terrell bottle: Gram negative rods   Results called to and read back by:Sofia Noriega RN 09/11/2018  06:01  No Growth to date  No Growth to date  No Growth to date  No Growth to date  No Growth to date  No Growth to date     CBC:   Recent Labs   Lab  09/12/18   0803   09/13/18   0141   WBC  20.25*  22.10*  22.10*   HGB  8.7*  8.1*  8.1*   HCT  27.7*  25.2*  25.2*   PLT  213  264  264     CMP:   Recent Labs   Lab  09/12/18   2330  09/13/18   0141  09/13/18   0536  09/13/18   1316   NA  122*   --   123*  124*   K  4.9   --   4.6  4.6   CL  85*   --   85*  87*   CO2  23   --   23  24   GLU  96   --   93  105   BUN  65*   --   62*  63*   CREATININE  3.1*   --   3.0*  2.9*   CALCIUM  8.2*   --   8.1*  8.3*   PROT   --   5.3*   --    --    ALBUMIN   --   1.7*   --    --    BILITOT   --   1.3*   --    --    ALKPHOS   --   135   --    --    AST   --   13   --    --    ALT   --   7*   --    --    ANIONGAP  14   --   15  13   EGFRNONAA  13.6*   --   14.1*  14.7*     Wound Culture:   Recent Labs   Lab  07/02/18   1432   LABAERO  ENTEROBACTER CLOACAE COMPLEX  Rare  For susceptibility see order # [id number]    ENTEROBACTER CLOACAE COMPLEX  Moderate       All pertinent labs within the past 24 hours have been reviewed.    Significant Imaging: I have reviewed all pertinent imaging results/findings within the past 24 hours.

## 2018-09-13 NOTE — ASSESSMENT & PLAN NOTE
--prerenal based on urine lytes, most likely cardiorenal syndrome  --Nephrology following  --continuing diuresis

## 2018-09-13 NOTE — ASSESSMENT & PLAN NOTE
--noted on MRI along with osteo  --Neurosurgery not wanting to intervene at this point.   --IR successfully drained epidural abscess this morning   --continue vancomycin, cefepime, and flagyll

## 2018-09-13 NOTE — ASSESSMENT & PLAN NOTE
-hypervolemic hyponatremia secondary decreased effective circulating volume, likely secondary CHF, urine sodium < 20 is consistent, at this time not getting worse and a little better since admission, think current deterioration in mental status more likely to CO2 retention / CO2 narcosis and not hyponatremia    Up to 123 this morning    Plan/Recommendations:  -as sodium correcting nicely with loop diuresis alone, would not do anything else at this time except observe, if improvement stops or sodium starts to worsen again, then can consider adding tolvaptan

## 2018-09-13 NOTE — PHYSICIAN QUERY
PT Name: Medina Frazier  MR #: [id number]    Physician Query Form - Cause and Effect Relationship Clarification      CDS/: Mary Lou RICKETTS,RN,CDI            Contact information:[phone]  This form is a permanent document in the medical record.     Query Date: September 13, 2018    By submitting this query, we are merely seeking further clarification of documentation. Please utilize your independent clinical judgment when addressing the question(s) below.    The Medical record contains the following:  Supporting Clinical Findings   Location in record          Sepsis with septic shock                                                                                                09/12/18   Per Sepsis query answer                                                                                                                   Gram stain terrell bottle: Gram negative rods                                                                                 09/09/18 Blood Culture, Routine Gram stain         Provider, please clarify if there is any correlation between ___Sepsis______________________ and _____Gram negative rods____________.           Are the conditions:     [x  ] Due to or associated with each other     [  ] Unrelated to each other     [  ] Other (Please Specify): _________________________     [  ] Clinically Undetermined

## 2018-09-13 NOTE — ASSESSMENT & PLAN NOTE
--multifactorial: hypercapnia, sepsis in setting of receiving opiates/gabapentin with JEY   --non focal on exam, consider CT head for acute changes  --see below for plan   --s/p intubation on 50 fentanyl and low dose propofol

## 2018-09-13 NOTE — SUBJECTIVE & OBJECTIVE
Interval History: excellent response to diuretics, net negative by about 2,000ccs     Review of patient's allergies indicates:   Allergen Reactions    Codeine Nausea Only    Penicillins Swelling     Able to take amoxil and cephalosporins      Current Facility-Administered Medications   Medication Frequency    albuterol-ipratropium 2.5 mg-0.5 mg/3 mL nebulizer solution 3 mL Q4H    ceFEPIme injection 2 g Daily    chlorhexidine 0.12 % solution 15 mL BID    dextrose 50% injection 12.5 g PRN    dextrose 50% injection 25 g PRN    fentaNYL 2500 mcg in 0.9% sodium chloride 250 mL infusion premix (titrating) Continuous    fentaNYL citrate (PF) Syrg 25 mcg Q2H PRN    fluconazole (DIFLUCAN) IVPB 200 mg 100 mL Q24H    glucagon (human recombinant) injection 1 mg PRN    glucose chewable tablet 16 g PRN    glucose chewable tablet 24 g PRN    insulin aspart U-100 pen 0-5 Units Q6H PRN    levothyroxine tablet 150 mcg Before breakfast    metronidazole IVPB 500 mg Q8H    norepinephrine 16 mg in sodium chloride 0.9% 250 mL infusion (titrating) Continuous    ondansetron injection 4 mg Q8H PRN    [START ON 9/14/2018] pantoprazole injection 40 mg Daily       Objective:     Vital Signs (Most Recent):  Temp: 98.1 °F (36.7 °C) (09/13/18 1200)  Pulse: 81 (09/13/18 1200)  Resp: 16 (09/13/18 1200)  BP: (!) 98/54 (09/13/18 1200)  SpO2: (!) 93 % (09/13/18 1200)  O2 Device (Oxygen Therapy): ventilator (09/13/18 1116) Vital Signs (24h Range):  Temp:  [96.6 °F (35.9 °C)-99.3 °F (37.4 °C)] 98.1 °F (36.7 °C)  Pulse:  [69-83] 81  Resp:  [0-27] 16  SpO2:  [88 %-100 %] 93 %  BP: ()/(49-84) 98/54  Arterial Line BP: (107-137)/(43-57) 115/53     Weight: 127.7 kg (281 lb 8.4 oz) (09/13/18 0305)  Body mass index is 48.32 kg/m².  Body surface area is 2.4 meters squared.    I/O last 3 completed shifts:  In: 567.8 [I.V.:507.8; NG/GT:60]  Out: 2610 [Urine:2610]    Physical Exam   HENT:   Head: Atraumatic.   Neck: No JVD present.    Cardiovascular: Normal rate and regular rhythm.   Pulmonary/Chest: Effort normal. She has rales.   Abdominal: Soft. She exhibits no distension.   Musculoskeletal: She exhibits edema. She exhibits no tenderness.   Skin: Skin is warm.

## 2018-09-13 NOTE — PROGRESS NOTES
[facility]  Nephrology  Progress Note    Patient Name: Medina Frazier  MRN: [id number]  Admission Date: 9/8/2018  Hospital Length of Stay: 5 days  Attending Provider: Anson Do MD   Primary Care Physician: Hao Garcia MD  Principal Problem:Acute hypercapnic respiratory failure    Subjective:     HPI: 83yo WF who was to have a lumbar fusion later in the month and was admitted due to abnormal pre-op labs.  Nephrology consulted for JEY (sCr 2.2 on admit, 3.1 at time of consult, normal baseline), also with a sodium of 119 (133 on August 10th). Patient appears somnolent, but wakes up and appropriately answers questions, per family she is at her baseline.  Patient is not complaining of increasing shortness of breath, she in on 3L O2 when seen, this is what she is on at home, she has history of LENA and is supposed to be on CPAP at night, but not compliant.  CXR reviewed and looks like pulmonary edema/congestion on admit.    Interval History: excellent response to diuretics, net negative by about 2,000ccs     Review of patient's allergies indicates:   Allergen Reactions    Codeine Nausea Only    Penicillins Swelling     Able to take amoxil and cephalosporins      Current Facility-Administered Medications   Medication Frequency    albuterol-ipratropium 2.5 mg-0.5 mg/3 mL nebulizer solution 3 mL Q4H    ceFEPIme injection 2 g Daily    chlorhexidine 0.12 % solution 15 mL BID    dextrose 50% injection 12.5 g PRN    dextrose 50% injection 25 g PRN    fentaNYL 2500 mcg in 0.9% sodium chloride 250 mL infusion premix (titrating) Continuous    fentaNYL citrate (PF) Syrg 25 mcg Q2H PRN    fluconazole (DIFLUCAN) IVPB 200 mg 100 mL Q24H    glucagon (human recombinant) injection 1 mg PRN    glucose chewable tablet 16 g PRN    glucose chewable tablet 24 g PRN    insulin aspart U-100 pen 0-5 Units Q6H PRN    levothyroxine tablet 150 mcg Before breakfast    metronidazole IVPB 500 mg Q8H     norepinephrine 16 mg in sodium chloride 0.9% 250 mL infusion (titrating) Continuous    ondansetron injection 4 mg Q8H PRN    [START ON 9/14/2018] pantoprazole injection 40 mg Daily       Objective:     Vital Signs (Most Recent):  Temp: 98.1 °F (36.7 °C) (09/13/18 1200)  Pulse: 81 (09/13/18 1200)  Resp: 16 (09/13/18 1200)  BP: (!) 98/54 (09/13/18 1200)  SpO2: (!) 93 % (09/13/18 1200)  O2 Device (Oxygen Therapy): ventilator (09/13/18 1116) Vital Signs (24h Range):  Temp:  [96.6 °F (35.9 °C)-99.3 °F (37.4 °C)] 98.1 °F (36.7 °C)  Pulse:  [69-83] 81  Resp:  [0-27] 16  SpO2:  [88 %-100 %] 93 %  BP: ()/(49-84) 98/54  Arterial Line BP: (107-137)/(43-57) 115/53     Weight: 127.7 kg (281 lb 8.4 oz) (09/13/18 0305)  Body mass index is 48.32 kg/m².  Body surface area is 2.4 meters squared.    I/O last 3 completed shifts:  In: 567.8 [I.V.:507.8; NG/GT:60]  Out: 2610 [Urine:2610]    Physical Exam   HENT:   Head: Atraumatic.   Neck: No JVD present.   Cardiovascular: Normal rate and regular rhythm.   Pulmonary/Chest: Effort normal. She has rales.   Abdominal: Soft. She exhibits no distension.   Musculoskeletal: She exhibits edema. She exhibits no tenderness.   Skin: Skin is warm.           Assessment/Plan:     * Acute hypercapnic respiratory failure    On top of likely some degree of chronic hypercapnia secondary poorly controlled LENA (per history patient has only intermittently, if at all, used her home CPAP).    Also and as noted in CHF section patient also has congestion/pulmonary edema on CXR    Plan/Recommendations:  1) NIPPV as per critical care team  2) recommend lasix diuresis for volume overload (see CHF section)        Hyponatremia    -hypervolemic hyponatremia secondary decreased effective circulating volume, likely secondary CHF, urine sodium < 20 is consistent, at this time not getting worse and a little better since admission, think current deterioration in mental status more likely to CO2 retention / CO2  narcosis and not hyponatremia    Up to 123 this morning    Plan/Recommendations:  -as sodium correcting nicely with loop diuresis alone, would not do anything else at this time except observe, if improvement stops or sodium starts to worsen again, then can consider adding tolvaptan        JEY (acute kidney injury)    Non-oliguric, likely multifactorial, the urine sodium of 10, could be consistent with CRS in this patient with what appears to be acute CHF exacerbation and volume overload, however would be careful reading too much in to this urinary sodium value, also consider ATN toxic secondary infection, ischemic ATN, also possibly vancomycin contributing to worsening sCr since admit further noted with WBCs in the urine of > 100, need to r/o infection which would be unlikely to cause an JEY (unless there is a bilateral pyelonephritis, which clinically does not fit, nor consistent with U/S findings), in short, if urine cultures negative need to consider AIN (outpatient abxs), absence of eosinophilia, rash or fever, may make a little less likely, but does not rule out.    Renal ultrasound unremarkable and negative for hydronephrosis.    Excellent response to IV lasix and stable sCr despite significant diuresis    Plan/Reccomendations:  1) continue diuresis with loop diuretics as felt needed for volume status, avoid thiazides  2) serial renal panels, strict Is & Os        CHF (congestive heart failure), NYHA class IV    -recommend continuing diuresis with loop diuretics, avoid thiazides            Thank you for your consult. I will follow-up with patient. Please contact us if you have any additional questions.    Allen Barnes MD  Nephrology  [facility]

## 2018-09-13 NOTE — PROGRESS NOTES
[facility]  Critical Care Medicine  Progress Note    Patient Name: Medina Frazier  MRN: [id number]  Admission Date: 9/8/2018  Hospital Length of Stay: 5 days  Code Status: Full Code  Attending Provider: Anson Do MD  Primary Care Provider: Hao Garcia MD   Principal Problem: Acute hypercapnic respiratory failure    Subjective:     HPI:  Mrs. Frazier is a 82 yr old female with history significant for diastolic HF, CAD s/p VIVIANA Lcx (June 2017), HTN, DM2, and lumbar stenosis s/p lumbar fusion in June 2018 with subsequent hardware infection and I&D on 7/2 and repeat lumbar I&D, hardware removal, decompression and wound vac placement to L2-L3 due to continued epidural abscess. She was discharged recently to a SNF for rehab and continued IV antibiotic therapy. She originally presented to [facility] on 9/8 from SNF after labwork revealed JEY, hyponatremia. Additionally, CT lumbar spine obtained 9/5 was concerning for persistent osteomyelitis, epidural abscess in L2-L3 region.     She was transferred to [facility] on 9/8 for higher level of care and neurosurgery evaluation. Admission has been complicated by continued JEY with creatinine up trending from baseline 0.8 to 1.0 to 3.0 along with hyponatremia felt to be related to acute on chronic diastolic heart failure, volume overload. Nephrology was consulted and has been assisting with management. She was started on lasix 100 mg BID with minimal urine output response and creatinine slowly uptrending. Additionally, blood cultures obtained 9/9 are growing GNR's, awaiting speciation. Suspect this is enterobacter given continued osteomyelitis of L2-L3, complex fluid collection within laminectomy bed measuring 3x8x5 cm concerning for abscess, and additional prevertebral collection anterior to L1 and L2 vertebral bodies measuring 4x6x1 abutting the abdominal aorta concerning for abscess noted on MRI from 9/10. Neurosurgery were planning  on repeat I&D for source control but held off given the above medical issues with JEY, hyponatremia. She has been on vancomycin, cefepime since 9/9 and blood cultures from 9/11 are NGTD.     She was transferred to the MICU on the AM of 9/12 for acute hypercapnic respiratory failure with concern for aspiration event, worsening encephalopathy, and worsening JEY.    .          Hospital/ICU Course:  Upon transfer to MICU, Mrs. Frazier's respiratory and mental status worsened despite Bipap and lasix trial. She was intubated and she developed shock requiring low dose levophed, felt to be septic in setting of bacteremia. A trialysis line was placed for venous access, vasopressors, and possible need for CRRT. Arterial line placed for frequent ABGs. Cefepime and vancomycin continued as ID and neurosurgery following. Added on flagyll as micro lab thinks BCx 9/9 may be growing anaerobes. IR successfully drained epidural abscess (9/13). Patient no longer requiring levophed. Continuing lasix trial as patient still appears volume overloaded.     Interval History/Significant Events: Patient weaned off levophed overnight. Patient was taken by IR this morning to drain the epidural abscess.     Review of Systems   Unable to perform ROS: Intubated     Objective:     Vital Signs (Most Recent):  Temp: 98.1 °F (36.7 °C) (09/13/18 1200)  Pulse: 81 (09/13/18 1200)  Resp: 16 (09/13/18 1200)  BP: (!) 98/54 (09/13/18 1200)  SpO2: (!) 93 % (09/13/18 1200) Vital Signs (24h Range):  Temp:  [96.6 °F (35.9 °C)-99.3 °F (37.4 °C)] 98.1 °F (36.7 °C)  Pulse:  [69-83] 81  Resp:  [0-27] 16  SpO2:  [88 %-100 %] 93 %  BP: ()/(49-84) 98/54  Arterial Line BP: (107-137)/(43-57) 115/53   Weight: 127.7 kg (281 lb 8.4 oz)  Body mass index is 48.32 kg/m².      Intake/Output Summary (Last 24 hours) at 9/13/2018 1304  Last data filed at 9/13/2018 1200  Gross per 24 hour   Intake 914.42 ml   Output 2100 ml   Net -1185.58 ml       Physical Exam    Constitutional: She appears well-developed and well-nourished. She appears lethargic. She appears ill. No distress. She is sedated, intubated and restrained.   HENT:   Head: Normocephalic and atraumatic.   Eyes: Conjunctivae are normal. Pupils are equal, round, and reactive to light.   Neck: JVD present.   trialysis central venous catheter in [facility]   Cardiovascular: Normal rate and regular rhythm.   Pulses:       Dorsalis pedis pulses are 1+ on the right side, and 1+ on the left side.   3+ pitting edema of bilateral lower extremity   Pulmonary/Chest: Effort normal and breath sounds normal. No stridor. She is intubated. She has no wheezes. She has no rales.   Abdominal: Soft. Bowel sounds are decreased. There is no tenderness.   Musculoskeletal:        Right wrist: She exhibits swelling.   Neurological: She appears lethargic. GCS eye subscore is 3. GCS verbal subscore is 1. GCS motor subscore is 3.   Difficult to arouse. Does not follow commands.    Skin: Skin is warm, dry and intact. She is not diaphoretic.   Vitals reviewed.      Vents:  Vent Mode: A/C (09/13/18 1116)  Set Rate: 16 bmp (09/13/18 1116)  Vt Set: 400 mL (09/13/18 1116)  Pressure Support: 0 cmH20 (09/13/18 1116)  PEEP/CPAP: 5 cmH20 (09/13/18 1116)  Oxygen Concentration (%): 40 (09/13/18 1200)  Peak Airway Pressure: 35 cmH2O (09/13/18 1116)  Plateau Pressure: 0 cmH20 (09/13/18 1116)  Total Ve: 6.66 mL (09/13/18 1116)  F/VT Ratio<105 (RSBI): (!) 38.55 (09/13/18 1116)  Lines/Drains/Airways     Central Venous Catheter Line                 Trialysis (Dialysis) Catheter 09/12/18 1326 right internal jugular less than 1 day          Drain                 Urethral Catheter 09/09/18 1700 Double-lumen 18 Fr. 3 days         NG/OG Tube 09/12/18 1325 orogastric Center mouth less than 1 day          Airway                 Airway - Non-Surgical 09/12/18 1012 Endotracheal Tube 1 day          Pressure Ulcer                 Negative Pressure Wound Therapy  36 days               Significant Labs:    CBC/Anemia Profile:  Recent Labs   Lab  09/12/18   0803  09/13/18   0141   WBC  20.25*  22.10*  22.10*   HGB  8.7*  8.1*  8.1*   HCT  27.7*  25.2*  25.2*   PLT  213  264  264   MCV  84  79*  79*   RDW  17.8*  17.3*  17.3*        Chemistries:  Recent Labs   Lab  09/12/18   1751  09/12/18   2330  09/13/18   0141  09/13/18   0536   NA  120*  122*   --   123*   K  4.9  4.9   --   4.6   CL  85*  85*   --   85*   CO2  24  23   --   23   BUN  63*  65*   --   62*   CREATININE  3.1*  3.1*   --   3.0*   CALCIUM  8.1*  8.2*   --   8.1*   ALBUMIN   --    --   1.7*   --    PROT   --    --   5.3*   --    BILITOT   --    --   1.3*   --    ALKPHOS   --    --   135   --    ALT   --    --   7*   --    AST   --    --   13   --    MG   --    --   1.5*   --    PHOS   --    --   5.6*   --        All pertinent labs within the past 24 hours have been reviewed.    Significant Imaging:  I have reviewed and interpreted all pertinent imaging results/findings within the past 24 hours.    Assessment/Plan:     Neuro   Encephalopathy, metabolic    --multifactorial: hypercapnia, sepsis in setting of receiving opiates/gabapentin with JEY   --non focal on exam, consider CT head for acute changes  --see below for plan   --s/p intubation on 50 fentanyl and low dose propofol        Lumbar stenosis    --s/p lumbar fusion/hardware placement June 2018 with subsequent osteo and abscess. See below        Derm   Alteration in skin integrity related to surgical incision    --s/p wound vac placement. Continue per neurosurgery        Pulmonary   * Acute hypercapnic respiratory failure    --2/2 pulmonary edema in setting of acute on chronic diastolic HF, volume overload and opiates/gabapentin administration with JEY  --s/p intubation on 9/12 for failing bipap. Continue volume control ventilation, wean support as tolerated  --Continue to diuresis with 80 mg lasix today  --mixed respiratory and metabolic alkalosis so adjusted vent  settings to decrease the rate          Pulmonary hypertension    --2/2 diastolic HF. No known lung disease  --diuresis as above          Cardiac/Vascular   PAF (paroxysmal atrial fibrillation)    --CHADS vasc 4; hold home rivaroxaban   --will resume heparin gtt for anticoagulation after central/arterial line placement and plans for possible NSGY or IR procedure are known         CHF (congestive heart failure), NYHA class IV    - Prior ECHO shows diastolic dysfunction (7/2018)  - Echo (9/13) EF 55-60%; concentric remodeling with RV enlargement; PA systolic pressure 88 mm Hg; no WMA  - Diuresed about 2 liters yesterday on 160 mg of lasix  - Continuing diuresis with 1 dose of 80 mg of lasix          Coronary artery disease involving native coronary artery of native heart without angina pectoris    --hold BB in setting of shock  --s/p VIVIANA to Lcx in June 2018 and on aspirin, plavix prior to admission. Holding in setting of upcoming procedures.             Hypertension    --hold coreg, imdur due to above        Renal/   Hyponatremia    --suspect hypervolemic hyponatremia from volume overload.   --continue diuresis, trend BMP's   --T4 given hypothyroidism history           JEY (acute kidney injury)    --prerenal based on urine lytes, most likely cardiorenal syndrome  --Nephrology following  --continuing diuresis          ID   Osteomyelitis of Lumbar Spine    --see above        Septic shock    --suspect septic given above  --titrate levophed to maintain MAP >65  --Abx as above.           Gram-negative bacteremia    --noted on cultures from 9/9; cleared on 9/11; Positive for Prevotella  --ID following  --Abx as above. Intervention per IR for source control        Spinal epidural abscess    --noted on MRI along with osteo  --Neurosurgery not wanting to intervene at this point.   --IR successfully drained epidural abscess this morning   --continue vancomycin, cefepime, and flagyll        Oncology   Anemia    --likely anemia  of chronic disease. Trend Hgb for now  --no signs of acute blood loss  --transfuse for Hgb <7        Endocrine   Diabetes mellitus, type II    --POCT glucose and SSI         GI   GERD (gastroesophageal reflux disease)    --continue PPI           Critical Care Daily Checklist:    A: Awake: RASS Goal/Actual Goal:   0  Actual: [place] Agitation Sedation Scale (RASS): Drowsy   B: Spontaneous Breathing Trial Performed?   No   C: SAT & SBT Coordinated?  N/A                     D: Delirium: CAM-ICU Overall CAM-ICU: Positive   E: Early Mobility Performed? Yes   F: Feeding Goal: Goals: Pt to receive nutrition by RD follow up  Status: Nutrition Goal Status: new   Current Diet Order   Procedures    Diet NPO      AS: Analgesia/Sedation Propofol 10, fentanyl 50   T: Thromboembolic Prophylaxis SCDs; no pharmacologic ppx due to procedures   H: HOB > 300 Yes   U: Stress Ulcer Prophylaxis (if needed) PPI   G: Glucose Control SSI   B: Bowel Function Stool Occurrence: 0   I: Indwelling Catheter (Lines & [place]) Necessity [place], central venous catheter, arterial line still necessary   D: De-escalation of Antimicrobials/Pharmacotherapies No    Plan for the day/ETD Supportive care, stabilize hemodynamics    Code Status:  Family/Goals of Care: Full Code       Discussed with Dr. Do.     Critical Care Time: 60 minutes  Critical secondary to Patient has a condition that poses threat to life and bodily function: Septic shock likely 2/2 epidural spinal abscess.       Critical care was time spent personally by me on the following activities: development of treatment plan with patient or surrogate and bedside caregivers, discussions with consultants, evaluation of patient's response to treatment, examination of patient, ordering and performing treatments and interventions, ordering and review of laboratory studies, ordering and review of radiographic studies, pulse oximetry, re-evaluation of patient's condition. This critical care time did  not overlap with that of any other provider or involve time for any procedures.     Leslie Johnson PA-C  Critical Care Medicine  [facility]

## 2018-09-13 NOTE — ASSESSMENT & PLAN NOTE
82 year old female with history of L2-3 fusion 6/5 complicated by post operative infection with Enterobacter cloacae. She has been on outpatient IV Ciprofloxacin and has undergone an I&D on 7/2 with hardware removal on 8/7 without resolution of her infection. 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 Blood cultures from 9/8 are +contaminant. Blood cultures 9/9 +GNR. Suspect to be enterobacter as with worsening infection liekly 2/2 source control. Neurosurgery following, surgery cancelled as pt is not medically stable.       Pt in ICU for acute respiratory failure with concern for aspiration event. Pt currently intubated, sedated. WBC 22K today.     Plan  - Continue Cefepime 2 g IV q 24 hours (renal adjusted). Vancomycin level 27 today. Hold and redose levels once levels are therapeutic.  - Blood cultures 2/4 bottles +prevotella. Continue IV metronidazole.   - S/p IR aspiration today. Cultures pending. Gram stain +GNR and gram negative diplococci. Continue cefepime  - Fluconazole adde per primary team. Continue current therapy. ID will follow closely.    Seen and discussed with ID staff.

## 2018-09-13 NOTE — ASSESSMENT & PLAN NOTE
--noted on cultures from 9/9; cleared on 9/11; Positive for Prevotella  --ID following  --Abx as above. Intervention per IR for source control

## 2018-09-13 NOTE — NURSING
Patient transported to and from IR on portable vent and portable cardiac monitor with 2 RNs and 1 RT. Pt back in room and connected to wall monitor, VSS at this time.

## 2018-09-13 NOTE — PROGRESS NOTES
Lumbar paraspinal fluid collection drainage complete. Pt tolerated well. VSS.  Dsg to back CDI.  Pt will return to critical care bed with critical care team.

## 2018-09-13 NOTE — ASSESSMENT & PLAN NOTE
--suspect hypervolemic hyponatremia from volume overload.   --continue diuresis, trend BMP's   --T4 given hypothyroidism history

## 2018-09-13 NOTE — SUBJECTIVE & OBJECTIVE
Interval History/Significant Events: Patient weaned off levophed overnight. Patient was taken by IR this morning to drain the epidural abscess.     Review of Systems   Unable to perform ROS: Intubated     Objective:     Vital Signs (Most Recent):  Temp: 98.1 °F (36.7 °C) (09/13/18 1200)  Pulse: 81 (09/13/18 1200)  Resp: 16 (09/13/18 1200)  BP: (!) 98/54 (09/13/18 1200)  SpO2: (!) 93 % (09/13/18 1200) Vital Signs (24h Range):  Temp:  [96.6 °F (35.9 °C)-99.3 °F (37.4 °C)] 98.1 °F (36.7 °C)  Pulse:  [69-83] 81  Resp:  [0-27] 16  SpO2:  [88 %-100 %] 93 %  BP: ()/(49-84) 98/54  Arterial Line BP: (107-137)/(43-57) 115/53   Weight: 127.7 kg (281 lb 8.4 oz)  Body mass index is 48.32 kg/m².      Intake/Output Summary (Last 24 hours) at 9/13/2018 1304  Last data filed at 9/13/2018 1200  Gross per 24 hour   Intake 914.42 ml   Output 2100 ml   Net -1185.58 ml       Physical Exam   Constitutional: She appears well-developed and well-nourished. She appears lethargic. She appears ill. No distress. She is sedated, intubated and restrained.   HENT:   Head: Normocephalic and atraumatic.   Eyes: Conjunctivae are normal. Pupils are equal, round, and reactive to light.   Neck: JVD present.   trialysis central venous catheter in [facility]   Cardiovascular: Normal rate and regular rhythm.   Pulses:       Dorsalis pedis pulses are 1+ on the right side, and 1+ on the left side.   3+ pitting edema of bilateral lower extremity   Pulmonary/Chest: Effort normal and breath sounds normal. No stridor. She is intubated. She has no wheezes. She has no rales.   Abdominal: Soft. Bowel sounds are decreased. There is no tenderness.   Musculoskeletal:        Right wrist: She exhibits swelling.   Neurological: She appears lethargic. GCS eye subscore is 3. GCS verbal subscore is 1. GCS motor subscore is 3.   Difficult to arouse. Does not follow commands.    Skin: Skin is warm, dry and intact. She is not diaphoretic.   Vitals reviewed.      Vents:  Vent  Mode: A/C (09/13/18 1116)  Set Rate: 16 bmp (09/13/18 1116)  Vt Set: 400 mL (09/13/18 1116)  Pressure Support: 0 cmH20 (09/13/18 1116)  PEEP/CPAP: 5 cmH20 (09/13/18 1116)  Oxygen Concentration (%): 40 (09/13/18 1200)  Peak Airway Pressure: 35 cmH2O (09/13/18 1116)  Plateau Pressure: 0 cmH20 (09/13/18 1116)  Total Ve: 6.66 mL (09/13/18 1116)  F/VT Ratio<105 (RSBI): (!) 38.55 (09/13/18 1116)  Lines/Drains/Airways     Central Venous Catheter Line                 Trialysis (Dialysis) Catheter 09/12/18 1326 right internal jugular less than 1 day          Drain                 Urethral Catheter 09/09/18 1700 Double-lumen 18 Fr. 3 days         NG/OG Tube 09/12/18 1325 orogastric Center mouth less than 1 day          Airway                 Airway - Non-Surgical 09/12/18 1012 Endotracheal Tube 1 day          Pressure Ulcer                 Negative Pressure Wound Therapy  36 days              Significant Labs:    CBC/Anemia Profile:  Recent Labs   Lab  09/12/18   0803  09/13/18   0141   WBC  20.25*  22.10*  22.10*   HGB  8.7*  8.1*  8.1*   HCT  27.7*  25.2*  25.2*   PLT  213  264  264   MCV  84  79*  79*   RDW  17.8*  17.3*  17.3*        Chemistries:  Recent Labs   Lab  09/12/18   1751  09/12/18   2330  09/13/18   0141  09/13/18   0536   NA  120*  122*   --   123*   K  4.9  4.9   --   4.6   CL  85*  85*   --   85*   CO2  24  23   --   23   BUN  63*  65*   --   62*   CREATININE  3.1*  3.1*   --   3.0*   CALCIUM  8.1*  8.2*   --   8.1*   ALBUMIN   --    --   1.7*   --    PROT   --    --   5.3*   --    BILITOT   --    --   1.3*   --    ALKPHOS   --    --   135   --    ALT   --    --   7*   --    AST   --    --   13   --    MG   --    --   1.5*   --    PHOS   --    --   5.6*   --        All pertinent labs within the past 24 hours have been reviewed.    Significant Imaging:  I have reviewed and interpreted all pertinent imaging results/findings within the past 24 hours.

## 2018-09-13 NOTE — ASSESSMENT & PLAN NOTE
--2/2 pulmonary edema in setting of acute on chronic diastolic HF, volume overload and opiates/gabapentin administration with JEY  --s/p intubation on 9/12 for failing bipap. Continue volume control ventilation, wean support as tolerated  --Continue to diuresis with 80 mg lasix today  --mixed respiratory and metabolic alkalosis so adjusted vent settings to decrease the rate

## 2018-09-14 NOTE — PROGRESS NOTES
[facility]  Neurosurgery  Progress Note    Subjective:     History of Present Illness: Ms Medina Frazier is an 82yoF with PMHx DMII, HTN, CHF, CAD, CHF, CKD, HLD, GERD, hypothyroidism,  s/p L2-3 TLIF on 6/5/18 by Dr. Gutiérrez at [facility], complicated by infection & subsequent washout &  hardware removal, who is transferred from [facility] for neurosurgical evaluation of L2-3 osteomyelitis.  Following her initial surgery, she was found to have a lumbar incision wound infection that was washed out on 7/2/18.  Surgical cultures grew E. Cloacae & a PICC was placed.  She was discharged on IV Cipro & a wound vac.  She then had hardware removal on 8/7.  She has had progressive worsening of pain & infection since that time.   Most recent CT Lspine shows L2-3 osteomyelitis with paraspinal fluid collection L1-3.      Post-Op Info:  Procedure(s) (LRB):  FUSION, SPINE, LUMBAR, TLIF, WITH PERCUTANEOUS INSTRUMENTATION L1-5, depuy, neuromonitoring, 4 post bed (N/A)         Interval History: 9/13: Patient to IR today for drain, NAEON, AFVSS, exam stable, remains intubated    Medications:  Continuous Infusions:   fentanyl 50 mcg/hr (09/13/18 1800)    custom IV infusion builder (for pharmacist use only) 13.8 mL/hr at 09/13/18 1800    propofol 5 mcg/kg/min (09/13/18 1800)     Scheduled Meds:   albuterol-ipratropium  3 mL Nebulization Q4H    chlorhexidine  15 mL Mouth/Throat BID    fluconazole (DIFLUCAN) IVPB  200 mg Intravenous Q24H    levothyroxine  150 mcg Per OG tube Before breakfast    [START ON 9/14/2018] pantoprazole  40 mg Intravenous Daily    piperacillin-tazobactam (ZOSYN) IVPB  4.5 g Intravenous Q12H     PRN Meds:dextrose 50%, dextrose 50%, fentaNYL citrate (PF), glucagon (human recombinant), glucose, glucose, insulin aspart U-100, ondansetron     Review of Systems  Objective:     Weight: 127.7 kg (281 lb 8.4 oz)  Body mass index is 48.32 kg/m².  Vital Signs (Most Recent):  Temp: 97.9 °F (36.6 °C)  (09/13/18 1800)  Pulse: 81 (09/13/18 1800)  Resp: (!) 7 (09/13/18 1800)  BP: (!) 108/53 (09/13/18 1800)  SpO2: 95 % (09/13/18 1800) Vital Signs (24h Range):  Temp:  [97.9 °F (36.6 °C)-99.3 °F (37.4 °C)] 97.9 °F (36.6 °C)  Pulse:  [75-83] 81  Resp:  [1-23] 7  SpO2:  [88 %-100 %] 95 %  BP: ()/(51-84) 108/53  Arterial Line BP: ()/(46-57) 107/47     Date 09/13/18 0700 - 09/14/18 0659   Shift 2715-2985 3658-3301 2423-6295 24 Hour Total   INTAKE   I.V.(mL/kg) 232.4(1.8) 389.6(3.1)  622(4.9)   NG/GT  40  40   IV Piggyback 200 100  300   Shift Total(mL/kg) 432.4(3.4) 529.6(4.1)  962(7.5)   OUTPUT   Urine(mL/kg/hr) 750(0.7) 300  1050   Shift Total(mL/kg) 750(5.9) 300(2.3)  1050(8.2)   Weight (kg) 127.7 127.7 127.7 127.7              Vent Mode: A/C  Oxygen Concentration (%):  [40] 40  Resp Rate Total:  [16 br/min-20 br/min] 16 br/min  Vt Set:  [400 mL] 400 mL  PEEP/CPAP:  [5 cmH20] 5 cmH20  Pressure Support:  [0 cmH20] 0 cmH20  Mean Airway Pressure:  [0 cmH20-15 cmH20] 11 cmH20         NG/OG Tube 09/12/18 1325 orogastric Center mouth (Active)   Placement Check placement verified by aspirate characteristics;placement verified by aspirate pH;placement verified by distal tube length measurement 9/13/2018  3:00 PM   Distal Tube Length (cm) 55 9/13/2018  7:01 AM   Tolerance no signs/symptoms of discomfort 9/13/2018  3:00 PM   Securement taped to commercial device 9/13/2018  3:00 PM   Clamp Status/Tolerance unclamped 9/13/2018  3:00 PM   Suction Setting/Drainage Method suction at;low;intermittent setting 9/13/2018  3:05 AM   Insertion Site Appearance no redness, warmth, tenderness, skin breakdown, drainage 9/13/2018  3:05 AM   Intake (mL) 60 mL 9/13/2018  6:05 AM   Intake (mL) - Formula Tube Feeding 10 9/13/2018  6:00 PM            Urethral Catheter 09/13/18 1438 Temperature probe (Active)   Output (mL) 300 mL 9/13/2018  6:00 PM            Trialysis (Dialysis) Catheter 09/12/18 1326 right internal jugular (Active)   IV  Device Securement sutures 9/13/2018  3:00 PM   Additional Site Signs no erythema;no warmth;no edema;no pain;no palpable cord;no streak formation;no drainage 9/13/2018  3:00 PM   Patency/Care infusing 9/13/2018  3:00 PM   Site Assessment Clean;Dry;Intact;No redness;No swelling 9/13/2018  3:00 PM   Status Deaccessed 9/13/2018  3:00 PM   Dressing Intervention Dressing reinforced 9/13/2018  3:00 PM   Dressing Status Biopatch in place;Clean;Intact;Dry 9/13/2018  3:00 PM   Dressing Change Due 09/19/18 9/13/2018  3:00 PM   Verification by X-ray Yes 9/13/2018  7:01 AM   Site Condition No complications 9/13/2018  3:00 PM   Dressing Occlusive 9/13/2018  3:00 PM   Daily Line Review Performed 9/13/2018  3:05 AM       Neurosurgery Physical Exam  E3VtM6  CNII-XIIi grossly intact, PERRLA, +cough/gag/corneal  Loc BLE, BUE move spontaneously    Significant Labs:  Recent Labs   Lab  09/13/18   0141  09/13/18   0536  09/13/18   1316  09/13/18   1727   GLU   --   93  105  113*   NA   --   123*  124*  123*   K   --   4.6  4.6  4.4   CL   --   85*  87*  87*   CO2   --   23  24  22*   BUN   --   62*  63*  64*   CREATININE   --   3.0*  2.9*  3.0*   CALCIUM   --   8.1*  8.3*  8.2*   MG  1.5*   --    --    --      Recent Labs   Lab  09/12/18   0803  09/13/18 0141   WBC  20.25*  22.10*  22.10*   HGB  8.7*  8.1*  8.1*   HCT  27.7*  25.2*  25.2*   PLT  213  264  264     Recent Labs   Lab  09/12/18   0250  09/13/18 0141   INR   --   1.3*   APTT  57.9*   --      Microbiology Results (last 7 days)     Procedure Component Value Units Date/Time    Gram stain [[id number]] Collected:  09/13/18 0834    Order Status:  No result Specimen:  Abscess from Back Updated:  09/13/18 1656    Blood culture [[id number]] Collected:  09/09/18 1731    Order Status:  Completed Specimen:  Blood Updated:  09/13/18 1418     Blood Culture, Routine Gram stain terrell bottle: Gram negative rods      Blood Culture, Routine Results called to and read back by:Sofia Noriega  RN 09/11/2018  06:01     Blood Culture, Routine PREVOTELLA (B.) BIVIA    Narrative:       From 2 different sites 30 minutes apart    Blood culture [[id number]] Collected:  09/09/18 1555    Order Status:  Completed Specimen:  Blood Updated:  09/13/18 1417     Blood Culture, Routine Gram stain terrell bottle: Gram negative rods      Blood Culture, Routine Positive results previously called 09/11/2018  13:30     Blood Culture, Routine PREVOTELLA (B.) BIVIA    Blood culture [[id number]] Collected:  09/11/18 1011    Order Status:  Completed Specimen:  Blood Updated:  09/13/18 1212     Blood Culture, Routine No Growth to date     Blood Culture, Routine No Growth to date     Blood Culture, Routine No Growth to date    Narrative:       Please draw from 2 separate sites 30 minutes apart. Thank you    Blood culture [[id number]] Collected:  09/11/18 1003    Order Status:  Completed Specimen:  Blood Updated:  09/13/18 1212     Blood Culture, Routine No Growth to date     Blood Culture, Routine No Growth to date     Blood Culture, Routine No Growth to date    Gram stain [[id number]] Collected:  09/13/18 0834    Order Status:  Completed Specimen:  Abscess from Back Updated:  09/13/18 1153     Gram Stain Result Many WBC's      Many Gram negative rods      Moderate Gram negative diplococci    Culture, Anaerobe [[id number]] Collected:  09/13/18 0834    Order Status:  Sent Specimen:  Abscess from Back Updated:  09/13/18 1040    Aerobic culture [[id number]] Collected:  09/13/18 0834    Order Status:  Sent Specimen:  Abscess from Back Updated:  09/13/18 1040    Fungus culture [[id number]] Collected:  09/13/18 0834    Order Status:  Sent Specimen:  Abscess from Back Updated:  09/13/18 1040    Gram stain [[id number]]     Order Status:  Completed Specimen:  Abscess from Back     Urine culture [[id number]] Collected:  09/10/18 1701    Order Status:  Completed Specimen:  Urine from Catheterized Updated:  09/12/18 1438     Urine Culture, Routine --      BEN ALBICANS  > 100,000 cfu/ml  Treatment of asymptomatic candiduria is not recommended (except for   specific populations). Candida isolated in the urine typically   represents colonization. If an indwelling urinary catheter is present  it should be removed or replaced.      Narrative:       add on CXURN order #[id number] per Dr. Elina Sandhu @ 19:58    09/10/2018     Urine culture [[id number]]     Order Status:  Completed Specimen:  Urine, Catheterized     Urine culture [[id number]]     Order Status:  Canceled Specimen:  Urine         ABGs:   Recent Labs   Lab  09/12/18   1636  09/13/18   0337  09/13/18   1524   PH  7.463*  7.520*  7.351   PCO2  36.9  33.8*  48.0*   PO2  78*  78*  39*   HCO3  26.4  27.6  26.5   POCSATURATED  96  97  70*   BE  3  5  1     Cardiac markers: No results for input(s): CKMB, CPKMB, TROPONINT, TROPONINI, MYOGLOBIN in the last 48 hours.  CMP:   Recent Labs   Lab  09/13/18   0141  09/13/18   0536  09/13/18   1316  09/13/18   1727   GLU   --   93  105  113*   CALCIUM   --   8.1*  8.3*  8.2*   ALBUMIN  1.7*   --    --    --    PROT  5.3*   --    --    --    NA   --   123*  124*  123*   K   --   4.6  4.6  4.4   CO2   --   23  24  22*   CL   --   85*  87*  87*   BUN   --   62*  63*  64*   CREATININE   --   3.0*  2.9*  3.0*   ALKPHOS  135   --    --    --    ALT  7*   --    --    --    AST  13   --    --    --    BILITOT  1.3*   --    --    --      CRP: No results for input(s): CRP in the last 48 hours.  ESR: No results for input(s): POCESR, ERYTHROCYTES in the last 48 hours.  LFTs:   Recent Labs   Lab  09/13/18   0141   ALT  7*   AST  13   ALKPHOS  135   BILITOT  1.3*   PROT  5.3*   ALBUMIN  1.7*     Procalcitonin: No results for input(s): PROCAL in the last 48 hours.    Significant Diagnostics:  I have reviewed and interpreted all pertinent imaging results/findings within the past 24 hours.    Assessment/Plan:     Spinal epidural abscess    Ms. Frazier is an 82yoF with multiple  comorbidities s/p L2-3 TLIF on 6/5/18 at [facility], with surgical wound infection with E. cloacea s/p washout & hardware removal, now with worsening osteomyelitis & paraspinal abscess despite weeks of treatment with IV Cipro    -No acute neurosurgical intervention  -MRI with L2-3 osteodiscitis, large fluid collection in surgical bed, as well a prevertebral fluid collection L1,2  -Plan for OR postponed secondary to dismal medical status and inability to tolerate surgery at this time  -Please wear LSO brace at all times  -Please hold Xarelto & Plavix   -Please medically optimize and document medical clearance when appropriate for surgery  -Continue vancomycin & cefepime per infectious disease recs  -Medical management per primary team            Skip Sousa MD  Neurosurgery  [facility]

## 2018-09-14 NOTE — ASSESSMENT & PLAN NOTE
--noted on cultures from 9/9; cleared on 9/11; Positive for Prevotella  --per ID recs: discontinued ceftriaxone and flagyll and added on zosyn. Patient has recorded penicillin allergy but patient has received penicillins in the past with no recorded reactions. We will watch closely for signs of allergic reactions.   --Not fully able to obtain source control as IR only able to drain one abscess.

## 2018-09-14 NOTE — ASSESSMENT & PLAN NOTE
--suspect septic given above  --titrate levophed to maintain SBP >100  --Abx as above. Also on vancomycin for empiric coverage  --positive UCx (9/11) for Candida; Treating with diflucan on 3 of 5 days as cannot rule out as source of her sepsis.

## 2018-09-14 NOTE — ASSESSMENT & PLAN NOTE
Ms Medina Frazier is an 82 year old woman with multiple comorbidities sp L2-3 TLIF on 6/5/18 at [facility], with surgical wound infection with E. cloacea sp washout & hardware removal, now with worsening osteomyelitis & paraspinal abscess despite weeks of treatment with IV Cipro    -No acute neurosurgical intervention at this time  -MRI with L2-3 osteodiscitis, large fluid collection in surgical bed, as well a prevertebral fluid collection L1,2  -Plan for OR postponed secondary to dismal medical status and inability to tolerate surgery at this time  -Please wear LSO brace at all times  -Please hold Xarelto & Plavix   -Please medically optimize and document medical clearance when appropriate for surgery  -abx per infectious disease recs  -Medical management per primary team

## 2018-09-14 NOTE — PT/OT/SLP PROGRESS
Physical Therapy      Patient Name:  Medina Frazier   MRN:  [id number]      Patient currently intubated; Please re consult when pt appropriate for mobility/EOB/OOB activity. Pt would benefit from nursing order for passive range of motion and positioning while supine for pressure relief.     Candie Harry, PT, DPT   9/14/2018

## 2018-09-14 NOTE — ASSESSMENT & PLAN NOTE
82 year old female with history of L2-3 fusion 6/5 complicated by post operative infection with Enterobacter cloacae. She has been on outpatient IV Ciprofloxacin and has undergone an I&D on 7/2 with partial hardware removal on 8/7 without resolution of her infection. 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      She was broadened to Cefepime and Vancomycin. Neurosurgery is following and had planned to take patient to OR for washout & fusion, however patient developed acute respiratory failure requiring intubation and transfer to the ICU with CXR showing pulmonary edema.     Blood cultures 9/9 returned positive for Prevotella. Urine is showing Candida. She underwent IR drainage of paraspinal abscess yesterday with 5 mL purulent fluid removed. Gram stain is showing many GNRs, moderate gram negative diplococci and few GPRs. Abscess cultures are pending. She remains intubated, sedated on pressors. Afebrile. Leukocytosis worsened today. Antimicrobial coverage broadened to Vanc, Zosyn and Fluconazole. Repeat blood cultures are NGTD. Surgical plans have been postponed due to decline in medical status.         Plan  - Continue empiric Zosyn and Fluconazole (renal adjusted)  - Vanc level 22.3 today. Would re-dose once Vanc level is below 20.  - Will follow aspiration cultures to guide antibiotic therapy.     - Ideally patient needs surgical washout for most optimal chance of cure of this infection. Neurosurgery is following.  - ID will continue to follow.

## 2018-09-14 NOTE — ASSESSMENT & PLAN NOTE
--multifactorial: hypercapnia, sepsis in setting of receiving opiates/gabapentin with JEY   --non focal on exam, consider CT head for acute changes  --patient more awake and alert this AM, following commands intermittently   --s/p intubation on precedex; discontinued fentanyl and propofol  --see below for plan

## 2018-09-14 NOTE — ASSESSMENT & PLAN NOTE
On top of likely some degree of chronic hypercapnia secondary poorly controlled LENA (per history patient has only intermittently, if at all, used her home CPAP).    Currently on vent, coming off as per primary team

## 2018-09-14 NOTE — ASSESSMENT & PLAN NOTE
--2/2 pulmonary edema in setting of acute on chronic diastolic HF, volume overload and opiates/gabapentin administration with JEY  --Repeat CXR (9/14) showing worsening pulmonary edema  --Now with mixed respiratory and metabolic acidosis with high anion gap; lactate pending; could be caused by uremia due to elevated BUN  --s/p intubation on 9/12 for failing bipap. Placed on pressure control today  --Failed SBT today  --US of thoracic cavity today  --holding diuresis, see CHF

## 2018-09-14 NOTE — PROGRESS NOTES
[facility]  Infectious Disease  Progress Note    Patient Name: Medina Frazier  MRN: [id number]  Admission Date: 9/8/2018  Length of Stay: 6 days  Attending Physician: Anson Do MD  Primary Care Provider: Hao Garcia MD    Isolation Status: No active isolations  Assessment/Plan:      Spinal epidural abscess       82 year old female with history of L2-3 fusion 6/5 complicated by post operative infection with Enterobacter cloacae. She has been on outpatient IV Ciprofloxacin and has undergone an I&D on 7/2 with partial hardware removal on 8/7 without resolution of her infection. 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      She was broadened to Cefepime and Vancomycin. Neurosurgery is following and had planned to take patient to OR for washout & fusion, however patient developed acute respiratory failure requiring intubation and transfer to the ICU with CXR showing pulmonary edema.     Blood cultures 9/9 returned positive for Prevotella. Urine is showing Candida. She underwent IR drainage of paraspinal abscess yesterday with 5 mL purulent fluid removed. Gram stain is showing many GNRs, moderate gram negative diplococci and few GPRs. Abscess cultures are pending. She remains intubated, sedated on pressors. Afebrile. Leukocytosis worsened today. Antimicrobial coverage broadened to Vanc, Zosyn and Fluconazole. Repeat blood cultures are NGTD. Surgical plans have been postponed due to decline in medical status.         Plan  - Continue empiric Zosyn and Fluconazole (renal adjusted)  - Vanc level 22.3 today. Would re-dose once Vanc level is below 20.  - Will follow aspiration cultures to guide antibiotic therapy.     - Ideally patient needs surgical washout for most optimal chance of cure of this infection. Neurosurgery is  following.  - ID will continue to follow.            Please call for any questions. Thank you.  Cora Boyer PA-C  Phone: [phone]  Pager: [phone]    Subjective:     Principal Problem:Acute hypercapnic respiratory failure    HPI: Medina Frazier is an 82 year old female who underwent L2-3 fusion on 6/5/18. She did well until shortly after staples removed,   drainage from the lower part of the incision was noted. On 7/2 she went to the OR for an I&D. Purulence was encountered. Surgical cultures grew Enterobacter cloacae. She was seen by Dr. Lenz, ID provider who started patient on IV Ciprofloxacin for 6 weeks. Patient's infection did not resolve and wound continued to drain. She was taken back to the OR on 8/7 for hardware removal, however appears on most recent imaging an intervertebral cage remains in place. No new surgical cultures obtained at that time. She was continued on Cipro. Patient's back pain has worsened over the past week and is now wrapping around to her anterior abdomen. [facility] to [facility] in [place] for evaluation. 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 Labs notable for elevated WBC, ESR, CRP and pyuria. Procalc 19. JEY.  Blood and urine culture negative.  She was transferred here for higher level of care. Currently she is on Cipro from her previous cultures, and was started on Vanc on 9/8. Patient denies fevers, chills, sweats. Reports severe back pain and lower extremity weakness. Mag[place] in place.    Of note, patient has a documented PCN allergy. She said she has tolerated PCN in the recent past without adverse reactions. 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 No problems since.      Interval History:   Patient remains intubated, sedated on pressors.  She underwent IR drainage of paraspinal abscess yesterday with 5  ml purulent fluid removed.   Gram stain is showing many GNRs, moderate gram negative diplococci and few GPRs.  Has been on Cefepime and Vanc. Flagyl added yesterday as Prevotella grew in blood cultures. Repeat blood cultures are negative. Fluconazole now on board. Vancomycin level still supratherapeutic. Clinically appears to have worsened.    Review of Systems   Unable to perform ROS: Intubated     Objective:     Vital Signs (Most Recent):  Temp: 97.3 °F (36.3 °C) (09/14/18 0905)  Pulse: 98 (09/14/18 0905)  Resp: 15 (09/14/18 0905)  BP: 129/63 (09/14/18 0905)  SpO2: (!) 94 % (09/14/18 0905) Vital Signs (24h Range):  Temp:  [97.3 °F (36.3 °C)-98.1 °F (36.7 °C)] 97.3 °F (36.3 °C)  Pulse:  [] 98  Resp:  [3-23] 15  SpO2:  [88 %-100 %] 94 %  BP: ()/(51-92) 129/63  Arterial Line BP: ()/(40-57) 118/47     Weight: 102.7 kg (226 lb 6.6 oz)  Body mass index is 38.86 kg/m².    Estimated Creatinine Clearance: 17.4 mL/min (A) (based on SCr of 3 mg/dL (H)).    Physical Exam   Constitutional: She appears well-developed and well-nourished. She appears ill.   Intubated, sedated   Neck: JVD present.   RIJ CVC in place   Cardiovascular: Normal rate. An irregularly irregular rhythm present. Exam reveals no friction rub.   No murmur heard.  Pulmonary/Chest: Effort normal. She has no wheezes. She has rales.   Abdominal: Soft. Bowel sounds are normal. She exhibits no distension.   Obese abdomen   Musculoskeletal: She exhibits edema (BLE, right hand).   +2 pitting edema   Skin: Skin is warm and dry. She is not diaphoretic. No erythema.   Lumbar wound not examined today.   Nursing note and vitals reviewed.      Significant Labs:   Blood Culture:   Recent Labs   Lab  06/08/18   1349  09/09/18   1555  09/09/18   1731  09/11/18   1003  09/11/18   1011   LABBLOO  No growth after 5 days.  No growth after 5 days.  Gram stain terrell bottle: Gram negative rods   Positive results previously called 09/11/2018  13:30  PREVOTELLA  (HEENA.) KENYA  Gram stain terrell bottle: Gram negative rods   Results called to and read back by:Sofia Noriega RN 09/11/2018  06:01  PREVOTELLA (HEENA.) KENYA  No Growth to date  No Growth to date  No Growth to date  No Growth to date  No Growth to date  No Growth to date     CBC:   Recent Labs   Lab  09/13/18   0141  09/14/18   0200   WBC  22.10*  22.10*  28.53*  28.53*   HGB  8.1*  8.1*  8.8*  8.8*   HCT  25.2*  25.2*  26.8*  26.8*   PLT  264  264  265  265     CMP:   Recent Labs   Lab  09/13/18   0141 09/13/18   1727  09/13/18   2329  09/14/18   0200  09/14/18   0812   NA   --    < >  123*  125*   --   125*   K   --    < >  4.4  4.6   --   4.5   CL   --    < >  87*  87*   --   87*   CO2   --    < >  22*  22*   --   19*   GLU   --    < >  113*  129*   --   157*   BUN   --    < >  64*  66*   --   65*   CREATININE   --    < >  3.0*  3.1*   --   3.0*   CALCIUM   --    < >  8.2*  8.7   --   8.6*   PROT  5.3*   --    --    --   5.4*   --    ALBUMIN  1.7*   --    --    --   1.7*   --    BILITOT  1.3*   --    --    --   1.6*   --    ALKPHOS  135   --    --    --   144*   --    AST  13   --    --    --   16   --    ALT  7*   --    --    --   7*   --    ANIONGAP   --    < >  14  16   --   19*   EGFRNONAA   --    < >  14.1*  13.6*   --   14.1*    < > = values in this interval not displayed.     Wound Culture:   Recent Labs   Lab  07/02/18   1432  09/13/18   0834   LABAERO  ENTEROBACTER CLOACAE COMPLEX  Rare  For susceptibility see order # [id number]    ENTEROBACTER CLOACAE COMPLEX  Moderate    No growth     All pertinent labs within the past 24 hours have been reviewed.    Significant Imaging: I have reviewed all pertinent imaging results/findings within the past 24 hours.

## 2018-09-14 NOTE — SUBJECTIVE & OBJECTIVE
Interval History/Significant Events: Patient put back on levophed overnight and has been up trending in requirements. Per nurse, patient is more alert and following commands. When asking the patient if she was in pain, she shook her head yes.     Review of Systems   Unable to perform ROS: Intubated     Objective:     Vital Signs (Most Recent):  Temp: 97.5 °F (36.4 °C) (09/14/18 0805)  Pulse: 97 (09/14/18 0805)  Resp: 14 (09/14/18 0805)  BP: 133/63 (09/14/18 0805)  SpO2: (!) 94 % (09/14/18 0805) Vital Signs (24h Range):  Temp:  [97.3 °F (36.3 °C)-98.4 °F (36.9 °C)] 97.5 °F (36.4 °C)  Pulse:  [78-97] 97  Resp:  [3-23] 14  SpO2:  [88 %-100 %] 94 %  BP: ()/(51-92) 133/63  Arterial Line BP: ()/(40-57) 106/44   Weight: 102.7 kg (226 lb 6.6 oz)  Body mass index is 38.86 kg/m².      Intake/Output Summary (Last 24 hours) at 9/14/2018 0828  Last data filed at 9/14/2018 0805  Gross per 24 hour   Intake 1733.29 ml   Output 2106 ml   Net -372.71 ml       Physical Exam   Constitutional: She appears well-developed and well-nourished. She appears lethargic. She is easily aroused. She appears ill. No distress. She is intubated and restrained.   HENT:   Head: Normocephalic and atraumatic.   Eyes: EOM are normal. Pupils are equal, round, and reactive to light. Right eye exhibits no nystagmus. Left eye exhibits no nystagmus.   Neck: Trachea normal. No tracheal deviation present.   trialysis central venous catheter placed in [facility]   Cardiovascular: Normal rate, regular rhythm, normal heart sounds and intact distal pulses. Exam reveals no gallop and no friction rub.   No murmur heard.  Pulses:       Dorsalis pedis pulses are 1+ on the right side, and 1+ on the left side.   Bilateral 3+ pitting edema in lower extremities up to knees   Pulmonary/Chest: Effort normal. No accessory muscle usage. She is intubated. No respiratory distress. She has decreased breath sounds in the right lower field and the left lower field. She has no  wheezes. She has rhonchi in the right upper field and the left upper field. She has no rales.   Abdominal: Soft. Normal appearance. She exhibits no distension, no abdominal bruit and no ascites. Bowel sounds are absent. There is no tenderness. No hernia.   Musculoskeletal:        Right foot: There is no deformity.        Left foot: There is no deformity.   Feet:   Right Foot:   Skin Integrity: Positive for dry skin.   Left Foot:   Skin Integrity: Positive for dry skin.   Neurological: She is easily aroused. She appears lethargic. GCS eye subscore is 3. GCS verbal subscore is 1. GCS motor subscore is 3.   Patient following commands intermittently. Unable to assess strength due to patient's mental status   Skin: Skin is warm, dry and intact.   Vitals reviewed.      Vents:  Vent Mode: A/C (09/14/18 0731)  Set Rate: 16 bmp (09/14/18 0731)  Vt Set: 350 mL (09/14/18 0731)  Pressure Support: 0 cmH20 (09/14/18 0731)  PEEP/CPAP: 5 cmH20 (09/14/18 0731)  Oxygen Concentration (%): 40 (09/14/18 0805)  Peak Airway Pressure: 43 cmH2O (09/14/18 0731)  Plateau Pressure: 0 cmH20 (09/14/18 0731)  Total Ve: 5.8 mL (09/14/18 0731)  F/VT Ratio<105 (RSBI): (!) 50 (09/14/18 0731)  Lines/Drains/Airways     Central Venous Catheter Line                 Trialysis (Dialysis) Catheter 09/12/18 1326 right internal jugular 1 day          Drain                 NG/OG Tube 09/12/18 1325 orogastric Center mouth 1 day         Urethral Catheter 09/13/18 1438 Temperature probe less than 1 day          Airway                 Airway - Non-Surgical 09/12/18 1012 Endotracheal Tube 1 day          Pressure Ulcer                 Negative Pressure Wound Therapy  37 days              Significant Labs:    CBC/Anemia Profile:  Recent Labs   Lab  09/13/18   0141  09/14/18   0200   WBC  22.10*  22.10*  28.53*  28.53*   HGB  8.1*  8.1*  8.8*  8.8*   HCT  25.2*  25.2*  26.8*  26.8*   PLT  264  264  265  265   MCV  79*  79*  78*  78*   RDW  17.3*  17.3*   17.5*  17.5*        Chemistries:  Recent Labs   Lab  09/13/18   0141   09/13/18   1316  09/13/18   1727  09/13/18   2329  09/14/18   0200   NA   --    < >  124*  123*  125*   --    K   --    < >  4.6  4.4  4.6   --    CL   --    < >  87*  87*  87*   --    CO2   --    < >  24  22*  22*   --    BUN   --    < >  63*  64*  66*   --    CREATININE   --    < >  2.9*  3.0*  3.1*   --    CALCIUM   --    < >  8.3*  8.2*  8.7   --    ALBUMIN  1.7*   --    --    --    --   1.7*   PROT  5.3*   --    --    --    --   5.4*   BILITOT  1.3*   --    --    --    --   1.6*   ALKPHOS  135   --    --    --    --   144*   ALT  7*   --    --    --    --   7*   AST  13   --    --    --    --   16   MG  1.5*   --    --    --    --   1.5*   PHOS  5.6*   --    --    --    --   6.5*    < > = values in this interval not displayed.       All pertinent labs within the past 24 hours have been reviewed.    Significant Imaging:  I have reviewed and interpreted all pertinent imaging results/findings within the past 24 hours.

## 2018-09-14 NOTE — PROGRESS NOTES
RN ordered to titrated the LEVO for SBP > 100. RN ordered not to restart TF due to residuals of 400 mL. KUB ordered per Critical Care.

## 2018-09-14 NOTE — SUBJECTIVE & OBJECTIVE
Interval History: still on vent, minimal settings this morning, tentatively for extubation    Review of patient's allergies indicates:   Allergen Reactions    Codeine Nausea Only    Penicillins Swelling     Able to take amoxil and cephalosporins      Current Facility-Administered Medications   Medication Frequency    albuterol-ipratropium 2.5 mg-0.5 mg/3 mL nebulizer solution 3 mL Q4H    chlorhexidine 0.12 % solution 15 mL BID    dexmedetomidine (PRECEDEX) 400mcg/100mL 0.9% NaCL infusion Continuous    dextrose 50% injection 12.5 g PRN    dextrose 50% injection 25 g PRN    fentaNYL citrate (PF) Syrg 25 mcg Q2H PRN    fluconazole (DIFLUCAN) IVPB 200 mg 100 mL Q24H    glucagon (human recombinant) injection 1 mg PRN    glucose chewable tablet 16 g PRN    glucose chewable tablet 24 g PRN    insulin aspart U-100 pen 0-5 Units Q6H PRN    levothyroxine tablet 150 mcg Before breakfast    norepinephrine 16 mg in sodium chloride 0.9% 250 mL infusion (titrating) Continuous    ondansetron injection 4 mg Q8H PRN    pantoprazole injection 40 mg Daily    piperacillin-tazobactam 4.5 g in sodium chloride 0.9% 100 mL IVPB (ready to mix system) Q12H    [START ON 9/15/2018] vancomycin (VANCOCIN) 500 mg in dextrose 5 % 100 mL IVPB Q24H       Objective:     Vital Signs (Most Recent):  Temp: 97.2 °F (36.2 °C) (09/14/18 1005)  Pulse: 81 (09/14/18 1005)  Resp: (!) 9 (09/14/18 1005)  BP: (!) 174/72 (09/14/18 1005)  SpO2: (!) 94 % (09/14/18 1005)  O2 Device (Oxygen Therapy): ventilator (09/14/18 1005) Vital Signs (24h Range):  Temp:  [97.2 °F (36.2 °C)-98.1 °F (36.7 °C)] 97.2 °F (36.2 °C)  Pulse:  [] 81  Resp:  [3-23] 9  SpO2:  [88 %-100 %] 94 %  BP: ()/(51-92) 174/72  Arterial Line BP: ()/(40-53) 117/48     Weight: 102.7 kg (226 lb 6.6 oz) (09/14/18 0505)  Body mass index is 38.86 kg/m².  Body surface area is 2.15 meters squared.    I/O last 3 completed shifts:  In: 2057.1 [I.V.:1237.1; NG/GT:420; IV  Piggyback:400]  Out: 3450 [Urine:3450]    Physical Exam   HENT:   Head: Atraumatic.   Neck: No JVD present.   Cardiovascular: Normal rate and regular rhythm.   Pulmonary/Chest: Effort normal. She has rales.   On vent   Abdominal: Soft. She exhibits no distension.   Musculoskeletal: She exhibits edema (but less than yesterday). She exhibits no tenderness.   Neurological: She is alert.   Skin: Skin is warm.

## 2018-09-14 NOTE — ASSESSMENT & PLAN NOTE
- Prior ECHO shows diastolic dysfunction (7/2018)  - Echo (9/13) EF 55-60%; concentric remodeling with RV enlargement; no WMA  - Levophed requirements increased overnight; transitioning titration to goal of SBP of 100  - Holding diuresis due to increased levophed requirements; patient making adequate amounts of urine so will hold furosemide for today to see if able to wean off levophed. Will reassess tomorrow

## 2018-09-14 NOTE — PROGRESS NOTES
[facility]  Nephrology  Progress Note    Patient Name: Medina Frazier  MRN: [id number]  Admission Date: 9/8/2018  Hospital Length of Stay: 6 days  Attending Provider: Anson Do MD   Primary Care Physician: Hao Garcia MD  Principal Problem:Acute hypercapnic respiratory failure    Subjective:     HPI: 83yo WF who was to have a lumbar fusion later in the month and was admitted due to abnormal pre-op labs.  Nephrology consulted for JEY (sCr 2.2 on admit, 3.1 at time of consult, normal baseline), also with a sodium of 119 (133 on August 10th). Patient appears somnolent, but wakes up and appropriately answers questions, per family she is at her baseline.  Patient is not complaining of increasing shortness of breath, she in on 3L O2 when seen, this is what she is on at home, she has history of LENA and is supposed to be on CPAP at night, but not compliant.  CXR reviewed and looks like pulmonary edema/congestion on admit.    Interval History: still on vent, minimal settings this morning, tentatively for extubation    Review of patient's allergies indicates:   Allergen Reactions    Codeine Nausea Only    Penicillins Swelling     Able to take amoxil and cephalosporins      Current Facility-Administered Medications   Medication Frequency    albuterol-ipratropium 2.5 mg-0.5 mg/3 mL nebulizer solution 3 mL Q4H    chlorhexidine 0.12 % solution 15 mL BID    dexmedetomidine (PRECEDEX) 400mcg/100mL 0.9% NaCL infusion Continuous    dextrose 50% injection 12.5 g PRN    dextrose 50% injection 25 g PRN    fentaNYL citrate (PF) Syrg 25 mcg Q2H PRN    fluconazole (DIFLUCAN) IVPB 200 mg 100 mL Q24H    glucagon (human recombinant) injection 1 mg PRN    glucose chewable tablet 16 g PRN    glucose chewable tablet 24 g PRN    insulin aspart U-100 pen 0-5 Units Q6H PRN    levothyroxine tablet 150 mcg Before breakfast    norepinephrine 16 mg in sodium chloride 0.9% 250 mL infusion (titrating)  Continuous    ondansetron injection 4 mg Q8H PRN    pantoprazole injection 40 mg Daily    piperacillin-tazobactam 4.5 g in sodium chloride 0.9% 100 mL IVPB (ready to mix system) Q12H    [START ON 9/15/2018] vancomycin (VANCOCIN) 500 mg in dextrose 5 % 100 mL IVPB Q24H       Objective:     Vital Signs (Most Recent):  Temp: 97.2 °F (36.2 °C) (09/14/18 1005)  Pulse: 81 (09/14/18 1005)  Resp: (!) 9 (09/14/18 1005)  BP: (!) 174/72 (09/14/18 1005)  SpO2: (!) 94 % (09/14/18 1005)  O2 Device (Oxygen Therapy): ventilator (09/14/18 1005) Vital Signs (24h Range):  Temp:  [97.2 °F (36.2 °C)-98.1 °F (36.7 °C)] 97.2 °F (36.2 °C)  Pulse:  [] 81  Resp:  [3-23] 9  SpO2:  [88 %-100 %] 94 %  BP: ()/(51-92) 174/72  Arterial Line BP: ()/(40-53) 117/48     Weight: 102.7 kg (226 lb 6.6 oz) (09/14/18 0505)  Body mass index is 38.86 kg/m².  Body surface area is 2.15 meters squared.    I/O last 3 completed shifts:  In: 2057.1 [I.V.:1237.1; NG/GT:420; IV Piggyback:400]  Out: 3450 [Urine:3450]    Physical Exam   HENT:   Head: Atraumatic.   Neck: No JVD present.   Cardiovascular: Normal rate and regular rhythm.   Pulmonary/Chest: Effort normal. She has rales.   On vent   Abdominal: Soft. She exhibits no distension.   Musculoskeletal: She exhibits edema (but less than yesterday). She exhibits no tenderness.   Neurological: She is alert.   Skin: Skin is warm.           Assessment/Plan:     * Acute hypercapnic respiratory failure    On top of likely some degree of chronic hypercapnia secondary poorly controlled LENA (per history patient has only intermittently, if at all, used her home CPAP).    Currently on vent, coming off as per primary team        Hyponatremia    -hypervolemic hyponatremia secondary decreased effective circulating volume, likely secondary CHF, urine sodium < 20 is consistent, at this time not getting worse and a little better since admission, think current deterioration in mental status more likely to CO2  retention / CO2 narcosis and not hyponatremia    Continues to improve, at 125 this morning    Plan/Recommendations:  -continue treatment for CHF with loop diuretic, sodium continues to improve        JEY (acute kidney injury)    Non-oliguric, likely multifactorial, the urine sodium of 10, could be consistent with CRS in this patient with what appears to be acute CHF exacerbation and volume overload, however would be careful reading too much in to this urinary sodium value, also consider ATN toxic secondary infection, ischemic ATN, also possibly vancomycin contributing to worsening sCr since admit further noted with WBCs in the urine of > 100, need to r/o infection which would be unlikely to cause an JEY (unless there is a bilateral pyelonephritis, which clinically does not fit, nor consistent with U/S findings), in short, if urine cultures negative need to consider AIN (outpatient abxs), absence of eosinophilia, rash or fever, may make a little less likely, but does not rule out.    Renal ultrasound unremarkable and negative for hydronephrosis.    Continues with excellent diuretic response    Plan/Reccomendations:  1) continue diuresis with loop diuretics as felt needed for volume status, avoid thiazides  2) serial renal panels, strict Is & Os        CHF (congestive heart failure), NYHA class IV    -diuresis as per primary team, recommend avoiding thiazieds due to hyponatremia            Thank you for your consult. I will follow-up with patient. Please contact us if you have any additional questions.    Allen Barnes MD  Nephrology  [facility]

## 2018-09-14 NOTE — ASSESSMENT & PLAN NOTE
Non-oliguric, likely multifactorial, the urine sodium of 10, could be consistent with CRS in this patient with what appears to be acute CHF exacerbation and volume overload, however would be careful reading too much in to this urinary sodium value, also consider ATN toxic secondary infection, ischemic ATN, also possibly vancomycin contributing to worsening sCr since admit further noted with WBCs in the urine of > 100, need to r/o infection which would be unlikely to cause an JEY (unless there is a bilateral pyelonephritis, which clinically does not fit, nor consistent with U/S findings), in short, if urine cultures negative need to consider AIN (outpatient abxs), absence of eosinophilia, rash or fever, may make a little less likely, but does not rule out.    Renal ultrasound unremarkable and negative for hydronephrosis.    Continues with excellent diuretic response    Plan/Reccomendations:  1) continue diuresis with loop diuretics as felt needed for volume status, avoid thiazides  2) serial renal panels, strict Is & Os

## 2018-09-14 NOTE — ASSESSMENT & PLAN NOTE
--2/2 diastolic HF. No known lung disease  --Echo (9/13) PA systolic pressure estimated at 88 mm Hg  --holding diuresis; see below

## 2018-09-14 NOTE — ASSESSMENT & PLAN NOTE
--suspect hypervolemic hyponatremia from volume overload.   --T4 given hypothyroidism history   -- sodium trending up with diuresis; will hold lasix today, see above

## 2018-09-14 NOTE — PLAN OF CARE
Patient remains intubated and requiring Levo for BP support.  S/P IR drainage of spinal abscess, however clinically not improving.  Plan to continue supportive care and obtain further Neurosurgery recs.  Patient is not medically appropriate for disposition planning.  CM will continue to follow.     09/14/18 2423   Right Care Assessment   Can the patient answer the patient profile reliably? No, cognitively impaired   How often would a person be available to care for the patient? Whenever needed   Describe the patient's ability to walk at the present time. Does not walk or unable to take any steps at all   How does the patient rate their overall health at the present time? Poor   Number of comorbid conditions (as recorded on the chart) Three   During the past month, has the patient often been bothered by feeling down, depressed or hopeless? No   During the past month, has the patient often been bothered by little interest or pleasure in doing things? No   Fauzia Holland RN, BSN, [facility]  Case Management  [facility]  Ext. [phone]

## 2018-09-14 NOTE — SIGNIFICANT EVENT
GoC Conversation    Had an extensive discussion w/ family (daughters - Sheeba and Ivelisse,  - Codey) regarding GoC.  Wanted to establish code status.  Patient has slowly deteriorated despite aggressive therapy including broad spectrum abx, vasopressors, IR drainage of a paraspinal abscess.  Explained to them that in the event of a cardiac arrest despite these measures, the likelihood of a poor outcome is exceedingly high.  Her eldest daughter expressed that her mother would not want to suffer any more if it appeared that we would be unable to fix her.  After further discussion with the remaining family members, the decision was made to make her DNR in the event of a cardiac arrest.  Code status updated in the EMR.    Stephen Arthur MD  [employer] JULIAN

## 2018-09-14 NOTE — SUBJECTIVE & OBJECTIVE
Interval History:   Patient remains intubated, sedated on pressors.  She underwent IR drainage of paraspinal abscess yesterday with 5 ml purulent fluid removed.   Gram stain is showing many GNRs, moderate gram negative diplococci and few GPRs.  Has been on Cefepime and Vanc. Flagyl added yesterday as Prevotella grew in blood cultures. Repeat blood cultures are negative. Fluconazole now on board. Vancomycin level still supratherapeutic. Clinically appears to have worsened.    Review of Systems   Unable to perform ROS: Intubated     Objective:     Vital Signs (Most Recent):  Temp: 97.3 °F (36.3 °C) (09/14/18 0905)  Pulse: 98 (09/14/18 0905)  Resp: 15 (09/14/18 0905)  BP: 129/63 (09/14/18 0905)  SpO2: (!) 94 % (09/14/18 0905) Vital Signs (24h Range):  Temp:  [97.3 °F (36.3 °C)-98.1 °F (36.7 °C)] 97.3 °F (36.3 °C)  Pulse:  [] 98  Resp:  [3-23] 15  SpO2:  [88 %-100 %] 94 %  BP: ()/(51-92) 129/63  Arterial Line BP: ()/(40-57) 118/47     Weight: 102.7 kg (226 lb 6.6 oz)  Body mass index is 38.86 kg/m².    Estimated Creatinine Clearance: 17.4 mL/min (A) (based on SCr of 3 mg/dL (H)).    Physical Exam   Constitutional: She appears well-developed and well-nourished. She appears ill.   Intubated, sedated   Neck: JVD present.   RIJ CVC in place   Cardiovascular: Normal rate. An irregularly irregular rhythm present. Exam reveals no friction rub.   No murmur heard.  Pulmonary/Chest: Effort normal. She has no wheezes. She has rales.   Abdominal: Soft. Bowel sounds are normal. She exhibits no distension.   Obese abdomen   Musculoskeletal: She exhibits edema (BLE, right hand).   +2 pitting edema   Skin: Skin is warm and dry. She is not diaphoretic. No erythema.   Lumbar wound not examined today.   Nursing note and vitals reviewed.      Significant Labs:   Blood Culture:   Recent Labs   Lab  06/08/18   1349  09/09/18   1555  09/09/18   1731  09/11/18   1003  09/11/18   1011   LABBLOO  No growth after 5 days.  No  growth after 5 days.  Gram stain terrell bottle: Gram negative rods   Positive results previously called 09/11/2018  13:30  PREVOTELLA (B.) BIVIA  Gram stain terrell bottle: Gram negative rods   Results called to and read back by:Sofia Noriega RN 09/11/2018  06:01  PREVOTELLA (B.) BIVIA  No Growth to date  No Growth to date  No Growth to date  No Growth to date  No Growth to date  No Growth to date     CBC:   Recent Labs   Lab  09/13/18   0141 09/14/18   0200   WBC  22.10*  22.10*  28.53*  28.53*   HGB  8.1*  8.1*  8.8*  8.8*   HCT  25.2*  25.2*  26.8*  26.8*   PLT  264  264  265  265     CMP:   Recent Labs   Lab  09/13/18   0141 09/13/18   1727  09/13/18   2329  09/14/18   0200  09/14/18   0812   NA   --    < >  123*  125*   --   125*   K   --    < >  4.4  4.6   --   4.5   CL   --    < >  87*  87*   --   87*   CO2   --    < >  22*  22*   --   19*   GLU   --    < >  113*  129*   --   157*   BUN   --    < >  64*  66*   --   65*   CREATININE   --    < >  3.0*  3.1*   --   3.0*   CALCIUM   --    < >  8.2*  8.7   --   8.6*   PROT  5.3*   --    --    --   5.4*   --    ALBUMIN  1.7*   --    --    --   1.7*   --    BILITOT  1.3*   --    --    --   1.6*   --    ALKPHOS  135   --    --    --   144*   --    AST  13   --    --    --   16   --    ALT  7*   --    --    --   7*   --    ANIONGAP   --    < >  14  16   --   19*   EGFRNONAA   --    < >  14.1*  13.6*   --   14.1*    < > = values in this interval not displayed.     Wound Culture:   Recent Labs   Lab  07/02/18   1432  09/13/18   0834   LABAERO  ENTEROBACTER CLOACAE COMPLEX  Rare  For susceptibility see order # [id number]    ENTEROBACTER CLOACAE COMPLEX  Moderate    No growth     All pertinent labs within the past 24 hours have been reviewed.    Significant Imaging: I have reviewed all pertinent imaging results/findings within the past 24 hours.

## 2018-09-14 NOTE — SUBJECTIVE & OBJECTIVE
Interval History: 9/14: AF, NAEON, wbc 29 from 22, Na 125 from 122, multiple cultures growing multiple different organisms    Medications:  Continuous Infusions:   dexmedetomidine (PRECEDEX) infusion      fentanyl Stopped (09/14/18 0730)    custom IV infusion builder (for pharmacist use only) 21.7 mL/hr at 09/14/18 1005    propofol Stopped (09/14/18 0730)     Scheduled Meds:   albuterol-ipratropium  3 mL Nebulization Q4H    chlorhexidine  15 mL Mouth/Throat BID    fluconazole (DIFLUCAN) IVPB  200 mg Intravenous Q24H    levothyroxine  150 mcg Per OG tube Before breakfast    pantoprazole  40 mg Intravenous Daily    piperacillin-tazobactam (ZOSYN) IVPB  4.5 g Intravenous Q12H     PRN Meds:dextrose 50%, dextrose 50%, fentaNYL citrate (PF), glucagon (human recombinant), glucose, glucose, insulin aspart U-100, ondansetron       Objective:     Weight: 102.7 kg (226 lb 6.6 oz)  Body mass index is 38.86 kg/m².  Vital Signs (Most Recent):  Temp: 97.2 °F (36.2 °C) (09/14/18 1005)  Pulse: 81 (09/14/18 1005)  Resp: (!) 9 (09/14/18 1005)  BP: (!) 174/72 (09/14/18 1005)  SpO2: (!) 94 % (09/14/18 1005) Vital Signs (24h Range):  Temp:  [97.2 °F (36.2 °C)-98.1 °F (36.7 °C)] 97.2 °F (36.2 °C)  Pulse:  [] 81  Resp:  [3-23] 9  SpO2:  [88 %-100 %] 94 %  BP: ()/(51-92) 174/72  Arterial Line BP: ()/(40-53) 117/48     Date 09/14/18 0700 - 09/15/18 0659   Shift 5005-2547 0935-1271 8977-2465 24 Hour Total   INTAKE   I.V.(mL/kg) 134.4(1.3)   134.4(1.3)   NG/GT 0   0   IV Piggyback 100   100   Shift Total(mL/kg) 234.4(2.3)   234.4(2.3)   OUTPUT   Urine(mL/kg/hr) 194   194   Shift Total(mL/kg) 194(1.9)   194(1.9)   Weight (kg) 102.7 102.7 102.7 102.7              Vent Mode: Spont  Oxygen Concentration (%):  [40] 40  Resp Rate Total:  [10 br/min-16 br/min] 10 br/min  Vt Set:  [350 mL-400 mL] 350 mL  PEEP/CPAP:  [5 cmH20] 5 cmH20  Pressure Support:  [0 cmH20-10 cmH20] 10 cmH20  Mean Airway Pressure:  [7.3 tuH55-56  "cmH20] 7.3 cmH20         NG/OG Tube 09/12/18 1325 orogastric Center mouth (Active)   Placement Check placement verified by x-ray 9/14/2018  7:05 AM   Distal Tube Length (cm) 58 9/14/2018  7:05 AM   Tolerance no signs/symptoms of discomfort 9/14/2018  7:05 AM   Securement taped to commercial device 9/14/2018  7:05 AM   Clamp Status/Tolerance clamped 9/14/2018  7:05 AM   Suction Setting/Drainage Method suction at;low;intermittent setting 9/14/2018  3:05 AM   Insertion Site Appearance no redness, warmth, tenderness, skin breakdown, drainage 9/14/2018  3:05 AM   Current Rate (mL/hr) 0 mL/hr 9/14/2018  7:05 AM   Intake (mL) 60 mL 9/13/2018  6:05 AM   Intake (mL) - Formula Tube Feeding 0 9/14/2018 10:05 AM            Urethral Catheter 09/13/18 1438 Temperature probe (Active)   Site Assessment Clean;Intact 9/14/2018  7:05 AM   Collection Container Urimeter 9/14/2018  7:05 AM   Securement Method secured to top of thigh w/ adhesive device 9/14/2018  7:05 AM   Catheter Care Performed yes 9/14/2018  7:05 AM   Reason for Continuing Urinary Catheterization Critically ill in ICU requiring intensive monitoring 9/14/2018  7:05 AM   CAUTI Prevention Bundle StatLock in place w 1" slack;Intact seal between catheter & drainage tubing;Drainage bag off the floor;Green sheeting clip in use;No dependent loops or kinks;Drainage bag not overfilled (<2/3 full);Drainage bag below bladder 9/14/2018  7:05 AM   Output (mL) 33 mL 9/14/2018 10:05 AM            Trialysis (Dialysis) Catheter 09/12/18 1326 right internal jugular (Active)   IV Device Securement sutures 9/14/2018  7:05 AM   Additional Site Signs no drainage;no streak formation;no palpable cord;no pain;no edema;no warmth;no erythema 9/14/2018  7:05 AM   Patency/Care infusing 9/14/2018  7:05 AM   Site Assessment Clean;Dry;Intact;No redness;No swelling 9/14/2018  7:05 AM   Status Accessed 9/14/2018  7:05 AM   Dressing Intervention Dressing reinforced 9/14/2018  7:05 AM   Dressing Status " Clean;Dry;Intact 9/14/2018  7:05 AM   Dressing Change Due 09/19/18 9/14/2018  7:05 AM   Verification by X-ray Yes 9/14/2018  7:05 AM   Site Condition No complications 9/14/2018  7:05 AM   Dressing Occlusive 9/14/2018  7:05 AM   Daily Line Review Performed 9/14/2018  7:05 AM       Neurosurgery Physical Exam     [id number]  PERRL  FC x4  CNII-XII grossly intact    Significant Labs:  Recent Labs   Lab  09/13/18   0141 09/13/18   1727  09/13/18   2329  09/14/18   0200  09/14/18 0812   GLU   --    < >  113*  129*   --   157*   NA   --    < >  123*  125*   --   125*   K   --    < >  4.4  4.6   --   4.5   CL   --    < >  87*  87*   --   87*   CO2   --    < >  22*  22*   --   19*   BUN   --    < >  64*  66*   --   65*   CREATININE   --    < >  3.0*  3.1*   --   3.0*   CALCIUM   --    < >  8.2*  8.7   --   8.6*   MG  1.5*   --    --    --   1.5*   --     < > = values in this interval not displayed.     Recent Labs   Lab  09/13/18 0141 09/14/18   0200   WBC  22.10*  22.10*  28.53*  28.53*   HGB  8.1*  8.1*  8.8*  8.8*   HCT  25.2*  25.2*  26.8*  26.8*   PLT  264  264  265  265     Recent Labs   Lab  09/13/18 0141 09/14/18   0200   INR  1.3*  1.3*     Microbiology Results (last 7 days)     Procedure Component Value Units Date/Time    Culture, Anaerobe [[id number]] Collected:  09/13/18 0834    Order Status:  Completed Specimen:  Abscess from Back Updated:  09/14/18 0734     Anaerobic Culture Culture in progress    Narrative:       lumbar paraspinal fluid collection drainage    Aerobic culture [[id number]] Collected:  09/13/18 0834    Order Status:  Completed Specimen:  Abscess from Back Updated:  09/14/18 0722     Aerobic Bacterial Culture No growth    Narrative:       lumbar paraspinal fluid collection drainage    Gram stain [[id number]] Collected:  09/13/18 0834    Order Status:  Completed Specimen:  Abscess from Back Updated:  09/13/18 1946     Gram Stain Result Many WBC's      Many Gram negative rods      Few  Gram positive rods    Blood culture [[id number]] Collected:  09/09/18 1731    Order Status:  Completed Specimen:  Blood Updated:  09/13/18 1418     Blood Culture, Routine Gram stain terrell bottle: Gram negative rods      Blood Culture, Routine Results called to and read back by:Sofia Noriega RN 09/11/2018  06:01     Blood Culture, Routine PREVOTELLA (B.) BIVIA    Narrative:       From 2 different sites 30 minutes apart    Blood culture [[id number]] Collected:  09/09/18 1555    Order Status:  Completed Specimen:  Blood Updated:  09/13/18 1417     Blood Culture, Routine Gram stain terrell bottle: Gram negative rods      Blood Culture, Routine Positive results previously called 09/11/2018  13:30     Blood Culture, Routine PREVOTELLA (B.) BIVIA    Blood culture [[id number]] Collected:  09/11/18 1011    Order Status:  Completed Specimen:  Blood Updated:  09/13/18 1212     Blood Culture, Routine No Growth to date     Blood Culture, Routine No Growth to date     Blood Culture, Routine No Growth to date    Narrative:       Please draw from 2 separate sites 30 minutes apart. Thank you    Blood culture [[id number]] Collected:  09/11/18 1003    Order Status:  Completed Specimen:  Blood Updated:  09/13/18 1212     Blood Culture, Routine No Growth to date     Blood Culture, Routine No Growth to date     Blood Culture, Routine No Growth to date    Gram stain [[id number]] Collected:  09/13/18 0834    Order Status:  Completed Specimen:  Abscess from Back Updated:  09/13/18 1153     Gram Stain Result Many WBC's      Many Gram negative rods      Moderate Gram negative diplococci    Fungus culture [[id number]] Collected:  09/13/18 0834    Order Status:  Sent Specimen:  Abscess from Back Updated:  09/13/18 1040    Gram stain [[id number]]     Order Status:  Completed Specimen:  Abscess from Back     Urine culture [[id number]] Collected:  09/10/18 1701    Order Status:  Completed Specimen:  Urine from Catheterized Updated:  09/12/18 1438     Urine  Culture, Routine --     BEN ALBICANS  > 100,000 cfu/ml  Treatment of asymptomatic candiduria is not recommended (except for   specific populations). Candida isolated in the urine typically   represents colonization. If an indwelling urinary catheter is present  it should be removed or replaced.      Narrative:       add on [facility] order #[id number] per Dr. Elina Sandhu @ 19:58    09/10/2018     Urine culture [[id number]]     Order Status:  Completed Specimen:  Urine, Catheterized     Urine culture [[id number]]     Order Status:  Canceled Specimen:  Urine         IR drain of psoas abscess placed yesterday, gram stain showing GNRs, GPCs, cultures, speciation, susceptibilities pending, urine culture from 9/10 growing candida, blood culture from 9/9 growing prevotella

## 2018-09-14 NOTE — PROGRESS NOTES
[facility]  Critical Care Medicine  Progress Note    Patient Name: Medina Frazier  MRN: [id number]  Admission Date: 9/8/2018  Hospital Length of Stay: 6 days  Code Status: Full Code  Attending Provider: Anson Do MD  Primary Care Provider: Hao Garcia MD   Principal Problem: Acute hypercapnic respiratory failure    Subjective:     HPI:  Mrs. Frazier is a 82 yr old female with history significant for diastolic HF, CAD s/p VIVIANA Lcx (June 2017), HTN, DM2, and lumbar stenosis s/p lumbar fusion in June 2018 with subsequent hardware infection and I&D on 7/2 and repeat lumbar I&D, hardware removal, decompression and wound vac placement to L2-L3 due to continued epidural abscess. She was discharged recently to a SNF for rehab and continued IV antibiotic therapy. She originally presented to [facility] on 9/8 from SNF after labwork revealed JEY, hyponatremia. Additionally, CT lumbar spine obtained 9/5 was concerning for persistent osteomyelitis, epidural abscess in L2-L3 region.     She was transferred to [facility] on 9/8 for higher level of care and neurosurgery evaluation. Admission has been complicated by continued JEY with creatinine up trending from baseline 0.8 to 1.0 to 3.0 along with hyponatremia felt to be related to acute on chronic diastolic heart failure, volume overload. Nephrology was consulted and has been assisting with management. She was started on lasix 100 mg BID with minimal urine output response and creatinine slowly uptrending. Additionally, blood cultures obtained 9/9 are growing GNR's, awaiting speciation. Suspect this is enterobacter given continued osteomyelitis of L2-L3, complex fluid collection within laminectomy bed measuring 3x8x5 cm concerning for abscess, and additional prevertebral collection anterior to L1 and L2 vertebral bodies measuring 4x6x1 abutting the abdominal aorta concerning for abscess noted on MRI from 9/10. Neurosurgery were planning  on repeat I&D for source control but held off given the above medical issues with JEY, hyponatremia. She has been on vancomycin, cefepime since 9/9 and blood cultures from 9/11 are NGTD.     She was transferred to the MICU on the AM of 9/12 for acute hypercapnic respiratory failure with concern for aspiration event, worsening encephalopathy, and worsening JEY.    .          Hospital/ICU Course:  Upon transfer to MICU, Mrs. Frazier's respiratory and mental status worsened despite Bipap and lasix trial. She was intubated and she developed shock requiring low dose levophed, felt to be septic in setting of bacteremia. A trialysis line was placed for venous access, vasopressors, and possible need for CRRT. Arterial line placed for frequent ABGs. Cefepime and vancomycin continued as ID and neurosurgery following. Added on flagyll as micro lab thinks BCx 9/9 may be growing anaerobes. IR successfully drained epidural abscess (9/13). Patient no longer requiring levophed. Continuing lasix trial as patient still appears volume overloaded.     Interval History/Significant Events: Patient put back on levophed overnight and has been up trending in requirements. Per nurse, patient is more alert and following commands. When asking the patient if she was in pain, she shook her head yes.     Review of Systems   Unable to perform ROS: Intubated     Objective:     Vital Signs (Most Recent):  Temp: 97.5 °F (36.4 °C) (09/14/18 0805)  Pulse: 97 (09/14/18 0805)  Resp: 14 (09/14/18 0805)  BP: 133/63 (09/14/18 0805)  SpO2: (!) 94 % (09/14/18 0805) Vital Signs (24h Range):  Temp:  [97.3 °F (36.3 °C)-98.4 °F (36.9 °C)] 97.5 °F (36.4 °C)  Pulse:  [78-97] 97  Resp:  [3-23] 14  SpO2:  [88 %-100 %] 94 %  BP: ()/(51-92) 133/63  Arterial Line BP: ()/(40-57) 106/44   Weight: 102.7 kg (226 lb 6.6 oz)  Body mass index is 38.86 kg/m².      Intake/Output Summary (Last 24 hours) at 9/14/2018 0828  Last data filed at 9/14/2018 0805  Gross per 24  hour   Intake 1733.29 ml   Output 2106 ml   Net -372.71 ml       Physical Exam   Constitutional: She appears well-developed and well-nourished. She appears lethargic. She is easily aroused. She appears ill. No distress. She is intubated and restrained.   HENT:   Head: Normocephalic and atraumatic.   Eyes: EOM are normal. Pupils are equal, round, and reactive to light. Right eye exhibits no nystagmus. Left eye exhibits no nystagmus.   Neck: Trachea normal. No tracheal deviation present.   trialysis central venous catheter placed in [facility]   Cardiovascular: Normal rate, regular rhythm, normal heart sounds and intact distal pulses. Exam reveals no gallop and no friction rub.   No murmur heard.  Pulses:       Dorsalis pedis pulses are 1+ on the right side, and 1+ on the left side.   Bilateral 3+ pitting edema in lower extremities up to knees   Pulmonary/Chest: Effort normal. No accessory muscle usage. She is intubated. No respiratory distress. She has decreased breath sounds in the right lower field and the left lower field. She has no wheezes. She has rhonchi in the right upper field and the left upper field. She has no rales.   Abdominal: Soft. Normal appearance. She exhibits no distension, no abdominal bruit and no ascites. Bowel sounds are absent. There is no tenderness. No hernia.   Musculoskeletal:        Right foot: There is no deformity.        Left foot: There is no deformity.   Feet:   Right Foot:   Skin Integrity: Positive for dry skin.   Left Foot:   Skin Integrity: Positive for dry skin.   Neurological: She is easily aroused. She appears lethargic. GCS eye subscore is 3. GCS verbal subscore is 1. GCS motor subscore is 3.   Patient following commands intermittently. Unable to assess strength due to patient's mental status   Skin: Skin is warm, dry and intact.   Vitals reviewed.      Vents:  Vent Mode: A/C (09/14/18 0731)  Set Rate: 16 bmp (09/14/18 0731)  Vt Set: 350 mL (09/14/18 0731)  Pressure Support: 0  cmH20 (09/14/18 0731)  PEEP/CPAP: 5 cmH20 (09/14/18 0731)  Oxygen Concentration (%): 40 (09/14/18 0805)  Peak Airway Pressure: 43 cmH2O (09/14/18 0731)  Plateau Pressure: 0 cmH20 (09/14/18 0731)  Total Ve: 5.8 mL (09/14/18 0731)  F/VT Ratio<105 (RSBI): (!) 50 (09/14/18 0731)  Lines/Drains/Airways     Central Venous Catheter Line                 Trialysis (Dialysis) Catheter 09/12/18 1326 right internal jugular 1 day          Drain                 NG/OG Tube 09/12/18 1325 orogastric Center mouth 1 day         Urethral Catheter 09/13/18 1438 Temperature probe less than 1 day          Airway                 Airway - Non-Surgical 09/12/18 1012 Endotracheal Tube 1 day          Pressure Ulcer                 Negative Pressure Wound Therapy  37 days              Significant Labs:    CBC/Anemia Profile:  Recent Labs   Lab  09/13/18   0141  09/14/18   0200   WBC  22.10*  22.10*  28.53*  28.53*   HGB  8.1*  8.1*  8.8*  8.8*   HCT  25.2*  25.2*  26.8*  26.8*   PLT  264  264  265  265   MCV  79*  79*  78*  78*   RDW  17.3*  17.3*  17.5*  17.5*        Chemistries:  Recent Labs   Lab  09/13/18   0141   09/13/18   1316  09/13/18   1727  09/13/18   2329  09/14/18   0200   NA   --    < >  124*  123*  125*   --    K   --    < >  4.6  4.4  4.6   --    CL   --    < >  87*  87*  87*   --    CO2   --    < >  24  22*  22*   --    BUN   --    < >  63*  64*  66*   --    CREATININE   --    < >  2.9*  3.0*  3.1*   --    CALCIUM   --    < >  8.3*  8.2*  8.7   --    ALBUMIN  1.7*   --    --    --    --   1.7*   PROT  5.3*   --    --    --    --   5.4*   BILITOT  1.3*   --    --    --    --   1.6*   ALKPHOS  135   --    --    --    --   144*   ALT  7*   --    --    --    --   7*   AST  13   --    --    --    --   16   MG  1.5*   --    --    --    --   1.5*   PHOS  5.6*   --    --    --    --   6.5*    < > = values in this interval not displayed.       All pertinent labs within the past 24 hours have been reviewed.    Significant  Imaging:  I have reviewed and interpreted all pertinent imaging results/findings within the past 24 hours.    Assessment/Plan:     Neuro   Encephalopathy, metabolic    --multifactorial: hypercapnia, sepsis in setting of receiving opiates/gabapentin with JEY   --non focal on exam, consider CT head for acute changes  --patient more awake and alert this AM, following commands intermittently   --s/p intubation on precedex; discontinued fentanyl and propofol  --see below for plan         Lumbar stenosis    --s/p lumbar fusion/hardware placement June 2018 with subsequent osteo and abscess. See below        Derm   Alteration in skin integrity related to surgical incision    --s/p wound vac placement. Continue per neurosurgery        Pulmonary   * Acute hypercapnic respiratory failure    --2/2 pulmonary edema in setting of acute on chronic diastolic HF, volume overload and opiates/gabapentin administration with JEY  --Repeat CXR (9/14) showing worsening pulmonary edema  --Now with mixed respiratory and metabolic acidosis with high anion gap; lactate pending; could be caused by uremia due to elevated BUN  --s/p intubation on 9/12 for failing bipap. Placed on pressure control today  --Failed SBT today  --US of thoracic cavity today  --holding diuresis, see CHF            Pulmonary hypertension    --2/2 diastolic HF. No known lung disease  --Echo (9/13) PA systolic pressure estimated at 88 mm Hg  --holding diuresis; see below          Cardiac/Vascular   PAF (paroxysmal atrial fibrillation)    --CHADS vasc 4; hold home rivaroxaban   --hold heparin gtt        CHF (congestive heart failure), NYHA class IV    - Prior ECHO shows diastolic dysfunction (7/2018)  - Echo (9/13) EF 55-60%; concentric remodeling with RV enlargement; no WMA  - Levophed requirements increased overnight; transitioning titration to goal of SBP of 100  - Holding diuresis due to increased levophed requirements; patient making adequate amounts of urine so will  hold furosemide for today to see if able to wean off levophed. Will reassess tomorrow        Coronary artery disease involving native coronary artery of native heart without angina pectoris    --hold BB in setting of shock  --continue statin for now   --s/p VIVIANA to Lcx in June 2018 and on aspirin, plavix prior to admission. Holding in setting of upcoming procedures.             Hypertension    --hold coreg, imdur due to above        Renal/   Hyponatremia    --suspect hypervolemic hyponatremia from volume overload.   --T4 given hypothyroidism history   -- sodium trending up with diuresis; will hold lasix today, see above          JEY (acute kidney injury)    --most likely cardiorenal syndrome vs sepsis (ATN)  --prerenal based on urine lytes, will f/u urine lytes  --Nephrology following  --no need for emergent dialysis at this time          ID   Osteomyelitis of Lumbar Spine    --see above        Septic shock    --suspect septic given above  --titrate levophed to maintain SBP >100  --Abx as above. Also on vancomycin for empiric coverage  --positive UCx (9/11) for Candida; Treating with diflucan on 3 of 5 days as cannot rule out as source of her sepsis.           Gram-negative bacteremia    --noted on cultures from 9/9; cleared on 9/11; Positive for Prevotella  --per ID recs: discontinued ceftriaxone and flagyll and added on zosyn. Patient has recorded penicillin allergy but patient has received penicillins in the past with no recorded reactions. We will watch closely for signs of allergic reactions.   --Not fully able to obtain source control as IR only able to drain one abscess.           Spinal epidural abscess    --noted on MRI along with osteo  --IR drained paraspinal abscess 9/13   --Culture of abscess (9/13) grew GNR and GN diplococci  --Will f/u with neurosurgery to see if any plans of intervention as patient is not improving.   --Treated with zosyn and vanc        Oncology   Anemia    --likely anemia of  chronic disease. Trend Hgb for now  --no signs of acute blood loss  --transfuse for Hgb <7        Endocrine   Diabetes mellitus, type II    --POCT glucose and SSI         GI   GERD (gastroesophageal reflux disease)    --continue PPI           Critical Care Daily Checklist:    A: Awake: RASS Goal/Actual Goal:  0  Actual: [place] Agitation Sedation Scale (RASS): Drowsy   B: Spontaneous Breathing Trial Performed?  Failed SBT today   C: SAT & SBT Coordinated?  N/A                    D: Delirium: CAM-ICU Overall CAM-ICU: Positive   E: Early Mobility Performed? Yes   F: Feeding Goal: Goals: Pt to receive nutrition by RD follow up  Status: Nutrition Goal Status: new   Current Diet Order   Procedures    Diet NPO      AS: Analgesia/Sedation Precedex   T: Thromboembolic Prophylaxis SCDs; no pharmacologic ppx in case of another procedure   H: HOB > 300 Yes   U: Stress Ulcer Prophylaxis (if needed) PPI   G: Glucose Control SSI   B: Bowel Function Stool Occurrence: 0   I: Indwelling Catheter (Lines & Saenz) Necessity Replaced saenz 9/13; trialysis in RIJ; arterial line LUE   D: De-escalation of Antimicrobials/Pharmacotherapies None    Plan for the day/ETD Supportive care, stabilize hemodynamics    Code Status:  Family/Goals of Care: Full Code       Critical Care Time: 60 minutes  Critical secondary to Patient has a condition that poses threat to life and bodily function: septic shock likely secondary to spinal abscess     Critical care was time spent personally by me on the following activities: development of treatment plan with patient or surrogate and bedside caregivers, discussions with consultants, evaluation of patient's response to treatment, examination of patient, ordering and performing treatments and interventions, ordering and review of laboratory studies, ordering and review of radiographic studies, pulse oximetry, re-evaluation of patient's condition. This critical care time did not overlap with that of any other  provider or involve time for any procedures.     Leslie Johnson PA-C  Critical Care Medicine  [facility]

## 2018-09-14 NOTE — ASSESSMENT & PLAN NOTE
Ms. Frazier is an 82yoF with multiple comorbidities s/p L2-3 TLIF on 6/5/18 at [facility], with surgical wound infection with E. cloacea s/p washout & hardware removal, now with worsening osteomyelitis & paraspinal abscess despite weeks of treatment with IV Cipro    -No acute neurosurgical intervention  -MRI with L2-3 osteodiscitis, large fluid collection in surgical bed, as well a prevertebral fluid collection L1,2  -Plan for OR postponed secondary to dismal medical status and inability to tolerate surgery at this time  -Please wear LSO brace at all times  -Please hold Xarelto & Plavix   -Please medically optimize and document medical clearance when appropriate for surgery  -Continue vancomycin & cefepime per infectious disease recs  -Medical management per primary team

## 2018-09-14 NOTE — SUBJECTIVE & OBJECTIVE
Interval History: 9/13: Patient to IR today for drain, NAEON, AFVSS, exam stable, remains intubated    Medications:  Continuous Infusions:   fentanyl 50 mcg/hr (09/13/18 1800)    custom IV infusion builder (for pharmacist use only) 13.8 mL/hr at 09/13/18 1800    propofol 5 mcg/kg/min (09/13/18 1800)     Scheduled Meds:   albuterol-ipratropium  3 mL Nebulization Q4H    chlorhexidine  15 mL Mouth/Throat BID    fluconazole (DIFLUCAN) IVPB  200 mg Intravenous Q24H    levothyroxine  150 mcg Per OG tube Before breakfast    [START ON 9/14/2018] pantoprazole  40 mg Intravenous Daily    piperacillin-tazobactam (ZOSYN) IVPB  4.5 g Intravenous Q12H     PRN Meds:dextrose 50%, dextrose 50%, fentaNYL citrate (PF), glucagon (human recombinant), glucose, glucose, insulin aspart U-100, ondansetron     Review of Systems  Objective:     Weight: 127.7 kg (281 lb 8.4 oz)  Body mass index is 48.32 kg/m².  Vital Signs (Most Recent):  Temp: 97.9 °F (36.6 °C) (09/13/18 1800)  Pulse: 81 (09/13/18 1800)  Resp: (!) 7 (09/13/18 1800)  BP: (!) 108/53 (09/13/18 1800)  SpO2: 95 % (09/13/18 1800) Vital Signs (24h Range):  Temp:  [97.9 °F (36.6 °C)-99.3 °F (37.4 °C)] 97.9 °F (36.6 °C)  Pulse:  [75-83] 81  Resp:  [1-23] 7  SpO2:  [88 %-100 %] 95 %  BP: ()/(51-84) 108/53  Arterial Line BP: ()/(46-57) 107/47     Date 09/13/18 0700 - 09/14/18 0659   Shift 4016-9556 9914-8863 9941-8903 24 Hour Total   INTAKE   I.V.(mL/kg) 232.4(1.8) 389.6(3.1)  622(4.9)   NG/GT  40  40   IV Piggyback 200 100  300   Shift Total(mL/kg) 432.4(3.4) 529.6(4.1)  962(7.5)   OUTPUT   Urine(mL/kg/hr) 750(0.7) 300  1050   Shift Total(mL/kg) 750(5.9) 300(2.3)  1050(8.2)   Weight (kg) 127.7 127.7 127.7 127.7              Vent Mode: A/C  Oxygen Concentration (%):  [40] 40  Resp Rate Total:  [16 br/min-20 br/min] 16 br/min  Vt Set:  [400 mL] 400 mL  PEEP/CPAP:  [5 cmH20] 5 cmH20  Pressure Support:  [0 cmH20] 0 cmH20  Mean Airway Pressure:  [0 cmH20-15 cmH20] 11  cmH20         NG/OG Tube 09/12/18 1325 orogastric Center mouth (Active)   Placement Check placement verified by aspirate characteristics;placement verified by aspirate pH;placement verified by distal tube length measurement 9/13/2018  3:00 PM   Distal Tube Length (cm) 55 9/13/2018  7:01 AM   Tolerance no signs/symptoms of discomfort 9/13/2018  3:00 PM   Securement taped to commercial device 9/13/2018  3:00 PM   Clamp Status/Tolerance unclamped 9/13/2018  3:00 PM   Suction Setting/Drainage Method suction at;low;intermittent setting 9/13/2018  3:05 AM   Insertion Site Appearance no redness, warmth, tenderness, skin breakdown, drainage 9/13/2018  3:05 AM   Intake (mL) 60 mL 9/13/2018  6:05 AM   Intake (mL) - Formula Tube Feeding 10 9/13/2018  6:00 PM            Urethral Catheter 09/13/18 1438 Temperature probe (Active)   Output (mL) 300 mL 9/13/2018  6:00 PM            Trialysis (Dialysis) Catheter 09/12/18 1326 right internal jugular (Active)   IV Device Securement sutures 9/13/2018  3:00 PM   Additional Site Signs no erythema;no warmth;no edema;no pain;no palpable cord;no streak formation;no drainage 9/13/2018  3:00 PM   Patency/Care infusing 9/13/2018  3:00 PM   Site Assessment Clean;Dry;Intact;No redness;No swelling 9/13/2018  3:00 PM   Status Deaccessed 9/13/2018  3:00 PM   Dressing Intervention Dressing reinforced 9/13/2018  3:00 PM   Dressing Status Biopatch in place;Clean;Intact;Dry 9/13/2018  3:00 PM   Dressing Change Due 09/19/18 9/13/2018  3:00 PM   Verification by X-ray Yes 9/13/2018  7:01 AM   Site Condition No complications 9/13/2018  3:00 PM   Dressing Occlusive 9/13/2018  3:00 PM   Daily Line Review Performed 9/13/2018  3:05 AM       Neurosurgery Physical Exam  E3VtM6  CNII-XIIi grossly intact, PERRLA, +cough/gag/corneal  Loc BLE, BUE move spontaneously    Significant Labs:  Recent Labs   Lab  09/13/18   0141  09/13/18   0536  09/13/18   1316  09/13/18   1727   GLU   --   93  105  113*   NA   --   123*   124*  123*   K   --   4.6  4.6  4.4   CL   --   85*  87*  87*   CO2   --   23  24  22*   BUN   --   62*  63*  64*   CREATININE   --   3.0*  2.9*  3.0*   CALCIUM   --   8.1*  8.3*  8.2*   MG  1.5*   --    --    --      Recent Labs   Lab  09/12/18   0803  09/13/18   0141   WBC  20.25*  22.10*  22.10*   HGB  8.7*  8.1*  8.1*   HCT  27.7*  25.2*  25.2*   PLT  213  264  264     Recent Labs   Lab  09/12/18   0250  09/13/18   0141   INR   --   1.3*   APTT  57.9*   --      Microbiology Results (last 7 days)     Procedure Component Value Units Date/Time    Gram stain [[id number]] Collected:  09/13/18 0834    Order Status:  No result Specimen:  Abscess from Back Updated:  09/13/18 1656    Blood culture [[id number]] Collected:  09/09/18 1731    Order Status:  Completed Specimen:  Blood Updated:  09/13/18 1418     Blood Culture, Routine Gram stain terrell bottle: Gram negative rods      Blood Culture, Routine Results called to and read back by:Sofia Noriega RN 09/11/2018  06:01     Blood Culture, Routine PREVOTELLA (B.) BIVIA    Narrative:       From 2 different sites 30 minutes apart    Blood culture [[id number]] Collected:  09/09/18 1555    Order Status:  Completed Specimen:  Blood Updated:  09/13/18 1417     Blood Culture, Routine Gram stain terrell bottle: Gram negative rods      Blood Culture, Routine Positive results previously called 09/11/2018  13:30     Blood Culture, Routine PREVOTELLA (B.) BIVIA    Blood culture [[id number]] Collected:  09/11/18 1011    Order Status:  Completed Specimen:  Blood Updated:  09/13/18 1212     Blood Culture, Routine No Growth to date     Blood Culture, Routine No Growth to date     Blood Culture, Routine No Growth to date    Narrative:       Please draw from 2 separate sites 30 minutes apart. Thank you    Blood culture [[id number]] Collected:  09/11/18 1003    Order Status:  Completed Specimen:  Blood Updated:  09/13/18 1212     Blood Culture, Routine No Growth to date     Blood Culture, Routine  No Growth to date     Blood Culture, Routine No Growth to date    Gram stain [[id number]] Collected:  09/13/18 0834    Order Status:  Completed Specimen:  Abscess from Back Updated:  09/13/18 1153     Gram Stain Result Many WBC's      Many Gram negative rods      Moderate Gram negative diplococci    Culture, Anaerobe [[id number]] Collected:  09/13/18 0834    Order Status:  Sent Specimen:  Abscess from Back Updated:  09/13/18 1040    Aerobic culture [[id number]] Collected:  09/13/18 0834    Order Status:  Sent Specimen:  Abscess from Back Updated:  09/13/18 1040    Fungus culture [[id number]] Collected:  09/13/18 0834    Order Status:  Sent Specimen:  Abscess from Back Updated:  09/13/18 1040    Gram stain [[id number]]     Order Status:  Completed Specimen:  Abscess from Back     Urine culture [[id number]] Collected:  09/10/18 1701    Order Status:  Completed Specimen:  Urine from Catheterized Updated:  09/12/18 1438     Urine Culture, Routine --     CANDIDA ALBICANS  > 100,000 cfu/ml  Treatment of asymptomatic candiduria is not recommended (except for   specific populations). Candida isolated in the urine typically   represents colonization. If an indwelling urinary catheter is present  it should be removed or replaced.      Narrative:       add on [facility] order #[id number] per Dr. Elina Sandhu @ 19:58    09/10/2018     Urine culture [[id number]]     Order Status:  Completed Specimen:  Urine, Catheterized     Urine culture [[id number]]     Order Status:  Canceled Specimen:  Urine         ABGs:   Recent Labs   Lab  09/12/18   1636  09/13/18   0337  09/13/18   1524   PH  7.463*  7.520*  7.351   PCO2  36.9  33.8*  48.0*   PO2  78*  78*  39*   HCO3  26.4  27.6  26.5   POCSATURATED  96  97  70*   BE  3  5  1     Cardiac markers: No results for input(s): CKMB, CPKMB, TROPONINT, TROPONINI, MYOGLOBIN in the last 48 hours.  CMP:   Recent Labs   Lab  09/13/18   0141  09/13/18   0536  09/13/18   1316  09/13/18   1727   GLU   --   93   105  113*   CALCIUM   --   8.1*  8.3*  8.2*   ALBUMIN  1.7*   --    --    --    PROT  5.3*   --    --    --    NA   --   123*  124*  123*   K   --   4.6  4.6  4.4   CO2   --   23  24  22*   CL   --   85*  87*  87*   BUN   --   62*  63*  64*   CREATININE   --   3.0*  2.9*  3.0*   ALKPHOS  135   --    --    --    ALT  7*   --    --    --    AST  13   --    --    --    BILITOT  1.3*   --    --    --      CRP: No results for input(s): CRP in the last 48 hours.  ESR: No results for input(s): POCESR, ERYTHROCYTES in the last 48 hours.  LFTs:   Recent Labs   Lab  09/13/18   0141   ALT  7*   AST  13   ALKPHOS  135   BILITOT  1.3*   PROT  5.3*   ALBUMIN  1.7*     Procalcitonin: No results for input(s): PROCAL in the last 48 hours.    Significant Diagnostics:  I have reviewed and interpreted all pertinent imaging results/findings within the past 24 hours.

## 2018-09-14 NOTE — PHYSICIAN QUERY
PT Name: Medina Frazier  MR #: [id number]     Physician Query Form - Documentation Clarification      CDS/: Mary Lou RICKETTS,RN,CDI      Contact information:[phone]  This form is a permanent document in the medical record.     Query Date: September 14, 2018    By submitting this query, we are merely seeking further clarification of documentation. Please utilize your independent clinical judgment when addressing the question(s) below.    The Medical record reflects the following:    Supporting Clinical Findings Location in Medical Record        Chronic Respiratory Failure:  On 2-3L home       oxygen.  Oxygen sats stable but with       elevated BNP and CXR with pulmonary       congestion.  Can consider Lasix as above      POC PH=   7.187---->7.387--->7.520--->7.351       POCCO2= 79.2----->45.7----->33.8-----> 48        POCPO2= 125----->35------->78------->39      POCHCO3=30.0--->27.5--->27.6----->26.5      PO2 sat=     98----->65------>97------->70              09/09/18 [facility] Plan of Care                   09/12------------------------------------>09/13 ABGs         Upon transfer to MICU, Mrs. Frazier's        respiratory and mental status worsened        despite Bipap and lasix trial. She was        intubated        Acute hypercapnic respiratory failure-     intubated for worsening hypoxia. Patient with     pulmonary edema on CXR. Diuresis as     tolerated.     Acute hypercapnic respiratory failure   -2/2 pulmonary edema in setting of acute on   chronic diastolic HF, volume overload                                09/13/18 Critical Care Medicine Progress Notes                                                                                  Doctor, Please specify diagnosis or diagnoses associated with above clinical findings.    Please specify the acuity and type of Respiratory Failure during this admission:    Provider Use Only        ______ Acute Respiratory Failure with hypercapnia        ___x___Acute Respiratory Failure with hypercapnia and hypoxia      ______ Acute on Chronic Respiratory Failure with hypercapnia      ______ Acute on Chronic Respiratory Failure with hypercapnia and hypoxia        ______Chronic Respiratory Failure with hypoxia      ______ Chronic Respiratory Failure with hypercapnia      ______ Chronic Respiratory Failure with hypercapnia and hypoxia     ______ Other__________________________________________                                                                                                                 [  ] Clinically undetermined

## 2018-09-14 NOTE — ASSESSMENT & PLAN NOTE
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   --Treated with zosyn and vanc

## 2018-09-14 NOTE — PLAN OF CARE
Problem: Patient Care Overview  Goal: Plan of Care Review  Outcome: Ongoing (interventions implemented as appropriate)  POC reviewed with pt at 0500. Pt could not verbalize an understanding due to being intubated. Questions and concerns not addressed. No acute events overnight. Pt progressing toward goals. Will continue to monitor. See flowsheets for full assessment and VS info

## 2018-09-14 NOTE — ASSESSMENT & PLAN NOTE
-hypervolemic hyponatremia secondary decreased effective circulating volume, likely secondary CHF, urine sodium < 20 is consistent, at this time not getting worse and a little better since admission, think current deterioration in mental status more likely to CO2 retention / CO2 narcosis and not hyponatremia    Continues to improve, at 125 this morning    Plan/Recommendations:  -continue treatment for CHF with loop diuretic, sodium continues to improve

## 2018-09-14 NOTE — PT/OT/SLP PROGRESS
Occupational Therapy  Discontinued    Patient Name:  Medina Frazier   MRN:  [id number]    Patient currently intubated. LSO at all times. Please re consult when pt appropriate for mobility/EOB/OOB for functional task.  Would benefit from nursing order for passive range of motion and positioning while supine.    MIKAYLA Cordova  9/14/2018

## 2018-09-14 NOTE — PLAN OF CARE
Problem: Patient Care Overview  Goal: Plan of Care Review  Outcome: Ongoing (interventions implemented as appropriate)  POC reviewed with patient and family at 1330. No evidence of learning from the patient. Questions and concerns addressed with the family. Neuro surgery has been consulted. TF held per critical care. Trickle feeds were to be restarted but the patient had residuals of 400, RN ordered to hold restarting TFs and KUB ordered. Precedex is 0.2 mcg/kg and Levo is at 0.22 mcg/kg to maintain a SBP > 100. Day bath given. Oral care per protocol. Patient turned per protocol. RN will continue to monitor. See flowsheets for full assessment and VS info.

## 2018-09-14 NOTE — PROGRESS NOTES
RN notified Critical Care team that the urine output has been 23 and 28 mL the previous 2 hours. Patient , No new orders per MD Nati. RN will continue to monitor.

## 2018-09-14 NOTE — PROGRESS NOTES
Patient seen for follow up wound vac dressing change to midline back. Wound cleansed with sterile normal saline, skin barrier film applied to periwound, one black sponge applied to wound base and bridged to right side of abdomen to offset trac pad. No leak noted at 125mmHg. Patient tolerated care well, patient's RN assisted with care. Plan on changing wound vac twice a week. No surgery date noted at this time per Neurosurgery. Wound care to follow prn.     09/14/18 1054       Incision/Site 08/07/18 1535 Back midline vertical   Date First Assessed/Time First Assessed: 08/07/18 1535   Present Prior to Hospital Arrival?: Yes  Location: Back  Orientation: (c) midline  Incision Type: vertical  Closure Method: (c) Other (see comments)   Wound Image    Incision WDL ex   Drainage Amount Scant   Drainage Characteristics/Odor Serous;No odor   Appearance Moist;Granulating;Slough   Red (%), Wound Tissue Color 95 %   Yellow (%), Wound Tissue Color 5 %   Periwound Area Redness   Wound Edges Open   Care Cleansed with:;Sterile normal saline   Dressing Changed  (wound vac)   Packing Inserted  (1 black sponge bridged to trac pad to right side of abdomen)   Periwound Care Skin barrier film applied   Dressing Change Due 09/18/18       Negative Pressure Wound Therapy    Placement Date/Time: 08/07/18 (c) 0240   Orientation: midline  Location: (c) Back  Additional Comments: continuous negative pressure at 125 mmhg   NPWT Type Vacuum Therapy   Therapy Setting NPWT Continuous therapy   Pressure Setting NPWT 125 mmHg   Therapy Interventions NPWT Dressing changed   Sponges Inserted NPWT Black;1  (bridged to right side of abdomen)

## 2018-09-14 NOTE — PROGRESS NOTES
[facility]  Neurosurgery  Progress Note    Subjective:     History of Present Illness: Ms Medina Frazier is an 82yoF with PMHx DMII, HTN, CHF, CAD, CHF, CKD, HLD, GERD, hypothyroidism,  s/p L2-3 TLIF on 6/5/18 by Dr. Gutiérrez at [facility], complicated by infection & subsequent washout &  hardware removal, who is transferred from [facility] for neurosurgical evaluation of L2-3 osteomyelitis.  Following her initial surgery, she was found to have a lumbar incision wound infection that was washed out on 7/2/18.  Surgical cultures grew E. Cloacae & a PICC was placed.  She was discharged on IV Cipro & a wound vac.  She then had hardware removal on 8/7.  She has had progressive worsening of pain & infection since that time.   Most recent CT Lspine shows L2-3 osteomyelitis with paraspinal fluid collection L1-3.      Post-Op Info:  Procedure(s) (LRB):  FUSION, SPINE, LUMBAR, TLIF, WITH PERCUTANEOUS INSTRUMENTATION L1-5, depuy, neuromonitoring, 4 post bed (N/A)         Interval History: 9/14: AF, NAEON, wbc 29 from 22, Na 125 from 122, multiple cultures growing multiple different organisms    Medications:  Continuous Infusions:   dexmedetomidine (PRECEDEX) infusion      fentanyl Stopped (09/14/18 0730)    custom IV infusion builder (for pharmacist use only) 21.7 mL/hr at 09/14/18 1005    propofol Stopped (09/14/18 0730)     Scheduled Meds:   albuterol-ipratropium  3 mL Nebulization Q4H    chlorhexidine  15 mL Mouth/Throat BID    fluconazole (DIFLUCAN) IVPB  200 mg Intravenous Q24H    levothyroxine  150 mcg Per OG tube Before breakfast    pantoprazole  40 mg Intravenous Daily    piperacillin-tazobactam (ZOSYN) IVPB  4.5 g Intravenous Q12H     PRN Meds:dextrose 50%, dextrose 50%, fentaNYL citrate (PF), glucagon (human recombinant), glucose, glucose, insulin aspart U-100, ondansetron       Objective:     Weight: 102.7 kg (226 lb 6.6 oz)  Body mass index is 38.86 kg/m².  Vital Signs (Most Recent):  Temp: 97.2  °F (36.2 °C) (09/14/18 1005)  Pulse: 81 (09/14/18 1005)  Resp: (!) 9 (09/14/18 1005)  BP: (!) 174/72 (09/14/18 1005)  SpO2: (!) 94 % (09/14/18 1005) Vital Signs (24h Range):  Temp:  [97.2 °F (36.2 °C)-98.1 °F (36.7 °C)] 97.2 °F (36.2 °C)  Pulse:  [] 81  Resp:  [3-23] 9  SpO2:  [88 %-100 %] 94 %  BP: ()/(51-92) 174/72  Arterial Line BP: ()/(40-53) 117/48     Date 09/14/18 0700 - 09/15/18 0659   Shift 4885-1145 5978-6415 7335-6182 24 Hour Total   INTAKE   I.V.(mL/kg) 134.4(1.3)   134.4(1.3)   NG/GT 0   0   IV Piggyback 100   100   Shift Total(mL/kg) 234.4(2.3)   234.4(2.3)   OUTPUT   Urine(mL/kg/hr) 194   194   Shift Total(mL/kg) 194(1.9)   194(1.9)   Weight (kg) 102.7 102.7 102.7 102.7              Vent Mode: Spont  Oxygen Concentration (%):  [40] 40  Resp Rate Total:  [10 br/min-16 br/min] 10 br/min  Vt Set:  [350 mL-400 mL] 350 mL  PEEP/CPAP:  [5 cmH20] 5 cmH20  Pressure Support:  [0 cmH20-10 cmH20] 10 cmH20  Mean Airway Pressure:  [7.3 kgF44-08 cmH20] 7.3 cmH20         NG/OG Tube 09/12/18 1325 orogastric Center mouth (Active)   Placement Check placement verified by x-ray 9/14/2018  7:05 AM   Distal Tube Length (cm) 58 9/14/2018  7:05 AM   Tolerance no signs/symptoms of discomfort 9/14/2018  7:05 AM   Securement taped to commercial device 9/14/2018  7:05 AM   Clamp Status/Tolerance clamped 9/14/2018  7:05 AM   Suction Setting/Drainage Method suction at;low;intermittent setting 9/14/2018  3:05 AM   Insertion Site Appearance no redness, warmth, tenderness, skin breakdown, drainage 9/14/2018  3:05 AM   Current Rate (mL/hr) 0 mL/hr 9/14/2018  7:05 AM   Intake (mL) 60 mL 9/13/2018  6:05 AM   Intake (mL) - Formula Tube Feeding 0 9/14/2018 10:05 AM            Urethral Catheter 09/13/18 1438 Temperature probe (Active)   Site Assessment Clean;Intact 9/14/2018  7:05 AM   Collection Container Urimeter 9/14/2018  7:05 AM   Securement Method secured to top of thigh w/ adhesive device 9/14/2018  7:05 AM  "  Catheter Care Performed yes 9/14/2018  7:05 AM   Reason for Continuing Urinary Catheterization Critically ill in ICU requiring intensive monitoring 9/14/2018  7:05 AM   CAUTI Prevention Bundle StatLock in place w 1" slack;Intact seal between catheter & drainage tubing;Drainage bag off the floor;Green sheeting clip in use;No dependent loops or kinks;Drainage bag not overfilled (<2/3 full);Drainage bag below bladder 9/14/2018  7:05 AM   Output (mL) 33 mL 9/14/2018 10:05 AM            Trialysis (Dialysis) Catheter 09/12/18 1326 right internal jugular (Active)   IV Device Securement sutures 9/14/2018  7:05 AM   Additional Site Signs no drainage;no streak formation;no palpable cord;no pain;no edema;no warmth;no erythema 9/14/2018  7:05 AM   Patency/Care infusing 9/14/2018  7:05 AM   Site Assessment Clean;Dry;Intact;No redness;No swelling 9/14/2018  7:05 AM   Status Accessed 9/14/2018  7:05 AM   Dressing Intervention Dressing reinforced 9/14/2018  7:05 AM   Dressing Status Clean;Dry;Intact 9/14/2018  7:05 AM   Dressing Change Due 09/19/18 9/14/2018  7:05 AM   Verification by X-ray Yes 9/14/2018  7:05 AM   Site Condition No complications 9/14/2018  7:05 AM   Dressing Occlusive 9/14/2018  7:05 AM   Daily Line Review Performed 9/14/2018  7:05 AM       Neurosurgery Physical Exam     [id number]  PERRL  FC x4  CNII-XII grossly intact    Significant Labs:  Recent Labs   Lab  09/13/18   0141   09/13/18   1727  09/13/18   2329  09/14/18   0200  09/14/18   0812   GLU   --    < >  113*  129*   --   157*   NA   --    < >  123*  125*   --   125*   K   --    < >  4.4  4.6   --   4.5   CL   --    < >  87*  87*   --   87*   CO2   --    < >  22*  22*   --   19*   BUN   --    < >  64*  66*   --   65*   CREATININE   --    < >  3.0*  3.1*   --   3.0*   CALCIUM   --    < >  8.2*  8.7   --   8.6*   MG  1.5*   --    --    --   1.5*   --     < > = values in this interval not displayed.     Recent Labs   Lab  09/13/18   0141  09/14/18   0200   WBC  " 22.10*  22.10*  28.53*  28.53*   HGB  8.1*  8.1*  8.8*  8.8*   HCT  25.2*  25.2*  26.8*  26.8*   PLT  264  264  265  265     Recent Labs   Lab  09/13/18   0141  09/14/18   0200   INR  1.3*  1.3*     Microbiology Results (last 7 days)     Procedure Component Value Units Date/Time    Culture, Anaerobe [[id number]] Collected:  09/13/18 0834    Order Status:  Completed Specimen:  Abscess from Back Updated:  09/14/18 0734     Anaerobic Culture Culture in progress    Narrative:       lumbar paraspinal fluid collection drainage    Aerobic culture [[id number]] Collected:  09/13/18 0834    Order Status:  Completed Specimen:  Abscess from Back Updated:  09/14/18 0722     Aerobic Bacterial Culture No growth    Narrative:       lumbar paraspinal fluid collection drainage    Gram stain [[id number]] Collected:  09/13/18 0834    Order Status:  Completed Specimen:  Abscess from Back Updated:  09/13/18 1946     Gram Stain Result Many WBC's      Many Gram negative rods      Few Gram positive rods    Blood culture [[id number]] Collected:  09/09/18 1731    Order Status:  Completed Specimen:  Blood Updated:  09/13/18 1418     Blood Culture, Routine Gram stain terrell bottle: Gram negative rods      Blood Culture, Routine Results called to and read back by:Sofia Noriega RN 09/11/2018  06:01     Blood Culture, Routine PREVOTELLA (B.) BIVIA    Narrative:       From 2 different sites 30 minutes apart    Blood culture [[id number]] Collected:  09/09/18 1555    Order Status:  Completed Specimen:  Blood Updated:  09/13/18 1417     Blood Culture, Routine Gram stain terrell bottle: Gram negative rods      Blood Culture, Routine Positive results previously called 09/11/2018  13:30     Blood Culture, Routine PREVOTELLA (B.) BIVIA    Blood culture [[id number]] Collected:  09/11/18 1011    Order Status:  Completed Specimen:  Blood Updated:  09/13/18 1212     Blood Culture, Routine No Growth to date     Blood Culture, Routine No Growth to date     Blood  Culture, Routine No Growth to date    Narrative:       Please draw from 2 separate sites 30 minutes apart. Thank you    Blood culture [[id number]] Collected:  09/11/18 1003    Order Status:  Completed Specimen:  Blood Updated:  09/13/18 1212     Blood Culture, Routine No Growth to date     Blood Culture, Routine No Growth to date     Blood Culture, Routine No Growth to date    Gram stain [[id number]] Collected:  09/13/18 0834    Order Status:  Completed Specimen:  Abscess from Back Updated:  09/13/18 1153     Gram Stain Result Many WBC's      Many Gram negative rods      Moderate Gram negative diplococci    Fungus culture [[id number]] Collected:  09/13/18 0834    Order Status:  Sent Specimen:  Abscess from Back Updated:  09/13/18 1040    Gram stain [[id number]]     Order Status:  Completed Specimen:  Abscess from Back     Urine culture [[id number]] Collected:  09/10/18 1701    Order Status:  Completed Specimen:  Urine from Catheterized Updated:  09/12/18 1438     Urine Culture, Routine --     CANDIDA ALBICANS  > 100,000 cfu/ml  Treatment of asymptomatic candiduria is not recommended (except for   specific populations). Candida isolated in the urine typically   represents colonization. If an indwelling urinary catheter is present  it should be removed or replaced.      Narrative:       add on [facility] order #[id number] per Dr. Elina Sandhu @ 19:58    09/10/2018     Urine culture [[id number]]     Order Status:  Completed Specimen:  Urine, Catheterized     Urine culture [[id number]]     Order Status:  Canceled Specimen:  Urine         IR drain of psoas abscess placed yesterday, gram stain showing GNRs, GPCs, cultures, speciation, susceptibilities pending, urine culture from 9/10 growing candida, blood culture from 9/9 growing prevotella    Assessment/Plan:     Spinal epidural abscess    Ms Medina Frazier is an 82 year old woman with multiple comorbidities sp L2-3 TLIF on 6/[facility], with surgical wound infection with E.  cloacea sp washout & hardware removal, now with worsening osteomyelitis & paraspinal abscess despite weeks of treatment with IV Cipro    -No acute neurosurgical intervention at this time  -MRI with L2-3 osteodiscitis, large fluid collection in surgical bed, as well a prevertebral fluid collection L1,2  -Plan for OR postponed secondary to dismal medical status and inability to tolerate surgery at this time  -Please wear LSO brace at all times  -Please hold Xarelto & Plavix   -Please medically optimize and document medical clearance when appropriate for surgery  -abx per infectious disease recs  -Medical management per primary team            Jeremie Verde MD  Neurosurgery  [facility]

## 2018-09-14 NOTE — ASSESSMENT & PLAN NOTE
--most likely cardiorenal syndrome vs sepsis (ATN)  --prerenal based on urine lytes, will f/u urine lytes  --Nephrology following  --no need for emergent dialysis at this time

## 2018-09-15 PROBLEM — E03.9 HYPOTHYROIDISM: Status: ACTIVE | Noted: 2018-01-01

## 2018-09-15 NOTE — ASSESSMENT & PLAN NOTE
-hypervolemic hyponatremia secondary decreased effective circulating volume, likely secondary CHF, urine sodium < 20 is consistent, at this time not getting worse and a little better since admission, think current deterioration in mental status more likely to CO2 retention / CO2 narcosis and not hyponatremia      Plan/Recommendations:  - Continue treatment for CHF with loop diuretic, sodium continues to improve

## 2018-09-15 NOTE — ASSESSMENT & PLAN NOTE
--suspect hypervolemic hyponatremia from volume overload.    --sodium trending up with diuresis  --continue to trend

## 2018-09-15 NOTE — ASSESSMENT & PLAN NOTE
Non-oliguric, likely multifactorial, the urine sodium of 10, could be consistent with CRS in this patient with what appears to be acute CHF exacerbation and volume overload, however would be careful reading too much in to this urinary sodium value, also consider ATN toxic secondary infection, ischemic ATN, also possibly vancomycin contributing to worsening sCr since admit further noted with WBCs in the urine of > 100, need to r/o infection which would be unlikely to cause an JEY (unless there is a bilateral pyelonephritis, which clinically does not fit, nor consistent with U/S findings), in short, if urine cultures negative need to consider AIN (outpatient abxs), absence of eosinophilia, rash or fever, may make a little less likely, but does not rule out.    Renal ultrasound unremarkable and negative for hydronephrosis.    Continues with excellent diuretic response    Plan/Reccomendations:  - Continue IV diuresis with loop diuretics (avoid thiazides - May increase furosemide dose to BiD if needed for more diuresis)  - Non-oliguric and responding to IVP diuretics  - Serum sodium levels within same range  - Continue diuretic regimen in view of creatinine continues within 3s, no significant GFR decline  - No indication for RRT yet  - Please renal function panel q 12 hrs  - Strict I/O and chart.  - Avoid nephrotoxic medications  - Maintain MAP >65  - Hb > 7 gm/dL  - Medications to renal parameters

## 2018-09-15 NOTE — ASSESSMENT & PLAN NOTE
On top of likely some degree of chronic hypercapnia secondary poorly controlled LENA (per history patient has only intermittently, if at all, used her home CPAP).    - Currently on mechanical ventilation  - Managed by primary team

## 2018-09-15 NOTE — ASSESSMENT & PLAN NOTE
--s/p lumbar fusion/hardware placement June 2018 with subsequent osteo and abscess.   --see septic shock

## 2018-09-15 NOTE — ASSESSMENT & PLAN NOTE
--noted on cultures from 9/9; cleared on 9/11; Positive for Prevotella  --per ID recs: discontinued ceftriaxone and flagyl and added on zosyn. Patient has recorded penicillin allergy but patient has received penicillins in the past with no recorded reactions. We will watch closely for signs of allergic reactions.   --Not fully able to obtain source control as IR only able to drain one abscess.

## 2018-09-15 NOTE — ASSESSMENT & PLAN NOTE
--secondary to spinal abscess  --blood cultures with prevotella, repeat blood cultures ngtd  --gram stain from spinal abscess with many GNR, moderate gram neg diplocci.   --appreciate ID recommendations.  On vancomycin + pip/tazo+ fluconazole.   --titrate levophed to maintain SBP >100  --Abx as above. Also on vancomycin for empiric coverage  --positive UCx (9/11) for Candida; Treating with fluconazole for 5 days as cannot rule out as source of her sepsis. Urine catheter changed.   --neurosurgery following, not a surgical candidate given hemodynamic instability.

## 2018-09-15 NOTE — ASSESSMENT & PLAN NOTE
--CHADS vasc 5 (HTN, DM, Age, and female); hold home rivaroxaban   --hold no given epidural abscess.   --currently in SR

## 2018-09-15 NOTE — ASSESSMENT & PLAN NOTE
Ms Medina Frazier is an 82 year old woman with multiple comorbidities sp L2-3 TLIF on 6/5/18 at [facility], with surgical wound infection with E. cloacea sp washout & hardware removal, now with worsening osteomyelitis & paraspinal abscess despite weeks of treatment with IV Cipro.    -No acute neurosurgical intervention at this time  -MRI with L2-3 osteodiscitis, large fluid collection in surgical bed, as well a prevertebral fluid collection L1,2  -Plan for OR postponed secondary to dismal medical status and inability to tolerate surgery at this time  -Please wear LSO brace at all times  -Please hold Xarelto & Plavix   -Please medically optimize and document medical clearance when appropriate for surgery  -Abx per infectious disease recs  -Medical management per primary team

## 2018-09-15 NOTE — SUBJECTIVE & OBJECTIVE
Interval History: Patient evaluated bedside, still on mechanical ventilation.  She was made DNR yesterday during late afternoon in view of deteriorating status.      Review of patient's allergies indicates:   Allergen Reactions    Codeine Nausea Only    Penicillins Swelling     Able to take amoxil and cephalosporins      Current Facility-Administered Medications   Medication Frequency    albuterol-ipratropium 2.5 mg-0.5 mg/3 mL nebulizer solution 3 mL Q4H    chlorhexidine 0.12 % solution 15 mL BID    dexmedetomidine (PRECEDEX) 400mcg/100mL 0.9% NaCL infusion Continuous    dextrose 50% injection 12.5 g PRN    dextrose 50% injection 25 g PRN    fentaNYL citrate (PF) Syrg 25 mcg Q2H PRN    fluconazole (DIFLUCAN) IVPB 200 mg 100 mL Q24H    fludrocortisone tablet 100 mcg Daily    glucagon (human recombinant) injection 1 mg PRN    glucose chewable tablet 16 g PRN    glucose chewable tablet 24 g PRN    hydrocortisone sodium succinate injection 100 mg Q8H    insulin aspart U-100 pen 0-5 Units Q6H PRN    levothyroxine tablet 150 mcg Before breakfast    norepinephrine 8 mg in dextrose 5 % 250 mL infusion Continuous    ondansetron injection 4 mg Q8H PRN    pantoprazole injection 40 mg Daily    piperacillin-tazobactam 4.5 g in sodium chloride 0.9% 100 mL IVPB (ready to mix system) Q12H       Objective:     Vital Signs (Most Recent):  Temp: (!) 95.9 °F (35.5 °C) (09/15/18 1258)  Pulse: 80 (09/15/18 1405)  Resp: (!) 25 (09/15/18 1405)  BP: (!) 125/58 (09/15/18 1405)  SpO2: (!) 94 % (09/15/18 1405)  O2 Device (Oxygen Therapy): ventilator (09/15/18 1405) Vital Signs (24h Range):  Temp:  [95.9 °F (35.5 °C)-96.8 °F (36 °C)] 95.9 °F (35.5 °C)  Pulse:  [58-82] 80  Resp:  [9-25] 25  SpO2:  [93 %-99 %] 94 %  BP: (100-148)/(51-68) 125/58  Arterial Line BP: (100-134)/(39-53) 114/44     Weight: 102.7 kg (226 lb 6.6 oz) (09/14/18 8945)  Body mass index is 38.86 kg/m².  Body surface area is 2.15 meters squared.    I/O last  3 completed shifts:  In: 1589.8 [I.V.:869.8; NG/GT:320; IV Piggyback:400]  Out: 1656 [Urine:1656]    Physical Exam  Constitutional: She appears well-developed and well-nourished. She appears lethargic. She is easily aroused. She appears ill. No distress. She is intubated and restrained.   HENT:   Head: Normocephalic and atraumatic.   Eyes: EOM are normal. Pupils are equal, round, and reactive to light. Right eye exhibits no nystagmus. Left eye exhibits no nystagmus.   Neck: Trachea normal. No tracheal deviation present.   trialysis central venous catheter placed in [facility]   Cardiovascular: Normal rate, regular rhythm, normal heart sounds and intact distal pulses. Exam reveals no gallop and no friction rub.   No murmur heard.  Pulses:       Dorsalis pedis pulses are 1+ on the right side, and 1+ on the left side.   Bilateral 3+ pitting edema in lower extremities up to knees   Pulmonary/Chest: Effort normal. No accessory muscle usage. She is intubated. No respiratory distress. She has decreased breath sounds in the right lower field and the left lower field. She has no wheezes. She has rhonchi in the right upper field and the left upper field. She has no rales.   Abdominal: Soft. Normal appearance. She exhibits no distension, no abdominal bruit and no ascites. Bowel sounds are absent. There is no tenderness. No hernia.   Skin Integrity: Positive for dry skin.   Neurological: She is easily aroused. She appears lethargic. GCS eye subscore is 3. GCS verbal subscore is 1. GCS motor subscore is 3.   Patient following commands intermittently. Unable to assess strength due to patient's mental status   Skin: Skin is warm, dry and intact.   Vitals reviewed.      Significant Labs:  CBC:   Recent Labs   Lab  09/15/18   0128   WBC  24.16*  24.16*   RBC  3.37*  3.37*   HGB  8.5*  8.5*   HCT  27.0*  27.0*   PLT  273  273   MCV  80*  80*   MCH  25.2*  25.2*   MCHC  31.5*  31.5*     CMP:   Recent Labs   Lab  09/15/18   0128   09/15/18   0808   GLU   --   219*   CALCIUM   --   8.9   ALBUMIN  1.7*   --    PROT  5.5*   --    NA   --   124*   K   --   4.5   CO2   --   16*   CL   --   87*   BUN   --   70*   CREATININE   --   3.4*   ALKPHOS  140*   --    ALT  7*   --    AST  12   --    BILITOT  1.2*   --      All labs within the past 24 hours have been reviewed.

## 2018-09-15 NOTE — ASSESSMENT & PLAN NOTE
--hold BB in setting of recent shock  --continue statin for now   --s/p VIVIANA to [facility] in June 2017 and on aspirin, plavix prior to admission. Holding in setting of upcoming procedures.

## 2018-09-15 NOTE — PROGRESS NOTES
[facility]  Nephrology  Progress Note    Patient Name: Medina Frazier  MRN: [id number]  Admission Date: 9/8/2018  Hospital Length of Stay: 7 days  Attending Provider: Emmanuel Metz MD   Primary Care Physician: Hao Garcia MD  Principal Problem:Acute hypercapnic respiratory failure    Subjective:     HPI: 83yo WF who was to have a lumbar fusion later in the month and was admitted due to abnormal pre-op labs.  Nephrology consulted for JEY (sCr 2.2 on admit, 3.1 at time of consult, normal baseline), also with a sodium of 119 (133 on August 10th). Patient appears somnolent, but wakes up and appropriately answers questions, per family she is at her baseline.  Patient is not complaining of increasing shortness of breath, she in on 3L O2 when seen, this is what she is on at home, she has history of LENA and is supposed to be on CPAP at night, but not compliant.  CXR reviewed and looks like pulmonary edema/congestion on admit.    Interval History: Patient evaluated bedside, still on mechanical ventilation.  She was made DNR yesterday during late afternoon in view of deteriorating status.      Review of patient's allergies indicates:   Allergen Reactions    Codeine Nausea Only    Penicillins Swelling     Able to take amoxil and cephalosporins      Current Facility-Administered Medications   Medication Frequency    albuterol-ipratropium 2.5 mg-0.5 mg/3 mL nebulizer solution 3 mL Q4H    chlorhexidine 0.12 % solution 15 mL BID    dexmedetomidine (PRECEDEX) 400mcg/100mL 0.9% NaCL infusion Continuous    dextrose 50% injection 12.5 g PRN    dextrose 50% injection 25 g PRN    fentaNYL citrate (PF) Syrg 25 mcg Q2H PRN    fluconazole (DIFLUCAN) IVPB 200 mg 100 mL Q24H    fludrocortisone tablet 100 mcg Daily    glucagon (human recombinant) injection 1 mg PRN    glucose chewable tablet 16 g PRN    glucose chewable tablet 24 g PRN    hydrocortisone sodium succinate injection 100 mg Q8H    insulin  aspart U-100 pen 0-5 Units Q6H PRN    levothyroxine tablet 150 mcg Before breakfast    norepinephrine 8 mg in dextrose 5 % 250 mL infusion Continuous    ondansetron injection 4 mg Q8H PRN    pantoprazole injection 40 mg Daily    piperacillin-tazobactam 4.5 g in sodium chloride 0.9% 100 mL IVPB (ready to mix system) Q12H       Objective:     Vital Signs (Most Recent):  Temp: (!) 95.9 °F (35.5 °C) (09/15/18 1258)  Pulse: 80 (09/15/18 1405)  Resp: (!) 25 (09/15/18 1405)  BP: (!) 125/58 (09/15/18 1405)  SpO2: (!) 94 % (09/15/18 1405)  O2 Device (Oxygen Therapy): ventilator (09/15/18 1405) Vital Signs (24h Range):  Temp:  [95.9 °F (35.5 °C)-96.8 °F (36 °C)] 95.9 °F (35.5 °C)  Pulse:  [58-82] 80  Resp:  [9-25] 25  SpO2:  [93 %-99 %] 94 %  BP: (100-148)/(51-68) 125/58  Arterial Line BP: (100-134)/(39-53) 114/44     Weight: 102.7 kg (226 lb 6.6 oz) (09/14/18 0505)  Body mass index is 38.86 kg/m².  Body surface area is 2.15 meters squared.    I/O last 3 completed shifts:  In: 1589.8 [I.V.:869.8; NG/GT:320; IV Piggyback:400]  Out: 1656 [Urine:1656]    Physical Exam  Constitutional: She appears well-developed and well-nourished. She appears lethargic. She is easily aroused. She appears ill. No distress. She is intubated and restrained.   HENT:   Head: Normocephalic and atraumatic.   Eyes: EOM are normal. Pupils are equal, round, and reactive to light. Right eye exhibits no nystagmus. Left eye exhibits no nystagmus.   Neck: Trachea normal. No tracheal deviation present.   trialysis central venous catheter placed in RIJ   Cardiovascular: Normal rate, regular rhythm, normal heart sounds and intact distal pulses. Exam reveals no gallop and no friction rub.   No murmur heard.  Pulses:       Dorsalis pedis pulses are 1+ on the right side, and 1+ on the left side.   Bilateral 3+ pitting edema in lower extremities up to knees   Pulmonary/Chest: Effort normal. No accessory muscle usage. She is intubated. No respiratory distress.  She has decreased breath sounds in the right lower field and the left lower field. She has no wheezes. She has rhonchi in the right upper field and the left upper field. She has no rales.   Abdominal: Soft. Normal appearance. She exhibits no distension, no abdominal bruit and no ascites. Bowel sounds are absent. There is no tenderness. No hernia.   Skin Integrity: Positive for dry skin.   Neurological: She is easily aroused. She appears lethargic. GCS eye subscore is 3. GCS verbal subscore is 1. GCS motor subscore is 3.   Patient following commands intermittently. Unable to assess strength due to patient's mental status   Skin: Skin is warm, dry and intact.   Vitals reviewed.      Significant Labs:  CBC:   Recent Labs   Lab  09/15/18   0128   WBC  24.16*  24.16*   RBC  3.37*  3.37*   HGB  8.5*  8.5*   HCT  27.0*  27.0*   PLT  273  273   MCV  80*  80*   MCH  25.2*  25.2*   MCHC  31.5*  31.5*     CMP:   Recent Labs   Lab  09/15/18   0128  09/15/18   0808   GLU   --   219*   CALCIUM   --   8.9   ALBUMIN  1.7*   --    PROT  5.5*   --    NA   --   124*   K   --   4.5   CO2   --   16*   CL   --   87*   BUN   --   70*   CREATININE   --   3.4*   ALKPHOS  140*   --    ALT  7*   --    AST  12   --    BILITOT  1.2*   --      All labs within the past 24 hours have been reviewed.     Assessment/Plan:     * Acute hypercapnic respiratory failure    On top of likely some degree of chronic hypercapnia secondary poorly controlled LENA (per history patient has only intermittently, if at all, used her home CPAP).    - Currently on mechanical ventilation  - Managed by primary team        JEY (acute kidney injury)    Non-oliguric, likely multifactorial, the urine sodium of 10, could be consistent with CRS in this patient with what appears to be acute CHF exacerbation and volume overload, however would be careful reading too much in to this urinary sodium value, also consider ATN toxic secondary infection, ischemic ATN, also  possibly vancomycin contributing to worsening sCr since admit further noted with WBCs in the urine of > 100, need to r/o infection which would be unlikely to cause an JEY (unless there is a bilateral pyelonephritis, which clinically does not fit, nor consistent with U/S findings), in short, if urine cultures negative need to consider AIN (outpatient abxs), absence of eosinophilia, rash or fever, may make a little less likely, but does not rule out.    Renal ultrasound unremarkable and negative for hydronephrosis.    Continues with excellent diuretic response    Plan/Reccomendations:  - Continue IV diuresis with loop diuretics (avoid thiazides - May increase furosemide dose to BiD if needed for more diuresis)  - Non-oliguric and responding to IVP diuretics  - Serum sodium levels within same range  - Continue diuretic regimen in view of creatinine continues within 3s, no significant GFR decline  - No indication for RRT yet  - Please renal function panel q 12 hrs  - Strict I/O and chart.  - Avoid nephrotoxic medications  - Maintain MAP >65  - Hb > 7 gm/dL  - Medications to renal parameters        Hyponatremia    -hypervolemic hyponatremia secondary decreased effective circulating volume, likely secondary CHF, urine sodium < 20 is consistent, at this time not getting worse and a little better since admission, think current deterioration in mental status more likely to CO2 retention / CO2 narcosis and not hyponatremia      Plan/Recommendations:  - Continue treatment for CHF with loop diuretic, sodium continues to improve            Thank you for your consult. I will follow-up with patient. Please contact us if you have any additional questions.    Juan Saeed MD  Nephrology  [facility]

## 2018-09-15 NOTE — ASSESSMENT & PLAN NOTE
--2/2 pulmonary edema in setting of acute on chronic diastolic HF, volume overload and JEY  --Repeat CXR (9/14) showing worsening pulmonary edema  --Compensated metabolic acidosis with high anion gap; lactate wnl, suspect uremia contributing to elevated AG.  --Did well on SBT however remains significantly volume overloaded with poor response to diuresis.  Remain intubated today due to high likelihood of failure and re-intubation if extubation is attempted.   --bedside ultrasound of right pleural effusion on 9/14 with small pocket not amendable to thoracentesis.  --SBT in am

## 2018-09-15 NOTE — ASSESSMENT & PLAN NOTE
--multifactorial: sepsis/uremia, medications  --non focal on exam, following request  --continue precedex prn agitation

## 2018-09-15 NOTE — SUBJECTIVE & OBJECTIVE
Interval History: pt made a DNR. No acute events.     Review of Systems   Unable to perform ROS: Intubated     Objective:     Vital Signs (Most Recent):  Temp: (!) 95.9 °F (35.5 °C) (09/15/18 1258)  Pulse: 80 (09/15/18 1305)  Resp: 15 (09/15/18 1305)  BP: (!) 142/67 (09/15/18 1305)  SpO2: (!) 94 % (09/15/18 1305) Vital Signs (24h Range):  Temp:  [95.9 °F (35.5 °C)-96.8 °F (36 °C)] 95.9 °F (35.5 °C)  Pulse:  [58-82] 80  Resp:  [9-23] 15  SpO2:  [93 %-99 %] 94 %  BP: (100-148)/(51-68) 142/67  Arterial Line BP: (100-134)/(39-53) 120/45     Weight: 102.7 kg (226 lb 6.6 oz)  Body mass index is 38.86 kg/m².    Estimated Creatinine Clearance: 15.4 mL/min (A) (based on SCr of 3.4 mg/dL (H)).    Physical Exam   Constitutional: She appears well-developed and well-nourished. She appears ill.   Intubated, sedated   Neck: JVD present.   RIJ CVC in place   Cardiovascular: Normal rate. An irregularly irregular rhythm present. Exam reveals no friction rub.   No murmur heard.  Pulmonary/Chest: Effort normal. She has no wheezes. She has rales.   Abdominal: Soft. Bowel sounds are normal. She exhibits no distension.   Obese abdomen   Musculoskeletal: She exhibits edema (BLE, right hand).   +2 pitting edema   Skin: Skin is warm and dry. She is not diaphoretic. No erythema.   Lumbar wound not examined today.   Nursing note and vitals reviewed.      Significant Labs:   Blood Culture:   Recent Labs   Lab  06/08/18   1349  09/09/18   1555  09/09/18   1731  09/11/18   1003  09/11/18   1011   LABBLOO  No growth after 5 days.  No growth after 5 days.  Gram stain terrell bottle: Gram negative rods   Positive results previously called 09/11/2018  13:30  PREVOTELLA (B.) BIVIA  Gram stain terrell bottle: Gram negative rods   Results called to and read back by:Sofia Noriega RN 09/11/2018  06:01  PREVOTELLA (B.) BIVIA  No Growth to date  No Growth to date  No Growth to date  No Growth to date  No Growth to date  No Growth to date  No Growth to  date  No Growth to date  No Growth to date  No Growth to date     CBC:   Recent Labs   Lab  09/14/18   0200  09/15/18   0128   WBC  28.53*  28.53*  24.16*  24.16*   HGB  8.8*  8.8*  8.5*  8.5*   HCT  26.8*  26.8*  27.0*  27.0*   PLT  265  265  273  273     CMP:   Recent Labs   Lab  09/14/18   0200   09/14/18   1607  09/14/18   2306  09/15/18   0128  09/15/18   0808   NA   --    < >  124*  126*   --   124*   K   --    < >  4.6  4.6   --   4.5   CL   --    < >  87*  88*   --   87*   CO2   --    < >  18*  17*   --   16*   GLU   --    < >  192*  212*   --   219*   BUN   --    < >  67*  68*   --   70*   CREATININE   --    < >  3.1*  3.3*   --   3.4*   CALCIUM   --    < >  8.6*  8.8   --   8.9   PROT  5.4*   --    --    --   5.5*   --    ALBUMIN  1.7*   --    --    --   1.7*   --    BILITOT  1.6*   --    --    --   1.2*   --    ALKPHOS  144*   --    --    --   140*   --    AST  16   --    --    --   12   --    ALT  7*   --    --    --   7*   --    ANIONGAP   --    < >  19*  21*   --   21*   EGFRNONAA   --    < >  13.6*  12.6*   --   12.1*    < > = values in this interval not displayed.     Urine Culture:   Recent Labs   Lab  06/07/18   1128  09/07/18   2255  09/10/18   1701   LABURIN  No growth  No significant growth  BEN ALBICANS  > 100,000 cfu/ml  Treatment of asymptomatic candiduria is not recommended (except for   specific populations). Candida isolated in the urine typically   represents colonization. If an indwelling urinary catheter is present  it should be removed or replaced.       Urine Studies:   Recent Labs   Lab  07/02/18   1205   09/10/18   1701   COLORU  Yellow   < >  Yellow   APPEARANCEUA  Clear   < >  Cloudy*   PHUR  6.0   < >  5.0   SPECGRAV  1.015   < >  1.020   PROTEINUA  Negative   < >  Negative   GLUCUA  Negative   < >  Negative   KETONESU  Negative   < >  Negative   BILIRUBINUA  Negative   < >  Negative   OCCULTUA  Trace*   < >  3+*   NITRITE  Positive*   < >  Negative   UROBILINOGEN   0.2   < >  Negative   LEUKOCYTESUR  Negative   < >  3+*   RBCUA  1   < >  64*   WBCUA  2   < >  48*   BACTERIA  Few*   --   Rare   SQUAMEPITHEL   --    --   1   HYALINECASTS  1   --    --     < > = values in this interval not displayed.     Wound Culture:   Recent Labs   Lab  07/02/18   1432  09/13/18   0834   LABAERO  ENTEROBACTER CLOACAE COMPLEX  Rare  For susceptibility see order # [id number]    ENTEROBACTER CLOACAE COMPLEX  Moderate    No growth       Significant Imaging: I have reviewed all pertinent imaging results/findings within the past 24 hours.

## 2018-09-15 NOTE — SUBJECTIVE & OBJECTIVE
Interval History/Significant Events: No acute events overnight. Weaned off norepinephrine.     Review of Systems   Unable to perform ROS: Intubated     Objective:     Vital Signs (Most Recent):  Temp: (!) 95.7 °F (35.4 °C) (09/15/18 1700)  Pulse: 79 (09/15/18 1700)  Resp: 10 (09/15/18 1700)  BP: (!) 117/58 (09/15/18 1605)  SpO2: (!) 94 % (09/15/18 1700) Vital Signs (24h Range):  Temp:  [93.7 °F (34.3 °C)-96.8 °F (36 °C)] 95.7 °F (35.4 °C)  Pulse:  [58-82] 79  Resp:  [9-26] 10  SpO2:  [93 %-98 %] 94 %  BP: (100-148)/(51-68) 117/58  Arterial Line BP: (100-134)/(37-53) 103/37   Weight: 102.7 kg (226 lb 6.6 oz)  Body mass index is 38.86 kg/m².      Intake/Output Summary (Last 24 hours) at 9/15/2018 1707  Last data filed at 9/15/2018 1605  Gross per 24 hour   Intake 665.69 ml   Output 426 ml   Net 239.69 ml       Physical Exam   Constitutional: She appears well-developed. She appears ill. No distress. She is intubated.   HENT:   Head: Normocephalic.   Eyes: Conjunctivae and EOM are normal. Pupils are equal, round, and reactive to light.   Neck: Full passive range of motion without pain. No tracheal deviation present.   Cardiovascular: Normal rate, regular rhythm, normal heart sounds and intact distal pulses.   Warm extremities, peripheral edema   Pulmonary/Chest: No accessory muscle usage. No tachypnea. She is intubated. No respiratory distress. She has decreased breath sounds in the right lower field and the left lower field. She has no wheezes. She has no rhonchi. She has rales in the right lower field and the left lower field.   Abdominal: Soft. Bowel sounds are decreased. There is no tenderness.   Genitourinary:   Genitourinary Comments: Urine catheter with clear, yellow output   Neurological: No cranial nerve deficit or sensory deficit. GCS eye subscore is 4. GCS verbal subscore is 1. GCS motor subscore is 6.   Drowsy, PERRL, following request and moving all extremities equally and symmetrically.    Skin: Skin is  warm and dry. She is not diaphoretic.        Nursing note and vitals reviewed.      Vents:  Vent Mode: Spont (09/15/18 1700)  Set Rate: 0 bmp (09/15/18 1700)  Vt Set: 0 mL (09/15/18 1700)  Pressure Support: 5 cmH20 (09/15/18 1700)  PEEP/CPAP: 5 cmH20 (09/15/18 1700)  Oxygen Concentration (%): 30 (09/15/18 1700)  Peak Airway Pressure: 11 cmH2O (09/15/18 1700)  Plateau Pressure: 12 cmH20 (09/15/18 1700)  Total Ve: 5.41 mL (09/15/18 1700)  Negative Inspiratory Force (cm H2O): -24 (09/15/18 1155)  F/VT Ratio<105 (RSBI): (!) 18.62 (09/15/18 1700)  Lines/Drains/Airways     Central Venous Catheter Line                 Trialysis (Dialysis) Catheter 09/12/18 1326 right internal jugular 3 days          Drain                 NG/OG Tube 09/12/18 1325 orogastric Center mouth 3 days         Urethral Catheter 09/13/18 1438 Temperature probe 2 days          Airway                 Airway - Non-Surgical 09/12/18 1012 Endotracheal Tube 3 days          Pressure Ulcer                 Negative Pressure Wound Therapy  39 days              Significant Labs:    CBC/Anemia Profile:  Recent Labs   Lab  09/14/18   0200  09/15/18   0128   WBC  28.53*  28.53*  24.16*  24.16*   HGB  8.8*  8.8*  8.5*  8.5*   HCT  26.8*  26.8*  27.0*  27.0*   PLT  265  265  273  273   MCV  78*  78*  80*  80*   RDW  17.5*  17.5*  17.6*  17.6*        Chemistries:  Recent Labs   Lab  09/14/18   0200   09/14/18   2306  09/15/18   0128  09/15/18   0808  09/15/18   1556   NA   --    < >  126*   --   124*  126*   K   --    < >  4.6   --   4.5  4.4   CL   --    < >  88*   --   87*  88*   CO2   --    < >  17*   --   16*  18*   BUN   --    < >  68*   --   70*  76*   CREATININE   --    < >  3.3*   --   3.4*  3.6*   CALCIUM   --    < >  8.8   --   8.9  8.8   ALBUMIN  1.7*   --    --   1.7*   --    --    PROT  5.4*   --    --   5.5*   --    --    BILITOT  1.6*   --    --   1.2*   --    --    ALKPHOS  144*   --    --   140*   --    --    ALT  7*   --    --   7*   --     --    AST  16   --    --   12   --    --    MG  1.5*   --    --   1.6   --    --    PHOS  6.5*   --    --   7.1*   --    --     < > = values in this interval not displayed.       All pertinent labs within the past 24 hours have been reviewed.    Significant Imaging:  I have reviewed and interpreted all pertinent imaging results/findings within the past 24 hours.

## 2018-09-15 NOTE — ASSESSMENT & PLAN NOTE
82 year old female with history of L2-3 fusion 6/5 complicated by post operative infection with Enterobacter cloacae. She has been on outpatient IV Ciprofloxacin and has undergone an I&D on 7/2 with partial hardware removal on 8/7 without resolution of her infection. 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      She was broadened to Cefepime and Vancomycin. Neurosurgery is following and had planned to take patient to OR for washout & fusion, however patient developed acute respiratory failure requiring intubation and transfer to the ICU with CXR showing pulmonary edema.     Blood cultures 9/9 returned positive for Prevotella. Urine is showing Candida. She underwent IR drainage of paraspinal abscess yesterday with 5 mL purulent fluid removed. Gram stain is showing many GNRs, moderate gram negative diplococci and few GPRs. Abscess cultures are pending. She remains intubated, sedated on pressors. Afebrile. Leukocytosis worsened today. Antimicrobial coverage broadened to Vanc, Zosyn and Fluconazole. Repeat blood cultures are NGTD. Surgical plans have been postponed due to decline in medical status.     Plan  - Continue empiric Zosyn and Fluconazole   - Vanc level 20.5 today; ordered a random for tomorrow morning; will re-dose accordingly  - Will follow aspiration cultures to guide antibiotic therapy.     - Ideally patient needs surgical washout for most optimal chance of cure of this infection. Neurosurgery is following.  - ID will continue to follow.

## 2018-09-15 NOTE — PROGRESS NOTES
[facility]  Critical Care Medicine  Progress Note    Patient Name: Medina Frazier  MRN: [id number]  Admission Date: 9/8/2018  Hospital Length of Stay: 7 days  Code Status: DNR  Attending Provider: Emmanuel Metz MD  Primary Care Provider: Hao Garcia MD   Principal Problem: Acute hypercapnic respiratory failure    Subjective:     HPI:  Mrs. Frazier is a 82 yr old female with history significant for diastolic HF, CAD s/p VIVIANA Lcx (June 2017), HTN, DM2, and lumbar stenosis s/p lumbar fusion in June 2018 with subsequent hardware infection and I&D on 7/2 and repeat lumbar I&D, hardware removal, decompression and wound vac placement to L2-L3 due to continued epidural abscess. She was discharged recently to a SNF for rehab and continued IV antibiotic therapy. She originally presented to [facility] on 9/8 from [facility] after labwork revealed JEY, hyponatremia. Additionally, CT lumbar spine obtained 9/5 was concerning for persistent osteomyelitis, epidural abscess in L2-L3 region.     She was transferred to [facility] on 9/8 for higher level of care and neurosurgery evaluation. Admission has been complicated by continued JEY with creatinine up trending from baseline 0.8 to 1.0 to 3.0 along with hyponatremia felt to be related to acute on chronic diastolic heart failure, volume overload. Nephrology was consulted and has been assisting with management. She was started on lasix 100 mg BID with minimal urine output response and creatinine slowly uptrending. Additionally, blood cultures obtained 9/9 are growing GNR's, awaiting speciation. Suspect this is enterobacter given continued osteomyelitis of L2-L3, complex fluid collection within laminectomy bed measuring 3x8x5 cm concerning for abscess, and additional prevertebral collection anterior to L1 and L2 vertebral bodies measuring 4x6x1 abutting the abdominal aorta concerning for abscess noted on MRI from 9/10. Neurosurgery were planning on repeat  I&D for source control but held off given the above medical issues with JEY, hyponatremia. She has been on vancomycin, cefepime since 9/9 and blood cultures from 9/11 are NGTD.     She was transferred to the MICU on the AM of 9/12 for acute hypercapnic respiratory failure with concern for aspiration event, worsening encephalopathy, and worsening JEY.    .          Hospital/ICU Course:  Upon transfer to MICU, Mrs. Frazier's respiratory and mental status worsened despite Bipap and lasix trial. She was intubated and she developed shock requiring low dose levophed, felt to be septic in setting of bacteremia. A trialysis line was placed for venous access, vasopressors, and possible need for CRRT. Arterial line placed for frequent ABGs. Cefepime and vancomycin continued as ID and neurosurgery following. Added on flagyl as micro lab thinks BCx 9/9 may be growing anaerobes. IR successfully drained epidural abscess (9/13). Continuing lasix trial as patient still appears volume overloaded. Norepinephrine requirements increasing on 9/14.  Discussed with neurosurgery, would need to be off vasopressors and hemodynamically stable prior to surgical intervention. Family meeting on 9/14, code status changed to DNR.     Interval History/Significant Events: No acute events overnight. Weaned off norepinephrine.     Review of Systems   Unable to perform ROS: Intubated     Objective:     Vital Signs (Most Recent):  Temp: (!) 95.7 °F (35.4 °C) (09/15/18 1700)  Pulse: 79 (09/15/18 1700)  Resp: 10 (09/15/18 1700)  BP: (!) 117/58 (09/15/18 1605)  SpO2: (!) 94 % (09/15/18 1700) Vital Signs (24h Range):  Temp:  [93.7 °F (34.3 °C)-96.8 °F (36 °C)] 95.7 °F (35.4 °C)  Pulse:  [58-82] 79  Resp:  [9-26] 10  SpO2:  [93 %-98 %] 94 %  BP: (100-148)/(51-68) 117/58  Arterial Line BP: (100-134)/(37-53) 103/37   Weight: 102.7 kg (226 lb 6.6 oz)  Body mass index is 38.86 kg/m².      Intake/Output Summary (Last 24 hours) at 9/15/2018 0218  Last data filed at  9/15/2018 1605  Gross per 24 hour   Intake 665.69 ml   Output 426 ml   Net 239.69 ml       Physical Exam   Constitutional: She appears well-developed. She appears ill. No distress. She is intubated.   HENT:   Head: Normocephalic.   Eyes: Conjunctivae and EOM are normal. Pupils are equal, round, and reactive to light.   Neck: Full passive range of motion without pain. No tracheal deviation present.   Cardiovascular: Normal rate, regular rhythm, normal heart sounds and intact distal pulses.   Warm extremities, peripheral edema   Pulmonary/Chest: No accessory muscle usage. No tachypnea. She is intubated. No respiratory distress. She has decreased breath sounds in the right lower field and the left lower field. She has no wheezes. She has no rhonchi. She has rales in the right lower field and the left lower field.   Abdominal: Soft. Bowel sounds are decreased. There is no tenderness.   Genitourinary:   Genitourinary Comments: Urine catheter with clear, yellow output   Neurological: No cranial nerve deficit or sensory deficit. GCS eye subscore is 4. GCS verbal subscore is 1. GCS motor subscore is 6.   Drowsy, PERRL, following request and moving all extremities equally and symmetrically.    Skin: Skin is warm and dry. She is not diaphoretic.        Nursing note and vitals reviewed.      Vents:  Vent Mode: Spont (09/15/18 1700)  Set Rate: 0 bmp (09/15/18 1700)  Vt Set: 0 mL (09/15/18 1700)  Pressure Support: 5 cmH20 (09/15/18 1700)  PEEP/CPAP: 5 cmH20 (09/15/18 1700)  Oxygen Concentration (%): 30 (09/15/18 1700)  Peak Airway Pressure: 11 cmH2O (09/15/18 1700)  Plateau Pressure: 12 cmH20 (09/15/18 1700)  Total Ve: 5.41 mL (09/15/18 1700)  Negative Inspiratory Force (cm H2O): -24 (09/15/18 1155)  F/VT Ratio<105 (RSBI): (!) 18.62 (09/15/18 1700)  Lines/Drains/Airways     Central Venous Catheter Line                 Trialysis (Dialysis) Catheter 09/12/18 1326 right internal jugular 3 days          Drain                 NG/OG  Tube 09/12/18 1325 orogastric Center mouth 3 days         Urethral Catheter 09/13/18 1438 Temperature probe 2 days          Airway                 Airway - Non-Surgical 09/12/18 1012 Endotracheal Tube 3 days          Pressure Ulcer                 Negative Pressure Wound Therapy  39 days              Significant Labs:    CBC/Anemia Profile:  Recent Labs   Lab  09/14/18   0200  09/15/18   0128   WBC  28.53*  28.53*  24.16*  24.16*   HGB  8.8*  8.8*  8.5*  8.5*   HCT  26.8*  26.8*  27.0*  27.0*   PLT  265  265  273  273   MCV  78*  78*  80*  80*   RDW  17.5*  17.5*  17.6*  17.6*        Chemistries:  Recent Labs   Lab  09/14/18   0200 09/14/18   2306  09/15/18   0128  09/15/18   0808  09/15/18   1556   NA   --    < >  126*   --   124*  126*   K   --    < >  4.6   --   4.5  4.4   CL   --    < >  88*   --   87*  88*   CO2   --    < >  17*   --   16*  18*   BUN   --    < >  68*   --   70*  76*   CREATININE   --    < >  3.3*   --   3.4*  3.6*   CALCIUM   --    < >  8.8   --   8.9  8.8   ALBUMIN  1.7*   --    --   1.7*   --    --    PROT  5.4*   --    --   5.5*   --    --    BILITOT  1.6*   --    --   1.2*   --    --    ALKPHOS  144*   --    --   140*   --    --    ALT  7*   --    --   7*   --    --    AST  16   --    --   12   --    --    MG  1.5*   --    --   1.6   --    --    PHOS  6.5*   --    --   7.1*   --    --     < > = values in this interval not displayed.       All pertinent labs within the past 24 hours have been reviewed.    Significant Imaging:  I have reviewed and interpreted all pertinent imaging results/findings within the past 24 hours.    Assessment/Plan:     Neuro   Encephalopathy, metabolic    --multifactorial: sepsis/uremia, medications  --non focal on exam, following request  --continue precedex prn agitation        Lumbar stenosis    --s/p lumbar fusion/hardware placement June 2018 with subsequent osteo and abscess.   --see septic shock        Derm   Alteration in skin integrity related  to surgical incision    --s/p wound vac placement to lower back. Continue per neurosurgery        Pulmonary   * Acute hypercapnic respiratory failure    --2/2 pulmonary edema in setting of acute on chronic diastolic HF, volume overload and JEY  --Repeat CXR (9/14) showing worsening pulmonary edema  --Compensated metabolic acidosis with high anion gap; lactate wnl, suspect uremia contributing to elevated AG.  --Did well on SBT however remains significantly volume overloaded with poor response to diuresis.  Remain intubated today due to high likelihood of failure and re-intubation if extubation is attempted.   --bedside ultrasound of right pleural effusion on 9/14 with small pocket not amendable to thoracentesis.  --SBT in am            Pulmonary hypertension    --2/2 diastolic HF. No known lung disease  --Echo (9/13) PA systolic pressure estimated at 88 mm Hg  --reportedly on revatio at home.   --diuresis          Cardiac/Vascular   PAF (paroxysmal atrial fibrillation)    --CHADS vasc 5 (HTN, DM, Age, and female); hold home rivaroxaban   --hold no given epidural abscess.   --currently in SR        CHF (congestive heart failure), NYHA class IV    -- Prior ECHO shows diastolic dysfunction (7/2018)  --Echo (9/13) EF 55-60%; concentric remodeling with RV enlargement; no WMA  --continue diuresis        Coronary artery disease involving native coronary artery of native heart without angina pectoris    --hold BB in setting of recent shock  --continue statin for now   --s/p VIVIANA to Lcx in June 2017 and on aspirin, plavix prior to admission. Holding in setting of upcoming procedures.             Hypertension    --holding antihypertensives in the setting of recent shock and borderline hypotension.         Renal/   Hyponatremia    --suspect hypervolemic hyponatremia from volume overload.    --sodium trending up with diuresis  --continue to trend          JEY (acute kidney injury)    --most likely cardiorenal syndrome vs sepsis  (ATN)  --retroperitoneal ultrasound on 9/9 consistent with medical renal disease.   --Nephrology following  --no need for emergent dialysis at this time  --trialysis in place.   --lasix challenge           ID   Osteomyelitis of Lumbar Spine    --see above        Septic shock    --secondary to spinal abscess  --blood cultures with prevotella, repeat blood cultures ngtd  --gram stain from spinal abscess with many GNR, moderate gram neg diplocci.   --appreciate ID recommendations.  On vancomycin + pip/tazo+ fluconazole.   --on stress dose steroids and fludrocortisone.  --titrate levophed to maintain SBP >100  --Abx as above. Also on vancomycin for empiric coverage  --positive UCx (9/11) for Candida; Treating with fluconazole for 5 days as cannot rule out as source of her sepsis. Urine catheter changed.   --neurosurgery following, not a surgical candidate given hemodynamic instability.           Gram-negative bacteremia    --noted on cultures from 9/9; cleared on 9/11; Positive for Prevotella  --per ID recs: discontinued ceftriaxone and flagyl and added on zosyn. Patient has recorded penicillin allergy but patient has received penicillins in the past with no recorded reactions. We will watch closely for signs of allergic reactions.   --Not fully able to obtain source control as IR only able to drain one abscess.           Spinal epidural abscess    --noted on MRI along with osteo  --IR drained paraspinal abscess 9/13   --see septic shock for details.         Oncology   Anemia    --likely anemia of chronic disease. Trend Hgb for now  --no signs of acute blood loss  --transfuse for Hgb <7        Endocrine   Hypothyroidism    --continue synthroid        Diabetes mellitus, type II    --POCT glucose and SSI           GI   GERD (gastroesophageal reflux disease)    --continue PPI          DVT PPx;  Heparin SQ, SCDs    Updated patient's daughters at bedside throughout the day.     Discussed plan with Dr. Lashay Nuñez  Care Time: 60 minutes  Critical secondary to Patient has a condition that poses threat to life and bodily function: septic shock, acute respiratory failure, JEY, pulmonary edema      Critical care was time spent personally by me on the following activities: development of treatment plan with patient or surrogate and bedside caregivers, discussions with consultants, evaluation of patient's response to treatment, examination of patient, ordering and performing treatments and interventions, ordering and review of laboratory studies, ordering and review of radiographic studies, pulse oximetry, re-evaluation of patient's condition. This critical care time did not overlap with that of any other provider or involve time for any procedures.     Darshana Lawler NP  Critical Care Medicine  [facility]

## 2018-09-15 NOTE — ASSESSMENT & PLAN NOTE
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     -No acute neurosurgical intervention at this time  -MRI with L2-3 osteodiscitis, large fluid collection in surgical bed, as well a prevertebral fluid collection L1,2 s/p IR drainage   -Plan for OR postponed secondary to dismal medical status and inability to tolerate surgery at this time  -Please wear LSO brace at all times  -Please hold Xarelto & Plavix   -Please medically optimize and document medical clearance when appropriate for surgery  -Abx per infectious disease recs   -Continue wound care and wound vac  -Medical management per primary team

## 2018-09-15 NOTE — PROGRESS NOTES
[facility]  Infectious Disease  Progress Note    Patient Name: Medina Frazier  MRN: [id number]  Admission Date: 9/8/2018  Length of Stay: 7 days  Attending Physician: Emmanuel Metz MD  Primary Care Provider: Hao Garcia MD    Isolation Status: No active isolations  Assessment/Plan:      Spinal epidural abscess    82 year old female with history of L2-3 fusion 6/5 complicated by post operative infection with Enterobacter cloacae. She has been on outpatient IV Ciprofloxacin and has undergone an I&D on 7/2 with partial hardware removal on 8/7 without resolution of her infection. 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      She was broadened to Cefepime and Vancomycin. Neurosurgery is following and had planned to take patient to OR for washout & fusion, however patient developed acute respiratory failure requiring intubation and transfer to the ICU with CXR showing pulmonary edema.     Blood cultures 9/9 returned positive for Prevotella. Urine is showing Candida. She underwent IR drainage of paraspinal abscess yesterday with 5 mL purulent fluid removed. Gram stain is showing many GNRs, moderate gram negative diplococci and few GPRs. Abscess cultures are pending. She remains intubated, sedated on pressors. Afebrile. Leukocytosis worsened today. Antimicrobial coverage broadened to Vanc, Zosyn and Fluconazole. Repeat blood cultures are NGTD. Surgical plans have been postponed due to decline in medical status.     Plan  - Continue empiric Zosyn and Fluconazole   - Vanc level 20.5 today; ordered a random for tomorrow morning; will re-dose accordingly  - Will follow aspiration cultures to guide antibiotic therapy.     - Ideally patient needs surgical washout for most optimal chance of cure of this infection. Neurosurgery is following.  -  ID will continue to follow.          Thank you for your consult. I will follow-up with patient. Please contact us if you have any additional questions.  JASMINA Witt Pager: [phone]    Infectious Disease  [facility]    Subjective:     Principal Problem:Acute hypercapnic respiratory failure    HPI: Medina Frazier is an 82 year old female who underwent L2-3 fusion on 6/5/18. She did well until shortly after staples removed,   drainage from the lower part of the incision was noted. On 7/2 she went to the OR for an I&D. Purulence was encountered. Surgical cultures grew Enterobacter cloacae. She was seen by Dr. Lenz, ID provider who started patient on IV Ciprofloxacin for 6 weeks. Patient's infection did not resolve and wound continued to drain. She was taken back to the OR on 8/7 for hardware removal, however appears on most recent imaging an intervertebral cage remains in place. No new surgical cultures obtained at that time. She was continued on Cipro. Patient's back pain has worsened over the past week and is now wrapping around to her anterior abdomen. She presented to an [facility]  ED in [place] for evaluation. 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 Labs notable for elevated WBC, ESR, CRP and pyuria. Procalc 19. JEY.  Blood and urine culture negative.  She was transferred here for higher level of care. Currently she is on Cipro from her previous cultures, and was started on Vanc on 9/8. Patient denies fevers, chills, sweats. Reports severe back pain and lower extremity weakness. Magaña in place.    Of note, patient has a documented PCN allergy. She said she has tolerated PCN in the recent past without adverse reactions. 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 No problems since.      Interval History: pt made a DNR. No acute  events.     Review of Systems   Unable to perform ROS: Intubated     Objective:     Vital Signs (Most Recent):  Temp: (!) 95.9 °F (35.5 °C) (09/15/18 1258)  Pulse: 80 (09/15/18 1305)  Resp: 15 (09/15/18 1305)  BP: (!) 142/67 (09/15/18 1305)  SpO2: (!) 94 % (09/15/18 1305) Vital Signs (24h Range):  Temp:  [95.9 °F (35.5 °C)-96.8 °F (36 °C)] 95.9 °F (35.5 °C)  Pulse:  [58-82] 80  Resp:  [9-23] 15  SpO2:  [93 %-99 %] 94 %  BP: (100-148)/(51-68) 142/67  Arterial Line BP: (100-134)/(39-53) 120/45     Weight: 102.7 kg (226 lb 6.6 oz)  Body mass index is 38.86 kg/m².    Estimated Creatinine Clearance: 15.4 mL/min (A) (based on SCr of 3.4 mg/dL (H)).    Physical Exam   Constitutional: She appears well-developed and well-nourished. She appears ill.   Intubated, sedated   Neck: JVD present.   RIJ CVC in place   Cardiovascular: Normal rate. An irregularly irregular rhythm present. Exam reveals no friction rub.   No murmur heard.  Pulmonary/Chest: Effort normal. She has no wheezes. She has rales.   Abdominal: Soft. Bowel sounds are normal. She exhibits no distension.   Obese abdomen   Musculoskeletal: She exhibits edema (BLE, right hand).   +2 pitting edema   Skin: Skin is warm and dry. She is not diaphoretic. No erythema.   Lumbar wound not examined today.   Nursing note and vitals reviewed.      Significant Labs:   Blood Culture:   Recent Labs   Lab  06/08/18   1349  09/09/18   1555  09/09/18   1731  09/11/18   1003  09/11/18   1011   LABBLOO  No growth after 5 days.  No growth after 5 days.  Gram stain terrell bottle: Gram negative rods   Positive results previously called 09/11/2018  13:30  PREVOTELLA (B.) BIVIA  Gram stain terrell bottle: Gram negative rods   Results called to and read back by:Sofia Noriega RN 09/11/2018  06:01  PREVOTELLA (B.) BIVIA  No Growth to date  No Growth to date  No Growth to date  No Growth to date  No Growth to date  No Growth to date  No Growth to date  No Growth to date  No Growth to date   No Growth to date     CBC:   Recent Labs   Lab  09/14/18   0200  09/15/18   0128   WBC  28.53*  28.53*  24.16*  24.16*   HGB  8.8*  8.8*  8.5*  8.5*   HCT  26.8*  26.8*  27.0*  27.0*   PLT  265  265  273  273     CMP:   Recent Labs   Lab  09/14/18   0200   09/14/18   1607  09/14/18   2306  09/15/18   0128  09/15/18   0808   NA   --    < >  124*  126*   --   124*   K   --    < >  4.6  4.6   --   4.5   CL   --    < >  87*  88*   --   87*   CO2   --    < >  18*  17*   --   16*   GLU   --    < >  192*  212*   --   219*   BUN   --    < >  67*  68*   --   70*   CREATININE   --    < >  3.1*  3.3*   --   3.4*   CALCIUM   --    < >  8.6*  8.8   --   8.9   PROT  5.4*   --    --    --   5.5*   --    ALBUMIN  1.7*   --    --    --   1.7*   --    BILITOT  1.6*   --    --    --   1.2*   --    ALKPHOS  144*   --    --    --   140*   --    AST  16   --    --    --   12   --    ALT  7*   --    --    --   7*   --    ANIONGAP   --    < >  19*  21*   --   21*   EGFRNONAA   --    < >  13.6*  12.6*   --   12.1*    < > = values in this interval not displayed.     Urine Culture:   Recent Labs   Lab  06/07/18   1128  09/07/18   2255  09/10/18   1701   LABURIN  No growth  No significant growth  BEN ALBICANS  > 100,000 cfu/ml  Treatment of asymptomatic candiduria is not recommended (except for   specific populations). Candida isolated in the urine typically   represents colonization. If an indwelling urinary catheter is present  it should be removed or replaced.       Urine Studies:   Recent Labs   Lab  07/02/18   1205   09/10/18   1701   COLORU  Yellow   < >  Yellow   APPEARANCEUA  Clear   < >  Cloudy*   PHUR  6.0   < >  5.0   SPECGRAV  1.015   < >  1.020   PROTEINUA  Negative   < >  Negative   GLUCUA  Negative   < >  Negative   KETONESU  Negative   < >  Negative   BILIRUBINUA  Negative   < >  Negative   OCCULTUA  Trace*   < >  3+*   NITRITE  Positive*   < >  Negative   UROBILINOGEN  0.2   < >  Negative   LEUKOCYTESUR  Negative    < >  3+*   RBCUA  1   < >  64*   WBCUA  2   < >  48*   BACTERIA  Few*   --   Rare   SQUAMEPITHEL   --    --   1   HYALINECASTS  1   --    --     < > = values in this interval not displayed.     Wound Culture:   Recent Labs   Lab  07/02/18   1432  09/13/18   0834   LABAERO  ENTEROBACTER CLOACAE COMPLEX  Rare  For susceptibility see order # [id number]    ENTEROBACTER CLOACAE COMPLEX  Moderate    No growth       Significant Imaging: I have reviewed all pertinent imaging results/findings within the past 24 hours.

## 2018-09-15 NOTE — PROGRESS NOTES
[facility]  Neurosurgery  Progress Note    Subjective:     History of Present Illness: Ms Medina Frazier is an 82yoF with PMHx DMII, HTN, CHF, CAD, CHF, CKD, HLD, GERD, hypothyroidism,  s/p L2-3 TLIF on 6/5/18 by Dr. Gutiérrez at [facility], complicated by infection & subsequent washout &  hardware removal, who is transferred from [facility] for neurosurgical evaluation of L2-3 osteomyelitis.  Following her initial surgery, she was found to have a lumbar incision wound infection that was washed out on 7/2/18.  Surgical cultures grew E. Cloacae & a PICC was placed.  She was discharged on IV Cipro & a wound vac.  She then had hardware removal on 8/7.  She has had progressive worsening of pain & infection since that time.   Most recent CT Lspine shows L2-3 osteomyelitis with paraspinal fluid collection L1-3.      Post-Op Info:  Procedure(s) (LRB):  FUSION, SPINE, LUMBAR, TLIF, WITH PERCUTANEOUS INSTRUMENTATION L1-5, depuy, neuromonitoring, 4 post bed (N/A)         Interval History: NAEON. Neurologically stable. WV in place. Wound cx grew GNR/GNP. Still intubated and on levo.    Medications:  Continuous Infusions:   dexmedetomidine (PRECEDEX) infusion Stopped (09/15/18 1022)    norepinephrine bitartrate-D5W Stopped (09/15/18 1427)     Scheduled Meds:   albuterol-ipratropium  3 mL Nebulization Q4H    chlorhexidine  15 mL Mouth/Throat BID    fluconazole (DIFLUCAN) IVPB  200 mg Intravenous Q24H    fludrocortisone  100 mcg Per OG tube Daily    hydrocortisone sodium succinate  100 mg Intravenous Q8H    levothyroxine  150 mcg Per OG tube Before breakfast    pantoprazole  40 mg Intravenous Daily    piperacillin-tazobactam (ZOSYN) IVPB  4.5 g Intravenous Q12H     PRN Meds:dextrose 50%, dextrose 50%, fentaNYL citrate (PF), glucagon (human recombinant), glucose, glucose, insulin aspart U-100, ondansetron       Objective:     Weight: 102.7 kg (226 lb 6.6 oz)  Body mass index is 38.86 kg/m².  Vital Signs (Most  Recent):  Temp: (!) 95.9 °F (35.5 °C) (09/15/18 1258)  Pulse: 80 (09/15/18 1405)  Resp: (!) 25 (09/15/18 1405)  BP: (!) 125/58 (09/15/18 1405)  SpO2: (!) 94 % (09/15/18 1405) Vital Signs (24h Range):  Temp:  [95.9 °F (35.5 °C)-96.8 °F (36 °C)] 95.9 °F (35.5 °C)  Pulse:  [58-82] 80  Resp:  [9-25] 25  SpO2:  [93 %-99 %] 94 %  BP: (100-148)/(51-68) 125/58  Arterial Line BP: (100-134)/(39-53) 114/44     Date 09/15/18 0700 - 09/16/18 0659   Shift 9730-9297 6139-6839 6696-2550 24 Hour Total   INTAKE   I.V.(mL/kg) 102.5(1)   102.5(1)   IV Piggyback 100   100   Shift Total(mL/kg) 202.5(2)   202.5(2)   OUTPUT   Urine(mL/kg/hr) 80(0.1)   80   Shift Total(mL/kg) 80(0.8)   80(0.8)   Weight (kg) 102.7 102.7 102.7 102.7              Vent Mode: Spont  Oxygen Concentration (%):  [30-40] 30  Resp Rate Total:  [13 br/min-21 br/min] 13 br/min  Vt Set:  [0 mL] 0 mL  PEEP/CPAP:  [5 cmH20] 5 cmH20  Pressure Support:  [0 cmH20-5 cmH20] 5 cmH20  Mean Airway Pressure:  [7 guP34-23 cmH20] 7.1 cmH20         NG/OG Tube 09/12/18 1325 orogastric Center mouth (Active)   Placement Check placement verified by x-ray 9/14/2018  7:05 AM   Distal Tube Length (cm) 58 9/14/2018  7:05 AM   Tolerance no signs/symptoms of discomfort 9/14/2018  7:05 AM   Securement taped to commercial device 9/14/2018  7:05 AM   Clamp Status/Tolerance clamped 9/14/2018  7:05 AM   Suction Setting/Drainage Method suction at;low;intermittent setting 9/14/2018  3:05 AM   Insertion Site Appearance no redness, warmth, tenderness, skin breakdown, drainage 9/14/2018  3:05 AM   Current Rate (mL/hr) 0 mL/hr 9/14/2018  7:05 AM   Intake (mL) 60 mL 9/13/2018  6:05 AM   Intake (mL) - Formula Tube Feeding 0 9/14/2018 10:05 AM            Urethral Catheter 09/13/18 1438 Temperature probe (Active)   Site Assessment Clean;Intact 9/14/2018  7:05 AM   Collection Container Urimeter 9/14/2018  7:05 AM   Securement Method secured to top of thigh w/ adhesive device 9/14/2018  7:05 AM   Catheter Care  "Performed yes 9/14/2018  7:05 AM   Reason for Continuing Urinary Catheterization Critically ill in ICU requiring intensive monitoring 9/14/2018  7:05 AM   CAUTI Prevention Bundle StatLock in place w 1" slack;Intact seal between catheter & drainage tubing;Drainage bag off the floor;Green sheeting clip in use;No dependent loops or kinks;Drainage bag not overfilled (<2/3 full);Drainage bag below bladder 9/14/2018  7:05 AM   Output (mL) 33 mL 9/14/2018 10:05 AM            Trialysis (Dialysis) Catheter 09/12/18 1326 right internal jugular (Active)   IV Device Securement sutures 9/14/2018  7:05 AM   Additional Site Signs no drainage;no streak formation;no palpable cord;no pain;no edema;no warmth;no erythema 9/14/2018  7:05 AM   Patency/Care infusing 9/14/2018  7:05 AM   Site Assessment Clean;Dry;Intact;No redness;No swelling 9/14/2018  7:05 AM   Status Accessed 9/14/2018  7:05 AM   Dressing Intervention Dressing reinforced 9/14/2018  7:05 AM   Dressing Status Clean;Dry;Intact 9/14/2018  7:05 AM   Dressing Change Due 09/19/18 9/14/2018  7:05 AM   Verification by X-ray Yes 9/14/2018  7:05 AM   Site Condition No complications 9/14/2018  7:05 AM   Dressing Occlusive 9/14/2018  7:05 AM   Daily Line Review Performed 9/14/2018  7:05 AM       Neurosurgery Physical Exam  E3VTM6  PERRL  FC x4  CNII-XII grossly intact    Significant Labs:  Recent Labs   Lab  09/14/18   0200   09/14/18   1607  09/14/18   2306  09/15/18   0128  09/15/18   0808   GLU   --    < >  192*  212*   --   219*   NA   --    < >  124*  126*   --   124*   K   --    < >  4.6  4.6   --   4.5   CL   --    < >  87*  88*   --   87*   CO2   --    < >  18*  17*   --   16*   BUN   --    < >  67*  68*   --   70*   CREATININE   --    < >  3.1*  3.3*   --   3.4*   CALCIUM   --    < >  8.6*  8.8   --   8.9   MG  1.5*   --    --    --   1.6   --     < > = values in this interval not displayed.     Recent Labs   Lab  09/14/18   0200  09/15/18   0128   WBC  28.53*  28.53*  " 24.16*  24.16*   HGB  8.8*  8.8*  8.5*  8.5*   HCT  26.8*  26.8*  27.0*  27.0*   PLT  265  265  273  273     Recent Labs   Lab  09/14/18   0200  09/15/18   0128   INR  1.3*  1.4*     Microbiology Results (last 7 days)     Procedure Component Value Units Date/Time    Blood culture [[id number]] Collected:  09/11/18 1011    Order Status:  Completed Specimen:  Blood Updated:  09/15/18 1212     Blood Culture, Routine No Growth to date     Blood Culture, Routine No Growth to date     Blood Culture, Routine No Growth to date     Blood Culture, Routine No Growth to date     Blood Culture, Routine No Growth to date    Narrative:       Please draw from 2 separate sites 30 minutes apart. Thank you    Blood culture [[id number]] Collected:  09/11/18 1003    Order Status:  Completed Specimen:  Blood Updated:  09/15/18 1212     Blood Culture, Routine No Growth to date     Blood Culture, Routine No Growth to date     Blood Culture, Routine No Growth to date     Blood Culture, Routine No Growth to date     Blood Culture, Routine No Growth to date    Culture, Anaerobe [[id number]] Collected:  09/13/18 0834    Order Status:  Completed Specimen:  Abscess from Back Updated:  09/14/18 0734     Anaerobic Culture Culture in progress    Narrative:       lumbar paraspinal fluid collection drainage    Aerobic culture [[id number]] Collected:  09/13/18 0834    Order Status:  Completed Specimen:  Abscess from Back Updated:  09/14/18 0722     Aerobic Bacterial Culture No growth    Narrative:       lumbar paraspinal fluid collection drainage    Gram stain [[id number]] Collected:  09/13/18 0834    Order Status:  Completed Specimen:  Abscess from Back Updated:  09/13/18 1946     Gram Stain Result Many WBC's      Many Gram negative rods      Few Gram positive rods    Blood culture [[id number]] Collected:  09/09/18 1731    Order Status:  Completed Specimen:  Blood Updated:  09/13/18 1418     Blood Culture, Routine Gram stain terrell bottle: Gram negative  rods      Blood Culture, Routine Results called to and read back by:Sofia Noriega RN 09/11/2018  06:01     Blood Culture, Routine PREVOTELLA (B.) BIVIA    Narrative:       From 2 different sites 30 minutes apart    Blood culture [[id number]] Collected:  09/09/18 1555    Order Status:  Completed Specimen:  Blood Updated:  09/13/18 1417     Blood Culture, Routine Gram stain terrell bottle: Gram negative rods      Blood Culture, Routine Positive results previously called 09/11/2018  13:30     Blood Culture, Routine PREVOTELLA (B.) BIVIA    Gram stain [[id number]] Collected:  09/13/18 0834    Order Status:  Completed Specimen:  Abscess from Back Updated:  09/13/18 1153     Gram Stain Result Many WBC's      Many Gram negative rods      Moderate Gram negative diplococci    Fungus culture [[id number]] Collected:  09/13/18 0834    Order Status:  Sent Specimen:  Abscess from Back Updated:  09/13/18 1040    Gram stain [[id number]]     Order Status:  Completed Specimen:  Abscess from Back     Urine culture [[id number]] Collected:  09/10/18 1701    Order Status:  Completed Specimen:  Urine from Catheterized Updated:  09/12/18 1438     Urine Culture, Routine --     CANDIDA ALBICANS  > 100,000 cfu/ml  Treatment of asymptomatic candiduria is not recommended (except for   specific populations). Candida isolated in the urine typically   represents colonization. If an indwelling urinary catheter is present  it should be removed or replaced.      Narrative:       add on CXURN order #[id number] per Dr. Elina Sandhu @ 19:58    09/10/2018     Urine culture [[id number]]     Order Status:  Completed Specimen:  Urine, Catheterized     Urine culture [[id number]]     Order Status:  Canceled Specimen:  Urine         IR drain of psoas abscess placed yesterday, gram stain showing GNRs, GPCs, cultures, speciation, susceptibilities pending, urine culture from 9/10 growing candida, blood culture from 9/9 growing prevotella    Review of  Systems        Assessment/Plan:     Spinal epidural abscess    Ms Medina Frzaier is an 82 year old woman with multiple comorbidities sp L2-3 TLIF on 6/5/[facility], with surgical wound infection with E. cloacea sp washout & hardware removal, now with worsening osteomyelitis & paraspinal abscess despite weeks of treatment with IV Cipro.Now s/p aspiration by IR.     -No acute neurosurgical intervention at this time  -MRI with L2-3 osteodiscitis, large fluid collection in surgical bed, as well a prevertebral fluid collection L1,2  -Plan for OR postponed secondary to dismal medical status and inability to tolerate surgery at this time  -Please wear LSO brace at all times  -Please hold Xarelto & Plavix   -Please medically optimize and document medical clearance when appropriate for surgery  -Abx per infectious disease recs   -Continue wound care and wound vac  -Medical management per primary team          Ary Franklin MD  Neurosurgery  [facility]

## 2018-09-15 NOTE — ASSESSMENT & PLAN NOTE
--2/2 diastolic HF. No known lung disease  --Echo (9/13) PA systolic pressure estimated at 88 mm Hg  --reportedly on revatio at home.   --diuresis

## 2018-09-15 NOTE — SUBJECTIVE & OBJECTIVE
Interval History: NAEON. Neurologically stable. WV in place. Wound cx grew GNR/GNP. Still intubated and on levo.    Medications:  Continuous Infusions:   dexmedetomidine (PRECEDEX) infusion Stopped (09/15/18 1022)    norepinephrine bitartrate-D5W Stopped (09/15/18 1427)     Scheduled Meds:   albuterol-ipratropium  3 mL Nebulization Q4H    chlorhexidine  15 mL Mouth/Throat BID    fluconazole (DIFLUCAN) IVPB  200 mg Intravenous Q24H    fludrocortisone  100 mcg Per OG tube Daily    hydrocortisone sodium succinate  100 mg Intravenous Q8H    levothyroxine  150 mcg Per OG tube Before breakfast    pantoprazole  40 mg Intravenous Daily    piperacillin-tazobactam (ZOSYN) IVPB  4.5 g Intravenous Q12H     PRN Meds:dextrose 50%, dextrose 50%, fentaNYL citrate (PF), glucagon (human recombinant), glucose, glucose, insulin aspart U-100, ondansetron       Objective:     Weight: 102.7 kg (226 lb 6.6 oz)  Body mass index is 38.86 kg/m².  Vital Signs (Most Recent):  Temp: (!) 95.9 °F (35.5 °C) (09/15/18 1258)  Pulse: 80 (09/15/18 1405)  Resp: (!) 25 (09/15/18 1405)  BP: (!) 125/58 (09/15/18 1405)  SpO2: (!) 94 % (09/15/18 1405) Vital Signs (24h Range):  Temp:  [95.9 °F (35.5 °C)-96.8 °F (36 °C)] 95.9 °F (35.5 °C)  Pulse:  [58-82] 80  Resp:  [9-25] 25  SpO2:  [93 %-99 %] 94 %  BP: (100-148)/(51-68) 125/58  Arterial Line BP: (100-134)/(39-53) 114/44     Date 09/15/18 0700 - 09/16/18 0659   Shift 6162-1395 1778-0588 0367-6940 24 Hour Total   INTAKE   I.V.(mL/kg) 102.5(1)   102.5(1)   IV Piggyback 100   100   Shift Total(mL/kg) 202.5(2)   202.5(2)   OUTPUT   Urine(mL/kg/hr) 80(0.1)   80   Shift Total(mL/kg) 80(0.8)   80(0.8)   Weight (kg) 102.7 102.7 102.7 102.7              Vent Mode: Spont  Oxygen Concentration (%):  [30-40] 30  Resp Rate Total:  [13 br/min-21 br/min] 13 br/min  Vt Set:  [0 mL] 0 mL  PEEP/CPAP:  [5 cmH20] 5 cmH20  Pressure Support:  [0 cmH20-5 cmH20] 5 cmH20  Mean Airway Pressure:  [7 yxH32-61 cmH20] 7.1  "cmH20         NG/OG Tube 09/12/18 1325 orogastric Center mouth (Active)   Placement Check placement verified by x-ray 9/14/2018  7:05 AM   Distal Tube Length (cm) 58 9/14/2018  7:05 AM   Tolerance no signs/symptoms of discomfort 9/14/2018  7:05 AM   Securement taped to commercial device 9/14/2018  7:05 AM   Clamp Status/Tolerance clamped 9/14/2018  7:05 AM   Suction Setting/Drainage Method suction at;low;intermittent setting 9/14/2018  3:05 AM   Insertion Site Appearance no redness, warmth, tenderness, skin breakdown, drainage 9/14/2018  3:05 AM   Current Rate (mL/hr) 0 mL/hr 9/14/2018  7:05 AM   Intake (mL) 60 mL 9/13/2018  6:05 AM   Intake (mL) - Formula Tube Feeding 0 9/14/2018 10:05 AM            Urethral Catheter 09/13/18 1438 Temperature probe (Active)   Site Assessment Clean;Intact 9/14/2018  7:05 AM   Collection Container Urimeter 9/14/2018  7:05 AM   Securement Method secured to top of thigh w/ adhesive device 9/14/2018  7:05 AM   Catheter Care Performed yes 9/14/2018  7:05 AM   Reason for Continuing Urinary Catheterization Critically ill in ICU requiring intensive monitoring 9/14/2018  7:05 AM   CAUTI Prevention Bundle StatLock in place w 1" slack;Intact seal between catheter & drainage tubing;Drainage bag off the floor;Green sheeting clip in use;No dependent loops or kinks;Drainage bag not overfilled (<2/3 full);Drainage bag below bladder 9/14/2018  7:05 AM   Output (mL) 33 mL 9/14/2018 10:05 AM            Trialysis (Dialysis) Catheter 09/12/18 1326 right internal jugular (Active)   IV Device Securement sutures 9/14/2018  7:05 AM   Additional Site Signs no drainage;no streak formation;no palpable cord;no pain;no edema;no warmth;no erythema 9/14/2018  7:05 AM   Patency/Care infusing 9/14/2018  7:05 AM   Site Assessment Clean;Dry;Intact;No redness;No swelling 9/14/2018  7:05 AM   Status Accessed 9/14/2018  7:05 AM   Dressing Intervention Dressing reinforced 9/14/2018  7:05 AM   Dressing Status " Clean;Dry;Intact 9/14/2018  7:05 AM   Dressing Change Due 09/19/18 9/14/2018  7:05 AM   Verification by X-ray Yes 9/14/2018  7:05 AM   Site Condition No complications 9/14/2018  7:05 AM   Dressing Occlusive 9/14/2018  7:05 AM   Daily Line Review Performed 9/14/2018  7:05 AM       Neurosurgery Physical Exam  E3VTM6  PERRL  FC x4  CNII-XII grossly intact    Significant Labs:  Recent Labs   Lab  09/14/18   0200   09/14/18   1607  09/14/18   2306  09/15/18   0128  09/15/18   0808   GLU   --    < >  192*  212*   --   219*   NA   --    < >  124*  126*   --   124*   K   --    < >  4.6  4.6   --   4.5   CL   --    < >  87*  88*   --   87*   CO2   --    < >  18*  17*   --   16*   BUN   --    < >  67*  68*   --   70*   CREATININE   --    < >  3.1*  3.3*   --   3.4*   CALCIUM   --    < >  8.6*  8.8   --   8.9   MG  1.5*   --    --    --   1.6   --     < > = values in this interval not displayed.     Recent Labs   Lab  09/14/18   0200  09/15/18   0128   WBC  28.53*  28.53*  24.16*  24.16*   HGB  8.8*  8.8*  8.5*  8.5*   HCT  26.8*  26.8*  27.0*  27.0*   PLT  265  265  273  273     Recent Labs   Lab  09/14/18   0200  09/15/18   0128   INR  1.3*  1.4*     Microbiology Results (last 7 days)     Procedure Component Value Units Date/Time    Blood culture [[id number]] Collected:  09/11/18 1011    Order Status:  Completed Specimen:  Blood Updated:  09/15/18 1212     Blood Culture, Routine No Growth to date     Blood Culture, Routine No Growth to date     Blood Culture, Routine No Growth to date     Blood Culture, Routine No Growth to date     Blood Culture, Routine No Growth to date    Narrative:       Please draw from 2 separate sites 30 minutes apart. Thank you    Blood culture [[id number]] Collected:  09/11/18 1003    Order Status:  Completed Specimen:  Blood Updated:  09/15/18 1212     Blood Culture, Routine No Growth to date     Blood Culture, Routine No Growth to date     Blood Culture, Routine No Growth to date     Blood  Culture, Routine No Growth to date     Blood Culture, Routine No Growth to date    Culture, Anaerobe [[id number]] Collected:  09/13/18 0834    Order Status:  Completed Specimen:  Abscess from Back Updated:  09/14/18 0734     Anaerobic Culture Culture in progress    Narrative:       lumbar paraspinal fluid collection drainage    Aerobic culture [[id number]] Collected:  09/13/18 0834    Order Status:  Completed Specimen:  Abscess from Back Updated:  09/14/18 0722     Aerobic Bacterial Culture No growth    Narrative:       lumbar paraspinal fluid collection drainage    Gram stain [[id number]] Collected:  09/13/18 0834    Order Status:  Completed Specimen:  Abscess from Back Updated:  09/13/18 1946     Gram Stain Result Many WBC's      Many Gram negative rods      Few Gram positive rods    Blood culture [[id number]] Collected:  09/09/18 1731    Order Status:  Completed Specimen:  Blood Updated:  09/13/18 1418     Blood Culture, Routine Gram stain terrell bottle: Gram negative rods      Blood Culture, Routine Results called to and read back by:Sofia Noriega RN 09/11/2018  06:01     Blood Culture, Routine PREVOTELLA (B.) BIVIA    Narrative:       From 2 different sites 30 minutes apart    Blood culture [[id number]] Collected:  09/09/18 1555    Order Status:  Completed Specimen:  Blood Updated:  09/13/18 1417     Blood Culture, Routine Gram stain terrell bottle: Gram negative rods      Blood Culture, Routine Positive results previously called 09/11/2018  13:30     Blood Culture, Routine PREVOTELLA (B.) BIVIA    Gram stain [[id number]] Collected:  09/13/18 0834    Order Status:  Completed Specimen:  Abscess from Back Updated:  09/13/18 1153     Gram Stain Result Many WBC's      Many Gram negative rods      Moderate Gram negative diplococci    Fungus culture [[id number]] Collected:  09/13/18 0834    Order Status:  Sent Specimen:  Abscess from Back Updated:  09/13/18 1040    Gram stain [[id number]]     Order Status:  Completed Specimen:   Abscess from Back     Urine culture [[id number]] Collected:  09/10/18 1701    Order Status:  Completed Specimen:  Urine from Catheterized Updated:  09/12/18 1438     Urine Culture, Routine --     CANDIDA ALBICANS  > 100,000 cfu/ml  Treatment of asymptomatic candiduria is not recommended (except for   specific populations). Candida isolated in the urine typically   represents colonization. If an indwelling urinary catheter is present  it should be removed or replaced.      Narrative:       add on CXURN order #[id number] per Dr. Elina Sandhu @ 19:58    09/10/2018     Urine culture [[id number]]     Order Status:  Completed Specimen:  Urine, Catheterized     Urine culture [[id number]]     Order Status:  Canceled Specimen:  Urine         IR drain of psoas abscess placed yesterday, gram stain showing GNRs, GPCs, cultures, speciation, susceptibilities pending, urine culture from 9/10 growing candida, blood culture from 9/9 growing prevotella    Review of Systems

## 2018-09-15 NOTE — ASSESSMENT & PLAN NOTE
--noted on MRI along with osteo  --IR drained paraspinal abscess 9/13   --see septic shock for details.

## 2018-09-15 NOTE — ASSESSMENT & PLAN NOTE
--most likely cardiorenal syndrome vs sepsis (ATN)  --retroperitoneal ultrasound on 9/9 consistent with medical renal disease.   --Nephrology following  --no need for emergent dialysis at this time  --trialysis in place.   --lasix challenge

## 2018-09-15 NOTE — PLAN OF CARE
Problem: Patient Care Overview  Goal: Plan of Care Review  Outcome: Ongoing (interventions implemented as appropriate)  POC reviewed with patient and family at 1515. No evidence of learning from the patient. Questions and concerns addressed with the family. 140 mg lasix given x 2 per Critical Care orders. Warming blanket on for temperature < 96. RN flushed saenz and performed a bladder scan per Critical Care orders. Bladder scan revealed 3 mL. Day bath given. Patient turned per protocol. Patient turned per protocol. RN will continue to monitor. See flowsheets for full assessment and VS info.

## 2018-09-15 NOTE — PROGRESS NOTES
RN notified Critical Care that the patient's urine output for the past two hours has been 15 and 12 mL/Hr. Patient is currently on levo 0.06 mcg and AM Na+ was 126. No new orders per Critical Care. RN will continue to monitor.

## 2018-09-15 NOTE — PLAN OF CARE
Problem: Patient Care Overview  Goal: Plan of Care Review  Outcome: Ongoing (interventions implemented as appropriate)  POC reviewed with pt at 0500. Pt's family  verbalized understanding. Questions and concerns addressed. No acute events overnight. Pt progressing toward goals. Will continue to monitor. See flowsheets for full assessment and VS info

## 2018-09-15 NOTE — ASSESSMENT & PLAN NOTE
-- Prior ECHO shows diastolic dysfunction (7/2018)  --Echo (9/13) EF 55-60%; concentric remodeling with RV enlargement; no WMA  --continue diuresis

## 2018-09-16 NOTE — NURSING
Pt had episode of afib with HR in the 50s causing symptomatic hypotension.  SBP of 72.  RN titrated levo up to 0.14 to achieve spb above 100.  Pt came out of afib and pressure returned to normal.  CCS called and saw pt.  EKG ordered.

## 2018-09-16 NOTE — PROGRESS NOTES
[facility]  Critical Care Medicine  Progress Note    Patient Name: Medina Frazier  MRN: [id number]  Admission Date: 9/8/2018  Hospital Length of Stay: 8 days  Code Status: DNR  Attending Provider: Emmanuel Metz MD  Primary Care Provider: Hao Garcia MD   Principal Problem: Acute hypercapnic respiratory failure    Subjective:     HPI:  Mrs. Frazier is a 82 yr old female with history significant for diastolic HF, CAD s/p VIVIANA Lcx (June 2017), HTN, DM2, and lumbar stenosis s/p lumbar fusion in June 2018 with subsequent hardware infection and I&D on 7/2 and repeat lumbar I&D, hardware removal, decompression and wound vac placement to L2-L3 due to continued epidural abscess. She was discharged recently to a SNF for rehab and continued IV antibiotic therapy. [place] on 9[place]8 from [facility] after labwork revealed JEY, hyponatremia. Additionally, CT lumbar spine obtained 9/5 was concerning for persistent osteomyelitis, epidural abscess in L2-L3 region.     She was transferred to [facility] on 9/8 for higher level of care and neurosurgery evaluation. Admission has been complicated by continued JEY with creatinine up trending from baseline 0.8 to 1.0 to 3.0 along with hyponatremia felt to be related to acute on chronic diastolic heart failure, volume overload. Nephrology was consulted and has been assisting with management. She was started on lasix 100 mg BID with minimal urine output response and creatinine slowly uptrending. Additionally, blood cultures obtained 9/9 are growing GNR's, awaiting speciation. Suspect this is enterobacter given continued osteomyelitis of L2-L3, complex fluid collection within laminectomy bed measuring 3x8x5 cm concerning for abscess, and additional prevertebral collection anterior to L1 and L2 vertebral bodies measuring 4x6x1 abutting the abdominal aorta concerning for abscess noted on MRI from 9/10. Neurosurgery were planning on repeat  I&D for source control but held off given the above medical issues with JEY, hyponatremia. She has been on vancomycin, cefepime since 9/9 and blood cultures from 9/11 are NGTD.     She was transferred to the MICU on the AM of 9/12 for acute hypercapnic respiratory failure with concern for aspiration event, worsening encephalopathy, and worsening JEY.    .          Hospital/ICU Course:  Upon transfer to MICU, Mrs. Frazier's respiratory and mental status worsened despite Bipap and lasix trial. She was intubated and she developed shock requiring low dose levophed, felt to be septic in setting of bacteremia. A trialysis line was placed for venous access, vasopressors, and possible need for CRRT. Arterial line placed for frequent ABGs. Cefepime and vancomycin continued as ID and neurosurgery following. Added on flagyl as micro lab thinks BCx 9/9 may be growing anaerobes. IR successfully drained epidural abscess (9/13). Continuing lasix trial as patient still appears volume overloaded. Norepinephrine requirements increasing on 9/14.  Discussed with neurosurgery, would need to be off vasopressors and hemodynamically stable prior to surgical intervention. Family meeting on 9/14, code status changed to DNR.  Weaned off vasopressors on 9/16.  Remains on ventilator due to significant pulmonary edema.  Aggressive diuresis started without significant response.     Interval History/Significant Events: No acute events overnight.      Review of Systems   Unable to perform ROS: Intubated     Objective:     Vital Signs (Most Recent):  Temp: 97 °F (36.1 °C) (09/16/18 1005)  Pulse: 81 (09/16/18 1005)  Resp: 17 (09/16/18 1005)  BP: (!) 119/58 (09/16/18 1005)  SpO2: 100 % (09/16/18 1005) Vital Signs (24h Range):  Temp:  [93.7 °F (34.3 °C)-97.3 °F (36.3 °C)] 97 °F (36.1 °C)  Pulse:  [63-82] 81  Resp:  [0-26] 17  SpO2:  [93 %-100 %] 100 %  BP: ()/(47-97) 119/58  Arterial Line BP: (103-141)/(37-48) 114/41   Weight: 102.7 kg (226 lb 6.6  oz)  Body mass index is 38.86 kg/m².      Intake/Output Summary (Last 24 hours) at 9/16/2018 1102  Last data filed at 9/16/2018 1100  Gross per 24 hour   Intake 1031.87 ml   Output 230 ml   Net 801.87 ml       Physical Exam   Constitutional: She appears well-developed. She appears ill. No distress. She is intubated.   HENT:   Head: Normocephalic.   Eyes: Conjunctivae and EOM are normal. Pupils are equal, round, and reactive to light.   Neck: Full passive range of motion without pain. No tracheal deviation present.   Cardiovascular: Normal rate and normal heart sounds. An irregularly irregular rhythm present.   Warm extremities, peripheral edema   Pulmonary/Chest: No accessory muscle usage. No tachypnea. She is intubated. No respiratory distress. She has decreased breath sounds in the right lower field and the left lower field. She has no wheezes. She has no rhonchi. She has rales in the right lower field and the left lower field.   Abdominal: Soft. Bowel sounds are normal. There is no tenderness.   Genitourinary:   Genitourinary Comments: Urine catheter with clear, yellow output. Oliguric.    Neurological: No cranial nerve deficit or sensory deficit. GCS eye subscore is 3. GCS verbal subscore is 1. GCS motor subscore is 6.   Drowsy, PERRL, following request and moving all extremities equally and symmetrically.    Skin: Skin is warm and dry. She is not diaphoretic.        Nursing note and vitals reviewed.      Vents:  Vent Mode: A/C (09/16/18 0915)  Set Rate: 16 bmp (09/16/18 0915)  Vt Set: 0 mL (09/16/18 0915)  Pressure Support: 0 cmH20 (09/16/18 0915)  PEEP/CPAP: 5 cmH20 (09/16/18 0915)  Oxygen Concentration (%): 30 (09/16/18 1005)  Peak Airway Pressure: 25 cmH2O (09/16/18 0915)  Plateau Pressure: 21 cmH20 (09/16/18 0915)  Total Ve: 7.94 mL (09/16/18 0915)  Negative Inspiratory Force (cm H2O): -24 (09/15/18 1155)  F/VT Ratio<105 (RSBI): (!) 0 (09/16/18 2377)  Lines/Drains/Airways     Central Venous Catheter Line                  Trialysis (Dialysis) Catheter 09/12/18 1326 right internal jugular 3 days          Drain                 NG/OG Tube 09/12/18 1325 orogastric Center mouth 3 days         Urethral Catheter 09/13/18 1438 Temperature probe 2 days          Airway                 Airway - Non-Surgical 09/12/18 1012 Endotracheal Tube 4 days          Pressure Ulcer                 Negative Pressure Wound Therapy  39 days              Significant Labs:    CBC/Anemia Profile:  Recent Labs   Lab  09/15/18   0128  09/16/18   0148   WBC  24.16*  24.16*  14.20*   HGB  8.5*  8.5*  7.8*   HCT  27.0*  27.0*  23.0*   PLT  273  273  254   MCV  80*  80*  75*   RDW  17.6*  17.6*  17.2*        Chemistries:  Recent Labs   Lab  09/15/18   0128   09/15/18   1556  09/15/18   2316  09/16/18   0148  09/16/18   0813   NA   --    < >  126*  126*   --   127*   K   --    < >  4.4  4.5   --   4.4   CL   --    < >  88*  88*   --   88*   CO2   --    < >  18*  20*   --   20*   BUN   --    < >  76*  73*   --   77*   CREATININE   --    < >  3.6*  3.7*   --   3.7*   CALCIUM   --    < >  8.8  8.6*   --   8.6*   ALBUMIN  1.7*   --    --    --   1.7*   --    PROT  5.5*   --    --    --   5.3*   --    BILITOT  1.2*   --    --    --   0.9   --    ALKPHOS  140*   --    --    --   123   --    ALT  7*   --    --    --   8*   --    AST  12   --    --    --   17   --    MG  1.6   --    --    --   1.6   --    PHOS  7.1*   --    --    --   7.2*   --     < > = values in this interval not displayed.       All pertinent labs within the past 24 hours have been reviewed.    Significant Imaging:  I have reviewed and interpreted all pertinent imaging results/findings within the past 24 hours.    Assessment/Plan:     Neuro   Encephalopathy, metabolic    --multifactorial: sepsis/uremia, medications  --non focal on exam, following request  --continue precedex prn agitation        Lumbar stenosis    --s/p lumbar fusion/hardware placement June 2018 with subsequent osteo and  abscess.   --LSO brace ordered.  Per conversation with Neurosurgery, will need to wear brace while OOB  --see septic shock        Derm   Alteration in skin integrity related to surgical incision    --s/p wound vac placement to lower back. Continue per neurosurgery        Pulmonary   * Acute hypercapnic respiratory failure    --2/2 pulmonary edema in setting of acute on chronic diastolic HF, volume overload and JEY  --Repeat CXR (9/14) showing worsening pulmonary edema  --Compensated metabolic acidosis with high anion gap; lactate wnl, suspect uremia contributing to elevated AG.  --Did well on SBT however remains significantly volume overloaded with poor response to diuresis.  Remain intubated today due to high likelihood of failure and re-intubation if extubation is attempted.   --bedside ultrasound of right pleural effusion on 9/14 with small pocket not amendable to thoracentesis.  --SBT in am            Pulmonary hypertension    --2/2 diastolic HF. No known lung disease  --Echo (9/13) PA systolic pressure estimated at 88 mm Hg  --reportedly on revatio at home.   --continue diuretics          Cardiac/Vascular   PAF (paroxysmal atrial fibrillation)    --CHADS vasc 5 (HTN, DM, Age, and female); hold home rivaroxaban   --hold now given spinal abscess.   --intermittently in rate controlled Afib        CHF (congestive heart failure), NYHA class IV    --Prior ECHO shows diastolic dysfunction (7/2018)  --Echo (9/13) EF 55-60%; concentric remodeling with RV enlargement; no WMA  --continue diuretics        Coronary artery disease involving native coronary artery of native heart without angina pectoris    --hold BB in setting of recent shock  --continue statin for now   --s/p VIVIANA to Lcx in June 2017 and on plavix prior to admission. Holding in setting of possible upcoming procedures.             Hypertension    --holding antihypertensives in the setting of recent shock and borderline hypotension.         Renal/    Hyponatremia    --suspect hypervolemic hyponatremia from volume overload.    --sodium trending up with diuresis  --continue to trend          JEY (acute kidney injury)    --most likely cardiorenal syndrome vs sepsis (ATN)  --retroperitoneal ultrasound on 9/9 consistent with medical renal disease.   --Nephrology following  --no need for emergent dialysis at this time  --trialysis in place.   --continue lasix and metolazone.  May need to consider dialysis for further volume removal.          ID   Osteomyelitis of Lumbar Spine    --see above        Septic shock    --secondary to spinal abscess  --blood cultures with prevotella, repeat blood cultures ngtd  --gram stain from spinal abscess with many GNR, moderate gram neg diplocci.   --appreciate ID recommendations.  On vancomycin + pip/tazo+ fluconazole.   --weaned off norepinephrine on 9/15.  --Abx as above. Also on vancomycin for empiric coverage  --positive UCx (9/11) for Candida; Treating with fluconazole for 5 days as cannot rule out as source of her sepsis. Urine catheter changed.   --neurosurgery following, will discuss if washout is now an option          Gram-negative bacteremia    --noted on cultures from 9/9; cleared on 9/11; Positive for Prevotella  --per ID recs: discontinued ceftriaxone and flagyl and added on zosyn. Patient has recorded penicillin allergy but patient has received penicillins in the past with no recorded reactions. We will watch closely for signs of allergic reactions.   --Not fully able to obtain source control as IR only able to partially drain one abscess.   --will discuss possibility of washout with Neurosurgery given improvement in hemodynamics          Spinal epidural abscess    --noted on MRI along with osteo  --IR drained paraspinal abscess 9/13   --see septic shock for details.         Oncology   Anemia    --likely anemia of chronic disease. Trend Hgb for now  --no signs of acute blood loss  --transfuse for Hgb <7         Endocrine   Hypothyroidism    --continue synthroid        Diabetes mellitus, type II    --likely steroid induced and secondary to infection  --wean steroids  --start insulin infusion          GI   GERD (gastroesophageal reflux disease)    --continue PPI          Tolerating enteral feedings    DVT ppx:  Heparin SQ    Family updated at bedside.     Discussed with Dr. Metz.     Critical Care Time: 45 minutes  Critical secondary to Patient has a condition that poses threat to life and bodily function: Acute Respiratory Failure requiring mechanical ventilation, Sepsis      Critical care was time spent personally by me on the following activities: development of treatment plan with patient or surrogate and bedside caregivers, discussions with consultants, evaluation of patient's response to treatment, examination of patient, ordering and performing treatments and interventions, ordering and review of laboratory studies, ordering and review of radiographic studies, pulse oximetry, re-evaluation of patient's condition. This critical care time did not overlap with that of any other provider or involve time for any procedures.     Darshana Lawler NP  Critical Care Medicine  [facility]

## 2018-09-16 NOTE — SUBJECTIVE & OBJECTIVE
Interval History/Significant Events: No acute events overnight.      Review of Systems   Unable to perform ROS: Intubated     Objective:     Vital Signs (Most Recent):  Temp: 97 °F (36.1 °C) (09/16/18 1005)  Pulse: 81 (09/16/18 1005)  Resp: 17 (09/16/18 1005)  BP: (!) 119/58 (09/16/18 1005)  SpO2: 100 % (09/16/18 1005) Vital Signs (24h Range):  Temp:  [93.7 °F (34.3 °C)-97.3 °F (36.3 °C)] 97 °F (36.1 °C)  Pulse:  [63-82] 81  Resp:  [0-26] 17  SpO2:  [93 %-100 %] 100 %  BP: ()/(47-97) 119/58  Arterial Line BP: (103-141)/(37-48) 114/41   Weight: 102.7 kg (226 lb 6.6 oz)  Body mass index is 38.86 kg/m².      Intake/Output Summary (Last 24 hours) at 9/16/2018 1102  Last data filed at 9/16/2018 1100  Gross per 24 hour   Intake 1031.87 ml   Output 230 ml   Net 801.87 ml       Physical Exam   Constitutional: She appears well-developed. She appears ill. No distress. She is intubated.   HENT:   Head: Normocephalic.   Eyes: Conjunctivae and EOM are normal. Pupils are equal, round, and reactive to light.   Neck: Full passive range of motion without pain. No tracheal deviation present.   Cardiovascular: Normal rate and normal heart sounds. An irregularly irregular rhythm present.   Warm extremities, peripheral edema   Pulmonary/Chest: No accessory muscle usage. No tachypnea. She is intubated. No respiratory distress. She has decreased breath sounds in the right lower field and the left lower field. She has no wheezes. She has no rhonchi. She has rales in the right lower field and the left lower field.   Abdominal: Soft. Bowel sounds are normal. There is no tenderness.   Genitourinary:   Genitourinary Comments: Urine catheter with clear, yellow output. Oliguric.    Neurological: No cranial nerve deficit or sensory deficit. GCS eye subscore is 3. GCS verbal subscore is 1. GCS motor subscore is 6.   Drowsy, PERRL, following request and moving all extremities equally and symmetrically.    Skin: Skin is warm and dry. She is  not diaphoretic.        Nursing note and vitals reviewed.      Vents:  Vent Mode: A/C (09/16/18 0915)  Set Rate: 16 bmp (09/16/18 0915)  Vt Set: 0 mL (09/16/18 0915)  Pressure Support: 0 cmH20 (09/16/18 0915)  PEEP/CPAP: 5 cmH20 (09/16/18 0915)  Oxygen Concentration (%): 30 (09/16/18 1005)  Peak Airway Pressure: 25 cmH2O (09/16/18 0915)  Plateau Pressure: 21 cmH20 (09/16/18 0915)  Total Ve: 7.94 mL (09/16/18 0915)  Negative Inspiratory Force (cm H2O): -24 (09/15/18 1155)  F/VT Ratio<105 (RSBI): (!) 0 (09/16/18 0915)  Lines/Drains/Airways     Central Venous Catheter Line                 Trialysis (Dialysis) Catheter 09/12/18 1326 right internal jugular 3 days          Drain                 NG/OG Tube 09/12/18 1325 orogastric Center mouth 3 days         Urethral Catheter 09/13/18 1438 Temperature probe 2 days          Airway                 Airway - Non-Surgical 09/12/18 1012 Endotracheal Tube 4 days          Pressure Ulcer                 Negative Pressure Wound Therapy  39 days              Significant Labs:    CBC/Anemia Profile:  Recent Labs   Lab  09/15/18   0128 09/16/18   0148   WBC  24.16*  24.16*  14.20*   HGB  8.5*  8.5*  7.8*   HCT  27.0*  27.0*  23.0*   PLT  273  273  254   MCV  80*  80*  75*   RDW  17.6*  17.6*  17.2*        Chemistries:  Recent Labs   Lab  09/15/18   0128   09/15/18   1556  09/15/18   2316  09/16/18   0148  09/16/18   0813   NA   --    < >  126*  126*   --   127*   K   --    < >  4.4  4.5   --   4.4   CL   --    < >  88*  88*   --   88*   CO2   --    < >  18*  20*   --   20*   BUN   --    < >  76*  73*   --   77*   CREATININE   --    < >  3.6*  3.7*   --   3.7*   CALCIUM   --    < >  8.8  8.6*   --   8.6*   ALBUMIN  1.7*   --    --    --   1.7*   --    PROT  5.5*   --    --    --   5.3*   --    BILITOT  1.2*   --    --    --   0.9   --    ALKPHOS  140*   --    --    --   123   --    ALT  7*   --    --    --   8*   --    AST  12   --    --    --   17   --    MG  1.6   --    --     --   1.6   --    PHOS  7.1*   --    --    --   7.2*   --     < > = values in this interval not displayed.       All pertinent labs within the past 24 hours have been reviewed.    Significant Imaging:  I have reviewed and interpreted all pertinent imaging results/findings within the past 24 hours.

## 2018-09-16 NOTE — ASSESSMENT & PLAN NOTE
--2/2 diastolic HF. No known lung disease  --Echo (9/13) PA systolic pressure estimated at 88 mm Hg  --reportedly on revatio at home.   --continue diuretics

## 2018-09-16 NOTE — PLAN OF CARE
Problem: Patient Care Overview  Goal: Plan of Care Review  Outcome: Ongoing (interventions implemented as appropriate)  POC reviewed with pt at 0500. Pt's sibling verbalized understanding. Questions and concerns addressed. Pt had multiple episodes of afib with hypotension.  Levo was titrated to maintain SBP above 100. Pt progressing toward goals. Will continue to monitor. See flowsheets for full assessment and VS info

## 2018-09-16 NOTE — SUBJECTIVE & OBJECTIVE
Interval History: Patient evaluated bedside acutely critically ill, still on mechanical ventilation.  Patient evaluated by Neurosurgery yesterday afternoon which referred no acute neurosurgical interventions at this time.  Stable vital signs.    Review of patient's allergies indicates:   Allergen Reactions    Codeine Nausea Only    Penicillins Swelling     Able to take amoxil and cephalosporins      Current Facility-Administered Medications   Medication Frequency    albuterol-ipratropium 2.5 mg-0.5 mg/3 mL nebulizer solution 3 mL Q4H    chlorhexidine 0.12 % solution 15 mL BID    dexmedetomidine (PRECEDEX) 400mcg/100mL 0.9% NaCL infusion Continuous    dextrose 50% injection 12.5 g PRN    dextrose 50% injection 25 g PRN    fentaNYL citrate (PF) Syrg 25 mcg Q2H PRN    fluconazole (DIFLUCAN) IVPB 200 mg 100 mL Q24H    furosemide injection 140 mg Q6H    glucagon (human recombinant) injection 1 mg PRN    glucose chewable tablet 16 g PRN    glucose chewable tablet 24 g PRN    heparin (porcine) injection 5,000 Units Q12H    hydrocortisone sodium succinate injection 100 mg Q8H    insulin aspart U-100 pen 0-5 Units Q4H PRN    insulin aspart U-100 pen 2 Units Q24H    insulin aspart U-100 pen 2 Units Q24H    insulin aspart U-100 pen 2 Units Q24H    insulin aspart U-100 pen 2 Units Q24H    insulin aspart U-100 pen 2 Units Q24H    insulin aspart U-100 pen 2 Units Q24H    levothyroxine tablet 150 mcg Before breakfast    metOLazone tablet 10 mg BID    norepinephrine 4 mg in dextrose 5% 250 mL infusion (premix) (titrating) Continuous    ondansetron injection 4 mg Q8H PRN    pantoprazole injection 40 mg Daily    piperacillin-tazobactam 4.5 g in sodium chloride 0.9% 100 mL IVPB (ready to mix system) Q12H       Objective:     Vital Signs (Most Recent):  Temp: 97 °F (36.1 °C) (09/16/18 1305)  Pulse: 81 (09/16/18 1305)  Resp: 14 (09/16/18 1305)  BP: 138/63 (09/16/18 1305)  SpO2: 100 % (09/16/18 1305)  O2  Device (Oxygen Therapy): ventilator (09/16/18 1305) Vital Signs (24h Range):  Temp:  [93.7 °F (34.3 °C)-97.3 °F (36.3 °C)] 97 °F (36.1 °C)  Pulse:  [63-82] 81  Resp:  [0-26] 14  SpO2:  [93 %-100 %] 100 %  BP: ()/(47-97) 138/63  Arterial Line BP: (103-141)/(37-48) 124/45     Weight: 102.7 kg (226 lb 6.6 oz) (09/14/18 0505)  Body mass index is 38.86 kg/m².  Body surface area is 2.15 meters squared.    I/O last 3 completed shifts:  In: 1235.7 [I.V.:525.7; NG/GT:310; IV Piggyback:400]  Out: 540 [Urine:540]    Physical Exam    Significant Labs:  CBC:   Recent Labs   Lab  09/16/18   0148   WBC  14.20*   RBC  3.07*   HGB  7.8*   HCT  23.0*   PLT  254   MCV  75*   MCH  25.4*   MCHC  33.9     BMP  Lab Results   Component Value Date     (L) 09/16/2018    K 4.4 09/16/2018    CL 88 (L) 09/16/2018    CO2 20 (L) 09/16/2018    BUN 77 (H) 09/16/2018    CREATININE 3.7 (H) 09/16/2018    CALCIUM 8.6 (L) 09/16/2018    ANIONGAP 19 (H) 09/16/2018    ESTGFRAFRICA 12.6 (A) 09/16/2018    EGFRNONAA 11.0 (A) 09/16/2018     All labs within the past 24 hours have been reviewed.     Significant Imaging:  CXR reviewed.

## 2018-09-16 NOTE — ASSESSMENT & PLAN NOTE
--most likely cardiorenal syndrome vs sepsis (ATN)  --retroperitoneal ultrasound on 9/9 consistent with medical renal disease.   --Nephrology following  --no need for emergent dialysis at this time  --trialysis in place.   --continue lasix and metolazone.  May need to consider dialysis for further volume removal.

## 2018-09-16 NOTE — PROGRESS NOTES
RN notified Critical Care that the patient's SBP is < 100 per the goal. The Levo ordered has /inactive. RN awaiting orders. RN will continue to monitor.

## 2018-09-16 NOTE — PROGRESS NOTES
Advanced patient ETT 2 cm per MD order. Pt was 23 at the lip and now the pt is 25 cm at the lip. Will continue to monitor.

## 2018-09-16 NOTE — PROGRESS NOTES
[facility]  Nephrology  Progress Note    Patient Name: Medina Frazier  MRN: [id number]  Admission Date: 9/8/2018  Hospital Length of Stay: 8 days  Attending Provider: Emmanuel Metz MD   Primary Care Physician: Hao Garcia MD  Principal Problem:Acute hypercapnic respiratory failure    Subjective:     HPI: 81yo WF who was to have a lumbar fusion later in the month and was admitted due to abnormal pre-op labs.  Nephrology consulted for JEY (sCr 2.2 on admit, 3.1 at time of consult, normal baseline), also with a sodium of 119 (133 on August 10th). Patient appears somnolent, but wakes up and appropriately answers questions, per family she is at her baseline.  Patient is not complaining of increasing shortness of breath, she in on 3L O2 when seen, this is what she is on at home, she has history of LENA and is supposed to be on CPAP at night, but not compliant.  CXR reviewed and looks like pulmonary edema/congestion on admit.    Interval History: Patient evaluated bedside acutely critically ill, still on mechanical ventilation.  Patient evaluated by Neurosurgery yesterday afternoon which referred no acute neurosurgical interventions at this time.  Stable vital signs.    Review of patient's allergies indicates:   Allergen Reactions    Codeine Nausea Only    Penicillins Swelling     Able to take amoxil and cephalosporins      Current Facility-Administered Medications   Medication Frequency    albuterol-ipratropium 2.5 mg-0.5 mg/3 mL nebulizer solution 3 mL Q4H    chlorhexidine 0.12 % solution 15 mL BID    dexmedetomidine (PRECEDEX) 400mcg/100mL 0.9% NaCL infusion Continuous    dextrose 50% injection 12.5 g PRN    dextrose 50% injection 25 g PRN    fentaNYL citrate (PF) Syrg 25 mcg Q2H PRN    fluconazole (DIFLUCAN) IVPB 200 mg 100 mL Q24H    furosemide injection 140 mg Q6H    glucagon (human recombinant) injection 1 mg PRN    glucose chewable tablet 16 g PRN    glucose chewable tablet 24  g PRN    heparin (porcine) injection 5,000 Units Q12H    hydrocortisone sodium succinate injection 100 mg Q8H    insulin aspart U-100 pen 0-5 Units Q4H PRN    insulin aspart U-100 pen 2 Units Q24H    insulin aspart U-100 pen 2 Units Q24H    insulin aspart U-100 pen 2 Units Q24H    insulin aspart U-100 pen 2 Units Q24H    insulin aspart U-100 pen 2 Units Q24H    insulin aspart U-100 pen 2 Units Q24H    levothyroxine tablet 150 mcg Before breakfast    metOLazone tablet 10 mg BID    norepinephrine 4 mg in dextrose 5% 250 mL infusion (premix) (titrating) Continuous    ondansetron injection 4 mg Q8H PRN    pantoprazole injection 40 mg Daily    piperacillin-tazobactam 4.5 g in sodium chloride 0.9% 100 mL IVPB (ready to mix system) Q12H       Objective:     Vital Signs (Most Recent):  Temp: 97 °F (36.1 °C) (09/16/18 1305)  Pulse: 81 (09/16/18 1305)  Resp: 14 (09/16/18 1305)  BP: 138/63 (09/16/18 1305)  SpO2: 100 % (09/16/18 1305)  O2 Device (Oxygen Therapy): ventilator (09/16/18 1305) Vital Signs (24h Range):  Temp:  [93.7 °F (34.3 °C)-97.3 °F (36.3 °C)] 97 °F (36.1 °C)  Pulse:  [63-82] 81  Resp:  [0-26] 14  SpO2:  [93 %-100 %] 100 %  BP: ()/(47-97) 138/63  Arterial Line BP: (103-141)/(37-48) 124/45     Weight: 102.7 kg (226 lb 6.6 oz) (09/14/18 0505)  Body mass index is 38.86 kg/m².  Body surface area is 2.15 meters squared.    I/O last 3 completed shifts:  In: 1235.7 [I.V.:525.7; NG/GT:310; IV Piggyback:400]  Out: 540 [Urine:540]    Physical Exam    Significant Labs:  CBC:   Recent Labs   Lab  09/16/18   0148   WBC  14.20*   RBC  3.07*   HGB  7.8*   HCT  23.0*   PLT  254   MCV  75*   MCH  25.4*   MCHC  33.9     BMP  Lab Results   Component Value Date     (L) 09/16/2018    K 4.4 09/16/2018    CL 88 (L) 09/16/2018    CO2 20 (L) 09/16/2018    BUN 77 (H) 09/16/2018    CREATININE 3.7 (H) 09/16/2018    CALCIUM 8.6 (L) 09/16/2018    ANIONGAP 19 (H) 09/16/2018    ESTGFRAFRICA 12.6 (A) 09/16/2018     EGFRNONAA 11.0 (A) 09/16/2018     All labs within the past 24 hours have been reviewed.     Significant Imaging:  CXR reviewed.    Assessment/Plan:     JEY (acute kidney injury)    Non-oliguric, likely multifactorial, the urine sodium of 10, could be consistent with CRS in this patient with what appears to be acute CHF exacerbation and volume overload, however would be careful reading too much in to this urinary sodium value, also consider ATN toxic secondary infection, ischemic ATN, also possibly vancomycin contributing to worsening sCr since admit further noted with WBCs in the urine of > 100, need to r/o infection which would be unlikely to cause an JEY (unless there is a bilateral pyelonephritis, which clinically does not fit, nor consistent with U/S findings), in short, if urine cultures negative need to consider AIN (outpatient abxs), absence of eosinophilia, rash or fever, may make a little less likely, but does not rule out.    Renal ultrasound unremarkable and negative for hydronephrosis.    Continues with excellent diuretic response    Plan/Reccomendations:  - Continue IV diuresis with loop diuretics (avoid thiazides - May increase furosemide dose to BiD if needed for more diuresis)  - Non-oliguric and responding to IVP diuretics  - Serum sodium levels slowly improving  - At this time poor candidate for CRRT in view of patient's deteriorative state  - Continue diuretic regimen in view of creatinine continues within 3s, no significant GFR decline  - No indication for RRT yet  - Please renal function panel q 12 hrs  - Strict I/O and chart.  - Avoid nephrotoxic medications  - Maintain MAP >65  - Hb > 7 gm/dL  - Medications to renal parameters            Thank you for your consult. I will follow-up with patient. Please contact us if you have any additional questions.    Juan Saeed MD  Nephrology  [facility]

## 2018-09-16 NOTE — ASSESSMENT & PLAN NOTE
--s/p lumbar fusion/hardware placement June 2018 with subsequent osteo and abscess.   --LSO brace ordered.  Per conversation with Neurosurgery, will need to wear brace while OOB  --see septic shock

## 2018-09-16 NOTE — ASSESSMENT & PLAN NOTE
--hold BB in setting of recent shock  --continue statin for now   --s/p VIVIANA to Lcx in June 2017 and on plavix prior to admission. Holding in setting of possible upcoming procedures.

## 2018-09-16 NOTE — PROGRESS NOTES
RN notified [facility] to fit the patient for a LSO brace. Patient to be fitted Monday (9.17.18).

## 2018-09-16 NOTE — ASSESSMENT & PLAN NOTE
--secondary to spinal abscess  --blood cultures with prevotella, repeat blood cultures ngtd  --gram stain from spinal abscess with many GNR, moderate gram neg diplocci.   --appreciate ID recommendations.  On vancomycin + pip/tazo+ fluconazole.   --titrate levophed to maintain SBP >100  --Abx as above. Also on vancomycin for empiric coverage  --positive UCx (9/11) for Candida; Treating with fluconazole for 5 days as cannot rule out as source of her sepsis. Urine catheter changed.   --neurosurgery following, will discuss if washout is now an option

## 2018-09-16 NOTE — SUBJECTIVE & OBJECTIVE
Interval History: NAEON. Neurologically stable. WV in place. Off levo this am. Wound cx grew GNR/GNP.    Medications:  Continuous Infusions:   dexmedetomidine (PRECEDEX) infusion Stopped (09/15/18 1022)     Scheduled Meds:   albuterol-ipratropium  3 mL Nebulization Q4H    chlorhexidine  15 mL Mouth/Throat BID    fluconazole (DIFLUCAN) IVPB  200 mg Intravenous Q24H    furosemide  140 mg Intravenous Once    heparin (porcine)  5,000 Units Subcutaneous Q12H    hydrocortisone sodium succinate  50 mg Intravenous Q8H    insulin aspart U-100  2 Units Subcutaneous Q24H    insulin aspart U-100  2 Units Subcutaneous Q24H    insulin aspart U-100  2 Units Subcutaneous Q24H    insulin aspart U-100  2 Units Subcutaneous Q24H    insulin aspart U-100  2 Units Subcutaneous Q24H    insulin aspart U-100  2 Units Subcutaneous Q24H    levothyroxine  150 mcg Per OG tube Before breakfast    pantoprazole  40 mg Intravenous Daily    piperacillin-tazobactam (ZOSYN) IVPB  4.5 g Intravenous Q12H     PRN Meds:dextrose 50%, dextrose 50%, fentaNYL citrate (PF), glucagon (human recombinant), glucose, glucose, insulin aspart U-100, ondansetron       Objective:     Weight: 102.7 kg (226 lb 6.6 oz)  Body mass index is 38.86 kg/m².  Vital Signs (Most Recent):  Temp: 96.8 °F (36 °C) (09/16/18 0808)  Pulse: 81 (09/16/18 0808)  Resp: (!) 0 (09/16/18 0808)  BP: 137/63 (09/16/18 0805)  SpO2: 95 % (09/16/18 0808) Vital Signs (24h Range):  Temp:  [93.7 °F (34.3 °C)-97.3 °F (36.3 °C)] 96.8 °F (36 °C)  Pulse:  [58-82] 81  Resp:  [0-26] 0  SpO2:  [93 %-98 %] 95 %  BP: ()/(47-97) 137/63  Arterial Line BP: (100-141)/(37-48) 127/47     Date 09/16/18 0700 - 09/17/18 0659   Shift 0553-6142 0829-8515 9799-5277 24 Hour Total   INTAKE   I.V.(mL/kg) 10(0.1)   10(0.1)   NG/GT 80   80   Shift Total(mL/kg) 90(0.9)   90(0.9)   OUTPUT   Urine(mL/kg/hr) 14   14   Shift Total(mL/kg) 14(0.1)   14(0.1)   Weight (kg) 102.7 102.7 102.7 102.7              Vent  "Mode: A/C  Oxygen Concentration (%):  [30] 30  Resp Rate Total:  [9.6 br/min-21 br/min] 20 br/min  Vt Set:  [0 mL] 0 mL  PEEP/CPAP:  [5 cmH20] 5 cmH20  Pressure Support:  [0 cmH20-5 cmH20] 0 cmH20  Mean Airway Pressure:  [6.7 rdF04-20 cmH20] 12 cmH20         NG/OG Tube 09/12/18 1325 orogastric Center mouth (Active)   Placement Check placement verified by x-ray 9/14/2018  7:05 AM   Distal Tube Length (cm) 58 9/14/2018  7:05 AM   Tolerance no signs/symptoms of discomfort 9/14/2018  7:05 AM   Securement taped to commercial device 9/14/2018  7:05 AM   Clamp Status/Tolerance clamped 9/14/2018  7:05 AM   Suction Setting/Drainage Method suction at;low;intermittent setting 9/14/2018  3:05 AM   Insertion Site Appearance no redness, warmth, tenderness, skin breakdown, drainage 9/14/2018  3:05 AM   Current Rate (mL/hr) 0 mL/hr 9/14/2018  7:05 AM   Intake (mL) 60 mL 9/13/2018  6:05 AM   Intake (mL) - Formula Tube Feeding 0 9/14/2018 10:05 AM            Urethral Catheter 09/13/18 1438 Temperature probe (Active)   Site Assessment Clean;Intact 9/14/2018  7:05 AM   Collection Container Urimeter 9/14/2018  7:05 AM   Securement Method secured to top of thigh w/ adhesive device 9/14/2018  7:05 AM   Catheter Care Performed yes 9/14/2018  7:05 AM   Reason for Continuing Urinary Catheterization Critically ill in ICU requiring intensive monitoring 9/14/2018  7:05 AM   CAUTI Prevention Bundle StatLock in place w 1" slack;Intact seal between catheter & drainage tubing;Drainage bag off the floor;Green sheeting clip in use;No dependent loops or kinks;Drainage bag not overfilled (<2/3 full);Drainage bag below bladder 9/14/2018  7:05 AM   Output (mL) 33 mL 9/14/2018 10:05 AM            Trialysis (Dialysis) Catheter 09/12/18 1326 right internal jugular (Active)   IV Device Securement sutures 9/14/2018  7:05 AM   Additional Site Signs no drainage;no streak formation;no palpable cord;no pain;no edema;no warmth;no erythema 9/14/2018  7:05 AM "   Patency/Care infusing 9/14/2018  7:05 AM   Site Assessment Clean;Dry;Intact;No redness;No swelling 9/14/2018  7:05 AM   Status Accessed 9/14/2018  7:05 AM   Dressing Intervention Dressing reinforced 9/14/2018  7:05 AM   Dressing Status Clean;Dry;Intact 9/14/2018  7:05 AM   Dressing Change Due 09/19/18 9/14/2018  7:05 AM   Verification by X-ray Yes 9/14/2018  7:05 AM   Site Condition No complications 9/14/2018  7:05 AM   Dressing Occlusive 9/14/2018  7:05 AM   Daily Line Review Performed 9/14/2018  7:05 AM       Neurosurgery Physical Exam  [id number]  PERRL  FC x4  CNII-XII grossly intact    Significant Labs:  Recent Labs   Lab  09/15/18   0128  09/15/18   0808  09/15/18   1556  09/15/18   2316  09/16/18   0148   GLU   --   219*  223*  221*   --    NA   --   124*  126*  126*   --    K   --   4.5  4.4  4.5   --    CL   --   87*  88*  88*   --    CO2   --   16*  18*  20*   --    BUN   --   70*  76*  73*   --    CREATININE   --   3.4*  3.6*  3.7*   --    CALCIUM   --   8.9  8.8  8.6*   --    MG  1.6   --    --    --   1.6     Recent Labs   Lab  09/15/18   0128  09/16/18   0148   WBC  24.16*  24.16*  14.20*   HGB  8.5*  8.5*  7.8*   HCT  27.0*  27.0*  23.0*   PLT  273  273  254     Recent Labs   Lab  09/15/18   0128  09/16/18   0148   INR  1.4*  1.2     Microbiology Results (last 7 days)     Procedure Component Value Units Date/Time    Blood culture [[id number]] Collected:  09/15/18 1805    Order Status:  Completed Specimen:  Blood from Peripheral, Upper Arm, Right Updated:  09/16/18 0332     Blood Culture, Routine No Growth to date    Narrative:       Blood cultures from 2 different sites. 4 bottles total.  Please draw before starting antibiotics.    Blood culture [[id number]] Collected:  09/15/18 1819    Order Status:  Completed Specimen:  Blood from Peripheral, Upper Arm, Left Updated:  09/16/18 0332     Blood Culture, Routine No Growth to date    Narrative:       Blood cultures x 2 different sites. 4 bottles total.  Please  draw cultures before administering antibiotics.    Blood culture [[id number]] Collected:  09/11/18 1011    Order Status:  Completed Specimen:  Blood Updated:  09/15/18 1212     Blood Culture, Routine No Growth to date     Blood Culture, Routine No Growth to date     Blood Culture, Routine No Growth to date     Blood Culture, Routine No Growth to date     Blood Culture, Routine No Growth to date    Narrative:       Please draw from 2 separate sites 30 minutes apart. Thank you    Blood culture [[id number]] Collected:  09/11/18 1003    Order Status:  Completed Specimen:  Blood Updated:  09/15/18 1212     Blood Culture, Routine No Growth to date     Blood Culture, Routine No Growth to date     Blood Culture, Routine No Growth to date     Blood Culture, Routine No Growth to date     Blood Culture, Routine No Growth to date    Culture, Anaerobe [[id number]] Collected:  09/13/18 0834    Order Status:  Completed Specimen:  Abscess from Back Updated:  09/14/18 0734     Anaerobic Culture Culture in progress    Narrative:       lumbar paraspinal fluid collection drainage    Aerobic culture [[id number]] Collected:  09/13/18 0834    Order Status:  Completed Specimen:  Abscess from Back Updated:  09/14/18 0722     Aerobic Bacterial Culture No growth    Narrative:       lumbar paraspinal fluid collection drainage    Gram stain [[id number]] Collected:  09/13/18 0834    Order Status:  Completed Specimen:  Abscess from Back Updated:  09/13/18 1946     Gram Stain Result Many WBC's      Many Gram negative rods      Few Gram positive rods    Blood culture [[id number]] Collected:  09/09/18 1731    Order Status:  Completed Specimen:  Blood Updated:  09/13/18 1418     Blood Culture, Routine Gram stain terrell bottle: Gram negative rods      Blood Culture, Routine Results called to and read back by:Sofia Noriega RN 09/11/2018  06:01     Blood Culture, Routine PREVOTELLA (B.) BIVIA    Narrative:       From 2 different sites 30 minutes apart     Blood culture [[id number]] Collected:  09/09/18 1555    Order Status:  Completed Specimen:  Blood Updated:  09/13/18 1417     Blood Culture, Routine Gram stain terrell bottle: Gram negative rods      Blood Culture, Routine Positive results previously called 09/11/2018  13:30     Blood Culture, Routine PREVOTELLA (B.) BIVIA    Gram stain [[id number]] Collected:  09/13/18 0834    Order Status:  Completed Specimen:  Abscess from Back Updated:  09/13/18 1153     Gram Stain Result Many WBC's      Many Gram negative rods      Moderate Gram negative diplococci    Fungus culture [[id number]] Collected:  09/13/18 0834    Order Status:  Sent Specimen:  Abscess from Back Updated:  09/13/18 1040    Gram stain [[id number]]     Order Status:  Completed Specimen:  Abscess from Back     Urine culture [[id number]] Collected:  09/10/18 1701    Order Status:  Completed Specimen:  Urine from Catheterized Updated:  09/12/18 1438     Urine Culture, Routine --     CANDIDA ALBICANS  > 100,000 cfu/ml  Treatment of asymptomatic candiduria is not recommended (except for   specific populations). Candida isolated in the urine typically   represents colonization. If an indwelling urinary catheter is present  it should be removed or replaced.      Narrative:       add on CXURN order #[id number] per Dr. Elina Sandhu @ 19:58    09/10/2018     Urine culture [[id number]]     Order Status:  Completed Specimen:  Urine, Catheterized     Urine culture [[id number]]     Order Status:  Canceled Specimen:  Urine         IR drain of psoas abscess placed yesterday, gram stain showing GNRs, GPCs, cultures, speciation, susceptibilities pending, urine culture from 9/10 growing candida, blood culture from 9/9 growing prevotella

## 2018-09-16 NOTE — ASSESSMENT & PLAN NOTE
--CHADS vasc 5 (HTN, DM, Age, and female); hold home rivaroxaban   --hold now given spinal abscess.   --intermittently in rate controlled Afib

## 2018-09-16 NOTE — ASSESSMENT & PLAN NOTE
--noted on cultures from 9/9; cleared on 9/11; Positive for Prevotella  --per ID recs: discontinued ceftriaxone and flagyl and added on zosyn. Patient has recorded penicillin allergy but patient has received penicillins in the past with no recorded reactions. We will watch closely for signs of allergic reactions.   --Not fully able to obtain source control as IR only able to partially drain one abscess.   --will discuss possibility of washout with Neurosurgery given improvement in hemodynamics

## 2018-09-16 NOTE — ASSESSMENT & PLAN NOTE
--Prior ECHO shows diastolic dysfunction (7/2018)  --Echo (9/13) EF 55-60%; concentric remodeling with RV enlargement; no WMA  --continue diuretics

## 2018-09-16 NOTE — PROGRESS NOTES
[facility]  Neurosurgery  Progress Note    Subjective:     History of Present Illness: Ms Medina Frazier is an 82yoF with PMHx DMII, HTN, CHF, CAD, CHF, CKD, HLD, GERD, hypothyroidism,  s/p L2-3 TLIF on 6/5/18 by Dr. Gutiérrez at [facility], complicated by infection & subsequent washout &  hardware removal, who is transferred from [facility] for neurosurgical evaluation of L2-3 osteomyelitis.  Following her initial surgery, she was found to have a lumbar incision wound infection that was washed out on 7/2/18.  Surgical cultures grew E. Cloacae & a PICC was placed.  She was discharged on IV Cipro & a wound vac.  She then had hardware removal on 8/7.  She has had progressive worsening of pain & infection since that time.   Most recent CT Lspine shows L2-3 osteomyelitis with paraspinal fluid collection L1-3.      Post-Op Info:  Procedure(s) (LRB):  FUSION, SPINE, LUMBAR, TLIF, WITH PERCUTANEOUS INSTRUMENTATION L1-5, depuy, neuromonitoring, 4 post bed (N/A)         Interval History: NAEON. Neurologically stable. WV in place. Off levo this am. Wound cx grew GNR/GNP.    Medications:  Continuous Infusions:   dexmedetomidine (PRECEDEX) infusion Stopped (09/15/18 1022)     Scheduled Meds:   albuterol-ipratropium  3 mL Nebulization Q4H    chlorhexidine  15 mL Mouth/Throat BID    fluconazole (DIFLUCAN) IVPB  200 mg Intravenous Q24H    furosemide  140 mg Intravenous Once    heparin (porcine)  5,000 Units Subcutaneous Q12H    hydrocortisone sodium succinate  50 mg Intravenous Q8H    insulin aspart U-100  2 Units Subcutaneous Q24H    insulin aspart U-100  2 Units Subcutaneous Q24H    insulin aspart U-100  2 Units Subcutaneous Q24H    insulin aspart U-100  2 Units Subcutaneous Q24H    insulin aspart U-100  2 Units Subcutaneous Q24H    insulin aspart U-100  2 Units Subcutaneous Q24H    levothyroxine  150 mcg Per OG tube Before breakfast    pantoprazole  40 mg Intravenous Daily    piperacillin-tazobactam  (ZOSYN) IVPB  4.5 g Intravenous Q12H     PRN Meds:dextrose 50%, dextrose 50%, fentaNYL citrate (PF), glucagon (human recombinant), glucose, glucose, insulin aspart U-100, ondansetron       Objective:     Weight: 102.7 kg (226 lb 6.6 oz)  Body mass index is 38.86 kg/m².  Vital Signs (Most Recent):  Temp: 96.8 °F (36 °C) (09/16/18 0808)  Pulse: 81 (09/16/18 0808)  Resp: (!) 0 (09/16/18 0808)  BP: 137/63 (09/16/18 0805)  SpO2: 95 % (09/16/18 0808) Vital Signs (24h Range):  Temp:  [93.7 °F (34.3 °C)-97.3 °F (36.3 °C)] 96.8 °F (36 °C)  Pulse:  [58-82] 81  Resp:  [0-26] 0  SpO2:  [93 %-98 %] 95 %  BP: ()/(47-97) 137/63  Arterial Line BP: (100-141)/(37-48) 127/47     Date 09/16/18 0700 - 09/17/18 0659   Shift 4764-0239 0775-4832 2707-1002 24 Hour Total   INTAKE   I.V.(mL/kg) 10(0.1)   10(0.1)   NG/GT 80   80   Shift Total(mL/kg) 90(0.9)   90(0.9)   OUTPUT   Urine(mL/kg/hr) 14   14   Shift Total(mL/kg) 14(0.1)   14(0.1)   Weight (kg) 102.7 102.7 102.7 102.7              Vent Mode: A/C  Oxygen Concentration (%):  [30] 30  Resp Rate Total:  [9.6 br/min-21 br/min] 20 br/min  Vt Set:  [0 mL] 0 mL  PEEP/CPAP:  [5 cmH20] 5 cmH20  Pressure Support:  [0 cmH20-5 cmH20] 0 cmH20  Mean Airway Pressure:  [6.7 qwX84-93 cmH20] 12 cmH20         NG/OG Tube 09/12/18 1325 orogastric Center mouth (Active)   Placement Check placement verified by x-ray 9/14/2018  7:05 AM   Distal Tube Length (cm) 58 9/14/2018  7:05 AM   Tolerance no signs/symptoms of discomfort 9/14/2018  7:05 AM   Securement taped to commercial device 9/14/2018  7:05 AM   Clamp Status/Tolerance clamped 9/14/2018  7:05 AM   Suction Setting/Drainage Method suction at;low;intermittent setting 9/14/2018  3:05 AM   Insertion Site Appearance no redness, warmth, tenderness, skin breakdown, drainage 9/14/2018  3:05 AM   Current Rate (mL/hr) 0 mL/hr 9/14/2018  7:05 AM   Intake (mL) 60 mL 9/13/2018  6:05 AM   Intake (mL) - Formula Tube Feeding 0 9/14/2018 10:05 AM             "Urethral Catheter 09/13/18 1438 Temperature probe (Active)   Site Assessment Clean;Intact 9/14/2018  7:05 AM   Collection Container Urimeter 9/14/2018  7:05 AM   Securement Method secured to top of thigh w/ adhesive device 9/14/2018  7:05 AM   Catheter Care Performed yes 9/14/2018  7:05 AM   Reason for Continuing Urinary Catheterization Critically ill in ICU requiring intensive monitoring 9/14/2018  7:05 AM   CAUTI Prevention Bundle StatLock in place w 1" slack;Intact seal between catheter & drainage tubing;Drainage bag off the floor;Green sheeting clip in use;No dependent loops or kinks;Drainage bag not overfilled (<2/3 full);Drainage bag below bladder 9/14/2018  7:05 AM   Output (mL) 33 mL 9/14/2018 10:05 AM            Trialysis (Dialysis) Catheter 09/12/18 1326 right internal jugular (Active)   IV Device Securement sutures 9/14/2018  7:05 AM   Additional Site Signs no drainage;no streak formation;no palpable cord;no pain;no edema;no warmth;no erythema 9/14/2018  7:05 AM   Patency/Care infusing 9/14/2018  7:05 AM   Site Assessment Clean;Dry;Intact;No redness;No swelling 9/14/2018  7:05 AM   Status Accessed 9/14/2018  7:05 AM   Dressing Intervention Dressing reinforced 9/14/2018  7:05 AM   Dressing Status Clean;Dry;Intact 9/14/2018  7:05 AM   Dressing Change Due 09/19/18 9/14/2018  7:05 AM   Verification by X-ray Yes 9/14/2018  7:05 AM   Site Condition No complications 9/14/2018  7:05 AM   Dressing Occlusive 9/14/2018  7:05 AM   Daily Line Review Performed 9/14/2018  7:05 AM       Neurosurgery Physical Exam  [id number]  PERRL  FC x4  CNII-XII grossly intact    Significant Labs:  Recent Labs   Lab  09/15/18   0128  09/15/18   0808  09/15/18   1556  09/15/18   2316  09/16/18   0148   GLU   --   219*  223*  221*   --    NA   --   124*  126*  126*   --    K   --   4.5  4.4  4.5   --    CL   --   87*  88*  88*   --    CO2   --   16*  18*  20*   --    BUN   --   70*  76*  73*   --    CREATININE   --   3.4*  3.6*  3.7*   --  "   CALCIUM   --   8.9  8.8  8.6*   --    MG  1.6   --    --    --   1.6     Recent Labs   Lab  09/15/18   0128  09/16/18   0148   WBC  24.16*  24.16*  14.20*   HGB  8.5*  8.5*  7.8*   HCT  27.0*  27.0*  23.0*   PLT  273  273  254     Recent Labs   Lab  09/15/18   0128  09/16/18   0148   INR  1.4*  1.2     Microbiology Results (last 7 days)     Procedure Component Value Units Date/Time    Blood culture [[id number]] Collected:  09/15/18 1805    Order Status:  Completed Specimen:  Blood from Peripheral, Upper Arm, Right Updated:  09/16/18 0332     Blood Culture, Routine No Growth to date    Narrative:       Blood cultures from 2 different sites. 4 bottles total.  Please draw before starting antibiotics.    Blood culture [[id number]] Collected:  09/15/18 1819    Order Status:  Completed Specimen:  Blood from Peripheral, Upper Arm, Left Updated:  09/16/18 0332     Blood Culture, Routine No Growth to date    Narrative:       Blood cultures x 2 different sites. 4 bottles total. Please  draw cultures before administering antibiotics.    Blood culture [[id number]] Collected:  09/11/18 1011    Order Status:  Completed Specimen:  Blood Updated:  09/15/18 1212     Blood Culture, Routine No Growth to date     Blood Culture, Routine No Growth to date     Blood Culture, Routine No Growth to date     Blood Culture, Routine No Growth to date     Blood Culture, Routine No Growth to date    Narrative:       Please draw from 2 separate sites 30 minutes apart. Thank you    Blood culture [[id number]] Collected:  09/11/18 1003    Order Status:  Completed Specimen:  Blood Updated:  09/15/18 1212     Blood Culture, Routine No Growth to date     Blood Culture, Routine No Growth to date     Blood Culture, Routine No Growth to date     Blood Culture, Routine No Growth to date     Blood Culture, Routine No Growth to date    Culture, Anaerobe [[id number]] Collected:  09/13/18 0834    Order Status:  Completed Specimen:  Abscess from Back Updated:   09/14/18 0734     Anaerobic Culture Culture in progress    Narrative:       lumbar paraspinal fluid collection drainage    Aerobic culture [[id number]] Collected:  09/13/18 0834    Order Status:  Completed Specimen:  Abscess from Back Updated:  09/14/18 0722     Aerobic Bacterial Culture No growth    Narrative:       lumbar paraspinal fluid collection drainage    Gram stain [[id number]] Collected:  09/13/18 0834    Order Status:  Completed Specimen:  Abscess from Back Updated:  09/13/18 1946     Gram Stain Result Many WBC's      Many Gram negative rods      Few Gram positive rods    Blood culture [[id number]] Collected:  09/09/18 1731    Order Status:  Completed Specimen:  Blood Updated:  09/13/18 1418     Blood Culture, Routine Gram stain terrell bottle: Gram negative rods      Blood Culture, Routine Results called to and read back by:Sofia Noriega RN 09/11/2018  06:01     Blood Culture, Routine PREVOTELLA (B.) BIVIA    Narrative:       From 2 different sites 30 minutes apart    Blood culture [[id number]] Collected:  09/09/18 1555    Order Status:  Completed Specimen:  Blood Updated:  09/13/18 1417     Blood Culture, Routine Gram stain terrell bottle: Gram negative rods      Blood Culture, Routine Positive results previously called 09/11/2018  13:30     Blood Culture, Routine PREVOTELLA (B.) BIVIA    Gram stain [[id number]] Collected:  09/13/18 0834    Order Status:  Completed Specimen:  Abscess from Back Updated:  09/13/18 1153     Gram Stain Result Many WBC's      Many Gram negative rods      Moderate Gram negative diplococci    Fungus culture [[id number]] Collected:  09/13/18 0834    Order Status:  Sent Specimen:  Abscess from Back Updated:  09/13/18 1040    Gram stain [[id number]]     Order Status:  Completed Specimen:  Abscess from Back     Urine culture [[id number]] Collected:  09/10/18 1701    Order Status:  Completed Specimen:  Urine from Catheterized Updated:  09/12/18 1438     Urine Culture, Routine --     CANDIDA  ALBICANS  > 100,000 cfu/ml  Treatment of asymptomatic candiduria is not recommended (except for   specific populations). Candida isolated in the urine typically   represents colonization. If an indwelling urinary catheter is present  it should be removed or replaced.      Narrative:       add on CXURN order #[id number] per Dr. Elina Sandhu @ 19:58    09/10/2018     Urine culture [[id number]]     Order Status:  Completed Specimen:  Urine, Catheterized     Urine culture [[id number]]     Order Status:  Canceled Specimen:  Urine         IR drain of psoas abscess placed yesterday, gram stain showing GNRs, GPCs, cultures, speciation, susceptibilities pending, urine culture from 9/10 growing candida, blood culture from 9/9 growing prevotella    Assessment/Plan:     Spinal epidural abscess    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     -No acute neurosurgical intervention at this time  -MRI with L2-3 osteodiscitis, large fluid collection in surgical bed, as well a prevertebral fluid collection L1,2 s/p IR drainage   -Plan for OR postponed secondary to dismal medical status and inability to tolerate surgery at this time  -Please wear LSO brace at all times  -Please hold Xarelto & Plavix   -Please medically optimize and document medical clearance when appropriate for surgery  -Abx per infectious disease recs   -Continue wound care and wound vac  -Medical management per primary team          Ary Franklin MD  Neurosurgery  [facility]

## 2018-09-16 NOTE — PLAN OF CARE
Problem: Patient Care Overview  Goal: Plan of Care Review  Outcome: Ongoing (interventions implemented as appropriate)  POC reviewed with patient and family at 1615. No evidence of learning from the patient. Questions and concerns addressed with the family. Day bath given. Patient turned per protocol. Oral care per protocol. No acute events today. RN will continue to monitor. See flowsheets for full assessment and VS info.

## 2018-09-16 NOTE — PROGRESS NOTES
RN notified Critical Care that the BG was 326. RN administered 2 units with sliding scale. Rn awaiting orders per CLARIBEL Lawler. RN will continue to monitor.

## 2018-09-16 NOTE — ASSESSMENT & PLAN NOTE
Non-oliguric, likely multifactorial, the urine sodium of 10, could be consistent with CRS in this patient with what appears to be acute CHF exacerbation and volume overload, however would be careful reading too much in to this urinary sodium value, also consider ATN toxic secondary infection, ischemic ATN, also possibly vancomycin contributing to worsening sCr since admit further noted with WBCs in the urine of > 100, need to r/o infection which would be unlikely to cause an JEY (unless there is a bilateral pyelonephritis, which clinically does not fit, nor consistent with U/S findings), in short, if urine cultures negative need to consider AIN (outpatient abxs), absence of eosinophilia, rash or fever, may make a little less likely, but does not rule out.    Renal ultrasound unremarkable and negative for hydronephrosis.    Continues with excellent diuretic response    Plan/Reccomendations:  - Continue IV diuresis with loop diuretics (avoid thiazides - May increase furosemide dose to BiD if needed for more diuresis)  - Non-oliguric and responding to IVP diuretics  - Serum sodium levels slowly improving  - At this time poor candidate for CRRT in view of patient's deteriorative state  - Continue diuretic regimen in view of creatinine continues within 3s, no significant GFR decline  - No indication for RRT yet  - Please renal function panel q 12 hrs  - Strict I/O and chart.  - Avoid nephrotoxic medications  - Maintain MAP >65  - Hb > 7 gm/dL  - Medications to renal parameters

## 2018-09-17 NOTE — PROGRESS NOTES
" [facility]  Adult Nutrition  Progress Note    SUMMARY       Recommendations    Recommendation/Intervention:   Continue Novasource Renal @ goal rate 40mL/hr.   - Provides 1920kcals, 87g protein and 688mL free water.   - Hold for residuals >500mL.     RD to monitor.    Goals: Pt to receive nutrition by RD follow up  Nutrition Goal Status: goal met  Communication of RD Recs: reviewed with RN    Reason for Assessment    Reason for Assessment: RD follow-up  Diagnosis: other (see comments)(aspiration, encephalopathy, worsening JEY)  Relevant Medical History: HEpEF, CAD, lumbar stenosis, HTN, T2DM  General Information Comments: Pt remains intubated. NFPE completed 9/13. No malnutrition noted. TF started, tolerating at goal rate with minimal residuals.  Nutrition Discharge Planning: unable to determine at this time    Nutrition Risk Screen    Nutrition Risk Screen: large or nonhealing wound, burn or pressure ulcer    Nutrition/Diet History    Do you have any cultural, spiritual, Restorationism conflicts, given your current situation?: none  Factors Affecting Nutritional Intake: NPO, on mechanical ventilation    Anthropometrics    Temp: (!) 95.5 °F (35.3 °C)  Height Method: Stated  Height: 5' 4" (162.6 cm)  Height (inches): 64 in  Weight Method: Bed Scale  Weight: 102.7 kg (226 lb 6.6 oz)  Weight (lb): 226.41 lb  Ideal Body Weight (IBW), Female: 120 lb  % Ideal Body Weight, Female (lb): 193.33 lb  BMI (Calculated): 39.9  BMI Grade: 35 - 39.9 - obesity - grade II       Lab/Procedures/Meds    Pertinent Labs Reviewed: reviewed  Pertinent Labs Comments: Na 128, BUN 86, Cr 4.0, Ph 5.9  Pertinent Medications Reviewed: reviewed  Pertinent Medications Comments: heparin, lasix, precedex, insulin    Physical Findings/Assessment    Overall Physical Appearance: nourished, obese, edematous, on ventilator support  Tubes: orogastric tube  Skin: incision(s), dermatitis, edema    Estimated/Assessed Needs    Weight Used For " Calorie Calculations: 127.7 kg (281 lb 8.4 oz)  Energy Calorie Requirements (kcal): 7608-3898  Energy Need Method: Kcal/kg(11-14kcal/kg)  Protein Requirements: 82-109g(1.5-2.0g/kg)  Weight Used For Protein Calculations: 54.5 kg (120 lb 2.4 oz)  Fluid Requirements (mL): 1mL/kcal or per MD     RDA Method (mL): 1405  CHO Requirement: 50% of total kcals      Nutrition Prescription Ordered    Current Diet Order: NPO  Nutrition Order Comments: TF at goal  Current Nutrition Support Formula Ordered: Novasource Renal  Current Nutrition Support Rate Ordered: 40 (ml)  Current Nutrition Support Frequency Ordered: mL/hr    Evaluation of Received Nutrient/Fluid Intake    Enteral Calories (kcal): 1920  Enteral Protein (gm): 87  Enteral (Free Water) Fluid (mL): 688  % Kcal Needs: 107  % Protein Needs: 100  I/O: -I/O, low UOP, LBM 9/16  Energy Calories Required: meeting needs  Protein Required: meeting needs  Fluid Required: other (see comments)(per MD)  Comments: 110mL residuals 9/17  Tolerance: tolerating  % Intake of Estimated Energy Needs: 75 - 100 %  % Meal Intake: NPO    Nutrition Risk    Level of Risk/Frequency of Follow-up: (f/u 1x/week)     Assessment and Plan    Nutrition Problem  Inadequate energy intake     Related to (etiology):   NPO     Signs and Symptoms (as evidenced by):   Pt receiving <85% EEN and EPN.      Nutrition Diagnosis Status:   Resolved       Monitor and Evaluation    Food and Nutrient Intake: enteral nutrition intake  Food and Nutrient Adminstration: enteral and parenteral nutrition administration  Anthropometric Measurements: weight, weight change, body mass index  Biochemical Data, Medical Tests and Procedures: electrolyte and renal panel, glucose/endocrine profile, gastrointestinal profile, inflammatory profile, lipid profile  Nutrition-Focused Physical Findings: overall appearance     Nutrition Follow-Up    RD Follow-up?: Yes

## 2018-09-17 NOTE — PROGRESS NOTES
[facility]  Nephrology  Progress Note    Patient Name: Medina Frazier  MRN: [id number]  Admission Date: 9/8/2018  Hospital Length of Stay: 9 days  Attending Provider: An Ward MD   Primary Care Physician: Hao Garcia MD  Principal Problem:Acute hypercapnic respiratory failure    Subjective:     HPI: 83yo WF who was to have a lumbar fusion later in the month and was admitted due to abnormal pre-op labs.  Nephrology consulted for JEY (sCr 2.2 on admit, 3.1 at time of consult, normal baseline), also with a sodium of 119 (133 on August 10th). Patient appears somnolent, but wakes up and appropriately answers questions, per family she is at her baseline.  Patient is not complaining of increasing shortness of breath, she in on 3L O2 when seen, this is what she is on at home, she has history of LENA and is supposed to be on CPAP at night, but not compliant.  CXR reviewed and looks like pulmonary edema/congestion on admit.    Interval History: urine output slowing down, despite high dose lasix    Review of patient's allergies indicates:   Allergen Reactions    Codeine Nausea Only    Penicillins Swelling     Able to take amoxil and cephalosporins      Current Facility-Administered Medications   Medication Frequency    albuterol-ipratropium 2.5 mg-0.5 mg/3 mL nebulizer solution 3 mL Q4H    chlorhexidine 0.12 % solution 15 mL BID    chlorothiazide (DIURIL) 500 mg in dextrose 5 % 50 mL IVPB Once    dexmedetomidine (PRECEDEX) 400mcg/100mL 0.9% NaCL infusion Continuous    dextrose 50% injection 12.5 g PRN    dextrose 50% injection 25 g PRN    fentaNYL citrate (PF) Syrg 25 mcg Q2H PRN    fluconazole (DIFLUCAN) IVPB 200 mg 100 mL Q24H    furosemide injection 140 mg Q6H    glucagon (human recombinant) injection 1 mg PRN    glucose chewable tablet 16 g PRN    glucose chewable tablet 24 g PRN    heparin (porcine) injection 5,000 Units Q12H    insulin aspart U-100 pen 0-5 Units Q4H PRN     insulin regular (Humulin R) 100 Units in sodium chloride 0.9% 100 mL infusion Continuous    levothyroxine tablet 150 mcg Before breakfast    norepinephrine 4 mg in dextrose 5% 250 mL infusion (premix) (titrating) Continuous    ondansetron injection 4 mg Q8H PRN    pantoprazole injection 40 mg Daily    piperacillin-tazobactam 4.5 g in sodium chloride 0.9% 100 mL IVPB (ready to mix system) Q12H       Objective:     Vital Signs (Most Recent):  Temp: 96.8 °F (36 °C) (09/17/18 1253)  Pulse: 80 (09/17/18 1253)  Resp: 17 (09/17/18 1253)  BP: (!) 98/53 (09/17/18 1205)  SpO2: 100 % (09/17/18 1253)  O2 Device (Oxygen Therapy): ventilator (09/17/18 1253) Vital Signs (24h Range):  Temp:  [94.8 °F (34.9 °C)-97.3 °F (36.3 °C)] 96.8 °F (36 °C)  Pulse:  [62-84] 80  Resp:  [16-34] 17  SpO2:  [90 %-100 %] 100 %  BP: ()/(51-64) 98/53  Arterial Line BP: ()/(40-59) 95/45     Weight: 102.7 kg (226 lb 6.6 oz) (09/14/18 0505)  Body mass index is 38.86 kg/m².  Body surface area is 2.15 meters squared.    I/O last 3 completed shifts:  In: 2238.6 [I.V.:518.6; NG/GT:1320; IV Piggyback:400]  Out: 353 [Urine:353]    Physical Exam   HENT:   Head: Atraumatic.   Neck: No JVD present.   Cardiovascular: Normal rate and regular rhythm.   Pulmonary/Chest: Effort normal. She has rales.   Abdominal: She exhibits distension.   Musculoskeletal: She exhibits edema. She exhibits no tenderness.   Neurological: She is alert.   Skin: Skin is warm.       Assessment/Plan:     JEY (acute kidney injury)    Non-oliguric, likely multifactorial, the urine sodium of 10, could be consistent with CRS in this patient with what appears to be acute CHF exacerbation and volume overload, however would be careful reading too much in to this urinary sodium value, also consider ATN toxic secondary infection, ischemic ATN, also possibly vancomycin contributing to worsening sCr since admit further noted with WBCs in the urine of > 100, need to r/o infection which  would be unlikely to cause an JEY (unless there is a bilateral pyelonephritis, which clinically does not fit, nor consistent with U/S findings), in short, if urine cultures negative need to consider AIN (outpatient abxs), absence of eosinophilia, rash or fever, may make a little less likely, but does not rule out.    Renal ultrasound unremarkable and negative for hydronephrosis.    Urine output significantly slowing down over the weekend, sCr increasing to around 4    Sodium coming up to 128    Plan/Recommendations:  -add diuril IV to current lasix in the hopes this will lead to better diuretic response  -continue serial RFPs and strict Is & Os  -address goals of care, if underlying spinal issue is not felt to be amenable to surgery, then RRT will not do anything definitive, in short, if RRT indicated based on clearance numbers and response to diuretics, then would think of as a bridge to surgery             Thank you for your consult. I will follow-up with patient. Please contact us if you have any additional questions.    Allen Barnes MD  Nephrology  [facility]

## 2018-09-17 NOTE — ASSESSMENT & PLAN NOTE
Non-oliguric, likely multifactorial, the urine sodium of 10, could be consistent with CRS in this patient with what appears to be acute CHF exacerbation and volume overload, however would be careful reading too much in to this urinary sodium value, also consider ATN toxic secondary infection, ischemic ATN, also possibly vancomycin contributing to worsening sCr since admit further noted with WBCs in the urine of > 100, need to r/o infection which would be unlikely to cause an JEY (unless there is a bilateral pyelonephritis, which clinically does not fit, nor consistent with U/S findings), in short, if urine cultures negative need to consider AIN (outpatient abxs), absence of eosinophilia, rash or fever, may make a little less likely, but does not rule out.    Renal ultrasound unremarkable and negative for hydronephrosis.    Urine output significantly slowing down over the weekend, sCr increasing to around 4    Sodium coming up to 128    Plan/Recommendations:  -add diuril IV to current lasix in the hopes this will lead to better diuretic response  -continue serial RFPs and strict Is & Os  -address goals of care, if underlying spinal issue is not felt to be amenable to surgery, then RRT will not do anything definitive, in short, if RRT indicated based on clearance numbers and response to diuretics, then would think of as a bridge to surgery

## 2018-09-17 NOTE — PROGRESS NOTES
RN notified critical care team that patient's SBP was ~90 and Levo was restarted per order. RN was told to hold 140mg of lasix per critical care team via telephone call. RN will continue to monitor.

## 2018-09-17 NOTE — PHYSICIAN QUERY
PT Name: Medina Frazier  MR #: [id number]    Physician Query Form - Relationship to Procedure Clarification     CDS/: Mary Lou RICKETTS,RN,CDI            Contact information:[phone]  This form is a permanent document in the medical record.     Query Date: September 17, 2018      By submitting this query, we are merely seeking further clarification of documentation. Please utilize your independent clinical judgment when addressing the question(s) below.    The Medical record contains the following:  Supporting Clinical Findings Location in Medical Record         Medina Frazier is an 82 year old female who        underwent L2-3 fusion on 6/5/18. She did well until        shortly after staples removed,   drainage from the        lower part of the incision was noted. On 7/2 she        went to the OR for an I&D. Purulence was        encountered. Surgical cultures grew Enterobacter        cloacae. She was seen by Dr. Lenz, ID        provider who started patient on IV Ciprofloxacin for        6 weeks. Patient's infection did not resolve and        wound continued to drain. She was taken back to        the OR on 8/7 for hardware removal, however        appears on most recent imaging an intervertebral        cage remains in place.          Spinal epidural abscess      Ms Medina Frazier is an 82 year old woman with      multiple comorbidities sp L2-3 TLIF on 6/5/18 at      [facility], with surgical wound infection with E. cloacea      sp washout & hardware removal, now with      worsening osteomyelitis & paraspinal abscess      despite weeks of treatment with IV Cipro.Now s/p      aspiration by IR.       No acute neurosurgical intervention at this time      MRI with L2-3 osteodiscitis, large fluid collection in        surgical bed, as well a prevertebral fluid collection        L1,2                                       09/15/18 Infectious Diseases Progress Notes                                                       09/15/18 Neurosurgery Progress Notes                            Please clarify if _Spinal Epidural Abscess ___________ is    [  ] Inherent/Integral to procedure  [  ] Routine outcome  [  ] Incidental finding  [  ] Complication of procedure  [  ] Clinically insignificant  [  ] Clinically undetermined    Surgery performed at OSH. Can't make informed comment.

## 2018-09-17 NOTE — PROGRESS NOTES
[facility]  Infectious Disease  Progress Note    Patient Name: Medina Frazier  MRN: [id number]  Admission Date: 9/8/2018  Length of Stay: 9 days  Attending Physician: An Ward MD  Primary Care Provider: Hao Garcia MD    Isolation Status: No active isolations  Assessment/Plan:      Spinal epidural abscess    82 year old female with history of L2-3 fusion 6/5 complicated by post operative infection with Enterobacter cloacae. She has been on outpatient IV Ciprofloxacin and has undergone an I&D on 7/2 with partial hardware removal on 8/7 without resolution of her infection. 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      She was broadened to Cefepime and Vancomycin. Neurosurgery is following and had planned to take patient to OR for washout & fusion, however patient developed acute respiratory failure requiring intubation and transfer to the ICU with CXR showing pulmonary edema.     Blood cultures 9/9 returned positive for Prevotella. Urine is showing Candida. She underwent IR drainage of paraspinal abscess on 9/13 with 5 mL purulent fluid removed. Gram stain is showing many GNRs, moderate gram negative diplococci and few GPRs. Abscess cultures are NGTD.  Antimicrobial coverage broadened to Vanc, Zosyn and Fluconazole. She remains intubated, sedated in the ICU. Afebrile. Leukocytosis downtrending. Repeat blood cultures are NGTD. Surgical plans have been postponed due to decline in medical status.     Plan  - Continue empiric Zosyn, Vanc and Fluconazole (renal adjusted)  - Will follow all outstanding cultures to guide antibiotic therapy.     - Continue management per primary team  - Ideally patient needs surgical washout for most optimal chance of cure of this infection. Neurosurgery is following.  - ID will follow.             Please call for any questions. Thank you.  Cora Boyer PA-C  Phone: [phone]  Pager: [phone]    Subjective:     Principal Problem:Acute hypercapnic respiratory failure    HPI: Medina Frazier is an 82 year old female who underwent L2-3 fusion on 6/5/18. She did well until shortly after staples removed,   drainage from the lower part of the incision was noted. On 7/2 she went to the OR for an I&D. Purulence was encountered. Surgical cultures grew Enterobacter cloacae. She was seen by Dr. Lenz, ID provider who started patient on IV Ciprofloxacin for 6 weeks. Patient's infection did not resolve and wound continued to drain. She was taken back to the OR on 8/7 for hardware removal, however appears on most recent imaging an intervertebral cage remains in place. No new surgical cultures obtained at that time. She was continued on Cipro. Patient's back pain has worsened over the past week and is now wrapping around to her anterior abdomen. She presented to an OS  ED in [place] for evaluation. 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 Labs notable for elevated WBC, ESR, CRP and pyuria. Procalc 19. JEY.  Blood and urine culture negative.  She was transferred here for higher level of care. Currently she is on Cipro from her previous cultures, and was started on Vanc on 9/8. Patient denies fevers, chills, sweats. Reports severe back pain and lower extremity weakness. Magaña in place.    Of note, patient has a documented PCN allergy. She said she has tolerated PCN in the recent past without adverse reactions. 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 No problems since.      Interval History: No acute events over weekend. Patient made a DNR. Remains intubated in the ICU. CXR without improvement. Awaiting further plans from neurosurgery. IR drainage  cultures are NGTD.    Review of Systems   Unable to perform ROS: Intubated     Objective:     Vital Signs (Most Recent):  Temp: 97.7 °F (36.5 °C) (09/17/18 1405)  Pulse: 86 (09/17/18 1405)  Resp: 16 (09/17/18 1405)  BP: (!) 109/59 (09/17/18 1405)  SpO2: 95 % (09/17/18 1405) Vital Signs (24h Range):  Temp:  [94.8 °F (34.9 °C)-97.7 °F (36.5 °C)] 97.7 °F (36.5 °C)  Pulse:  [62-86] 86  Resp:  [16-34] 16  SpO2:  [90 %-100 %] 95 %  BP: ()/(51-64) 109/59  Arterial Line BP: ()/(40-59) 112/44     Weight: 102.7 kg (226 lb 6.6 oz)  Body mass index is 38.86 kg/m².    Estimated Creatinine Clearance: 13.1 mL/min (A) (based on SCr of 4 mg/dL (H)).    Physical Exam   Constitutional: She appears well-developed and well-nourished. She appears ill.   Intubated, sedated   Neck: JVD present.   RIJ CVC in place   Cardiovascular: Normal rate. An irregularly irregular rhythm present. Exam reveals no friction rub.   No murmur heard.  Pulmonary/Chest: Effort normal. She has no wheezes. She has rales.   Abdominal: Soft. Bowel sounds are normal. She exhibits no distension.   Obese abdomen   Musculoskeletal: She exhibits edema (BLE, right hand).   +2 pitting edema   Skin: Skin is warm and dry. She is not diaphoretic. No erythema.   Lumbar wound not examined today.   Nursing note and vitals reviewed.      Significant Labs:   Blood Culture:   Recent Labs   Lab  09/09/18   1731  09/11/18   1003  09/11/18   1011  09/15/18   1805  09/15/18   1819   LABBLOO  Gram stain terrell bottle: Gram negative rods   Results called to and read back by:Sofia Noriega RN 09/11/2018  06:01  PREVOTELLA (B.) BIVIA  No growth after 5 days.  No growth after 5 days.  No Growth to date  No Growth to date  No Growth to date  No Growth to date     CBC:   Recent Labs   Lab  09/16/18   0148  09/17/18   0254  09/17/18   0407   WBC  14.20*  16.65*  16.11*   HGB  7.8*  7.6*  7.6*   HCT  23.0*  22.1*  21.9*   PLT  254  211  198     CMP:   Recent Labs   Lab  09/16/18    0148   09/16/18   1621  09/17/18   0104  09/17/18   0254  09/17/18   0824   NA   --    < >  127*  127*   --   128*   K   --    < >  4.1  3.7   --   3.9   CL   --    < >  88*  90*   --   90*   CO2   --    < >  22*  22*   --   24   GLU   --    < >  307*  223*   --   105   BUN   --    < >  79*  84*   --   86*   CREATININE   --    < >  4.0*  4.2*   --   4.0*   CALCIUM   --    < >  8.4*  8.4*   --   8.3*   PROT  5.3*   --    --    --   5.2*   --    ALBUMIN  1.7*   --    --    --   1.6*   --    BILITOT  0.9   --    --    --   0.6   --    ALKPHOS  123   --    --    --   114   --    AST  17   --    --    --   10   --    ALT  8*   --    --    --   8*   --    ANIONGAP   --    < >  17*  15   --   14   EGFRNONAA   --    < >  10.0*  9.4*   --   10.0*    < > = values in this interval not displayed.     Urine Culture:   Recent Labs   Lab  06/07/18   1128  09/07/18   2255  09/10/18   1701   LABURIN  No growth  No significant growth  BEN ALBICANS  > 100,000 cfu/ml  Treatment of asymptomatic candiduria is not recommended (except for   specific populations). Candida isolated in the urine typically   represents colonization. If an indwelling urinary catheter is present  it should be removed or replaced.       Urine Studies:   Recent Labs   Lab  07/02/18   1205   09/10/18   1701   COLORU  Yellow   < >  Yellow   APPEARANCEUA  Clear   < >  Cloudy*   PHUR  6.0   < >  5.0   SPECGRAV  1.015   < >  1.020   PROTEINUA  Negative   < >  Negative   GLUCUA  Negative   < >  Negative   KETONESU  Negative   < >  Negative   BILIRUBINUA  Negative   < >  Negative   OCCULTUA  Trace*   < >  3+*   NITRITE  Positive*   < >  Negative   UROBILINOGEN  0.2   < >  Negative   LEUKOCYTESUR  Negative   < >  3+*   RBCUA  1   < >  64*   WBCUA  2   < >  48*   BACTERIA  Few*   --   Rare   SQUAMEPITHEL   --    --   1   HYALINECASTS  1   --    --     < > = values in this interval not displayed.     Wound Culture:   Recent Labs   Lab  07/02/18   1432  09/13/18   0843    LABAERO  ENTEROBACTER CLOACAE COMPLEX  Rare  For susceptibility see order # [id number]    ENTEROBACTER CLOACAE COMPLEX  Moderate    No growth       Significant Imaging: I have reviewed all pertinent imaging results/findings within the past 24 hours.

## 2018-09-17 NOTE — ASSESSMENT & PLAN NOTE
--most likely cardiorenal syndrome vs sepsis (ATN)  --retroperitoneal ultrasound on 9/9 consistent with medical renal disease.   --Nephrology following  --no need for emergent dialysis at this time  --trialysis in place.   --on high dose lasix and metolazone, d/c metolazone and start diuril.  May need to consider dialysis for further volume removal and clearance.

## 2018-09-17 NOTE — PROGRESS NOTES
[facility]  Critical Care Medicine  Progress Note    Patient Name: Medina Frazier  MRN: [id number]  Admission Date: 9/8/2018  Hospital Length of Stay: 9 days  Code Status: DNR  Attending Provider: An Ward MD  Primary Care Provider: Hao Garcia MD   Principal Problem: Acute hypercapnic respiratory failure    Subjective:     HPI:  Mrs. Frazier is a 82 yr old female with history significant for diastolic HF, CAD s/p VIVIANA Lcx (June 2017), HTN, DM2, and lumbar stenosis s/p lumbar fusion in June 2018 with subsequent hardware infection and I&D on 7/2 and repeat lumbar I&D, hardware removal, decompression and wound vac placement to L2-L3 due to continued epidural abscess. She was discharged recently to a SNF for rehab and continued IV antibiotic therapy. She originally presented to [facility] on 9/8 from [facility] after labwork revealed JEY, hyponatremia. Additionally, CT lumbar spine obtained 9/5 was concerning for persistent osteomyelitis, epidural abscess in L2-L3 region.     She was transferred to [facility] on 9/8 for higher level of care and neurosurgery evaluation. Admission has been complicated by continued JEY with creatinine up trending from baseline 0.8 to 1.0 to 3.0 along with hyponatremia felt to be related to acute on chronic diastolic heart failure, volume overload. Nephrology was consulted and has been assisting with management. She was started on lasix 100 mg BID with minimal urine output response and creatinine slowly uptrending. Additionally, blood cultures obtained 9/9 are growing GNR's, awaiting speciation. Suspect this is enterobacter given continued osteomyelitis of L2-L3, complex fluid collection within laminectomy bed measuring 3x8x5 cm concerning for abscess, and additional prevertebral collection anterior to L1 and L2 vertebral bodies measuring 4x6x1 abutting the abdominal aorta concerning for abscess noted on MRI from 9/10. Neurosurgery were planning on repeat  I&D for source control but held off given the above medical issues with JEY, hyponatremia. She has been on vancomycin, cefepime since 9/9 and blood cultures from 9/11 are NGTD.     She was transferred to the MICU on the AM of 9/12 for acute hypercapnic respiratory failure with concern for aspiration event, worsening encephalopathy, and worsening JEY.    .          Hospital/ICU Course:  Upon transfer to MICU, Mrs. Frazier's respiratory and mental status worsened despite Bipap and lasix trial. She was intubated and she developed shock requiring low dose levophed, felt to be septic in setting of bacteremia. A trialysis line was placed for venous access, vasopressors, and possible need for CRRT. Arterial line placed for frequent ABGs. Cefepime and vancomycin continued as ID and neurosurgery following. Added on flagyl as micro lab thinks BCx 9/9 may be growing anaerobes. IR successfully drained epidural abscess (9/13). Continuing lasix trial as patient still appears volume overloaded. Norepinephrine requirements increasing on 9/14.  Discussed with neurosurgery, would need to be off vasopressors and hemodynamically stable prior to surgical intervention. Family meeting on 9/14, code status changed to DNR.  Weaned off vasopressors on 9/16.  Remains on ventilator due to significant pulmonary edema.  Aggressive diuresis started without significant response.     Interval History/Significant Events: started on insulin infusion on 9/16.  Briefly on pressors overnight.     Review of Systems  Objective:     Vital Signs (Most Recent):  Temp: (!) 95.7 °F (35.4 °C) (09/17/18 1114)  Pulse: 63 (09/17/18 1114)  Resp: 16 (09/17/18 1114)  BP: (!) 112/56 (09/17/18 0905)  SpO2: 100 % (09/17/18 1114) Vital Signs (24h Range):  Temp:  [94.8 °F (34.9 °C)-97.3 °F (36.3 °C)] 95.7 °F (35.4 °C)  Pulse:  [62-84] 63  Resp:  [11-34] 16  SpO2:  [90 %-100 %] 100 %  BP: (104-140)/(51-64) 112/56  Arterial Line BP: (104-136)/(40-56) 117/49   Weight: 102.7  kg (226 lb 6.6 oz)  Body mass index is 38.86 kg/m².      Intake/Output Summary (Last 24 hours) at 9/17/2018 1125  Last data filed at 9/17/2018 0805  Gross per 24 hour   Intake 1411.84 ml   Output 191 ml   Net 1220.84 ml       Physical Exam   Constitutional: She appears well-developed. She appears ill. No distress. She is intubated.   HENT:   Head: Normocephalic.   Eyes: Conjunctivae and EOM are normal. Pupils are equal, round, and reactive to light.   Neck: Full passive range of motion without pain. No tracheal deviation present.   Cardiovascular: Normal rate and normal heart sounds. An irregularly irregular rhythm present.   Warm extremities, peripheral edema   Pulmonary/Chest: No accessory muscle usage. No tachypnea. She is intubated. No respiratory distress. She has decreased breath sounds in the right lower field and the left lower field. She has no wheezes. She has no rhonchi. She has rales in the right lower field and the left lower field. Wheezing bilaterally.   Abdominal: Soft. Bowel sounds are normal. There is generalized tenderness.   Genitourinary:   Genitourinary Comments: Urine catheter with clear, yellow output. Oliguric.    Neurological: She is alert. No cranial nerve deficit or sensory deficit. GCS eye subscore is 4. GCS verbal subscore is 1. GCS motor subscore is 6.   Alert, PERRL, following request and moving all extremities equally and symmetrically.    Skin: Skin is warm and dry. She is not diaphoretic.        Nursing note and vitals reviewed.      Vents:  Vent Mode: A/C (09/17/18 1114)  Set Rate: 16 bmp (09/17/18 1114)  Vt Set: 0 mL (09/17/18 1114)  Pressure Support: 0 cmH20 (09/17/18 1114)  PEEP/CPAP: 5 cmH20 (09/17/18 1114)  Oxygen Concentration (%): 30 (09/17/18 1114)  Peak Airway Pressure: 25 cmH2O (09/17/18 1114)  Plateau Pressure: 21 cmH20 (09/17/18 1114)  Total Ve: 6.57 mL (09/17/18 1114)  Negative Inspiratory Force (cm H2O): -24 (09/15/18 1155)  F/VT Ratio<105 (RSBI): (!) 39.6 (09/17/18  1114)  Lines/Drains/Airways     Central Venous Catheter Line                 Trialysis (Dialysis) Catheter 09/12/18 1326 right internal jugular 4 days          Drain                 NG/OG Tube 09/12/18 1325 orogastric Center mouth 4 days         Urethral Catheter 09/13/18 1438 Temperature probe 3 days          Airway                 Airway - Non-Surgical 09/12/18 1012 Endotracheal Tube 5 days          Arterial Line                 Arterial Line 09/15/18 1500 Left Radial 1 day          Pressure Ulcer                 Negative Pressure Wound Therapy  40 days              Significant Labs:    CBC/Anemia Profile:  Recent Labs   Lab  09/16/18   0148  09/17/18   0254  09/17/18   0407   WBC  14.20*  16.65*  16.11*   HGB  7.8*  7.6*  7.6*   HCT  23.0*  22.1*  21.9*   PLT  254  211  198   MCV  75*  75*  74*   RDW  17.2*  17.4*  17.3*        Chemistries:  Recent Labs   Lab  09/16/18   0148   09/16/18   1621  09/17/18   0104  09/17/18   0254  09/17/18   0824   NA   --    < >  127*  127*   --   128*   K   --    < >  4.1  3.7   --   3.9   CL   --    < >  88*  90*   --   90*   CO2   --    < >  22*  22*   --   24   BUN   --    < >  79*  84*   --   86*   CREATININE   --    < >  4.0*  4.2*   --   4.0*   CALCIUM   --    < >  8.4*  8.4*   --   8.3*   ALBUMIN  1.7*   --    --    --   1.6*   --    PROT  5.3*   --    --    --   5.2*   --    BILITOT  0.9   --    --    --   0.6   --    ALKPHOS  123   --    --    --   114   --    ALT  8*   --    --    --   8*   --    AST  17   --    --    --   10   --    MG  1.6   --    --    --   1.7   --    PHOS  7.2*   --    --    --   5.9*   --     < > = values in this interval not displayed.       All pertinent labs within the past 24 hours have been reviewed.    Significant Imaging:  I have reviewed and interpreted all pertinent imaging results/findings within the past 24 hours.    Assessment/Plan:     Neuro   Encephalopathy, metabolic    --multifactorial: sepsis/uremia, medications  --non focal on  exam, following request  --continue precedex prn agitation        Lumbar stenosis    --s/p lumbar fusion/hardware placement June 2018 with subsequent osteo and abscess.   --LSO brace ordered.  Per conversation with Neurosurgery, will need to wear brace while OOB  --see septic shock        Derm   Alteration in skin integrity related to surgical incision    --s/p wound vac placement to lower back. Continue per neurosurgery        Pulmonary   * Acute hypercapnic respiratory failure    --2/2 pulmonary edema in setting of acute on chronic diastolic HF, volume overload and JEY  --chest xray with continued pulmonary edema.  --Compensated metabolic acidosis with high anion gap; lactate wnl, suspect uremia contributing to elevated AG.  --bedside ultrasound of right pleural effusion on 9/14 with small pocket not amendable to thoracentesis.  --daily SBT  --would ideally like further volume removal prior to extubation given high risk for re-intubation otherwise.             Pulmonary hypertension    --2/2 diastolic HF. No known lung disease  --Echo (9/13) PA systolic pressure estimated at 88 mm Hg  --reportedly on revatio at home.   --continue diuretics          Cardiac/Vascular   PAF (paroxysmal atrial fibrillation)    --CHADS vasc 5 (HTN, DM, Age, and female); hold home rivaroxaban   --hold now given spinal abscess.   --now in aflutter HR 70's        CHF (congestive heart failure), NYHA class IV    --Prior ECHO shows diastolic dysfunction (7/2018)  --Echo (9/13) EF 55-60%; concentric remodeling with RV enlargement; no WMA  --continue diuretics        Coronary artery disease involving native coronary artery of native heart without angina pectoris    --hold BB in setting of recent shock  --continue statin for now   --s/p VIVIANA to Lcx in June 2017 and on plavix prior to admission. Holding in setting of possible upcoming procedures.             Hypertension    --holding antihypertensives in the setting of recent shock and  borderline hypotension.         Renal/   Hyponatremia    --suspect hypervolemic hyponatremia from volume overload.    --sodium trending up with diuresis  --continue to trend          JEY (acute kidney injury)    --most likely cardiorenal syndrome vs sepsis (ATN)  --retroperitoneal ultrasound on 9/9 consistent with medical renal disease.   --Nephrology following  --no need for emergent dialysis at this time  --trialysis in place.   --on high dose lasix and metolazone, d/c metolazone and start diuril.  May need to consider dialysis for further volume removal and clearance.           ID   Osteomyelitis of Lumbar Spine    --see above        Septic shock    --secondary to spinal abscesses  --blood cultures with prevotella, repeat blood cultures ngtd  --gram stain from spinal abscess with many GNR, moderate gram neg diplocci.   --appreciate ID recommendations.  On vancomycin + pip/tazo+ fluconazole.   --currently off norepinephrine.   --Abx as above. Also on vancomycin for empiric coverage  --positive UCx (9/11) for Candida; Treating with fluconazole for 5 days as cannot rule out as source of her sepsis. Urine catheter changed.   --neurosurgery following, will discuss if washout is now an option          Gram-negative bacteremia    --noted on cultures from 9/9; cleared on 9/11; Positive for Prevotella  --per ID recs: discontinued ceftriaxone and flagyl and added on zosyn. Patient has recorded penicillin allergy but patient has received penicillins in the past with no recorded reactions. We will watch closely for signs of allergic reactions.   --Not fully able to obtain source control as IR only able to partially drain one abscess.   --continue discussions regarding possibility of washout with Neurosurgery given improvement in hemodynamics          Spinal epidural abscess    --noted on MRI along with osteo  --IR drained paraspinal abscess 9/13   --see septic shock for details.         Oncology   Anemia    --likely anemia  of chronic disease. Trend Hgb for now  --no signs of acute blood loss  --transfuse for Hgb <7        Endocrine   Hypothyroidism    --continue synthroid        Diabetes mellitus, type II    --likely steroid induced and secondary to infection  --steroids weaned off 9/16  --continue insulin infusion (started 9/16)  --A1c 7.6, on lantus 30 units every evening outpatient          GI   GERD (gastroesophageal reflux disease)    --continue PPI          DVT ppx: heparin SQ      Critical Care Time: 45 minutes  Critical secondary to Patient has a condition that poses threat to life and bodily function: Acute Hypoxemic/Hypercapnic Respiratory Failure requiring mechanical ventilation.       Critical care was time spent personally by me on the following activities: development of treatment plan with patient or surrogate and bedside caregivers, discussions with consultants, evaluation of patient's response to treatment, examination of patient, ordering and performing treatments and interventions, ordering and review of laboratory studies, ordering and review of radiographic studies, pulse oximetry, re-evaluation of patient's condition. This critical care time did not overlap with that of any other provider or involve time for any procedures.     Darshana Lawler NP  Critical Care Medicine  [facility]

## 2018-09-17 NOTE — ASSESSMENT & PLAN NOTE
--2/2 pulmonary edema in setting of acute on chronic diastolic HF, volume overload and JEY  --chest xray with continued pulmonary edema.  --Compensated metabolic acidosis with high anion gap; lactate wnl, suspect uremia contributing to elevated AG.  --bedside ultrasound of right pleural effusion on 9/14 with small pocket not amendable to thoracentesis.  --daily SBT  --would ideally like further volume removal prior to extubation given high risk for re-intubation otherwise.

## 2018-09-17 NOTE — CONSULTS
Single lumen 20G x 10Cm midline placed right cephalic vein. Max dwell date 10/16/18, Lot#[id number].  Needle advanced into the vessel under real time ultrasound guidance.  Image recorded and saved.

## 2018-09-17 NOTE — PLAN OF CARE
Problem: Patient Care Overview  Goal: Plan of Care Review  Outcome: Ongoing (interventions implemented as appropriate)  POC reviewed with patient at 0500. Patient nodded to indicate understanding. Questions and concerns addressed. Levo restarted @ 0.02 for SBP <100 per order. Patient turned per protocol. Oral care provided per protocol. RN Will continue to monitor. See flowsheets for full assessment and VS info

## 2018-09-17 NOTE — SUBJECTIVE & OBJECTIVE
Interval History: urine output slowing down, despite high dose lasix    Review of patient's allergies indicates:   Allergen Reactions    Codeine Nausea Only    Penicillins Swelling     Able to take amoxil and cephalosporins      Current Facility-Administered Medications   Medication Frequency    albuterol-ipratropium 2.5 mg-0.5 mg/3 mL nebulizer solution 3 mL Q4H    chlorhexidine 0.12 % solution 15 mL BID    chlorothiazide (DIURIL) 500 mg in dextrose 5 % 50 mL IVPB Once    dexmedetomidine (PRECEDEX) 400mcg/100mL 0.9% NaCL infusion Continuous    dextrose 50% injection 12.5 g PRN    dextrose 50% injection 25 g PRN    fentaNYL citrate (PF) Syrg 25 mcg Q2H PRN    fluconazole (DIFLUCAN) IVPB 200 mg 100 mL Q24H    furosemide injection 140 mg Q6H    glucagon (human recombinant) injection 1 mg PRN    glucose chewable tablet 16 g PRN    glucose chewable tablet 24 g PRN    heparin (porcine) injection 5,000 Units Q12H    insulin aspart U-100 pen 0-5 Units Q4H PRN    insulin regular (Humulin R) 100 Units in sodium chloride 0.9% 100 mL infusion Continuous    levothyroxine tablet 150 mcg Before breakfast    norepinephrine 4 mg in dextrose 5% 250 mL infusion (premix) (titrating) Continuous    ondansetron injection 4 mg Q8H PRN    pantoprazole injection 40 mg Daily    piperacillin-tazobactam 4.5 g in sodium chloride 0.9% 100 mL IVPB (ready to mix system) Q12H       Objective:     Vital Signs (Most Recent):  Temp: 96.8 °F (36 °C) (09/17/18 1253)  Pulse: 80 (09/17/18 1253)  Resp: 17 (09/17/18 1253)  BP: (!) 98/53 (09/17/18 1205)  SpO2: 100 % (09/17/18 1253)  O2 Device (Oxygen Therapy): ventilator (09/17/18 1253) Vital Signs (24h Range):  Temp:  [94.8 °F (34.9 °C)-97.3 °F (36.3 °C)] 96.8 °F (36 °C)  Pulse:  [62-84] 80  Resp:  [16-34] 17  SpO2:  [90 %-100 %] 100 %  BP: ()/(51-64) 98/53  Arterial Line BP: ()/(40-59) 95/45     Weight: 102.7 kg (226 lb 6.6 oz) (09/14/18 0505)  Body mass index is 38.86  kg/m².  Body surface area is 2.15 meters squared.    I/O last 3 completed shifts:  In: 2238.6 [I.V.:518.6; NG/GT:1320; IV Piggyback:400]  Out: 353 [Urine:353]    Physical Exam   HENT:   Head: Atraumatic.   Neck: No JVD present.   Cardiovascular: Normal rate and regular rhythm.   Pulmonary/Chest: Effort normal. She has rales.   Abdominal: She exhibits distension.   Musculoskeletal: She exhibits edema. She exhibits no tenderness.   Neurological: She is alert.   Skin: Skin is warm.

## 2018-09-17 NOTE — ASSESSMENT & PLAN NOTE
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     -No acute neurosurgical intervention at this time  -MRI with L2-3 osteodiscitis, large fluid collection in surgical bed, as well a prevertebral fluid collection L1,2 s/p IR drainage on 9/13   -Plan for OR postponed secondary to dismal medical status and inability to tolerate surgery at this time  -Please wear LSO brace at all times  -Please hold Xarelto & Plavix   -Please medically optimize and document medical clearance when appropriate for surgery  -Abx per infectious disease recs   -Continue wound care and wound vac  -Medical management per primary team

## 2018-09-17 NOTE — PLAN OF CARE
Problem: Patient Care Overview  Goal: Plan of Care Review  Outcome: Ongoing (interventions implemented as appropriate)  POC reviewed with pt and family at 1400. Pt family verbalized understanding. Questions and concerns addressed. Pt is going to be put on CRRT tonight. Pt was also started on amiodarone drip due to atrial flutter. Pt progressing toward goals. Will continue to monitor. See flowsheets for full assessment and VS info.

## 2018-09-17 NOTE — SUBJECTIVE & OBJECTIVE
Interval History: No acute events over weekend. Patient made a DNR. Remains intubated in the ICU. CXR without improvement. Awaiting further plans from neurosurgery. IR drainage cultures are NGTD.    Review of Systems   Unable to perform ROS: Intubated     Objective:     Vital Signs (Most Recent):  Temp: 97.7 °F (36.5 °C) (09/17/18 1405)  Pulse: 86 (09/17/18 1405)  Resp: 16 (09/17/18 1405)  BP: (!) 109/59 (09/17/18 1405)  SpO2: 95 % (09/17/18 1405) Vital Signs (24h Range):  Temp:  [94.8 °F (34.9 °C)-97.7 °F (36.5 °C)] 97.7 °F (36.5 °C)  Pulse:  [62-86] 86  Resp:  [16-34] 16  SpO2:  [90 %-100 %] 95 %  BP: ()/(51-64) 109/59  Arterial Line BP: ()/(40-59) 112/44     Weight: 102.7 kg (226 lb 6.6 oz)  Body mass index is 38.86 kg/m².    Estimated Creatinine Clearance: 13.1 mL/min (A) (based on SCr of 4 mg/dL (H)).    Physical Exam   Constitutional: She appears well-developed and well-nourished. She appears ill.   Intubated, sedated   Neck: JVD present.   RIJ CVC in place   Cardiovascular: Normal rate. An irregularly irregular rhythm present. Exam reveals no friction rub.   No murmur heard.  Pulmonary/Chest: Effort normal. She has no wheezes. She has rales.   Abdominal: Soft. Bowel sounds are normal. She exhibits no distension.   Obese abdomen   Musculoskeletal: She exhibits edema (BLE, right hand).   +2 pitting edema   Skin: Skin is warm and dry. She is not diaphoretic. No erythema.   Lumbar wound not examined today.   Nursing note and vitals reviewed.      Significant Labs:   Blood Culture:   Recent Labs   Lab  09/09/18   1731  09/11/18   1003  09/11/18   1011  09/15/18   1805  09/15/18   1819   LABBLOO  Gram stain terrell bottle: Gram negative rods   Results called to and read back by:Sofia Noriega RN 09/11/2018  06:01  PREVOTELLA (B.) BIVIA  No growth after 5 days.  No growth after 5 days.  No Growth to date  No Growth to date  No Growth to date  No Growth to date     CBC:   Recent Labs   Lab  09/16/18   0148   09/17/18   0254  09/17/18   0407   WBC  14.20*  16.65*  16.11*   HGB  7.8*  7.6*  7.6*   HCT  23.0*  22.1*  21.9*   PLT  254  211  198     CMP:   Recent Labs   Lab  09/16/18   0148   09/16/18   1621  09/17/18   0104  09/17/18   0254  09/17/18   0824   NA   --    < >  127*  127*   --   128*   K   --    < >  4.1  3.7   --   3.9   CL   --    < >  88*  90*   --   90*   CO2   --    < >  22*  22*   --   24   GLU   --    < >  307*  223*   --   105   BUN   --    < >  79*  84*   --   86*   CREATININE   --    < >  4.0*  4.2*   --   4.0*   CALCIUM   --    < >  8.4*  8.4*   --   8.3*   PROT  5.3*   --    --    --   5.2*   --    ALBUMIN  1.7*   --    --    --   1.6*   --    BILITOT  0.9   --    --    --   0.6   --    ALKPHOS  123   --    --    --   114   --    AST  17   --    --    --   10   --    ALT  8*   --    --    --   8*   --    ANIONGAP   --    < >  17*  15   --   14   EGFRNONAA   --    < >  10.0*  9.4*   --   10.0*    < > = values in this interval not displayed.     Urine Culture:   Recent Labs   Lab  06/07/18   1128  09/07/18   2255  09/10/18   1701   LABURIN  No growth  No significant growth  BEN ALBICANS  > 100,000 cfu/ml  Treatment of asymptomatic candiduria is not recommended (except for   specific populations). Candida isolated in the urine typically   represents colonization. If an indwelling urinary catheter is present  it should be removed or replaced.       Urine Studies:   Recent Labs   Lab  07/02/18   1205   09/10/18   1701   COLORU  Yellow   < >  Yellow   APPEARANCEUA  Clear   < >  Cloudy*   PHUR  6.0   < >  5.0   SPECGRAV  1.015   < >  1.020   PROTEINUA  Negative   < >  Negative   GLUCUA  Negative   < >  Negative   KETONESU  Negative   < >  Negative   BILIRUBINUA  Negative   < >  Negative   OCCULTUA  Trace*   < >  3+*   NITRITE  Positive*   < >  Negative   UROBILINOGEN  0.2   < >  Negative   LEUKOCYTESUR  Negative   < >  3+*   RBCUA  1   < >  64*   WBCUA  2   < >  48*   BACTERIA  Few*   --   Rare    SQUAMEPITHEL   --    --   1   HYALINECASTS  1   --    --     < > = values in this interval not displayed.     Wound Culture:   Recent Labs   Lab  07/02/18   1432  09/13/18   0834   LABAERO  ENTEROBACTER CLOACAE COMPLEX  Rare  For susceptibility see order # [id number]    ENTEROBACTER CLOACAE COMPLEX  Moderate    No growth       Significant Imaging: I have reviewed all pertinent imaging results/findings within the past 24 hours.

## 2018-09-17 NOTE — PROGRESS NOTES
[facility]  Neurosurgery  Progress Note    Subjective:     History of Present Illness: Ms Medina Frzaier is an 82yoF with PMHx DMII, HTN, CHF, CAD, CHF, CKD, HLD, GERD, hypothyroidism,  s/p L2-3 TLIF on 6/5/18 by Dr. Gutiérrez at [facility], complicated by infection & subsequent washout &  hardware removal, who is transferred from [facility] for neurosurgical evaluation of L2-3 osteomyelitis.  Following her initial surgery, she was found to have a lumbar incision wound infection that was washed out on 7/2/18.  Surgical cultures grew E. Cloacae & a PICC was placed.  She was discharged on IV Cipro & a wound vac.  She then had hardware removal on 8/7.  She has had progressive worsening of pain & infection since that time.   Most recent CT Lspine shows L2-3 osteomyelitis with paraspinal fluid collection L1-3.      Post-Op Info:  Procedure(s) (LRB):  FUSION, SPINE, LUMBAR, TLIF, WITH PERCUTANEOUS INSTRUMENTATION L1-5, depuy, neuromonitoring, 4 post bed (N/A)         Interval History: NAEON    Medications:  Continuous Infusions:   dexmedetomidine (PRECEDEX) infusion Stopped (09/15/18 1022)    insulin (HUMAN R) infusion (adults) Stopped (09/17/18 0805)    norepinephrine bitartrate-D5W Stopped (09/17/18 0605)     Scheduled Meds:   albuterol-ipratropium  3 mL Nebulization Q4H    chlorhexidine  15 mL Mouth/Throat BID    fluconazole (DIFLUCAN) IVPB  200 mg Intravenous Q24H    furosemide  140 mg Intravenous Q6H    heparin (porcine)  5,000 Units Subcutaneous Q12H    levothyroxine  150 mcg Per OG tube Before breakfast    metOLazone  10 mg Per NG tube BID    pantoprazole  40 mg Intravenous Daily    piperacillin-tazobactam (ZOSYN) IVPB  4.5 g Intravenous Q12H     PRN Meds:dextrose 50%, dextrose 50%, fentaNYL citrate (PF), glucagon (human recombinant), glucose, glucose, insulin aspart U-100, ondansetron       Objective:     Weight: 102.7 kg (226 lb 6.6 oz)  Body mass index is 38.86 kg/m².  Vital Signs (Most  Recent):  Temp: (!) 95.5 °F (35.3 °C) (09/17/18 0854)  Pulse: 84 (09/17/18 0854)  Resp: (!) 22 (09/17/18 0854)  BP: (!) 130/59 (09/17/18 0737)  SpO2: 98 % (09/17/18 0854) Vital Signs (24h Range):  Temp:  [95 °F (35 °C)-97.3 °F (36.3 °C)] 95.5 °F (35.3 °C)  Pulse:  [62-84] 84  Resp:  [11-34] 22  SpO2:  [90 %-100 %] 98 %  BP: (104-140)/(51-64) 130/59  Arterial Line BP: (104-136)/(40-56) 117/49     Date 09/17/18 0700 - 09/18/18 0659   Shift 3823-7908 5306-5348 2701-7152 24 Hour Total   INTAKE   I.V.(mL/kg) 28.5(0.3)   28.5(0.3)   NG/GT 40   40   Shift Total(mL/kg) 68.5(0.7)   68.5(0.7)   OUTPUT   Shift Total(mL/kg)       Weight (kg) 102.7 102.7 102.7 102.7              Vent Mode: A/C  Oxygen Concentration (%):  [30] 30  Resp Rate Total:  [16 br/min-22 br/min] 22 br/min  Vt Set:  [0 mL] 0 mL  PEEP/CPAP:  [5 cmH20] 5 cmH20  Pressure Support:  [0 cmH20] 0 cmH20  Mean Airway Pressure:  [11 spY34-43 cmH20] 13 cmH20         NG/OG Tube 09/12/18 1325 orogastric Center mouth (Active)   Placement Check placement verified by x-ray 9/14/2018  7:05 AM   Distal Tube Length (cm) 58 9/14/2018  7:05 AM   Tolerance no signs/symptoms of discomfort 9/14/2018  7:05 AM   Securement taped to commercial device 9/14/2018  7:05 AM   Clamp Status/Tolerance clamped 9/14/2018  7:05 AM   Suction Setting/Drainage Method suction at;low;intermittent setting 9/14/2018  3:05 AM   Insertion Site Appearance no redness, warmth, tenderness, skin breakdown, drainage 9/14/2018  3:05 AM   Current Rate (mL/hr) 0 mL/hr 9/14/2018  7:05 AM   Intake (mL) 60 mL 9/13/2018  6:05 AM   Intake (mL) - Formula Tube Feeding 0 9/14/2018 10:05 AM            Urethral Catheter 09/13/18 1438 Temperature probe (Active)   Site Assessment Clean;Intact 9/14/2018  7:05 AM   Collection Container Urimeter 9/14/2018  7:05 AM   Securement Method secured to top of thigh w/ adhesive device 9/14/2018  7:05 AM   Catheter Care Performed yes 9/14/2018  7:05 AM   Reason for Continuing Urinary  "Catheterization Critically ill in ICU requiring intensive monitoring 9/14/2018  7:05 AM   CAUTI Prevention Bundle StatLock in place w 1" slack;Intact seal between catheter & drainage tubing;Drainage bag off the floor;Green sheeting clip in use;No dependent loops or kinks;Drainage bag not overfilled (<2/3 full);Drainage bag below bladder 9/14/2018  7:05 AM   Output (mL) 33 mL 9/14/2018 10:05 AM            Trialysis (Dialysis) Catheter 09/12/18 1326 right internal jugular (Active)   IV Device Securement sutures 9/14/2018  7:05 AM   Additional Site Signs no drainage;no streak formation;no palpable cord;no pain;no edema;no warmth;no erythema 9/14/2018  7:05 AM   Patency/Care infusing 9/14/2018  7:05 AM   Site Assessment Clean;Dry;Intact;No redness;No swelling 9/14/2018  7:05 AM   Status Accessed 9/14/2018  7:05 AM   Dressing Intervention Dressing reinforced 9/14/2018  7:05 AM   Dressing Status Clean;Dry;Intact 9/14/2018  7:05 AM   Dressing Change Due 09/19/18 9/14/2018  7:05 AM   Verification by X-ray Yes 9/14/2018  7:05 AM   Site Condition No complications 9/14/2018  7:05 AM   Dressing Occlusive 9/14/2018  7:05 AM   Daily Line Review Performed 9/14/2018  7:05 AM       Neurosurgery Physical Exam  E3VTM6  PERRL  FC x4  CNII-XII grossly intact    Significant Labs:  Recent Labs   Lab  09/16/18   0148   09/16/18   1621  09/17/18   0104  09/17/18   0254  09/17/18   0824   GLU   --    < >  307*  223*   --   105   NA   --    < >  127*  127*   --   128*   K   --    < >  4.1  3.7   --   3.9   CL   --    < >  88*  90*   --   90*   CO2   --    < >  22*  22*   --   24   BUN   --    < >  79*  84*   --   86*   CREATININE   --    < >  4.0*  4.2*   --   4.0*   CALCIUM   --    < >  8.4*  8.4*   --   8.3*   MG  1.6   --    --    --   1.7   --     < > = values in this interval not displayed.     Recent Labs   Lab  09/16/18   0148  09/17/18   0254  09/17/18   0407   WBC  14.20*  16.65*  16.11*   HGB  7.8*  7.6*  7.6*   HCT  23.0*  22.1*  " 21.9*   PLT  254  211  198     Recent Labs   Lab  09/16/18   0148  09/17/18   0254   INR  1.2  1.2     Microbiology Results (last 7 days)     Procedure Component Value Units Date/Time    Aerobic culture [[id number]] Collected:  09/13/18 0834    Order Status:  Completed Specimen:  Abscess from Back Updated:  09/17/18 0848     Aerobic Bacterial Culture No growth    Narrative:       lumbar paraspinal fluid collection drainage    Blood culture [[id number]] Collected:  09/15/18 1805    Order Status:  Completed Specimen:  Blood from Peripheral, Upper Arm, Right Updated:  09/16/18 2012     Blood Culture, Routine No Growth to date     Blood Culture, Routine No Growth to date    Narrative:       Blood cultures from 2 different sites. 4 bottles total.  Please draw before starting antibiotics.    Blood culture [[id number]] Collected:  09/15/18 1819    Order Status:  Completed Specimen:  Blood from Peripheral, Upper Arm, Left Updated:  09/16/18 2012     Blood Culture, Routine No Growth to date     Blood Culture, Routine No Growth to date    Narrative:       Blood cultures x 2 different sites. 4 bottles total. Please  draw cultures before administering antibiotics.    Blood culture [[id number]] Collected:  09/11/18 1011    Order Status:  Completed Specimen:  Blood Updated:  09/16/18 1212     Blood Culture, Routine No growth after 5 days.    Narrative:       Please draw from 2 separate sites 30 minutes apart. Thank you    Blood culture [[id number]] Collected:  09/11/18 1003    Order Status:  Completed Specimen:  Blood Updated:  09/16/18 1212     Blood Culture, Routine No growth after 5 days.    Culture, Anaerobe [[id number]] Collected:  09/13/18 0834    Order Status:  Completed Specimen:  Abscess from Back Updated:  09/14/18 0734     Anaerobic Culture Culture in progress    Narrative:       lumbar paraspinal fluid collection drainage    Gram stain [[id number]] Collected:  09/13/18 0834    Order Status:  Completed Specimen:  Abscess from  Back Updated:  09/13/18 1946     Gram Stain Result Many WBC's      Many Gram negative rods      Few Gram positive rods    Blood culture [[id number]] Collected:  09/09/18 1731    Order Status:  Completed Specimen:  Blood Updated:  09/13/18 1418     Blood Culture, Routine Gram stain terrell bottle: Gram negative rods      Blood Culture, Routine Results called to and read back by:Sofia Noriega RN 09/11/2018  06:01     Blood Culture, Routine PREVOTELLA (B.) BIVIA    Narrative:       From 2 different sites 30 minutes apart    Blood culture [[id number]] Collected:  09/09/18 1555    Order Status:  Completed Specimen:  Blood Updated:  09/13/18 1417     Blood Culture, Routine Gram stain terrell bottle: Gram negative rods      Blood Culture, Routine Positive results previously called 09/11/2018  13:30     Blood Culture, Routine PREVOTELLA (B.) BIVIA    Gram stain [[id number]] Collected:  09/13/18 0834    Order Status:  Completed Specimen:  Abscess from Back Updated:  09/13/18 1153     Gram Stain Result Many WBC's      Many Gram negative rods      Moderate Gram negative diplococci    Fungus culture [[id number]] Collected:  09/13/18 0834    Order Status:  Sent Specimen:  Abscess from Back Updated:  09/13/18 1040    Gram stain [[id number]]     Order Status:  Completed Specimen:  Abscess from Back     Urine culture [[id number]] Collected:  09/10/18 1701    Order Status:  Completed Specimen:  Urine from Catheterized Updated:  09/12/18 1438     Urine Culture, Routine --     CANDIDA ALBICANS  > 100,000 cfu/ml  Treatment of asymptomatic candiduria is not recommended (except for   specific populations). Candida isolated in the urine typically   represents colonization. If an indwelling urinary catheter is present  it should be removed or replaced.      Narrative:       add on CXURN order #[id number] per Dr. lEina Sandhu @ 19:58    09/10/2018     Urine culture [[id number]]     Order Status:  Completed Specimen:  Urine, Catheterized     Urine  culture [[id number]]     Order Status:  Canceled Specimen:  Urine         IR drain of psoas abscess placed yesterday, gram stain showing GNRs, GPCs, cultures, speciation, susceptibilities pending, urine culture from 9/10 growing candida, blood culture from 9/9 growing prevotella    Review of Systems        Assessment/Plan:     Spinal epidural abscess    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     -No acute neurosurgical intervention at this time  -MRI with L2-3 osteodiscitis, large fluid collection in surgical bed, as well a prevertebral fluid collection L1,2 s/p IR drainage on 9/13   -Plan for OR postponed secondary to dismal medical status and inability to tolerate surgery at this time  -Please wear LSO brace at all times  -Please hold Xarelto & Plavix   -Please medically optimize and document medical clearance when appropriate for surgery  -Abx per infectious disease recs   -Continue wound care and wound vac  -Medical management per primary team          We plan to take patient to OR for washout and subsequent instrumentation once medically stable from ICU standpoint      Everett Watson MD  Neurosurgery  [facility]

## 2018-09-17 NOTE — ASSESSMENT & PLAN NOTE
82 year old female with history of L2-3 fusion 6/5 complicated by post operative infection with Enterobacter cloacae. She has been on outpatient IV Ciprofloxacin and has undergone an I&D on 7/2 with partial hardware removal on 8/7 without resolution of her infection. 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      She was broadened to Cefepime and Vancomycin. Neurosurgery is following and had planned to take patient to OR for washout & fusion, however patient developed acute respiratory failure requiring intubation and transfer to the ICU with CXR showing pulmonary edema.     Blood cultures 9/9 returned positive for Prevotella. Urine is showing Candida. She underwent IR drainage of paraspinal abscess on 9/13 with 5 mL purulent fluid removed. Gram stain is showing many GNRs, moderate gram negative diplococci and few GPRs. Abscess cultures are NGTD.  Antimicrobial coverage broadened to Vanc, Zosyn and Fluconazole. She remains intubated, sedated in the ICU. Afebrile. Leukocytosis downtrending. Repeat blood cultures are NGTD. Surgical plans have been postponed due to decline in medical status.     Plan  - Continue empiric Zosyn, Vanc and Fluconazole (renal adjusted)  - Will follow all outstanding cultures to guide antibiotic therapy.     - Continue management per primary team  - Ideally patient needs surgical washout for most optimal chance of cure of this infection. Neurosurgery is following.  - ID will follow.

## 2018-09-17 NOTE — ASSESSMENT & PLAN NOTE
--likely steroid induced and secondary to infection  --steroids weaned off 9/16  --continue insulin infusion (started 9/16)  --A1c 7.6, on lantus 30 units every evening outpatient

## 2018-09-17 NOTE — ASSESSMENT & PLAN NOTE
--noted on cultures from 9/9; cleared on 9/11; Positive for Prevotella  --per ID recs: discontinued ceftriaxone and flagyl and added on zosyn. Patient has recorded penicillin allergy but patient has received penicillins in the past with no recorded reactions. We will watch closely for signs of allergic reactions.   --Not fully able to obtain source control as IR only able to partially drain one abscess.   --continue discussions regarding possibility of washout with Neurosurgery given improvement in hemodynamics

## 2018-09-17 NOTE — SUBJECTIVE & OBJECTIVE
Interval History: NAEON    Medications:  Continuous Infusions:   dexmedetomidine (PRECEDEX) infusion Stopped (09/15/18 1022)    insulin (HUMAN R) infusion (adults) Stopped (09/17/18 0805)    norepinephrine bitartrate-D5W Stopped (09/17/18 0605)     Scheduled Meds:   albuterol-ipratropium  3 mL Nebulization Q4H    chlorhexidine  15 mL Mouth/Throat BID    fluconazole (DIFLUCAN) IVPB  200 mg Intravenous Q24H    furosemide  140 mg Intravenous Q6H    heparin (porcine)  5,000 Units Subcutaneous Q12H    levothyroxine  150 mcg Per OG tube Before breakfast    metOLazone  10 mg Per NG tube BID    pantoprazole  40 mg Intravenous Daily    piperacillin-tazobactam (ZOSYN) IVPB  4.5 g Intravenous Q12H     PRN Meds:dextrose 50%, dextrose 50%, fentaNYL citrate (PF), glucagon (human recombinant), glucose, glucose, insulin aspart U-100, ondansetron       Objective:     Weight: 102.7 kg (226 lb 6.6 oz)  Body mass index is 38.86 kg/m².  Vital Signs (Most Recent):  Temp: (!) 95.5 °F (35.3 °C) (09/17/18 0854)  Pulse: 84 (09/17/18 0854)  Resp: (!) 22 (09/17/18 0854)  BP: (!) 130/59 (09/17/18 0737)  SpO2: 98 % (09/17/18 0854) Vital Signs (24h Range):  Temp:  [95 °F (35 °C)-97.3 °F (36.3 °C)] 95.5 °F (35.3 °C)  Pulse:  [62-84] 84  Resp:  [11-34] 22  SpO2:  [90 %-100 %] 98 %  BP: (104-140)/(51-64) 130/59  Arterial Line BP: (104-136)/(40-56) 117/49     Date 09/17/18 0700 - 09/18/18 0659   Shift 5814-2293 5527-5204 1884-2112 24 Hour Total   INTAKE   I.V.(mL/kg) 28.5(0.3)   28.5(0.3)   NG/GT 40   40   Shift Total(mL/kg) 68.5(0.7)   68.5(0.7)   OUTPUT   Shift Total(mL/kg)       Weight (kg) 102.7 102.7 102.7 102.7              Vent Mode: A/C  Oxygen Concentration (%):  [30] 30  Resp Rate Total:  [16 br/min-22 br/min] 22 br/min  Vt Set:  [0 mL] 0 mL  PEEP/CPAP:  [5 cmH20] 5 cmH20  Pressure Support:  [0 cmH20] 0 cmH20  Mean Airway Pressure:  [11 zaG62-94 cmH20] 13 cmH20         NG/OG Tube 09/12/18 1325 orogastric Center mouth (Active)  "  Placement Check placement verified by x-ray 9/14/2018  7:05 AM   Distal Tube Length (cm) 58 9/14/2018  7:05 AM   Tolerance no signs/symptoms of discomfort 9/14/2018  7:05 AM   Securement taped to commercial device 9/14/2018  7:05 AM   Clamp Status/Tolerance clamped 9/14/2018  7:05 AM   Suction Setting/Drainage Method suction at;low;intermittent setting 9/14/2018  3:05 AM   Insertion Site Appearance no redness, warmth, tenderness, skin breakdown, drainage 9/14/2018  3:05 AM   Current Rate (mL/hr) 0 mL/hr 9/14/2018  7:05 AM   Intake (mL) 60 mL 9/13/2018  6:05 AM   Intake (mL) - Formula Tube Feeding 0 9/14/2018 10:05 AM            Urethral Catheter 09/13/18 1438 Temperature probe (Active)   Site Assessment Clean;Intact 9/14/2018  7:05 AM   Collection Container Urimeter 9/14/2018  7:05 AM   Securement Method secured to top of thigh w/ adhesive device 9/14/2018  7:05 AM   Catheter Care Performed yes 9/14/2018  7:05 AM   Reason for Continuing Urinary Catheterization Critically ill in ICU requiring intensive monitoring 9/14/2018  7:05 AM   CAUTI Prevention Bundle StatLock in place w 1" slack;Intact seal between catheter & drainage tubing;Drainage bag off the floor;Green sheeting clip in use;No dependent loops or kinks;Drainage bag not overfilled (<2/3 full);Drainage bag below bladder 9/14/2018  7:05 AM   Output (mL) 33 mL 9/14/2018 10:05 AM            Trialysis (Dialysis) Catheter 09/12/18 1326 right internal jugular (Active)   IV Device Securement sutures 9/14/2018  7:05 AM   Additional Site Signs no drainage;no streak formation;no palpable cord;no pain;no edema;no warmth;no erythema 9/14/2018  7:05 AM   Patency/Care infusing 9/14/2018  7:05 AM   Site Assessment Clean;Dry;Intact;No redness;No swelling 9/14/2018  7:05 AM   Status Accessed 9/14/2018  7:05 AM   Dressing Intervention Dressing reinforced 9/14/2018  7:05 AM   Dressing Status Clean;Dry;Intact 9/14/2018  7:05 AM   Dressing Change Due 09/19/18 9/14/2018  7:05 AM "   Verification by X-ray Yes 9/14/2018  7:05 AM   Site Condition No complications 9/14/2018  7:05 AM   Dressing Occlusive 9/14/2018  7:05 AM   Daily Line Review Performed 9/14/2018  7:05 AM       Neurosurgery Physical Exam  E3VTM6  PERRL  FC x4  CNII-XII grossly intact    Significant Labs:  Recent Labs   Lab  09/16/18   0148   09/16/18   1621  09/17/18   0104  09/17/18   0254  09/17/18   0824   GLU   --    < >  307*  223*   --   105   NA   --    < >  127*  127*   --   128*   K   --    < >  4.1  3.7   --   3.9   CL   --    < >  88*  90*   --   90*   CO2   --    < >  22*  22*   --   24   BUN   --    < >  79*  84*   --   86*   CREATININE   --    < >  4.0*  4.2*   --   4.0*   CALCIUM   --    < >  8.4*  8.4*   --   8.3*   MG  1.6   --    --    --   1.7   --     < > = values in this interval not displayed.     Recent Labs   Lab  09/16/18   0148  09/17/18   0254  09/17/18   0407   WBC  14.20*  16.65*  16.11*   HGB  7.8*  7.6*  7.6*   HCT  23.0*  22.1*  21.9*   PLT  254  211  198     Recent Labs   Lab  09/16/18   0148  09/17/18   0254   INR  1.2  1.2     Microbiology Results (last 7 days)     Procedure Component Value Units Date/Time    Aerobic culture [[id number]] Collected:  09/13/18 0834    Order Status:  Completed Specimen:  Abscess from Back Updated:  09/17/18 0848     Aerobic Bacterial Culture No growth    Narrative:       lumbar paraspinal fluid collection drainage    Blood culture [[id number]] Collected:  09/15/18 1805    Order Status:  Completed Specimen:  Blood from Peripheral, Upper Arm, Right Updated:  09/16/18 2012     Blood Culture, Routine No Growth to date     Blood Culture, Routine No Growth to date    Narrative:       Blood cultures from 2 different sites. 4 bottles total.  Please draw before starting antibiotics.    Blood culture [[id number]] Collected:  09/15/18 1819    Order Status:  Completed Specimen:  Blood from Peripheral, Upper Arm, Left Updated:  09/16/18 2012     Blood Culture, Routine No Growth to  date     Blood Culture, Routine No Growth to date    Narrative:       Blood cultures x 2 different sites. 4 bottles total. Please  draw cultures before administering antibiotics.    Blood culture [[id number]] Collected:  09/11/18 1011    Order Status:  Completed Specimen:  Blood Updated:  09/16/18 1212     Blood Culture, Routine No growth after 5 days.    Narrative:       Please draw from 2 separate sites 30 minutes apart. Thank you    Blood culture [[id number]] Collected:  09/11/18 1003    Order Status:  Completed Specimen:  Blood Updated:  09/16/18 1212     Blood Culture, Routine No growth after 5 days.    Culture, Anaerobe [[id number]] Collected:  09/13/18 0834    Order Status:  Completed Specimen:  Abscess from Back Updated:  09/14/18 0734     Anaerobic Culture Culture in progress    Narrative:       lumbar paraspinal fluid collection drainage    Gram stain [[id number]] Collected:  09/13/18 0834    Order Status:  Completed Specimen:  Abscess from Back Updated:  09/13/18 1946     Gram Stain Result Many WBC's      Many Gram negative rods      Few Gram positive rods    Blood culture [[id number]] Collected:  09/09/18 1731    Order Status:  Completed Specimen:  Blood Updated:  09/13/18 1418     Blood Culture, Routine Gram stain terrell bottle: Gram negative rods      Blood Culture, Routine Results called to and read back by:Sofia Noriega RN 09/11/2018  06:01     Blood Culture, Routine PREVOTELLA (B.) BIVIA    Narrative:       From 2 different sites 30 minutes apart    Blood culture [[id number]] Collected:  09/09/18 1555    Order Status:  Completed Specimen:  Blood Updated:  09/13/18 1417     Blood Culture, Routine Gram stain terrell bottle: Gram negative rods      Blood Culture, Routine Positive results previously called 09/11/2018  13:30     Blood Culture, Routine PREVOTELLA (B.) BIVIA    Gram stain [[id number]] Collected:  09/13/18 0834    Order Status:  Completed Specimen:  Abscess from Back Updated:  09/13/18 1153     Gram  Stain Result Many WBC's      Many Gram negative rods      Moderate Gram negative diplococci    Fungus culture [[id number]] Collected:  09/13/18 0834    Order Status:  Sent Specimen:  Abscess from Back Updated:  09/13/18 1040    Gram stain [[id number]]     Order Status:  Completed Specimen:  Abscess from Back     Urine culture [[id number]] Collected:  09/10/18 1701    Order Status:  Completed Specimen:  Urine from Catheterized Updated:  09/12/18 1438     Urine Culture, Routine --     CANDIDA ALBICANS  > 100,000 cfu/ml  Treatment of asymptomatic candiduria is not recommended (except for   specific populations). Candida isolated in the urine typically   represents colonization. If an indwelling urinary catheter is present  it should be removed or replaced.      Narrative:       add on CXURN order #[id number] per Dr. Elina Sandhu @ 19:58    09/10/2018     Urine culture [[id number]]     Order Status:  Completed Specimen:  Urine, Catheterized     Urine culture [[id number]]     Order Status:  Canceled Specimen:  Urine         IR drain of psoas abscess placed yesterday, gram stain showing GNRs, GPCs, cultures, speciation, susceptibilities pending, urine culture from 9/10 growing candida, blood culture from 9/9 growing prevotella    Review of Systems

## 2018-09-17 NOTE — ASSESSMENT & PLAN NOTE
--CHADS vasc 5 (HTN, DM, Age, and female); hold home rivaroxaban   --hold now given spinal abscess.   --now in aflutter HR 70's

## 2018-09-17 NOTE — SUBJECTIVE & OBJECTIVE
Interval History/Significant Events: started on insulin infusion on 9/16.  Briefly on pressors overnight.     Review of Systems  Objective:     Vital Signs (Most Recent):  Temp: (!) 95.7 °F (35.4 °C) (09/17/18 1114)  Pulse: 63 (09/17/18 1114)  Resp: 16 (09/17/18 1114)  BP: (!) 112/56 (09/17/18 0905)  SpO2: 100 % (09/17/18 1114) Vital Signs (24h Range):  Temp:  [94.8 °F (34.9 °C)-97.3 °F (36.3 °C)] 95.7 °F (35.4 °C)  Pulse:  [62-84] 63  Resp:  [11-34] 16  SpO2:  [90 %-100 %] 100 %  BP: (104-140)/(51-64) 112/56  Arterial Line BP: (104-136)/(40-56) 117/49   Weight: 102.7 kg (226 lb 6.6 oz)  Body mass index is 38.86 kg/m².      Intake/Output Summary (Last 24 hours) at 9/17/2018 1125  Last data filed at 9/17/2018 0805  Gross per 24 hour   Intake 1411.84 ml   Output 191 ml   Net 1220.84 ml       Physical Exam   Constitutional: She appears well-developed. She appears ill. No distress. She is intubated.   HENT:   Head: Normocephalic.   Eyes: Conjunctivae and EOM are normal. Pupils are equal, round, and reactive to light.   Neck: Full passive range of motion without pain. No tracheal deviation present.   Cardiovascular: Normal rate and normal heart sounds. An irregularly irregular rhythm present.   Warm extremities, peripheral edema   Pulmonary/Chest: No accessory muscle usage. No tachypnea. She is intubated. No respiratory distress. She has decreased breath sounds in the right lower field and the left lower field. She has no wheezes. She has no rhonchi. She has rales in the right lower field and the left lower field.   Abdominal: Soft. Bowel sounds are normal. There is generalized tenderness.   Genitourinary:   Genitourinary Comments: Urine catheter with clear, yellow output. Oliguric.    Neurological: She is alert. No cranial nerve deficit or sensory deficit. GCS eye subscore is 4. GCS verbal subscore is 1. GCS motor subscore is 6.   Alert, PERRL, following request and moving all extremities equally and symmetrically.     Skin: Skin is warm and dry. She is not diaphoretic.        Nursing note and vitals reviewed.      Vents:  Vent Mode: A/C (09/17/18 1114)  Set Rate: 16 bmp (09/17/18 1114)  Vt Set: 0 mL (09/17/18 1114)  Pressure Support: 0 cmH20 (09/17/18 1114)  PEEP/CPAP: 5 cmH20 (09/17/18 1114)  Oxygen Concentration (%): 30 (09/17/18 1114)  Peak Airway Pressure: 25 cmH2O (09/17/18 1114)  Plateau Pressure: 21 cmH20 (09/17/18 1114)  Total Ve: 6.57 mL (09/17/18 1114)  Negative Inspiratory Force (cm H2O): -24 (09/15/18 1155)  F/VT Ratio<105 (RSBI): (!) 39.6 (09/17/18 1114)  Lines/Drains/Airways     Central Venous Catheter Line                 Trialysis (Dialysis) Catheter 09/12/18 1326 right internal jugular 4 days          Drain                 NG/OG Tube 09/12/18 1325 orogastric Center mouth 4 days         Urethral Catheter 09/13/18 1438 Temperature probe 3 days          Airway                 Airway - Non-Surgical 09/12/18 1012 Endotracheal Tube 5 days          Arterial Line                 Arterial Line 09/15/18 1500 Left Radial 1 day          Pressure Ulcer                 Negative Pressure Wound Therapy  40 days              Significant Labs:    CBC/Anemia Profile:  Recent Labs   Lab  09/16/18   0148  09/17/18   0254  09/17/18   0407   WBC  14.20*  16.65*  16.11*   HGB  7.8*  7.6*  7.6*   HCT  23.0*  22.1*  21.9*   PLT  254  211  198   MCV  75*  75*  74*   RDW  17.2*  17.4*  17.3*        Chemistries:  Recent Labs   Lab  09/16/18   0148   09/16/18   1621  09/17/18   0104  09/17/18   0254  09/17/18   0824   NA   --    < >  127*  127*   --   128*   K   --    < >  4.1  3.7   --   3.9   CL   --    < >  88*  90*   --   90*   CO2   --    < >  22*  22*   --   24   BUN   --    < >  79*  84*   --   86*   CREATININE   --    < >  4.0*  4.2*   --   4.0*   CALCIUM   --    < >  8.4*  8.4*   --   8.3*   ALBUMIN  1.7*   --    --    --   1.6*   --    PROT  5.3*   --    --    --   5.2*   --    BILITOT  0.9   --    --    --   0.6   --    ALKPHOS   123   --    --    --   114   --    ALT  8*   --    --    --   8*   --    AST  17   --    --    --   10   --    MG  1.6   --    --    --   1.7   --    PHOS  7.2*   --    --    --   5.9*   --     < > = values in this interval not displayed.       All pertinent labs within the past 24 hours have been reviewed.    Significant Imaging:  I have reviewed and interpreted all pertinent imaging results/findings within the past 24 hours.

## 2018-09-17 NOTE — ASSESSMENT & PLAN NOTE
--secondary to spinal abscesses  --blood cultures with prevotella, repeat blood cultures ngtd  --gram stain from spinal abscess with many GNR, moderate gram neg diplocci.   --appreciate ID recommendations.  On vancomycin + pip/tazo+ fluconazole.   --currently off norepinephrine.   --Abx as above. Also on vancomycin for empiric coverage  --positive UCx (9/11) for Candida; Treating with fluconazole for 5 days as cannot rule out as source of her sepsis. Urine catheter changed.   --neurosurgery following, will discuss if washout is now an option

## 2018-09-18 NOTE — ASSESSMENT & PLAN NOTE
--suspect hypervolemic hyponatremia from volume overload.    --corrected but will continue to trend BMP q8hr

## 2018-09-18 NOTE — ASSESSMENT & PLAN NOTE
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     -No acute neurosurgical intervention at this time  -MRI with L2-3 osteodiscitis, large fluid collection in surgical bed, as well a prevertebral fluid collection L1,2 s/p IR drainage on 9/13   -Plan for OR postponed secondary to dismal medical status and inability to tolerate surgery at this time  -Please wear LSO brace at all times  -Please hold Xarelto & Plavix   -Please medically optimize and document medical clearance when appropriate for surgery  -Pt will need 2 stage surgery: washout and then instrumentation following.  -Abx per infectious disease recs   -Continue wound care and wound vac  -Medical management per primary team

## 2018-09-18 NOTE — SUBJECTIVE & OBJECTIVE
Interval History: started on SLED late yesterday afternoon, neurosurgery considering taking her to the OR for a washout as her hemodynamics have improved, phos < 1.0 this morning, being replaced    Review of patient's allergies indicates:   Allergen Reactions    Codeine Nausea Only    Penicillins Swelling     Able to take amoxil and cephalosporins      Current Facility-Administered Medications   Medication Frequency    0.9%  NaCl infusion (CRRT USE ONLY) Continuous    albuterol-ipratropium 2.5 mg-0.5 mg/3 mL nebulizer solution 3 mL Q4H    chlorhexidine 0.12 % solution 15 mL BID    dextrose 50% injection 12.5 g PRN    dextrose 50% injection 25 g PRN    fentaNYL citrate (PF) Syrg 25 mcg Q2H PRN    glucagon (human recombinant) injection 1 mg PRN    glucose chewable tablet 16 g PRN    glucose chewable tablet 24 g PRN    heparin (porcine) injection 5,000 Units Q8H    insulin aspart U-100 pen 0-5 Units Q4H PRN    insulin regular (Humulin R) 100 Units in sodium chloride 0.9% 100 mL infusion Continuous    levothyroxine tablet 150 mcg Before breakfast    magnesium sulfate 2g in water 50mL IVPB (premix) Once    miconazole NITRATE 2 % top powder BID    ondansetron injection 4 mg Q8H PRN    pantoprazole injection 40 mg Daily    piperacillin-tazobactam 4.5 g in sodium chloride 0.9% 100 mL IVPB (ready to mix system) Q12H    sodium phosphate 20.01 mmol in dextrose 5 % 250 mL IVPB PRN    sodium phosphate 30 mmol in dextrose 5 % 250 mL IVPB PRN    sodium phosphate 39.99 mmol in dextrose 5 % 250 mL IVPB PRN    vancomycin 1.5 g in 5 % dextrose 250 mL IVPB Once       Objective:     Vital Signs (Most Recent):  Temp: 97.7 °F (36.5 °C) (09/18/18 1000)  Pulse: 91 (09/18/18 1000)  Resp: 19 (09/18/18 0809)  BP: 115/61 (09/18/18 1000)  SpO2: 98 % (09/18/18 1000)  O2 Device (Oxygen Therapy): ventilator (09/18/18 1000) Vital Signs (24h Range):  Temp:  [95.5 °F (35.3 °C)-98.2 °F (36.8 °C)] 97.7 °F (36.5 °C)  Pulse:   [60-91] 91  Resp:  [16-29] 19  SpO2:  [95 %-100 %] 98 %  BP: ()/(52-74) 115/61  Arterial Line BP: ()/() 126/60     Weight: 102.7 kg (226 lb 6.6 oz) (09/14/18 0505)  Body mass index is 38.86 kg/m².  Body surface area is 2.15 meters squared.    I/O last 3 completed shifts:  In: 2508.3 [I.V.:638.3; NG/GT:1420; IV Piggyback:450]  Out: 2446 [Urine:330; Other:2116]    Physical Exam   HENT:   Head: Atraumatic.   Cardiovascular: Normal rate and regular rhythm.   Pulmonary/Chest: She has rales.   Musculoskeletal: She exhibits edema. She exhibits no tenderness.   Skin: Skin is warm.

## 2018-09-18 NOTE — PROGRESS NOTES
Pt hypotensive. Sustaining BP 90/39 MAP 55 HR 50-60s. Amio gtt 0.5. CRRT UF 250ml/h. Notified Critical care. Awaiting orders to be placed.

## 2018-09-18 NOTE — PROGRESS NOTES
[facility]  Critical Care Medicine  Progress Note    Patient Name: Medina Frazier  MRN: [id number]  Admission Date: 9/8/2018  Hospital Length of Stay: 10 days  Code Status: DNR  Attending Provider: An Ward MD  Primary Care Provider: Hao Garcia MD   Principal Problem: Acute hypercapnic respiratory failure    Subjective:     HPI:  Mrs. Frazier is a 82 yr old female with history significant for diastolic HF, CAD s/p VIVIANA Lcx (June 2017), HTN, DM2, and lumbar stenosis s/p lumbar fusion in June 2018 with subsequent hardware infection and I&D on 7/2 and repeat lumbar I&D, hardware removal, decompression and wound vac placement to L2-L3 due to continued epidural abscess. She was discharged recently to a SNF for rehab and continued IV antibiotic therapy. She originally presented to [facility] on 9/8 from [facility] after labwork revealed JEY, hyponatremia. Additionally, CT lumbar spine obtained 9/5 was concerning for persistent osteomyelitis, epidural abscess in L2-L3 region.     She was transferred to [facility] on 9/8 for higher level of care and neurosurgery evaluation. Admission has been complicated by continued JEY with creatinine up trending from baseline 0.8 to 1.0 to 3.0 along with hyponatremia felt to be related to acute on chronic diastolic heart failure, volume overload. Nephrology was consulted and has been assisting with management. She was started on lasix 100 mg BID with minimal urine output response and creatinine slowly uptrending. Additionally, blood cultures obtained 9/9 are growing GNR's, awaiting speciation. Suspect this is enterobacter given continued osteomyelitis of L2-L3, complex fluid collection within laminectomy bed measuring 3x8x5 cm concerning for abscess, and additional prevertebral collection anterior to L1 and L2 vertebral bodies measuring 4x6x1 abutting the abdominal aorta concerning for abscess noted on MRI from 9/10. Neurosurgery were planning on repeat  I&D for source control but held off given the above medical issues with JEY, hyponatremia. She has been on vancomycin, cefepime since 9/9 and blood cultures from 9/11 are NGTD.     She was transferred to the MICU on the AM of 9/12 for acute hypercapnic respiratory failure with concern for aspiration event, worsening encephalopathy, and worsening JEY.    .          Hospital/ICU Course:  Upon transfer to MICU, Mrs. Frazier's respiratory and mental status worsened despite Bipap and lasix trial. She was intubated and she developed shock requiring low dose levophed, felt to be septic in setting of bacteremia. A trialysis line was placed for venous access, vasopressors, and possible need for CRRT. Arterial line placed for frequent ABGs. Cefepime and vancomycin continued as ID and neurosurgery following. Added on flagyl as micro lab thinks BCx 9/9 may be growing anaerobes. IR successfully drained epidural abscess (9/13). Continuing lasix trial as patient still appears volume overloaded. Norepinephrine requirements increasing on 9/14.  Discussed with neurosurgery, would need to be off vasopressors and hemodynamically stable prior to surgical intervention. Family meeting on 9/14, code status changed to DNR.  Weaned off vasopressors on 9/16.  Remains on ventilator due to significant pulmonary edema.  Aggressive diuresis started without significant response. Patient had an episode of A fib RVR and put on amiodarone drip on 9/17. CRRT started 9/17 however patient became hypotensive and bradycardic requiring levophed again. Discontinued amiodarone at that time. Patient successfully weaned off levophed and has passed her SBT. Patient is medical stable for neurosurgery.     Interval History/Significant Events: Patient had episodes of A fib with RVR and was placed on amiodarone drip. CRRT started overnight. During CRRT, patient became hypotensive and bradycardic so amio was discontinued and patient was placed back on levophed.  Patient still receiving CRRT this morning but vitals have stabilized and levo has been discontinued.      Review of Systems   Unable to perform ROS: Intubated     Objective:     Vital Signs (Most Recent):  Temp: 97.7 °F (36.5 °C) (09/18/18 0919)  Pulse: 91 (09/18/18 0919)  Resp: 19 (09/18/18 0809)  BP: 121/74 (09/18/18 0800)  SpO2: 100 % (09/18/18 0919) Vital Signs (24h Range):  Temp:  [95.5 °F (35.3 °C)-98.2 °F (36.8 °C)] 97.7 °F (36.5 °C)  Pulse:  [60-91] 91  Resp:  [16-29] 19  SpO2:  [95 %-100 %] 100 %  BP: ()/(52-74) 121/74  Arterial Line BP: ()/() 101/39   Weight: 102.7 kg (226 lb 6.6 oz)  Body mass index is 38.86 kg/m².      Intake/Output Summary (Last 24 hours) at 9/18/2018 0923  Last data filed at 9/18/2018 0900  Gross per 24 hour   Intake 1973.43 ml   Output 2856 ml   Net -882.57 ml       Physical Exam   Constitutional: Vital signs are normal. She appears well-developed and well-nourished. She is cooperative. She is intubated and restrained.   HENT:   Head: Normocephalic and atraumatic.   Eyes: Lids are normal. Right eye exhibits normal extraocular motion. Left eye exhibits normal extraocular motion.   Neck: Normal range of motion. Neck supple.   Trialysis central venous catheter in right internal jugular   Cardiovascular: Normal rate, regular rhythm, normal heart sounds and intact distal pulses. Exam reveals no gallop, no distant heart sounds and no friction rub.   No murmur heard.  Pulses:       Dorsalis pedis pulses are Detected w/ doppler on the right side, and Detected w/ doppler on the left side.        Posterior tibial pulses are Detected w/ doppler on the right side, and Detected w/ doppler on the left side.   3+ pitting edema of bilateral lower extremity up to knees  Edema noted to right hand    Pulmonary/Chest: No accessory muscle usage. No apnea, no tachypnea and no bradypnea. She is intubated. No respiratory distress. She has no decreased breath sounds. She has no wheezes. She  has rhonchi in the right upper field, the right middle field, the left upper field and the left middle field. She has no rales.   Abdominal: Soft. Normal appearance. She exhibits distension. Bowel sounds are decreased. There is no tenderness. No hernia.   Genitourinary:   Genitourinary Comments: Rash between inguinal folds and upper thighs bilaterally   Musculoskeletal:   Arterial line in carotid artery of right wrist   Neurological: She is alert. She has normal strength.   Opens eyes spontaneously and follows commands.    Skin: Skin is dry and intact. She is not diaphoretic.       Vents:  Vent Mode: Spont (09/18/18 0919)  Set Rate: 0 bmp (09/18/18 0919)  Vt Set: 0 mL (09/18/18 0919)  Pressure Support: 5 cmH20 (09/18/18 0919)  PEEP/CPAP: 5 cmH20 (09/18/18 0919)  Oxygen Concentration (%): 30 (09/18/18 0919)  Peak Airway Pressure: 10 cmH2O (09/18/18 0919)  Plateau Pressure: 21 cmH20 (09/18/18 0919)  Total Ve: 7.34 mL (09/18/18 0919)  Negative Inspiratory Force (cm H2O): -24 (09/15/18 1155)  F/VT Ratio<105 (RSBI): 267.61 (09/18/18 0809)  Lines/Drains/Airways     Central Venous Catheter Line                 Trialysis (Dialysis) Catheter 09/12/18 1326 right internal jugular 5 days          Drain                 NG/OG Tube 09/12/18 1325 orogastric Center mouth 5 days         Urethral Catheter 09/13/18 1438 Temperature probe 4 days          Airway                 Airway - Non-Surgical 09/12/18 1012 Endotracheal Tube 5 days          Arterial Line                 Arterial Line 09/15/18 1500 Left Radial 2 days          Pressure Ulcer                 Negative Pressure Wound Therapy  41 days          Peripheral Intravenous Line                 Midline Catheter Insertion/Assessment  - Single Lumen 09/17/18 1235 Right cephalic vein (lateral side of arm) 20g x 10cm less than 1 day              Significant Labs:    CBC/Anemia Profile:  Recent Labs   Lab  09/17/18   0254  09/17/18   0407  09/18/18   0119   WBC  16.65*  16.11*   20.58*   HGB  7.6*  7.6*  7.3*   HCT  22.1*  21.9*  22.3*   PLT  211  198  225   MCV  75*  74*  77*   RDW  17.4*  17.3*  17.3*        Chemistries:  Recent Labs   Lab  09/17/18   0254   09/17/18 2116  09/17/18   2353  09/18/18   0119  09/18/18   0312   NA   --    < >  129*  139   --   139   K   --    < >  3.8  4.2   --   4.2   CL   --    < >  94*  107   --   106   CO2   --    < >  23  26   --   27   BUN   --    < >  72*  4*   --   3*   CREATININE   --    < >  3.2*  0.4*   --   0.4*   CALCIUM   --    < >  7.9*  7.3*   --   7.2*   ALBUMIN  1.6*   --   1.5*   --   1.7*  1.5*   PROT  5.2*   --    --    --   5.2*   --    BILITOT  0.6   --    --    --   0.5   --    ALKPHOS  114   --    --    --   117   --    ALT  8*   --    --    --   8*   --    AST  10   --    --    --   12   --    MG  1.7   --   1.5*   --   1.7  1.5*   PHOS  5.9*   --   4.6*   --   3.2  <1.0*    < > = values in this interval not displayed.       All pertinent labs within the past 24 hours have been reviewed.    Significant Imaging:  I have reviewed and interpreted all pertinent imaging results/findings within the past 24 hours.    Assessment/Plan:     Neuro   Encephalopathy, metabolic    --multifactorial: sepsis/uremia, medications  --non focal on exam  --more awake and alert; following commands appropriately  --weaned off sedation and levophed        Lumbar stenosis    --s/p lumbar fusion/hardware placement June 2018 with subsequent osteo and abscess.   --LSO brace ordered.  Per conversation with Neurosurgery, will need to wear brace while OOB  --see septic shock        Derm   Alteration in skin integrity related to surgical incision    --s/p wound vac placement to lower back. Continue per neurosurgery        Pulmonary   * Acute hypercapnic respiratory failure    --2/2 pulmonary edema in setting of acute on chronic diastolic HF, volume overload and JEY  --chest xray (9/17) with continued pulmonary edema however has improved since 9/15 CXR  --bedside  ultrasound of right pleural effusion on 9/14 with small pocket not amendable to thoracentesis.  --s/p CRRT overnight  --Passed SBT this AM; f/u with neurosurgery regarding plan to take patient back to OR to determine if need to extubate in the mean time.               Pulmonary hypertension    --2/2 diastolic HF. No known lung disease  --Echo (9/13) PA systolic pressure estimated at 88 mm Hg  --reportedly on revatio at home.   --Underwent CRRT 9/17          Cardiac/Vascular   PAF (paroxysmal atrial fibrillation)    --CHADS vasc 5 (HTN, DM, Age, and female); hold home rivaroxaban   --hold now given spinal abscess.   --episode of atrial fibrillation with RVR overnight that was corrected with amiodarone drip. Drip discontinued this AM due to hypotension and bradycardia after start of CRRT. No episodes of atrial fibrillation today.         CHF (congestive heart failure), NYHA class IV    --Prior ECHO shows diastolic dysfunction (7/2018)  --Echo (9/13) EF 55-60%; concentric remodeling with RV enlargement; no WMA  --s/p CRRT 9/17        Coronary artery disease involving native coronary artery of native heart without angina pectoris    --hold BB in setting of recent shock  --hold statin for now  --s/p VIVIANA to Lcx in June 2017 and on plavix prior to admission. Holding in setting of possible upcoming procedures.             Hypertension    --holding antihypertensives in the setting of recent shock and borderline hypotension.         Renal/   Hyponatremia    --suspect hypervolemic hyponatremia from volume overload.    --corrected but will continue to trend BMP q8hr          JEY (acute kidney injury)    --most likely cardiorenal syndrome vs sepsis (ATN)  --retroperitoneal ultrasound on 9/9 consistent with medical renal disease.   --Nephrology following   --Underwent CRRT 9/17        ID   Osteomyelitis of Lumbar Spine    --see above        Septic shock    --secondary to spinal abscesses  --blood cultures with prevotella,  repeat blood cultures ngtd  --gram stain from spinal abscess with many GNR, moderate gram neg diplocci.   --currently off norepinephrine.   --Abx as above.        Gram-negative bacteremia    --noted on cultures from 9/9; cleared on 9/11; Positive for Prevotella  --per ID recs: continue vanc and zosyn  --Not fully able to obtain source control as IR only able to partially drain one abscess.   --Washout with Neurosurgery given improvement in hemodynamics either this Thursday or sometime next week.          Spinal epidural abscess    --noted on MRI along with osteo  --IR drained paraspinal abscess 9/13   --see septic shock for details.         Oncology   Anemia    --likely anemia of chronic disease. Trend Hgb for now  --no signs of acute blood loss  --transfuse for Hgb <7        Endocrine   Hypothyroidism    --continue synthroid        Diabetes mellitus, type II    --likely steroid induced and secondary to infection  --steroids weaned off 9/16  --continue insulin infusion (started 9/16)  --A1c 7.6, on lantus 30 units every evening outpatient          GI   GERD (gastroesophageal reflux disease)    --continue PPI           Critical Care Daily Checklist:    A: Awake: RASS Goal/Actual Goal: RASS Goal: 0-->alert and calm  Actual: [place] Agitation Sedation Scale (RASS): Alert and calm   B: Spontaneous Breathing Trial Performed?  Passed today   C: SAT & SBT Coordinated?  yes                     D: Delirium: CAM-ICU Overall CAM-ICU: Negative   E: Early Mobility Performed? Yes   F: Feeding Goal: Goals: Pt to receive nutrition by RD follow up  Status: Nutrition Goal Status: goal met   Current Diet Order   Procedures    Diet NPO   Receiving tube feeds   AS: Analgesia/Sedation None   T: Thromboembolic Prophylaxis Heparin SQ   H: HOB > 300 Yes   U: Stress Ulcer Prophylaxis (if needed) PPI   G: Glucose Control Insulin   B: Bowel Function Stool Occurrence: 1   I: Indwelling Catheter (Lines & [place]) Necessity All necessary   D:  De-escalation of Antimicrobials/Pharmacotherapies De-escalated fluconazole, still need vanc and zosyn    Plan for the day/ETD Supportive care, stabilize, extubate    Code Status:  Family/Goals of Care: DNR       Discuss with Dr. Ward.     Critical Care Time: 60 minutes  Critical secondary to Patient has a condition that poses threat to life and bodily function: Acute Hypoxemic/Hypercapnic Respiratory Failure requiring mechanical ventilation.     Critical care was time spent personally by me on the following activities: development of treatment plan with patient or surrogate and bedside caregivers, discussions with consultants, evaluation of patient's response to treatment, examination of patient, ordering and performing treatments and interventions, ordering and review of laboratory studies, ordering and review of radiographic studies, pulse oximetry, re-evaluation of patient's condition. This critical care time did not overlap with that of any other provider or involve time for any procedures.     Leslie Johnson PA-C  Critical Care Medicine  [facility]

## 2018-09-18 NOTE — PHYSICIAN QUERY
PT Name: Medina Frazier  MR #: [id number]    Physician Query Form - Atrial Flutter Specificity Clarification     CDS/: Mary Lou RICKETTS,RN,CDI   Contact information:[phone]    This form is a permanent document in the medical record.     Query Date: September 18, 2018    By submitting this query, we are merely seeking further clarification of documentation. Please utilize your independent clinical judgment when addressing the question(s) below.    The medical record contains the following:   Indicators     Supporting Clinical Findings Location in Medical Record   x Atrial Flutter      Pt was also started on amiodarone drip due to       atrial  flutter.         09/17/18 Intensive Care Plan of Care      x EKG results         Vent. Rate : 067 BPM        Atrial Rate : 256 BPM        P-R Int : 000 ms                QRS Dur : 090 ms       QT Int : 426 ms                  P-R-T Axes : 000 090      216        degrees       QTc Int : 450 ms       Atrial flutter with variable A-V block       Rightward axis       Low voltage QRS       Nonspecific T wave abnormality       Abnormal ECG                   09/17/18 EKG   x Medication            amiodarone in dextrose 150 mg/100 mL (1.5       mg/mL) loading dose 150 mg        Dose: 150 mg Freq: Once Route: IV       Start: 09/17/18 1515 End: 09/17/18 1605       amiodarone in dextrose 150 mg/100 mL (1.5       mg/mL) loading dose 150 mg        Dose: 150 mg Freq: Once Route: IV       Start: 09/17/18 1515 End: 09/17/18 1605       amiodarone 360 mg/200 mL (1.8 mg/mL)       infusion Rate: 33.3 mL/hr Dose: 1 mg/min       Freq: Continuous Route: IV       Start: 09/17/18 1530 End: 09/17/18 2207                               09/15--------------------------------->09/18 MAR Report         Treatment      Other       Provider, please further specify the Atrial Flutter diagnosis.    [  ] Atypical  [  ] Type I  [  ] Type II  [  ] Typical  [  ] Other (please specify):  ___________________________________  [x  ] Clinically Undetermined    Please document in your progress notes daily for the duration of treatment until resolved, and include in your discharge summary.

## 2018-09-18 NOTE — PROGRESS NOTES
[facility]  Neurosurgery  Progress Note    Subjective:     History of Present Illness: Ms Medina Frazier is an 82yoF with PMHx DMII, HTN, CHF, CAD, CHF, CKD, HLD, GERD, hypothyroidism,  s/p L2-3 TLIF on 6/5/18 by Dr. Gutiérrez at [facility], complicated by infection & subsequent washout &  hardware removal, who is transferred from [facility] for neurosurgical evaluation of L2-3 osteomyelitis.  Following her initial surgery, she was found to have a lumbar incision wound infection that was washed out on 7/2/18.  Surgical cultures grew E. Cloacae & a PICC was placed.  She was discharged on IV Cipro & a wound vac.  She then had hardware removal on 8/7.  She has had progressive worsening of pain & infection since that time.   Most recent CT Lspine shows L2-3 osteomyelitis with paraspinal fluid collection L1-3.      Post-Op Info:  Procedure(s) (LRB):  FUSION, SPINE, LUMBAR, TLIF, WITH PERCUTANEOUS INSTRUMENTATION L1-5, depuy, neuromonitoring, 4 post bed (N/A)         Interval History: naeon    Medications:  Continuous Infusions:   sodium chloride 0.9% 200 mL/hr at 09/18/18 1200    insulin (HUMAN R) infusion (adults) 0.9 Units/hr (09/18/18 1200)     Scheduled Meds:   albuterol-ipratropium  3 mL Nebulization Q4H    chlorhexidine  15 mL Mouth/Throat BID    heparin (porcine)  5,000 Units Subcutaneous Q8H    levothyroxine  150 mcg Per OG tube Before breakfast    magnesium sulfate IVPB  2 g Intravenous Once    miconazole NITRATE 2 %   Topical (Top) BID    pantoprazole  40 mg Intravenous Daily    piperacillin-tazobactam (ZOSYN) IVPB  4.5 g Intravenous Q8H     PRN Meds:dextrose 50%, dextrose 50%, fentaNYL citrate (PF), glucagon (human recombinant), glucose, glucose, insulin aspart U-100, ondansetron, sodium phosphate IVPB, sodium phosphate IVPB, sodium phosphate IVPB     Review of Systems  Objective:     Weight: 102.7 kg (226 lb 6.6 oz)  Body mass index is 38.86 kg/m².  Vital Signs (Most Recent):  Temp: 98.1 °F  (36.7 °C) (09/18/18 1219)  Pulse: 91 (09/18/18 1219)  Resp: (!) 35 (09/18/18 1219)  BP: (!) 125/58 (09/18/18 1200)  SpO2: 95 % (09/18/18 1219) Vital Signs (24h Range):  Temp:  [96.8 °F (36 °C)-98.2 °F (36.8 °C)] 98.1 °F (36.7 °C)  Pulse:  [60-91] 91  Resp:  [16-35] 35  SpO2:  [93 %-100 %] 95 %  BP: ()/(52-74) 125/58  Arterial Line BP: (101-230)/() 140/61     Date 09/18/18 0700 - 09/19/18 0659   Shift 0440-1134 9603-1946 6705-8300 24 Hour Total   INTAKE   I.V.(mL/kg) 1127.2(11)   1127.2(11)   NG/   240   IV Piggyback 250   250   Shift Total(mL/kg) 1617.2(15.7)   1617.2(15.7)   OUTPUT   Urine(mL/kg/hr) 60   60   Other 1124   1124   Shift Total(mL/kg) 1184(11.5)   1184(11.5)   Weight (kg) 102.7 102.7 102.7 102.7              Vent Mode: Spont  Oxygen Concentration (%):  [30] 30  Resp Rate Total:  [14 br/min-23 br/min] 21 br/min  Vt Set:  [0 mL] 0 mL  PEEP/CPAP:  [5 cmH20] 5 cmH20  Pressure Support:  [0 cmH20-5 cmH20] 5 cmH20  Mean Airway Pressure:  [7.3 gyS87-88 cmH20] 7.5 cmH20         NG/OG Tube 09/12/18 1325 orogastric Center mouth (Active)   Placement Check placement verified by x-ray;placement verified by distal tube length measurement 9/18/2018 11:00 AM   Distal Tube Length (cm) 57 9/18/2018 11:00 AM   Tolerance no signs/symptoms of discomfort 9/18/2018 11:00 AM   Securement taped to commercial device 9/18/2018 11:00 AM   Clamp Status/Tolerance unclamped 9/18/2018 11:00 AM   Suction Setting/Drainage Method suction at;low;intermittent setting 9/16/2018  3:05 AM   Insertion Site Appearance no redness, warmth, tenderness, skin breakdown, drainage 9/18/2018 11:00 AM   Flush/Irrigation flushed w/;water;no resistance met 9/18/2018 11:00 AM   Feeding Method continuous 9/18/2018  7:01 AM   Feeding Action feeding continued 9/18/2018 11:00 AM   Current Rate (mL/hr) 40 mL/hr 9/18/2018 11:00 AM   Goal Rate (mL/hr) 40 mL/hr 9/18/2018 11:00 AM   Intake (mL) 60 mL 9/16/2018 11:05 PM   Intake (mL) - Formula Tube  "Feeding 40 9/18/2018 12:00 PM   Residual Amount (ml) 110 ml 9/18/2018 11:00 AM            Urethral Catheter 09/13/18 1438 Temperature probe (Active)   Site Assessment Clean;Intact 9/18/2018 11:00 AM   Collection Container Urimeter 9/18/2018 11:00 AM   Securement Method secured to top of thigh w/ adhesive device 9/18/2018 11:00 AM   Catheter Care Performed yes 9/18/2018 11:00 AM   Reason for Continuing Urinary Catheterization Critically ill in ICU requiring intensive monitoring 9/18/2018 11:00 AM   CAUTI Prevention Bundle StatLock in place w 1" slack;Intact seal between catheter & drainage tubing;Drainage bag off the floor;Green sheeting clip in use;No dependent loops or kinks;Drainage bag not overfilled (<2/3 full);Drainage bag below bladder 9/18/2018 11:00 AM   Output (mL) 15 mL 9/18/2018 12:00 PM            Trialysis (Dialysis) Catheter 09/12/18 1326 right internal jugular (Active)   IV Device Securement sutures 9/18/2018 11:00 AM   Additional Site Signs no drainage;no streak formation;no palpable cord;no pain;no edema;no warmth;no erythema 9/18/2018  7:01 AM   Patency/Care infusing 9/18/2018 11:00 AM   Site Assessment Clean;Dry;Intact;No redness;No swelling 9/18/2018 11:00 AM   Status Accessed 9/18/2018 11:00 AM   Dressing Intervention Dressing reinforced 9/18/2018 11:00 AM   Dressing Status Biopatch in place;Clean;Dry;Intact 9/18/2018 11:00 AM   Dressing Change Due 09/22/18 9/18/2018 11:00 AM   Verification by X-ray Yes 9/18/2018 11:00 AM   Site Condition No complications 9/18/2018 11:00 AM   Dressing Occlusive 9/18/2018 11:00 AM   Daily Line Review Performed 9/18/2018 11:00 AM       Neurosurgery Physical Exam   E3VTM6  PERRL  FC x4  CNII-XII grossly intact        Significant Labs:  Recent Labs   Lab  09/17/18   2116  09/17/18   2353  09/18/18   0119  09/18/18   0312  09/18/18   0915   GLU  274*  117*   --   105  141*  141*   NA  129*  139   --   139  136  136   K  3.8  4.2   --   4.2  3.8  3.8   CL  94*  107 "   --   106  102  102   CO2  23  26   --   27  26  26   BUN  72*  4*   --   3*  27*  27*   CREATININE  3.2*  0.4*   --   0.4*  1.4  1.4   CALCIUM  7.9*  7.3*   --   7.2*  7.8*  7.8*   MG  1.5*   --   1.7  1.5*   --      Recent Labs   Lab  09/17/18   0254  09/17/18   0407  09/18/18   0119   WBC  16.65*  16.11*  20.58*   HGB  7.6*  7.6*  7.3*   HCT  22.1*  21.9*  22.3*   PLT  211  198  225     Recent Labs   Lab  09/17/18   0254  09/18/18   0119   INR  1.2  1.1     Microbiology Results (last 7 days)     Procedure Component Value Units Date/Time    Blood culture [[id number]] Collected:  09/15/18 1805    Order Status:  Completed Specimen:  Blood from Peripheral, Upper Arm, Right Updated:  09/17/18 2012     Blood Culture, Routine No Growth to date     Blood Culture, Routine No Growth to date     Blood Culture, Routine No Growth to date    Narrative:       Blood cultures from 2 different sites. 4 bottles total.  Please draw before starting antibiotics.    Blood culture [[id number]] Collected:  09/15/18 1819    Order Status:  Completed Specimen:  Blood from Peripheral, Upper Arm, Left Updated:  09/17/18 2012     Blood Culture, Routine No Growth to date     Blood Culture, Routine No Growth to date     Blood Culture, Routine No Growth to date    Narrative:       Blood cultures x 2 different sites. 4 bottles total. Please  draw cultures before administering antibiotics.    Aerobic culture [[id number]] Collected:  09/13/18 0834    Order Status:  Completed Specimen:  Abscess from Back Updated:  09/17/18 0848     Aerobic Bacterial Culture No growth    Narrative:       lumbar paraspinal fluid collection drainage    Blood culture [[id number]] Collected:  09/11/18 1011    Order Status:  Completed Specimen:  Blood Updated:  09/16/18 1212     Blood Culture, Routine No growth after 5 days.    Narrative:       Please draw from 2 separate sites 30 minutes apart. Thank you    Blood culture [[id number]] Collected:  09/11/18 1003    Order  Status:  Completed Specimen:  Blood Updated:  09/16/18 1212     Blood Culture, Routine No growth after 5 days.    Culture, Anaerobe [[id number]] Collected:  09/13/18 0834    Order Status:  Completed Specimen:  Abscess from Back Updated:  09/14/18 0734     Anaerobic Culture Culture in progress    Narrative:       lumbar paraspinal fluid collection drainage    Gram stain [[id number]] Collected:  09/13/18 0834    Order Status:  Completed Specimen:  Abscess from Back Updated:  09/13/18 1946     Gram Stain Result Many WBC's      Many Gram negative rods      Few Gram positive rods    Blood culture [[id number]] Collected:  09/09/18 1731    Order Status:  Completed Specimen:  Blood Updated:  09/13/18 1418     Blood Culture, Routine Gram stain terrell bottle: Gram negative rods      Blood Culture, Routine Results called to and read back by:Sofia Noriega RN 09/11/2018  06:01     Blood Culture, Routine PREVOTELLA (B.) BIVIA    Narrative:       From 2 different sites 30 minutes apart    Blood culture [[id number]] Collected:  09/09/18 1555    Order Status:  Completed Specimen:  Blood Updated:  09/13/18 1417     Blood Culture, Routine Gram stain terrell bottle: Gram negative rods      Blood Culture, Routine Positive results previously called 09/11/2018  13:30     Blood Culture, Routine PREVOTELLA (B.) BIVIA    Gram stain [[id number]] Collected:  09/13/18 0834    Order Status:  Completed Specimen:  Abscess from Back Updated:  09/13/18 1153     Gram Stain Result Many WBC's      Many Gram negative rods      Moderate Gram negative diplococci    Fungus culture [[id number]] Collected:  09/13/18 0834    Order Status:  Sent Specimen:  Abscess from Back Updated:  09/13/18 1040    Gram stain [[id number]]     Order Status:  Completed Specimen:  Abscess from Back     Urine culture [[id number]] Collected:  09/10/18 1701    Order Status:  Completed Specimen:  Urine from Catheterized Updated:  09/12/18 1438     Urine Culture, Routine --     BEN ALBICANS  >  100,000 cfu/ml  Treatment of asymptomatic candiduria is not recommended (except for   specific populations). Candida isolated in the urine typically   represents colonization. If an indwelling urinary catheter is present  it should be removed or replaced.      Narrative:       add on CXURN order #[id number] per Dr. Elina Sandhu @ 19:58    09/10/2018             Significant Diagnostics:      Assessment/Plan:     Spinal epidural abscess    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     -No acute neurosurgical intervention at this time  -MRI with L2-3 osteodiscitis, large fluid collection in surgical bed, as well a prevertebral fluid collection L1,2 s/p IR drainage on 9/13   -Plan for OR postponed secondary to dismal medical status and inability to tolerate surgery at this time  -Please wear LSO brace at all times  -Please hold Xarelto & Plavix   -Please medically optimize and document medical clearance when appropriate for surgery  -Pt will need 2 stage surgery: washout and then instrumentation following.  -Abx per infectious disease recs   -Continue wound care and wound vac  -Medical management per primary team            Thierno Carlos,   Neurosurgery  [facility]

## 2018-09-18 NOTE — ASSESSMENT & PLAN NOTE
--Prior ECHO shows diastolic dysfunction (7/2018)  --Echo (9/13) EF 55-60%; concentric remodeling with RV enlargement; no WMA  --s/p CRRT 9/17

## 2018-09-18 NOTE — SUBJECTIVE & OBJECTIVE
Interval History: naeon    Medications:  Continuous Infusions:   sodium chloride 0.9% 200 mL/hr at 09/18/18 1200    insulin (HUMAN R) infusion (adults) 0.9 Units/hr (09/18/18 1200)     Scheduled Meds:   albuterol-ipratropium  3 mL Nebulization Q4H    chlorhexidine  15 mL Mouth/Throat BID    heparin (porcine)  5,000 Units Subcutaneous Q8H    levothyroxine  150 mcg Per OG tube Before breakfast    magnesium sulfate IVPB  2 g Intravenous Once    miconazole NITRATE 2 %   Topical (Top) BID    pantoprazole  40 mg Intravenous Daily    piperacillin-tazobactam (ZOSYN) IVPB  4.5 g Intravenous Q8H     PRN Meds:dextrose 50%, dextrose 50%, fentaNYL citrate (PF), glucagon (human recombinant), glucose, glucose, insulin aspart U-100, ondansetron, sodium phosphate IVPB, sodium phosphate IVPB, sodium phosphate IVPB     Review of Systems  Objective:     Weight: 102.7 kg (226 lb 6.6 oz)  Body mass index is 38.86 kg/m².  Vital Signs (Most Recent):  Temp: 98.1 °F (36.7 °C) (09/18/18 1219)  Pulse: 91 (09/18/18 1219)  Resp: (!) 35 (09/18/18 1219)  BP: (!) 125/58 (09/18/18 1200)  SpO2: 95 % (09/18/18 1219) Vital Signs (24h Range):  Temp:  [96.8 °F (36 °C)-98.2 °F (36.8 °C)] 98.1 °F (36.7 °C)  Pulse:  [60-91] 91  Resp:  [16-35] 35  SpO2:  [93 %-100 %] 95 %  BP: ()/(52-74) 125/58  Arterial Line BP: (101-230)/() 140/61     Date 09/18/18 0700 - 09/19/18 0659   Shift 2837-9736 5459-8369 7163-2930 24 Hour Total   INTAKE   I.V.(mL/kg) 1127.2(11)   1127.2(11)   NG/   240   IV Piggyback 250   250   Shift Total(mL/kg) 1617.2(15.7)   1617.2(15.7)   OUTPUT   Urine(mL/kg/hr) 60   60   Other 1124   1124   Shift Total(mL/kg) 1184(11.5)   1184(11.5)   Weight (kg) 102.7 102.7 102.7 102.7              Vent Mode: Spont  Oxygen Concentration (%):  [30] 30  Resp Rate Total:  [14 br/min-23 br/min] 21 br/min  Vt Set:  [0 mL] 0 mL  PEEP/CPAP:  [5 cmH20] 5 cmH20  Pressure Support:  [0 cmH20-5 cmH20] 5 cmH20  Mean Airway Pressure:  [7.3  "zaE52-91 cmH20] 7.5 cmH20         NG/OG Tube 09/12/18 1325 orogastric Center mouth (Active)   Placement Check placement verified by x-ray;placement verified by distal tube length measurement 9/18/2018 11:00 AM   Distal Tube Length (cm) 57 9/18/2018 11:00 AM   Tolerance no signs/symptoms of discomfort 9/18/2018 11:00 AM   Securement taped to commercial device 9/18/2018 11:00 AM   Clamp Status/Tolerance unclamped 9/18/2018 11:00 AM   Suction Setting/Drainage Method suction at;low;intermittent setting 9/16/2018  3:05 AM   Insertion Site Appearance no redness, warmth, tenderness, skin breakdown, drainage 9/18/2018 11:00 AM   Flush/Irrigation flushed w/;water;no resistance met 9/18/2018 11:00 AM   Feeding Method continuous 9/18/2018  7:01 AM   Feeding Action feeding continued 9/18/2018 11:00 AM   Current Rate (mL/hr) 40 mL/hr 9/18/2018 11:00 AM   Goal Rate (mL/hr) 40 mL/hr 9/18/2018 11:00 AM   Intake (mL) 60 mL 9/16/2018 11:05 PM   Intake (mL) - Formula Tube Feeding 40 9/18/2018 12:00 PM   Residual Amount (ml) 110 ml 9/18/2018 11:00 AM            Urethral Catheter 09/13/18 1438 Temperature probe (Active)   Site Assessment Clean;Intact 9/18/2018 11:00 AM   Collection Container Urimeter 9/18/2018 11:00 AM   Securement Method secured to top of thigh w/ adhesive device 9/18/2018 11:00 AM   Catheter Care Performed yes 9/18/2018 11:00 AM   Reason for Continuing Urinary Catheterization Critically ill in ICU requiring intensive monitoring 9/18/2018 11:00 AM   CAUTI Prevention Bundle StatLock in place w 1" slack;Intact seal between catheter & drainage tubing;Drainage bag off the floor;Green sheeting clip in use;No dependent loops or kinks;Drainage bag not overfilled (<2/3 full);Drainage bag below bladder 9/18/2018 11:00 AM   Output (mL) 15 mL 9/18/2018 12:00 PM            Trialysis (Dialysis) Catheter 09/12/18 1326 right internal jugular (Active)   IV Device Securement sutures 9/18/2018 11:00 AM   Additional Site Signs no " drainage;no streak formation;no palpable cord;no pain;no edema;no warmth;no erythema 9/18/2018  7:01 AM   Patency/Care infusing 9/18/2018 11:00 AM   Site Assessment Clean;Dry;Intact;No redness;No swelling 9/18/2018 11:00 AM   Status Accessed 9/18/2018 11:00 AM   Dressing Intervention Dressing reinforced 9/18/2018 11:00 AM   Dressing Status Biopatch in place;Clean;Dry;Intact 9/18/2018 11:00 AM   Dressing Change Due 09/22/18 9/18/2018 11:00 AM   Verification by X-ray Yes 9/18/2018 11:00 AM   Site Condition No complications 9/18/2018 11:00 AM   Dressing Occlusive 9/18/2018 11:00 AM   Daily Line Review Performed 9/18/2018 11:00 AM       Neurosurgery Physical Exam   E3VTM6  PERRL  FC x4  CNII-XII grossly intact        Significant Labs:  Recent Labs   Lab  09/17/18 2116  09/17/18   2353  09/18/18   0119  09/18/18   0312  09/18/18   0915   GLU  274*  117*   --   105  141*  141*   NA  129*  139   --   139  136  136   K  3.8  4.2   --   4.2  3.8  3.8   CL  94*  107   --   106  102  102   CO2  23  26   --   27  26  26   BUN  72*  4*   --   3*  27*  27*   CREATININE  3.2*  0.4*   --   0.4*  1.4  1.4   CALCIUM  7.9*  7.3*   --   7.2*  7.8*  7.8*   MG  1.5*   --   1.7  1.5*   --      Recent Labs   Lab  09/17/18   0254  09/17/18   0407  09/18/18   0119   WBC  16.65*  16.11*  20.58*   HGB  7.6*  7.6*  7.3*   HCT  22.1*  21.9*  22.3*   PLT  211  198  225     Recent Labs   Lab  09/17/18   0254  09/18/18   0119   INR  1.2  1.1     Microbiology Results (last 7 days)     Procedure Component Value Units Date/Time    Blood culture [[id number]] Collected:  09/15/18 1805    Order Status:  Completed Specimen:  Blood from Peripheral, Upper Arm, Right Updated:  09/17/18 2012     Blood Culture, Routine No Growth to date     Blood Culture, Routine No Growth to date     Blood Culture, Routine No Growth to date    Narrative:       Blood cultures from 2 different sites. 4 bottles total.  Please draw before starting antibiotics.    Blood  culture [[id number]] Collected:  09/15/18 1819    Order Status:  Completed Specimen:  Blood from Peripheral, Upper Arm, Left Updated:  09/17/18 2012     Blood Culture, Routine No Growth to date     Blood Culture, Routine No Growth to date     Blood Culture, Routine No Growth to date    Narrative:       Blood cultures x 2 different sites. 4 bottles total. Please  draw cultures before administering antibiotics.    Aerobic culture [[id number]] Collected:  09/13/18 0834    Order Status:  Completed Specimen:  Abscess from Back Updated:  09/17/18 0848     Aerobic Bacterial Culture No growth    Narrative:       lumbar paraspinal fluid collection drainage    Blood culture [[id number]] Collected:  09/11/18 1011    Order Status:  Completed Specimen:  Blood Updated:  09/16/18 1212     Blood Culture, Routine No growth after 5 days.    Narrative:       Please draw from 2 separate sites 30 minutes apart. Thank you    Blood culture [[id number]] Collected:  09/11/18 1003    Order Status:  Completed Specimen:  Blood Updated:  09/16/18 1212     Blood Culture, Routine No growth after 5 days.    Culture, Anaerobe [[id number]] Collected:  09/13/18 0834    Order Status:  Completed Specimen:  Abscess from Back Updated:  09/14/18 0734     Anaerobic Culture Culture in progress    Narrative:       lumbar paraspinal fluid collection drainage    Gram stain [[id number]] Collected:  09/13/18 0834    Order Status:  Completed Specimen:  Abscess from Back Updated:  09/13/18 1946     Gram Stain Result Many WBC's      Many Gram negative rods      Few Gram positive rods    Blood culture [[id number]] Collected:  09/09/18 1731    Order Status:  Completed Specimen:  Blood Updated:  09/13/18 1418     Blood Culture, Routine Gram stain terrell bottle: Gram negative rods      Blood Culture, Routine Results called to and read back by:Sofia Noriega RN 09/11/2018  06:01     Blood Culture, Routine PREVOTELLA (B.) BIVIA    Narrative:       From 2 different sites 30 minutes  apart    Blood culture [[id number]] Collected:  09/09/18 1555    Order Status:  Completed Specimen:  Blood Updated:  09/13/18 1417     Blood Culture, Routine Gram stain terrell bottle: Gram negative rods      Blood Culture, Routine Positive results previously called 09/11/2018  13:30     Blood Culture, Routine PREVOTELLA (B.) BIVIA    Gram stain [[id number]] Collected:  09/13/18 0834    Order Status:  Completed Specimen:  Abscess from Back Updated:  09/13/18 1153     Gram Stain Result Many WBC's      Many Gram negative rods      Moderate Gram negative diplococci    Fungus culture [[id number]] Collected:  09/13/18 0834    Order Status:  Sent Specimen:  Abscess from Back Updated:  09/13/18 1040    Gram stain [[id number]]     Order Status:  Completed Specimen:  Abscess from Back     Urine culture [[id number]] Collected:  09/10/18 1701    Order Status:  Completed Specimen:  Urine from Catheterized Updated:  09/12/18 1438     Urine Culture, Routine --     CANDIDA ALBICANS  > 100,000 cfu/ml  Treatment of asymptomatic candiduria is not recommended (except for   specific populations). Candida isolated in the urine typically   represents colonization. If an indwelling urinary catheter is present  it should be removed or replaced.      Narrative:       add on CXURN order #[id number] per Dr. Elina Sandhu @ 19:58    09/10/2018             Significant Diagnostics:

## 2018-09-18 NOTE — SUBJECTIVE & OBJECTIVE
Interval History: Patient with worsening fluid overload yesterday and CRRT started overnight. Remains intubated in the ICU but alert and off pressors. Alert. Awaiting further plans from neurosurgery. IR drainage cultures are NGTD.    Review of Systems   Unable to perform ROS: Intubated     Objective:     Vital Signs (Most Recent):  Temp: 97.7 °F (36.5 °C) (09/18/18 1000)  Pulse: 91 (09/18/18 1000)  Resp: 19 (09/18/18 0809)  BP: 115/61 (09/18/18 1000)  SpO2: 98 % (09/18/18 1000) Vital Signs (24h Range):  Temp:  [95.7 °F (35.4 °C)-98.2 °F (36.8 °C)] 97.7 °F (36.5 °C)  Pulse:  [60-91] 91  Resp:  [16-29] 19  SpO2:  [95 %-100 %] 98 %  BP: ()/(52-74) 115/61  Arterial Line BP: ()/() 126/60     Weight: 102.7 kg (226 lb 6.6 oz)  Body mass index is 38.86 kg/m².    Estimated Creatinine Clearance: 37.4 mL/min (based on SCr of 1.4 mg/dL).    Physical Exam   Constitutional: She appears well-developed and well-nourished. She appears ill.   Intubated, sedated   Neck: JVD present.   RIJ CVC in place   Cardiovascular: Normal rate. An irregularly irregular rhythm present. Exam reveals no friction rub.   No murmur heard.  Pulmonary/Chest: Effort normal. She has no wheezes. She has rales.   Abdominal: Soft. Bowel sounds are normal. She exhibits no distension.   Obese abdomen   Musculoskeletal: She exhibits edema (BLE, right hand).   +2 pitting edema   Neurological: She is alert.   Skin: Skin is warm and dry. She is not diaphoretic. No erythema.   Lumbar wound not examined today.   Nursing note and vitals reviewed.      Significant Labs:   Blood Culture:   Recent Labs   Lab  09/09/18   1731  09/11/18   1003  09/11/18   1011  09/15/18   1805  09/15/18   1819   LABBLOO  Gram stain terrell bottle: Gram negative rods   Results called to and read back by:Sofia Noriega RN 09/11/2018  06:01  PREVOTELLA (B.) BIVIA  No growth after 5 days.  No growth after 5 days.  No Growth to date  No Growth to date  No Growth to date  No  Growth to date  No Growth to date  No Growth to date     CBC:   Recent Labs   Lab  09/17/18   0254  09/17/18   0407  09/18/18   0119   WBC  16.65*  16.11*  20.58*   HGB  7.6*  7.6*  7.3*   HCT  22.1*  21.9*  22.3*   PLT  211  198  225     CMP:   Recent Labs   Lab  09/17/18   0254   09/17/18   2353  09/18/18   0119  09/18/18   0312  09/18/18   0915   NA   --    < >  139   --   139  136  136   K   --    < >  4.2   --   4.2  3.8  3.8   CL   --    < >  107   --   106  102  102   CO2   --    < >  26   --   27  26  26   GLU   --    < >  117*   --   105  141*  141*   BUN   --    < >  4*   --   3*  27*  27*   CREATININE   --    < >  0.4*   --   0.4*  1.4  1.4   CALCIUM   --    < >  7.3*   --   7.2*  7.8*  7.8*   PROT  5.2*   --    --   5.2*   --    --    ALBUMIN  1.6*   < >   --   1.7*  1.5*  1.6*   BILITOT  0.6   --    --   0.5   --    --    ALKPHOS  114   --    --   117   --    --    AST  10   --    --   12   --    --    ALT  8*   --    --   8*   --    --    ANIONGAP   --    < >  6*   --   6*  8  8   EGFRNONAA   --    < >  >60.0   --   >60.0  35.5*  35.5*    < > = values in this interval not displayed.     Urine Culture:   Recent Labs   Lab  06/07/18   1128  09/07/18   2255  09/10/18   1701   LABURIN  No growth  No significant growth  BEN ALBICANS  > 100,000 cfu/ml  Treatment of asymptomatic candiduria is not recommended (except for   specific populations). Candida isolated in the urine typically   represents colonization. If an indwelling urinary catheter is present  it should be removed or replaced.       Urine Studies:   Recent Labs   Lab  07/02/18   1205   09/10/18   1701   COLORU  Yellow   < >  Yellow   APPEARANCEUA  Clear   < >  Cloudy*   PHUR  6.0   < >  5.0   SPECGRAV  1.015   < >  1.020   PROTEINUA  Negative   < >  Negative   GLUCUA  Negative   < >  Negative   KETONESU  Negative   < >  Negative   BILIRUBINUA  Negative   < >  Negative   OCCULTUA  Trace*   < >  3+*   NITRITE  Positive*   < >   Negative   UROBILINOGEN  0.2   < >  Negative   LEUKOCYTESUR  Negative   < >  3+*   RBCUA  1   < >  64*   WBCUA  2   < >  48*   BACTERIA  Few*   --   Rare   SQUAMEPITHEL   --    --   1   HYALINECASTS  1   --    --     < > = values in this interval not displayed.     Wound Culture:   Recent Labs   Lab  07/02/18   1432  09/13/18   0834   LABAERO  ENTEROBACTER CLOACAE COMPLEX  Rare  For susceptibility see order # [id number]    ENTEROBACTER CLOACAE COMPLEX  Moderate    No growth       Significant Imaging: I have reviewed all pertinent imaging results/findings within the past 24 hours.

## 2018-09-18 NOTE — ASSESSMENT & PLAN NOTE
--noted on cultures from 9/9; cleared on 9/11; Positive for Prevotella  --per ID recs: continue vanc and zosyn  --Not fully able to obtain source control as IR only able to partially drain one abscess.   --Washout with Neurosurgery given improvement in hemodynamics either this Thursday or sometime next week.

## 2018-09-18 NOTE — PROGRESS NOTES
CRRT inititiated post alteplase dwell, arterial port remains sluggish, lines reversed. See flowsheet for further information.

## 2018-09-18 NOTE — PLAN OF CARE
Problem: Patient Care Overview  Goal: Plan of Care Review  Outcome: Ongoing (interventions implemented as appropriate)  POC reviewed with pt and family at 1400. Pt unable to verbalize understanding d/t being intubated. Questions and concerns addressed with family. No acute events today. Insulin gtt continued. Tube feeds held at 1600 d/t pending extubation. Pt progressing toward goals. Will continue to monitor. See flowsheets for full assessment and VS info.

## 2018-09-18 NOTE — ASSESSMENT & PLAN NOTE
--CHADS vasc 5 (HTN, DM, Age, and female); hold home rivaroxaban   --hold now given spinal abscess.   --episode of atrial fibrillation with RVR overnight that was corrected with amiodarone drip. Drip discontinued this AM due to hypotension and bradycardia after start of CRRT. No episodes of atrial fibrillation today.

## 2018-09-18 NOTE — ASSESSMENT & PLAN NOTE
--2/2 pulmonary edema in setting of acute on chronic diastolic HF, volume overload and JEY  --chest xray (9/17) with continued pulmonary edema however has improved since 9/15 CXR  --bedside ultrasound of right pleural effusion on 9/14 with small pocket not amendable to thoracentesis.  --s/p CRRT overnight  --Passed SBT this AM; f/u with neurosurgery regarding plan to take patient back to OR to determine if need to extubate in the mean time.

## 2018-09-18 NOTE — ASSESSMENT & PLAN NOTE
82 year old female with history of L2-3 fusion 6/5 complicated by post operative infection with Enterobacter cloacae. She has been on outpatient IV Ciprofloxacin and has undergone an I&D on 7/2 with partial hardware removal on 8/7 without resolution of her infection. 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     Neurosurgery had planned to take patient to OR for washout & fusion, however patient developed acute respiratory failure requiring intubation and transfer to the ICU with CXR showing pulmonary edema. Blood cultures 9/9 returned positive for Prevotella. Urine is showing Candida. She underwent IR drainage of paraspinal abscess on 9/13 with 5 mL purulent fluid removed. Gram stain is showing many GNRs, moderate gram negative diplococci and few GPRs. Abscess cultures are NGTD.      She is currently on empiric Vanc, Zosyn and Fluconazole. Surgery has been postponed due to decline in medical status. Patient remains intubated in the ICU. Still very volume overloaded and was started on CRRT overnight. Afebrile. Leukocytosis 20K. Repeat blood cultures are NGTD.  Awaiting final surgical plans.      Plan  - Continue empiric Zosyn and Vancomycin (renal adjusted). Vanc dose x 1 given today. Random level ordered for a.m.   - Fluconazole discontinued   - Will follow all outstanding cultures to guide antibiotic therapy.     - Continue management per primary team  - Ideally patient needs surgical washout for most optimal chance of cure of this infection. Neurosurgery is following.  - ID will follow.

## 2018-09-18 NOTE — PROGRESS NOTES
Patient seen for follow up wound vac change to back. Wound cleansed with sterile normal saline. Moist red wound bed, small amount of yellow slough noted. Small serous drainage noted, no odor. Wound packed with black sponge and bridged trac pad to right side of abdomen to offset pressure from trac pad. No leak noted at 125mmHg, foam dressing applied over drape to prevent shearing of drape. Patient tolerated care well. Wound care to follow vac changes twice a week. Nursing to continue care.         09/18/18 1529       Incision/Site 08/07/18 1535 Back midline vertical   Date First Assessed/Time First Assessed: 08/07/18 1535   Present Prior to Hospital Arrival?: Yes  Location: Back  Orientation: (c) midline  Incision Type: vertical  Closure Method: (c) Other (see comments)   Wound Image    Incision WDL ex   Drainage Amount Small   Drainage Characteristics/Odor Serous;No odor   Appearance Moist;Red;Slough  (sutures noted to wound base)   Red (%), Wound Tissue Color 95 %   Yellow (%), Wound Tissue Color 5 %   Periwound Area Intact   Wound Edges Open   Wound Length (cm) 11 cm   Wound Width (cm) 5.5 cm   Wound Depth (cm) 1.5 cm   Wound Volume (cm^3) 90.75 cm^3   Care Cleansed with:;Sterile normal saline   Dressing Changed   Dressing Change Due 09/21/18       Negative Pressure Wound Therapy    Placement Date/Time: 08/07/18 (c) 7020   Orientation: midline  Location: (c) Back  Additional Comments: continuous negative pressure at 125 mmhg   NPWT Type Vacuum Therapy   Therapy Setting NPWT Continuous therapy   Pressure Setting NPWT 125 mmHg   Therapy Interventions NPWT Dressing changed   Sponges Inserted NPWT Black;1  (bridged trac pad to right side of abdomen)

## 2018-09-18 NOTE — ASSESSMENT & PLAN NOTE
--multifactorial: sepsis/uremia, medications  --non focal on exam  --more awake and alert; following commands appropriately  --weaned off sedation and levophed

## 2018-09-18 NOTE — PROGRESS NOTES
Spoke to critical care concerning Pt B/P. Ordered to stop Amnio before starting levo. Will continue to monitor.

## 2018-09-18 NOTE — PLAN OF CARE
Problem: Patient Care Overview  Goal: Plan of Care Review  Outcome: Ongoing (interventions implemented as appropriate)  POC reviewed with pt at 0330. Pt unable to verbalized understanding due to intubation. Questions and concerns addressed. Pt restarted on levo due to low B/P. Pt CRRT continued.  Pt progressing toward goals. Will continue to monitor. See flowsheets for full assessment and VS info

## 2018-09-18 NOTE — PROGRESS NOTES
Spoke to Critical care concerning Pt B/P order given to lower UF to 200. Will continue to monitor.

## 2018-09-18 NOTE — ASSESSMENT & PLAN NOTE
--2/2 diastolic HF. No known lung disease  --Echo (9/13) PA systolic pressure estimated at 88 mm Hg  --reportedly on revatio at home.   --Underwent CRRT 9/17

## 2018-09-18 NOTE — ASSESSMENT & PLAN NOTE
--hold BB in setting of recent shock  --hold statin for now  --s/p VIVIANA to Lcx in June 2017 and on plavix prior to admission. Holding in setting of possible upcoming procedures.

## 2018-09-18 NOTE — ASSESSMENT & PLAN NOTE
--most likely cardiorenal syndrome vs sepsis (ATN)  --retroperitoneal ultrasound on 9/9 consistent with medical renal disease.   --Nephrology following   --Underwent CRRT 9/17

## 2018-09-18 NOTE — PROGRESS NOTES
[facility]  Nephrology  Progress Note    Patient Name: Medina Frazier  MRN: [id number]  Admission Date: 9/8/2018  Hospital Length of Stay: 10 days  Attending Provider: An Ward MD   Primary Care Physician: Hao Garcia MD  Principal Problem:Acute hypercapnic respiratory failure    Subjective:     HPI: 81yo WF who was to have a lumbar fusion later in the month and was admitted due to abnormal pre-op labs.  Nephrology consulted for JEY (sCr 2.2 on admit, 3.1 at time of consult, normal baseline), also with a sodium of 119 (133 on August 10th). Patient appears somnolent, but wakes up and appropriately answers questions, per family she is at her baseline.  Patient is not complaining of increasing shortness of breath, she in on 3L O2 when seen, this is what she is on at home, she has history of LENA and is supposed to be on CPAP at night, but not compliant.  CXR reviewed and looks like pulmonary edema/congestion on admit.    Interval History: started on SLED late yesterday afternoon, neurosurgery considering taking her to the OR for a washout as her hemodynamics have improved, phos < 1.0 this morning, being replaced    Review of patient's allergies indicates:   Allergen Reactions    Codeine Nausea Only    Penicillins Swelling     Able to take amoxil and cephalosporins      Current Facility-Administered Medications   Medication Frequency    0.9%  NaCl infusion (CRRT USE ONLY) Continuous    albuterol-ipratropium 2.5 mg-0.5 mg/3 mL nebulizer solution 3 mL Q4H    chlorhexidine 0.12 % solution 15 mL BID    dextrose 50% injection 12.5 g PRN    dextrose 50% injection 25 g PRN    fentaNYL citrate (PF) Syrg 25 mcg Q2H PRN    glucagon (human recombinant) injection 1 mg PRN    glucose chewable tablet 16 g PRN    glucose chewable tablet 24 g PRN    heparin (porcine) injection 5,000 Units Q8H    insulin aspart U-100 pen 0-5 Units Q4H PRN    insulin regular (Humulin R) 100 Units in sodium  chloride 0.9% 100 mL infusion Continuous    levothyroxine tablet 150 mcg Before breakfast    magnesium sulfate 2g in water 50mL IVPB (premix) Once    miconazole NITRATE 2 % top powder BID    ondansetron injection 4 mg Q8H PRN    pantoprazole injection 40 mg Daily    piperacillin-tazobactam 4.5 g in sodium chloride 0.9% 100 mL IVPB (ready to mix system) Q12H    sodium phosphate 20.01 mmol in dextrose 5 % 250 mL IVPB PRN    sodium phosphate 30 mmol in dextrose 5 % 250 mL IVPB PRN    sodium phosphate 39.99 mmol in dextrose 5 % 250 mL IVPB PRN    vancomycin 1.5 g in 5 % dextrose 250 mL IVPB Once       Objective:     Vital Signs (Most Recent):  Temp: 97.7 °F (36.5 °C) (09/18/18 1000)  Pulse: 91 (09/18/18 1000)  Resp: 19 (09/18/18 0809)  BP: 115/61 (09/18/18 1000)  SpO2: 98 % (09/18/18 1000)  O2 Device (Oxygen Therapy): ventilator (09/18/18 1000) Vital Signs (24h Range):  Temp:  [95.5 °F (35.3 °C)-98.2 °F (36.8 °C)] 97.7 °F (36.5 °C)  Pulse:  [60-91] 91  Resp:  [16-29] 19  SpO2:  [95 %-100 %] 98 %  BP: ()/(52-74) 115/61  Arterial Line BP: ()/() 126/60     Weight: 102.7 kg (226 lb 6.6 oz) (09/14/18 0505)  Body mass index is 38.86 kg/m².  Body surface area is 2.15 meters squared.    I/O last 3 completed shifts:  In: 2508.3 [I.V.:638.3; NG/GT:1420; IV Piggyback:450]  Out: 2446 [Urine:330; Other:2116]    Physical Exam   HENT:   Head: Atraumatic.   Cardiovascular: Normal rate and regular rhythm.   Pulmonary/Chest: She has rales.   Musculoskeletal: She exhibits edema. She exhibits no tenderness.   Skin: Skin is warm.         Assessment/Plan:     JEY (acute kidney injury)    Non-oliguric, likely multifactorial, the urine sodium of 10, could be consistent with CRS in this patient with what appears to be acute CHF exacerbation and volume overload, however would be careful reading too much in to this urinary sodium value, also consider ATN toxic secondary infection, ischemic ATN, also possibly vancomycin  contributing to worsening sCr since admit further noted with WBCs in the urine of > 100, need to r/o infection which would be unlikely to cause an JEY (unless there is a bilateral pyelonephritis, which clinically does not fit, nor consistent with U/S findings), in short, if urine cultures negative need to consider AIN (outpatient abxs), absence of eosinophilia, rash or fever, may make a little less likely, but does not rule out.    Renal ultrasound unremarkable and negative for hydronephrosis.    Started on SLED late yesterday for metabolic clearance and gentle volume remova    Plan/Recommendations:  -for now continue SLED until decision can be made regarding definitive surgical tx., from an RRT perspective likely will be able to run 12hrs nightly, also still making some urine so will consider lasix challenge, but with upcoming surgery, no changes for now            Thank you for your consult. I will follow-up with patient. Please contact us if you have any additional questions.    Allen Barnes MD  Nephrology  [facility]

## 2018-09-18 NOTE — PLAN OF CARE
Patient remains intubated and on CRRT.  Neurosurgery and ID following.  Neurosurgery plans to take patient for surgical washout once medically stable.  CM will continue to follow.     09/18/18 1106   Right Care Assessment   Can the patient answer the patient profile reliably? No, cognitively impaired   How often would a person be available to care for the patient? Whenever needed   Describe the patient's ability to walk at the present time. Does not walk or unable to take any steps at all   How does the patient rate their overall health at the present time? Poor   Number of comorbid conditions (as recorded on the chart) Three   During the past month, has the patient often been bothered by feeling down, depressed or hopeless? No   Have you felt down, depressed, or hopeless? Unable to Assess   During the past month, has the patient often been bothered by little interest or pleasure in doing things? No   Have you felt little interest or pleasure in doing things? Unable to assess   Fauzia Holland, RN, BSN, [facility]  Case Management  [facility]  Ext. [phone]

## 2018-09-18 NOTE — ASSESSMENT & PLAN NOTE
--secondary to spinal abscesses  --blood cultures with prevotella, repeat blood cultures ngtd  --gram stain from spinal abscess with many GNR, moderate gram neg diplocci.   --currently off norepinephrine.   --Abx as above.

## 2018-09-18 NOTE — PROGRESS NOTES
[facility]  Infectious Disease  Progress Note    Patient Name: Medina Frazier  MRN: [id number]  Admission Date: 9/8/2018  Length of Stay: 10 days  Attending Physician: An Ward MD  Primary Care Provider: Hao Garcia MD    Isolation Status: No active isolations  Assessment/Plan:      Spinal epidural abscess    82 year old female with history of L2-3 fusion 6/5 complicated by post operative infection with Enterobacter cloacae. She has been on outpatient IV Ciprofloxacin and has undergone an I&D on 7/2 with partial hardware removal on 8/7 without resolution of her infection. 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     Neurosurgery had planned to take patient to OR for washout & fusion, however patient developed acute respiratory failure requiring intubation and transfer to the ICU with CXR showing pulmonary edema. Blood cultures 9/9 returned positive for Prevotella. Urine is showing Candida. She underwent IR drainage of paraspinal abscess on 9/13 with 5 mL purulent fluid removed. Gram stain is showing many GNRs, moderate gram negative diplococci and few GPRs. Abscess cultures are NGTD.      She is currently on empiric Vanc, Zosyn and Fluconazole. Surgery has been postponed due to decline in medical status. Patient remains intubated in the ICU. Still very volume overloaded and was started on CRRT overnight. Afebrile. Leukocytosis 20K. Repeat blood cultures are NGTD.  Awaiting final surgical plans.      Plan  - Continue empiric Zosyn and Vancomycin (renal adjusted). Vanc dose x 1 given today. Random level ordered for a.m.   - Fluconazole discontinued   - Will follow all outstanding cultures to guide antibiotic therapy.     - Continue management per primary team  - Ideally patient needs surgical washout for most optimal chance of cure of this infection. Neurosurgery  is following.  - ID will follow.            Please call for any questions. Thank you.  Cora Boyer PA-C  Phone: [phone]  Pager: [phone]    Subjective:     Principal Problem:Acute hypercapnic respiratory failure    HPI: Medina Frazier is an 82 year old female who underwent L2-3 fusion on 6/5/18. She did well until shortly after staples removed,   drainage from the lower part of the incision was noted. On 7/2 she went to the OR for an I&D. Purulence was encountered. Surgical cultures grew Enterobacter cloacae. She was seen by Dr. Lenz, ID provider who started patient on IV Ciprofloxacin for 6 weeks. Patient's infection did not resolve and wound continued to drain. She was taken back to the OR on 8/7 for hardware removal, however appears on most recent imaging an intervertebral cage remains in place. No new surgical cultures obtained at that time. She was continued on Cipro. Patient's back pain has worsened over the past week and is now wrapping around to her anterior abdomen. She presented to an OS  ED in [place] for evaluation. 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 Labs notable for elevated WBC, ESR, CRP and pyuria. Procalc 19. JEY.  Blood and urine culture negative.  She was transferred here for higher level of care. Currently she is on Cipro from her previous cultures, and was started on Vanc on 9/8. Patient denies fevers, chills, sweats. Reports severe back pain and lower extremity weakness. Magaña in place.    Of note, patient has a documented PCN allergy. She said she has tolerated PCN in the recent past without adverse reactions. 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 No problems since.      Interval History: Patient with worsening fluid overload yesterday and CRRT started overnight. Remains intubated in the ICU but alert and off  pressors. Alert. Awaiting further plans from neurosurgery. IR drainage cultures are NGTD.    Review of Systems   Unable to perform ROS: Intubated     Objective:     Vital Signs (Most Recent):  Temp: 97.7 °F (36.5 °C) (09/18/18 1000)  Pulse: 91 (09/18/18 1000)  Resp: 19 (09/18/18 0809)  BP: 115/61 (09/18/18 1000)  SpO2: 98 % (09/18/18 1000) Vital Signs (24h Range):  Temp:  [95.7 °F (35.4 °C)-98.2 °F (36.8 °C)] 97.7 °F (36.5 °C)  Pulse:  [60-91] 91  Resp:  [16-29] 19  SpO2:  [95 %-100 %] 98 %  BP: ()/(52-74) 115/61  Arterial Line BP: ()/() 126/60     Weight: 102.7 kg (226 lb 6.6 oz)  Body mass index is 38.86 kg/m².    Estimated Creatinine Clearance: 37.4 mL/min (based on SCr of 1.4 mg/dL).    Physical Exam   Constitutional: She appears well-developed and well-nourished. She appears ill.   Intubated, sedated   Neck: JVD present.   RIJ CVC in place   Cardiovascular: Normal rate. An irregularly irregular rhythm present. Exam reveals no friction rub.   No murmur heard.  Pulmonary/Chest: Effort normal. She has no wheezes. She has rales.   Abdominal: Soft. Bowel sounds are normal. She exhibits no distension.   Obese abdomen   Musculoskeletal: She exhibits edema (BLE, right hand).   +2 pitting edema   Neurological: She is alert.   Skin: Skin is warm and dry. She is not diaphoretic. No erythema.   Lumbar wound not examined today.   Nursing note and vitals reviewed.      Significant Labs:   Blood Culture:   Recent Labs   Lab  09/09/18   1731  09/11/18   1003  09/11/18   1011  09/15/18   1805  09/15/18   1819   LABBLOO  Gram stain terrell bottle: Gram negative rods   Results called to and read back by:Sofia Noriega RN 09/11/2018  06:01  PREVOTELLA (B.) BIVIA  No growth after 5 days.  No growth after 5 days.  No Growth to date  No Growth to date  No Growth to date  No Growth to date  No Growth to date  No Growth to date     CBC:   Recent Labs   Lab  09/17/18   0254  09/17/18   0407  09/18/18   0119   WBC   16.65*  16.11*  20.58*   HGB  7.6*  7.6*  7.3*   HCT  22.1*  21.9*  22.3*   PLT  211  198  225     CMP:   Recent Labs   Lab  09/17/18   0254   09/17/18   2353  09/18/18   0119  09/18/18   0312  09/18/18   0915   NA   --    < >  139   --   139  136  136   K   --    < >  4.2   --   4.2  3.8  3.8   CL   --    < >  107   --   106  102  102   CO2   --    < >  26   --   27  26  26   GLU   --    < >  117*   --   105  141*  141*   BUN   --    < >  4*   --   3*  27*  27*   CREATININE   --    < >  0.4*   --   0.4*  1.4  1.4   CALCIUM   --    < >  7.3*   --   7.2*  7.8*  7.8*   PROT  5.2*   --    --   5.2*   --    --    ALBUMIN  1.6*   < >   --   1.7*  1.5*  1.6*   BILITOT  0.6   --    --   0.5   --    --    ALKPHOS  114   --    --   117   --    --    AST  10   --    --   12   --    --    ALT  8*   --    --   8*   --    --    ANIONGAP   --    < >  6*   --   6*  8  8   EGFRNONAA   --    < >  >60.0   --   >60.0  35.5*  35.5*    < > = values in this interval not displayed.     Urine Culture:   Recent Labs   Lab  06/07/18   1128  09/07/18   2255  09/10/18   1701   LABURIN  No growth  No significant growth  BEN ALBICANS  > 100,000 cfu/ml  Treatment of asymptomatic candiduria is not recommended (except for   specific populations). Candida isolated in the urine typically   represents colonization. If an indwelling urinary catheter is present  it should be removed or replaced.       Urine Studies:   Recent Labs   Lab  07/02/18   1205   09/10/18   1701   COLORU  Yellow   < >  Yellow   APPEARANCEUA  Clear   < >  Cloudy*   PHUR  6.0   < >  5.0   SPECGRAV  1.015   < >  1.020   PROTEINUA  Negative   < >  Negative   GLUCUA  Negative   < >  Negative   KETONESU  Negative   < >  Negative   BILIRUBINUA  Negative   < >  Negative   OCCULTUA  Trace*   < >  3+*   NITRITE  Positive*   < >  Negative   UROBILINOGEN  0.2   < >  Negative   LEUKOCYTESUR  Negative   < >  3+*   RBCUA  1   < >  64*   WBCUA  2   < >  48*   BACTERIA  Few*   --    Rare   SQUAMEPITHEL   --    --   1   HYALINECASTS  1   --    --     < > = values in this interval not displayed.     Wound Culture:   Recent Labs   Lab  07/02/18   1432  09/13/18   0834   LABAERO  ENTEROBACTER CLOACAE COMPLEX  Rare  For susceptibility see order # [id number]    ENTEROBACTER CLOACAE COMPLEX  Moderate    No growth       Significant Imaging: I have reviewed all pertinent imaging results/findings within the past 24 hours.

## 2018-09-18 NOTE — ASSESSMENT & PLAN NOTE
Non-oliguric, likely multifactorial, the urine sodium of 10, could be consistent with CRS in this patient with what appears to be acute CHF exacerbation and volume overload, however would be careful reading too much in to this urinary sodium value, also consider ATN toxic secondary infection, ischemic ATN, also possibly vancomycin contributing to worsening sCr since admit further noted with WBCs in the urine of > 100, need to r/o infection which would be unlikely to cause an JEY (unless there is a bilateral pyelonephritis, which clinically does not fit, nor consistent with U/S findings), in short, if urine cultures negative need to consider AIN (outpatient abxs), absence of eosinophilia, rash or fever, may make a little less likely, but does not rule out.    Renal ultrasound unremarkable and negative for hydronephrosis.    Started on SLED late yesterday for metabolic clearance and gentle volume remova    Plan/Recommendations:  -for now continue SLED until decision can be made regarding definitive surgical tx., from an RRT perspective likely will be able to run 12hrs nightly, also still making some urine so will consider lasix challenge, but with upcoming surgery, no changes for now

## 2018-09-19 PROBLEM — E87.1 HYPONATREMIA: Status: RESOLVED | Noted: 2018-01-01 | Resolved: 2018-01-01

## 2018-09-19 NOTE — PROGRESS NOTES
[facility]  Infectious Disease  Progress Note    Patient Name: Medina Frazier  MRN: [id number]  Admission Date: 9/8/2018  Length of Stay: 11 days  Attending Physician: An Ward MD  Primary Care Provider: Hao Garcia MD    Isolation Status: No active isolations  Assessment/Plan:      Spinal epidural abscess    82 year old female with history of L2-3 fusion 6/5 complicated by post operative infection with Enterobacter cloacae. She has been on outpatient IV Ciprofloxacin and has undergone an I&D on 7/2 with partial hardware removal on 8/7 without resolution of her infection. 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     Patient's hospital course has been complicated by acute respiratory failure requiring intubation and transfer to the ICU with CXR showing pulmonary edema. Surgery has been postponed due to decline in medical status. Blood cultures 9/9 returned positive for Prevotella. She underwent IR drainage of paraspinal abscess on 9/13 with 5 mL purulent fluid removed. Gram stain is showing many GNRs, moderate gram negative diplococci and few GPRs. Abscess cultures are also showing Prevotella.      She is currently on empiric Vanc and Zosyn. Patient remains intubated in the ICU. Afebrile. Leukocytosis/CRP/procalc are trending down. Repeat blood cultures are NGTD. Volume status improved. She is scheduled to go to the OR tomorrow with NSGY for a washout.       Plan  - Continue Zosyn 4.5 g IV q 8 hours   - Recommend discontinuing Vancomycin given no GPCs on current culture data  - When taken to the OR, please obtain surgical cultures and send for gram stain, aerobic, anaerobic, AFB and fungal cultures   - Will follow cultures to guide antibiotic therapy.  - Anticipate 8 weeks of antibiotics      - ID will follow.            Please call for any questions. Thank  you.  Cora Boyer PA-C  Phone: [phone]  Pager: [phone]    Subjective:     Principal Problem:Acute hypercapnic respiratory failure    HPI: Medina Frazier is an 82 year old female who underwent L2-3 fusion on 6/5/18. She did well until shortly after staples removed,   drainage from the lower part of the incision was noted. On 7/2 she went to the OR for an I&D. Purulence was encountered. Surgical cultures grew Enterobacter cloacae. She was seen by Dr. Lenz, ID provider who started patient on IV Ciprofloxacin for 6 weeks. Patient's infection did not resolve and wound continued to drain. She was taken back to the OR on 8/7 for hardware removal, however appears on most recent imaging an intervertebral cage remains in place. No new surgical cultures obtained at that time. She was continued on Cipro. Patient's back pain has worsened over the past week and is now wrapping around to her anterior abdomen. She presented to an OSH  ED in [place] for evaluation. 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 Labs notable for elevated WBC, ESR, CRP and pyuria. Procalc 19. JEY.  Blood and urine culture negative.  She was transferred here for higher level of care. Currently she is on Cipro from her previous cultures, and was started on Vanc on 9/8. Patient denies fevers, chills, sweats. Reports severe back pain and lower extremity weakness. Magaña in place.    Of note, patient has a documented PCN allergy. She said she has tolerated PCN in the recent past without adverse reactions. 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 No problems since.      Interval History: Patient remains intubated in the ICU. CRRT on hold this morning. Her abscess cultures have returned with Prevotella growth. She is scheduled to go to the OR tomorrow for a washout.    Review of Systems    Unable to perform ROS: Intubated     Objective:     Vital Signs (Most Recent):  Temp: 98.8 °F (37.1 °C) (09/19/18 1137)  Pulse: 94 (09/19/18 1137)  Resp: (!) 23 (09/19/18 1137)  BP: (!) 144/67 (09/19/18 1102)  SpO2: 99 % (09/19/18 1137) Vital Signs (24h Range):  Temp:  [98.1 °F (36.7 °C)-99.3 °F (37.4 °C)] 98.8 °F (37.1 °C)  Pulse:  [] 94  Resp:  [15-39] 23  SpO2:  [93 %-100 %] 99 %  BP: (111-178)/(55-99) 144/67  Arterial Line BP: (108-167)/(49-90) 144/67     Weight: 102.7 kg (226 lb 6.6 oz)  Body mass index is 38.86 kg/m².    Estimated Creatinine Clearance: 87.2 mL/min (based on SCr of 0.6 mg/dL).    Physical Exam   Constitutional: She appears well-developed and well-nourished. She appears ill.   Intubated, sedated   Neck: JVD present.   RIJ CVC in place   Cardiovascular: Normal rate. An irregularly irregular rhythm present. Exam reveals no friction rub.   No murmur heard.  Pulmonary/Chest: Effort normal. She has no wheezes. She has rales.   Abdominal: Soft. Bowel sounds are normal. She exhibits no distension.   Obese abdomen   Musculoskeletal: She exhibits edema (BLE, right hand).   +2 pitting edema   Neurological: She is alert.   Skin: Skin is warm and dry. She is not diaphoretic. No erythema.   Lumbar wound not examined today.   Nursing note and vitals reviewed.      Significant Labs:   Blood Culture:   Recent Labs   Lab  09/09/18   1731  09/11/18   1003  09/11/18   1011  09/15/18   1805  09/15/18   1819   LABBLOO  Gram stain terrell bottle: Gram negative rods   Results called to and read back by:Sofia Noriega RN 09/11/2018  06:01  PREVOTELLA (B.) BIVIA  No growth after 5 days.  No growth after 5 days.  No Growth to date  No Growth to date  No Growth to date  No Growth to date  No Growth to date  No Growth to date  No Growth to date  No Growth to date     CBC:   Recent Labs   Lab  09/18/18   0119  09/19/18   0109   WBC  20.58*  19.17*   HGB  7.3*  7.0*   HCT  22.3*  22.0*   PLT  225  177     CMP:    Recent Labs   Lab  09/18/18   0119   09/18/18   2204  09/19/18   0109  09/19/18   0125  09/19/18   0508  09/19/18   0834   NA   --    < >  137  139  139  138  141  144   K   --    < >  4.0  4.2  4.2  4.2  4.2  4.5   CL   --    < >  106  107  107  107  110  111*   CO2   --    < >  25  26  26  24  26  26   GLU   --    < >  138*  145*  145*  154*  139*  134*   BUN   --    < >  10  8  8  9  6*  6*   CREATININE   --    < >  0.7  0.7  0.7  0.7  0.5  0.6   CALCIUM   --    < >  8.0*  7.9*  7.9*  7.8*  7.9*  8.1*   PROT  5.2*   --    --   5.0*   --    --    --    ALBUMIN  1.7*   < >  1.6*  1.6*  1.6*  1.5*   --    --    BILITOT  0.5   --    --   0.6   --    --    --    ALKPHOS  117   --    --   110   --    --    --    AST  12   --    --   13   --    --    --    ALT  8*   --    --   6*   --    --    --    ANIONGAP   --    < >  6*  6*  6*  7*  5*  7*   EGFRNONAA   --    < >  >60.0  >60.0  >60.0  >60.0  >60.0  >60.0    < > = values in this interval not displayed.     Urine Culture:   Recent Labs   Lab  06/07/18   1128  09/07/18   2255  09/10/18   1701   LABURIN  No growth  No significant growth  BEN ALBICANS  > 100,000 cfu/ml  Treatment of asymptomatic candiduria is not recommended (except for   specific populations). Candida isolated in the urine typically   represents colonization. If an indwelling urinary catheter is present  it should be removed or replaced.       Urine Studies:   Recent Labs   Lab  07/02/18   1205   09/10/18   1701   COLORU  Yellow   < >  Yellow   APPEARANCEUA  Clear   < >  Cloudy*   PHUR  6.0   < >  5.0   SPECGRAV  1.015   < >  1.020   PROTEINUA  Negative   < >  Negative   GLUCUA  Negative   < >  Negative   KETONESU  Negative   < >  Negative   BILIRUBINUA  Negative   < >  Negative   OCCULTUA  Trace*   < >  3+*   NITRITE  Positive*   < >  Negative   UROBILINOGEN  0.2   < >  Negative   LEUKOCYTESUR  Negative   < >  3+*   RBCUA  1   < >  64*   WBCUA  2   < >  48*   BACTERIA  Few*   --   Rare    SQUAMEPITHEL   --    --   1   HYALINECASTS  1   --    --     < > = values in this interval not displayed.     Wound Culture:   Recent Labs   Lab  07/02/18   1432  09/13/18   0834   LABAERO  ENTEROBACTER CLOACAE COMPLEX  Rare  For susceptibility see order # [id number]    ENTEROBACTER CLOACAE COMPLEX  Moderate    No growth       Significant Imaging: I have reviewed all pertinent imaging results/findings within the past 24 hours.

## 2018-09-19 NOTE — ASSESSMENT & PLAN NOTE
--CHADS vasc 5 (HTN, DM, Age, and female); hold home rivaroxaban   --hold now given spinal abscess.

## 2018-09-19 NOTE — ASSESSMENT & PLAN NOTE
--2/2 pulmonary edema in setting of acute on chronic diastolic HF, volume overload and JEY  --chest xray (9/17) with continued pulmonary edema however has improved since 9/15 CXR  --bedside ultrasound of right pleural effusion on 9/14 with small pocket not amendable to thoracentesis.  --s/p CRRT  --Mucus plug event today, resolved with bagging. Repeat CXR today with no pneumothorax or atelectasis. Still persistent RLL opacity  --passed SBT with plans of extubation to bipap today

## 2018-09-19 NOTE — PROGRESS NOTES
[facility]  Critical Care Medicine  Progress Note    Patient Name: Medina Frazier  MRN: [id number]  Admission Date: 9/8/2018  Hospital Length of Stay: 11 days  Code Status: DNR  Attending Provider: An Ward MD  Primary Care Provider: Hao Garcia MD   Principal Problem: Acute hypercapnic respiratory failure    Subjective:     HPI:  Mrs. Frazier is a 82 yr old female with history significant for diastolic HF, CAD s/p VIVIANA Lcx (June 2017), HTN, DM2, and lumbar stenosis s/p lumbar fusion in June 2018 with subsequent hardware infection and I&D on 7/2 and repeat lumbar I&D, hardware removal, decompression and wound vac placement to L2-L3 due to continued epidural abscess. She was discharged recently to a SNF for rehab and continued IV antibiotic therapy. She originally presented to [facility] on 9/8 from [facility] after labwork revealed JEY, hyponatremia. Additionally, CT lumbar spine obtained 9/5 was concerning for persistent osteomyelitis, epidural abscess in L2-L3 region.     She was transferred to [facility] on 9/8 for higher level of care and neurosurgery evaluation. Admission has been complicated by continued JEY with creatinine up trending from baseline 0.8 to 1.0 to 3.0 along with hyponatremia felt to be related to acute on chronic diastolic heart failure, volume overload. Nephrology was consulted and has been assisting with management. She was started on lasix 100 mg BID with minimal urine output response and creatinine slowly uptrending. Additionally, blood cultures obtained 9/9 are growing GNR's, awaiting speciation. Suspect this is enterobacter given continued osteomyelitis of L2-L3, complex fluid collection within laminectomy bed measuring 3x8x5 cm concerning for abscess, and additional prevertebral collection anterior to L1 and L2 vertebral bodies measuring 4x6x1 abutting the abdominal aorta concerning for abscess noted on MRI from 9/10. Neurosurgery were planning on repeat  I&D for source control but held off given the above medical issues with JEY, hyponatremia. She has been on vancomycin, cefepime since 9/9 and blood cultures from 9/11 are NGTD.     She was transferred to the MICU on the AM of 9/12 for acute hypercapnic respiratory failure with concern for aspiration event, worsening encephalopathy, and worsening JEY.      Hospital/ICU Course:  Upon transfer to MICU, Mrs. Frazier's respiratory and mental status worsened despite Bipap and lasix trial. She was intubated and she developed shock requiring low dose levophed, felt to be septic in setting of bacteremia. A trialysis line was placed for venous access, vasopressors, and possible need for CRRT. Arterial line placed for frequent ABGs. Cefepime and vancomycin continued as ID and neurosurgery following. Added on flagyl as micro lab thinks BCx 9/9 may be growing anaerobes. IR successfully drained epidural abscess (9/13). Continuing lasix trial as patient still appears volume overloaded. Norepinephrine requirements increasing on 9/14.  Discussed with neurosurgery, would need to be off vasopressors and hemodynamically stable prior to surgical intervention. Family meeting on 9/14, code status changed to DNR.  Weaned off vasopressors on 9/16.  Remains on ventilator due to significant pulmonary edema.  Aggressive diuresis started without significant response. Patient had an episode of A fib RVR and put on amiodarone drip on 9/17. CRRT started 9/17 however patient became hypotensive and bradycardic requiring levophed again. Discontinued amiodarone at that time. Patient successfully weaned off levophed. CRRT ran continuously 9/18. Mucus plug event (9/19) after patient's tube was suctioned. Patient became tachycardic and hypertensive with absent breath sounds bilaterally. Sats decreased into the 70s and tidal volumes less than 100. Patient was bagged and mucus plug was dislodged.  Passed SBT and extubated to bipap (9/19). CRRT discontinued  and pending diuresis trial.     Interval History/Significant Events: Reported run of 21 beats of V tach overnight. Patient was anxious so propofol drip was started. CRRT continued to run overnight with an increased UF from 200 to 450 ml/hr. Mucus plug event this morning after patients tube was suctioned. Patient became tachycardic and hypertensive with absent breath sounds bilaterally. Sats decreased into the 70s and tidal volumes less than 100. Patient was bagged and mucus plug was dislodged.     Review of Systems   Unable to perform ROS: Intubated     Objective:     Vital Signs (Most Recent):  Temp: 98.8 °F (37.1 °C) (09/19/18 1137)  Pulse: 94 (09/19/18 1137)  Resp: (!) 23 (09/19/18 1137)  BP: (!) 144/67 (09/19/18 1102)  SpO2: 99 % (09/19/18 1137) Vital Signs (24h Range):  Temp:  [98.1 °F (36.7 °C)-99.3 °F (37.4 °C)] 98.8 °F (37.1 °C)  Pulse:  [] 94  Resp:  [15-39] 23  SpO2:  [93 %-100 %] 99 %  BP: (111-178)/(55-99) 144/67  Arterial Line BP: (108-167)/(49-90) 144/67   Weight: 102.7 kg (226 lb 6.6 oz)  Body mass index is 38.86 kg/m².      Intake/Output Summary (Last 24 hours) at 9/19/2018 1219  Last data filed at 9/19/2018 1102  Gross per 24 hour   Intake 5170.43 ml   Output 6052 ml   Net -881.57 ml       Physical Exam   Constitutional: Vital signs are normal. She appears well-developed and well-nourished. She is cooperative. She appears distressed. She is intubated and restrained.   HENT:   Head: Normocephalic and atraumatic.   Mouth/Throat: Oropharyngeal exudate present.   Eyes: EOM and lids are normal. Right eye exhibits normal extraocular motion. Left eye exhibits normal extraocular motion.   Neck: Normal range of motion. Neck supple.   Trialysis central venous catheter in right internal jugular   Cardiovascular: Normal rate, regular rhythm, normal heart sounds and intact distal pulses. Exam reveals no gallop, no distant heart sounds and no friction rub.   No murmur heard.  3+ pitting edema of bilateral  lower extremity up to knees  Edema noted to right hand    Pulmonary/Chest: Accessory muscle usage present. No apnea, no tachypnea and no bradypnea. She is intubated. She is in respiratory distress. She has no decreased breath sounds. She has no wheezes. She has rhonchi in the right upper field, the right middle field, the right lower field, the left upper field, the left middle field and the left lower field. She has no rales.   Abdominal: Soft. Normal appearance. She exhibits distension. Bowel sounds are decreased. There is no tenderness. No hernia.   Genitourinary:   Genitourinary Comments: Rash between inguinal folds and upper thighs bilaterally   Musculoskeletal:   Arterial line in carotid artery of right wrist   Neurological: She is alert. She has normal strength.   Opens eyes spontaneously and follows commands.    Skin: Skin is dry and intact. She is not diaphoretic.       Vents:  Vent Mode: Spont (09/19/18 1137)  Set Rate: 0 bmp (09/19/18 1137)  Vt Set: 360 mL (09/19/18 1137)  Pressure Support: 5 cmH20 (09/19/18 1137)  PEEP/CPAP: 5 cmH20 (09/19/18 1137)  Oxygen Concentration (%): 30 (09/19/18 1137)  Peak Airway Pressure: 11 cmH2O (09/19/18 1137)  Plateau Pressure: 21 cmH20 (09/19/18 1137)  Total Ve: 9.09 mL (09/19/18 1137)  Negative Inspiratory Force (cm H2O): -24 (09/15/18 1155)  F/VT Ratio<105 (RSBI): (!) 58.23 (09/19/18 1137)  Lines/Drains/Airways     Central Venous Catheter Line                 Trialysis (Dialysis) Catheter 09/12/18 1326 right internal jugular 6 days          Drain                 NG/OG Tube 09/12/18 1325 orogastric Center mouth 6 days         Urethral Catheter 09/13/18 1438 Temperature probe 5 days          Airway                 Airway - Non-Surgical 09/12/18 1012 Endotracheal Tube 7 days          Arterial Line                 Arterial Line 09/15/18 1500 Left Radial 3 days          Pressure Ulcer                 Negative Pressure Wound Therapy  42 days          Peripheral Intravenous  Line                 Midline Catheter Insertion/Assessment  - Single Lumen 09/17/18 1235 Right cephalic vein (lateral side of arm) 20g x 10cm 1 day              Significant Labs:    CBC/Anemia Profile:  Recent Labs   Lab  09/18/18   0119  09/19/18   0109   WBC  20.58*  19.17*   HGB  7.3*  7.0*   HCT  22.3*  22.0*   PLT  225  177   MCV  77*  80*   RDW  17.3*  18.1*        Chemistries:  Recent Labs   Lab  09/18/18   0119   09/18/18   2204  09/19/18   0109  09/19/18   0125  09/19/18   0508  09/19/18   0834   NA   --    < >  137  139  139  138  141  144   K   --    < >  4.0  4.2  4.2  4.2  4.2  4.5   CL   --    < >  106  107  107  107  110  111*   CO2   --    < >  25  26  26  24  26  26   BUN   --    < >  10  8  8  9  6*  6*   CREATININE   --    < >  0.7  0.7  0.7  0.7  0.5  0.6   CALCIUM   --    < >  8.0*  7.9*  7.9*  7.8*  7.9*  8.1*   ALBUMIN  1.7*   < >  1.6*  1.6*  1.6*  1.5*   --    --    PROT  5.2*   --    --   5.0*   --    --    --    BILITOT  0.5   --    --   0.6   --    --    --    ALKPHOS  117   --    --   110   --    --    --    ALT  8*   --    --   6*   --    --    --    AST  12   --    --   13   --    --    --    MG  1.7   < >  1.6  1.6  1.6  1.6  1.6   --    PHOS  3.2   < >  2.0*  1.7*  1.9*  1.9*   --    --     < > = values in this interval not displayed.       All pertinent labs within the past 24 hours have been reviewed.    Significant Imaging:  I have reviewed and interpreted all pertinent imaging results/findings within the past 24 hours.    Assessment/Plan:     Neuro   Lumbar stenosis    --s/p lumbar fusion/hardware placement June 2018 with subsequent osteo and abscess.   --LSO brace ordered.  Per conversation with Neurosurgery, will need to wear brace while OOB          Encephalopathy, metabolic    --multifactorial: sepsis/uremia, medications  --non focal on exam  --awake and alert; following commands appropriately  --weaned off sedation and levophed (9/17)        Derm   Alteration in  skin integrity related to surgical incision    --s/p wound vac placement to lower back. Continue per neurosurgery        Pulmonary   * Acute hypercapnic respiratory failure    --2/2 pulmonary edema in setting of acute on chronic diastolic HF, volume overload and JEY  --chest xray (9/17) with continued pulmonary edema however has improved since 9/15 CXR  --bedside ultrasound of right pleural effusion on 9/14 with small pocket not amendable to thoracentesis.  --s/p CRRT  --Mucus plug event today, resolved with bagging. Repeat CXR today with no pneumothorax or atelectasis. Still persistent RLL opacity  --passed SBT with plans of extubation to bipap today        Pulmonary hypertension    --2/2 diastolic HF. No known lung disease  --Echo (9/13) PA systolic pressure estimated at 88 mm Hg  --reportedly on revatio at home.         Cardiac/Vascular   PAF (paroxysmal atrial fibrillation)    --CHADS vasc 5 (HTN, DM, Age, and female); hold home rivaroxaban   --hold now given spinal abscess.           Coronary artery disease involving native coronary artery of native heart without angina pectoris    --hold BB in setting of recent shock  --hold statin for now  --s/p VIVIANA to Lcx in June 2017 and on plavix prior to admission. Holding in setting of possible upcoming procedures.           Hypertension    --holding antihypertensives in the setting of recent shock and borderline hypotension.         CHF (congestive heart failure), NYHA class IV    --Prior ECHO shows diastolic dysfunction (7/2018)  --Echo (9/13) EF 55-60%; concentric remodeling with RV enlargement; no WMA  --s/p CRRT (9/18)  --diuresis trial today        Renal/   JEY (acute kidney injury)    --most likely cardiorenal syndrome vs sepsis (ATN)  --retroperitoneal ultrasound on 9/9 consistent with medical renal disease.   --Nephrology following   --Underwent CRRT 9/17 & 9/18  --Per nephro, f/u diuresis trial with 160 mg of lasix; strict intake and output measurements         ID   Osteomyelitis of Lumbar Spine    --see above        Gram-negative bacteremia    --noted on cultures from 9/9; cleared on 9/11; Positive for Prevotella  --See above          Spinal epidural abscess    --noted on MRI along with osteo  --IR drained paraspinal abscess 9/13   --see septic shock for details.         Septic shock    --secondary to spinal abscesses  --blood cultures with prevotella, repeat blood cultures ngtd  --gram stain from spinal abscess with prevotella  --currently off norepinephrine.   --per ID recs: continue zosyn and discontinue vanc as no GPC on most recent cultures  --Not fully able to obtain source control as IR intervention (9/12) only able to partially drain one abscess.   --Washout with Neurosurgery tomorrow      Oncology   Anemia    --likely anemia of chronic disease. Trend Hgb for now  --no signs of acute blood loss  --transfuse for Hgb <7        Endocrine   Hypothyroidism    --continue synthroid        Diabetes mellitus, type II    --likely steroid induced and secondary to infection  --steroids weaned off 9/16  --continue insulin infusion (started 9/16)  --A1c 7.6, on lantus 30 units every evening outpatient          GI   GERD (gastroesophageal reflux disease)    --continue PPI           Critical Care Daily Checklist:    A: Awake: RASS Goal/Actual Goal: RASS Goal: 0-->alert and calm  Actual: [place] Agitation Sedation Scale (RASS): Alert and calm   B: Spontaneous Breathing Trial Performed?  Passed   C: SAT & SBT Coordinated?  yes                      D: Delirium: CAM-ICU Overall CAM-ICU: Negative   E: Early Mobility Performed? Yes   F: Feeding Goal: Goals: Pt to receive nutrition by RD follow up  Status: Nutrition Goal Status: goal met   Current Diet Order   Procedures    Diet NPO    Diet NPO      AS: Analgesia/Sedation None   T: Thromboembolic Prophylaxis Heparin   H: HOB > 300 Yes   U: Stress Ulcer Prophylaxis (if needed) PPI   G: Glucose Control Insulin   B: Bowel Function  Stool Occurrence: 1   I: Indwelling Catheter (Lines & [place]) Necessity All necessary   D: De-escalation of Antimicrobials/Pharmacotherapies Discontinued vancomycin    Plan for the day/ETD Supportive care, stabilize hemodynamics and extubated    Code Status:  Family/Goals of Care: DNR       Discussed with Dr. Ward.     Critical Care Time: 60 minutes  Critical secondary to Patient has a condition that poses threat to life and bodily function: Acute Hypoxemic/Hypercapnic Respiratory Failure requiring mechanical ventilation.      Critical care was time spent personally by me on the following activities: development of treatment plan with patient or surrogate and bedside caregivers, discussions with consultants, evaluation of patient's response to treatment, examination of patient, ordering and performing treatments and interventions, ordering and review of laboratory studies, ordering and review of radiographic studies, pulse oximetry, re-evaluation of patient's condition. This critical care time did not overlap with that of any other provider or involve time for any procedures.     Leslie Johnson PA-C  Critical Care Medicine  [facility]

## 2018-09-19 NOTE — SUBJECTIVE & OBJECTIVE
Interval History: Patient remains intubated in the ICU. CRRT on hold this morning. Her abscess cultures have returned with Prevotella growth. She is scheduled to go to the OR tomorrow for a washout.    Review of Systems   Unable to perform ROS: Intubated     Objective:     Vital Signs (Most Recent):  Temp: 98.8 °F (37.1 °C) (09/19/18 1137)  Pulse: 94 (09/19/18 1137)  Resp: (!) 23 (09/19/18 1137)  BP: (!) 144/67 (09/19/18 1102)  SpO2: 99 % (09/19/18 1137) Vital Signs (24h Range):  Temp:  [98.1 °F (36.7 °C)-99.3 °F (37.4 °C)] 98.8 °F (37.1 °C)  Pulse:  [] 94  Resp:  [15-39] 23  SpO2:  [93 %-100 %] 99 %  BP: (111-178)/(55-99) 144/67  Arterial Line BP: (108-167)/(49-90) 144/67     Weight: 102.7 kg (226 lb 6.6 oz)  Body mass index is 38.86 kg/m².    Estimated Creatinine Clearance: 87.2 mL/min (based on SCr of 0.6 mg/dL).    Physical Exam   Constitutional: She appears well-developed and well-nourished. She appears ill.   Intubated, sedated   Neck: JVD present.   RIJ CVC in place   Cardiovascular: Normal rate. An irregularly irregular rhythm present. Exam reveals no friction rub.   No murmur heard.  Pulmonary/Chest: Effort normal. She has no wheezes. She has rales.   Abdominal: Soft. Bowel sounds are normal. She exhibits no distension.   Obese abdomen   Musculoskeletal: She exhibits edema (BLE, right hand).   +2 pitting edema   Neurological: She is alert.   Skin: Skin is warm and dry. She is not diaphoretic. No erythema.   Lumbar wound not examined today.   Nursing note and vitals reviewed.      Significant Labs:   Blood Culture:   Recent Labs   Lab  09/09/18   1731  09/11/18   1003  09/11/18   1011  09/15/18   1805  09/15/18   1819   LABBLOO  Gram stain terrell bottle: Gram negative rods   Results called to and read back by:Sofia Noriega RN 09/11/2018  06:01  PREVOTELLA (B.) BIVIA  No growth after 5 days.  No growth after 5 days.  No Growth to date  No Growth to date  No Growth to date  No Growth to date  No Growth  to date  No Growth to date  No Growth to date  No Growth to date     CBC:   Recent Labs   Lab  09/18/18   0119  09/19/18   0109   WBC  20.58*  19.17*   HGB  7.3*  7.0*   HCT  22.3*  22.0*   PLT  225  177     CMP:   Recent Labs   Lab  09/18/18   0119   09/18/18   2204  09/19/18   0109  09/19/18   0125  09/19/18   0508  09/19/18   0834   NA   --    < >  137  139  139  138  141  144   K   --    < >  4.0  4.2  4.2  4.2  4.2  4.5   CL   --    < >  106  107  107  107  110  111*   CO2   --    < >  25  26  26  24  26  26   GLU   --    < >  138*  145*  145*  154*  139*  134*   BUN   --    < >  10  8  8  9  6*  6*   CREATININE   --    < >  0.7  0.7  0.7  0.7  0.5  0.6   CALCIUM   --    < >  8.0*  7.9*  7.9*  7.8*  7.9*  8.1*   PROT  5.2*   --    --   5.0*   --    --    --    ALBUMIN  1.7*   < >  1.6*  1.6*  1.6*  1.5*   --    --    BILITOT  0.5   --    --   0.6   --    --    --    ALKPHOS  117   --    --   110   --    --    --    AST  12   --    --   13   --    --    --    ALT  8*   --    --   6*   --    --    --    ANIONGAP   --    < >  6*  6*  6*  7*  5*  7*   EGFRNONAA   --    < >  >60.0  >60.0  >60.0  >60.0  >60.0  >60.0    < > = values in this interval not displayed.     Urine Culture:   Recent Labs   Lab  06/07/18   1128  09/07/18   2255  09/10/18   1701   LABURIN  No growth  No significant growth  BEN ALBICANS  > 100,000 cfu/ml  Treatment of asymptomatic candiduria is not recommended (except for   specific populations). Candida isolated in the urine typically   represents colonization. If an indwelling urinary catheter is present  it should be removed or replaced.       Urine Studies:   Recent Labs   Lab  07/02/18   1205   09/10/18   1701   COLORU  Yellow   < >  Yellow   APPEARANCEUA  Clear   < >  Cloudy*   PHUR  6.0   < >  5.0   SPECGRAV  1.015   < >  1.020   PROTEINUA  Negative   < >  Negative   GLUCUA  Negative   < >  Negative   KETONESU  Negative   < >  Negative   BILIRUBINUA  Negative   < >   Negative   OCCULTUA  Trace*   < >  3+*   NITRITE  Positive*   < >  Negative   UROBILINOGEN  0.2   < >  Negative   LEUKOCYTESUR  Negative   < >  3+*   RBCUA  1   < >  64*   WBCUA  2   < >  48*   BACTERIA  Few*   --   Rare   SQUAMEPITHEL   --    --   1   HYALINECASTS  1   --    --     < > = values in this interval not displayed.     Wound Culture:   Recent Labs   Lab  07/02/18   1432  09/13/18   0834   LABAERO  ENTEROBACTER CLOACAE COMPLEX  Rare  For susceptibility see order # [id number]    ENTEROBACTER CLOACAE COMPLEX  Moderate    No growth       Significant Imaging: I have reviewed all pertinent imaging results/findings within the past 24 hours.

## 2018-09-19 NOTE — PLAN OF CARE
Problem: Patient Care Overview  Goal: Plan of Care Review  Outcome: Ongoing (interventions implemented as appropriate)  POC reviewed with pt at 0400. Pt unable to verbalize an understanding due to being intubated and sedated. Questions and concerns not addressed. 21 beat run of vtach observed overnight.  CCT notified. Propofol titrated to achieve acceptable comfort level.   Pt progressing toward goals. Will continue to monitor. See flowsheets for full assessment and VS info

## 2018-09-19 NOTE — PLAN OF CARE
Problem: Patient Care Overview  Goal: Plan of Care Review  Outcome: Ongoing (interventions implemented as appropriate)  POC reviewed with pt and family at 1400. Pt verbalized understanding. Questions and concerns addressed. Pt progressing toward goals. Will continue to monitor. See flowsheets for full assessment and VS info. Pt extubated today, currently on 8L High flow NC. Pt failed shahram. Pt to be NPO after midnight tonight for spinal washout tomorrow. Pt code status updated to partial code (intubation only.) Bilateral US showed DVT in RUE, limb alert bracelet placed on pt.

## 2018-09-19 NOTE — SUBJECTIVE & OBJECTIVE
Interval History: RUE DVT on US    Medications:  Continuous Infusions:    Scheduled Meds:   albuterol-ipratropium  3 mL Nebulization Q4H    chlorhexidine  15 mL Mouth/Throat BID    heparin (porcine)  5,000 Units Subcutaneous Q8H    insulin detemir U-100  5 Units Subcutaneous QHS    levothyroxine  150 mcg Per OG tube Before breakfast    miconazole NITRATE 2 %   Topical (Top) BID    [START ON 9/20/2018] pantoprazole  40 mg Per OG tube Daily    piperacillin-tazobactam (ZOSYN) IVPB  4.5 g Intravenous Q8H     PRN Meds:dextrose 50%, dextrose 50%, fentaNYL citrate (PF), glucagon (human recombinant), glucose, glucose, insulin aspart U-100, ondansetron     Review of Systems    Objective:     Weight: 102.7 kg (226 lb 6.6 oz)  Body mass index is 38.86 kg/m².  Vital Signs (Most Recent):  Temp: 98.8 °F (37.1 °C) (09/19/18 1251)  Pulse: 93 (09/19/18 1251)  Resp: 19 (09/19/18 1251)  BP: 135/62 (09/19/18 1202)  SpO2: 95 % (09/19/18 1251) Vital Signs (24h Range):  Temp:  [98.1 °F (36.7 °C)-99.3 °F (37.4 °C)] 98.8 °F (37.1 °C)  Pulse:  [] 93  Resp:  [15-38] 19  SpO2:  [93 %-100 %] 95 %  BP: (111-178)/(55-99) 135/62  Arterial Line BP: (108-167)/(49-90) 135/62     Date 09/19/18 0700 - 09/20/18 0659   Shift 5699-1811 3108-2208 2411-9154 24 Hour Total   INTAKE   I.V.(mL/kg) 861.7(8.4)   861.7(8.4)   IV Piggyback 250   250   Shift Total(mL/kg) 1111.7(10.8)   1111.7(10.8)   OUTPUT   Other 1721   1721   Shift Total(mL/kg) 1721(16.8)   1721(16.8)   Weight (kg) 102.7 102.7 102.7 102.7              Vent Mode: Spont  Oxygen Concentration (%):  [30] 30  Resp Rate Total:  [17 br/min-27 br/min] 18 br/min  Vt Set:  [0 mL-360 mL] 360 mL  PEEP/CPAP:  [5 cmH20] 5 cmH20  Pressure Support:  [0 cmH20-5 cmH20] 5 cmH20  Mean Airway Pressure:  [7.4 syK35-71 cmH20] 7.4 cmH20         NG/OG Tube 09/12/18 1325 orogastric Center mouth (Active)   Placement Check placement verified by x-ray;placement verified by distal tube length measurement  "9/18/2018 11:00 AM   Distal Tube Length (cm) 57 9/18/2018 11:00 AM   Tolerance no signs/symptoms of discomfort 9/18/2018 11:00 AM   Securement taped to commercial device 9/18/2018 11:00 AM   Clamp Status/Tolerance unclamped 9/18/2018 11:00 AM   Suction Setting/Drainage Method suction at;low;intermittent setting 9/16/2018  3:05 AM   Insertion Site Appearance no redness, warmth, tenderness, skin breakdown, drainage 9/18/2018 11:00 AM   Flush/Irrigation flushed w/;water;no resistance met 9/18/2018 11:00 AM   Feeding Method continuous 9/18/2018  7:01 AM   Feeding Action feeding continued 9/18/2018 11:00 AM   Current Rate (mL/hr) 40 mL/hr 9/18/2018 11:00 AM   Goal Rate (mL/hr) 40 mL/hr 9/18/2018 11:00 AM   Intake (mL) 60 mL 9/16/2018 11:05 PM   Intake (mL) - Formula Tube Feeding 40 9/18/2018 12:00 PM   Residual Amount (ml) 110 ml 9/18/2018 11:00 AM            Urethral Catheter 09/13/18 1438 Temperature probe (Active)   Site Assessment Clean;Intact 9/18/2018 11:00 AM   Collection Container Urimeter 9/18/2018 11:00 AM   Securement Method secured to top of thigh w/ adhesive device 9/18/2018 11:00 AM   Catheter Care Performed yes 9/18/2018 11:00 AM   Reason for Continuing Urinary Catheterization Critically ill in ICU requiring intensive monitoring 9/18/2018 11:00 AM   CAUTI Prevention Bundle StatLock in place w 1" slack;Intact seal between catheter & drainage tubing;Drainage bag off the floor;Green sheeting clip in use;No dependent loops or kinks;Drainage bag not overfilled (<2/3 full);Drainage bag below bladder 9/18/2018 11:00 AM   Output (mL) 15 mL 9/18/2018 12:00 PM            Trialysis (Dialysis) Catheter 09/12/18 1326 right internal jugular (Active)   IV Device Securement sutures 9/18/2018 11:00 AM   Additional Site Signs no drainage;no streak formation;no palpable cord;no pain;no edema;no warmth;no erythema 9/18/2018  7:01 AM   Patency/Care infusing 9/18/2018 11:00 AM   Site Assessment Clean;Dry;Intact;No redness;No " swelling 9/18/2018 11:00 AM   Status Accessed 9/18/2018 11:00 AM   Dressing Intervention Dressing reinforced 9/18/2018 11:00 AM   Dressing Status Biopatch in place;Clean;Dry;Intact 9/18/2018 11:00 AM   Dressing Change Due 09/22/18 9/18/2018 11:00 AM   Verification by X-ray Yes 9/18/2018 11:00 AM   Site Condition No complications 9/18/2018 11:00 AM   Dressing Occlusive 9/18/2018 11:00 AM   Daily Line Review Performed 9/18/2018 11:00 AM       Neurosurgery Physical Exam     [id number]  PERRL  FC x4  CNII-XII grossly intact        Significant Labs:  Recent Labs   Lab  09/19/18   0109  09/19/18   0125  09/19/18   0508  09/19/18   0834   GLU  145*  145*  154*  139*  134*   NA  139  139  138  141  144   K  4.2  4.2  4.2  4.2  4.5   CL  107  107  107  110  111*   CO2  26  26  24  26  26   BUN  8  8  9  6*  6*   CREATININE  0.7  0.7  0.7  0.5  0.6   CALCIUM  7.9*  7.9*  7.8*  7.9*  8.1*   MG  1.6  1.6  1.6  1.6   --      Recent Labs   Lab  09/18/18   0119  09/19/18   0109   WBC  20.58*  19.17*   HGB  7.3*  7.0*   HCT  22.3*  22.0*   PLT  225  177     Recent Labs   Lab  09/18/18   0119  09/19/18   0109   INR  1.1  1.2     Microbiology Results (last 7 days)     Procedure Component Value Units Date/Time    Blood culture [[id number]] Collected:  09/15/18 1819    Order Status:  Completed Specimen:  Blood from Peripheral, Upper Arm, Left Updated:  09/18/18 2012     Blood Culture, Routine No Growth to date     Blood Culture, Routine No Growth to date     Blood Culture, Routine No Growth to date     Blood Culture, Routine No Growth to date    Narrative:       Blood cultures x 2 different sites. 4 bottles total. Please  draw cultures before administering antibiotics.    Blood culture [[id number]] Collected:  09/15/18 1805    Order Status:  Completed Specimen:  Blood from Peripheral, Upper Arm, Right Updated:  09/18/18 2012     Blood Culture, Routine No Growth to date     Blood Culture, Routine No Growth to date     Blood Culture,  Routine No Growth to date     Blood Culture, Routine No Growth to date    Narrative:       Blood cultures from 2 different sites. 4 bottles total.  Please draw before starting antibiotics.    Culture, Anaerobe [[id number]] Collected:  09/13/18 0834    Order Status:  Completed Specimen:  Abscess from Back Updated:  09/18/18 1340     Anaerobic Culture --     PREVOTELLA (B.) BIVIA  Many      Narrative:       lumbar paraspinal fluid collection drainage    Aerobic culture [[id number]] Collected:  09/13/18 0834    Order Status:  Completed Specimen:  Abscess from Back Updated:  09/17/18 0848     Aerobic Bacterial Culture No growth    Narrative:       lumbar paraspinal fluid collection drainage    Blood culture [[id number]] Collected:  09/11/18 1011    Order Status:  Completed Specimen:  Blood Updated:  09/16/18 1212     Blood Culture, Routine No growth after 5 days.    Narrative:       Please draw from 2 separate sites 30 minutes apart. Thank you    Blood culture [[id number]] Collected:  09/11/18 1003    Order Status:  Completed Specimen:  Blood Updated:  09/16/18 1212     Blood Culture, Routine No growth after 5 days.    Gram stain [[id number]] Collected:  09/13/18 0834    Order Status:  Completed Specimen:  Abscess from Back Updated:  09/13/18 1946     Gram Stain Result Many WBC's      Many Gram negative rods      Few Gram positive rods    Blood culture [[id number]] Collected:  09/09/18 1731    Order Status:  Completed Specimen:  Blood Updated:  09/13/18 1418     Blood Culture, Routine Gram stain terrell bottle: Gram negative rods      Blood Culture, Routine Results called to and read back by:Sofia Noriega RN 09/11/2018  06:01     Blood Culture, Routine PREVOTELLA (B.) BIVIA    Narrative:       From 2 different sites 30 minutes apart    Blood culture [[id number]] Collected:  09/09/18 1555    Order Status:  Completed Specimen:  Blood Updated:  09/13/18 1417     Blood Culture, Routine Gram stain terrell bottle: Gram negative rods       Blood Culture, Routine Positive results previously called 09/11/2018  13:30     Blood Culture, Routine PREVOTELLA (B.) BIVIA    Gram stain [[id number]] Collected:  09/13/18 0834    Order Status:  Completed Specimen:  Abscess from Back Updated:  09/13/18 1153     Gram Stain Result Many WBC's      Many Gram negative rods      Moderate Gram negative diplococci    Fungus culture [[id number]] Collected:  09/13/18 0834    Order Status:  Sent Specimen:  Abscess from Back Updated:  09/13/18 1040    Gram stain [[id number]]     Order Status:  Completed Specimen:  Abscess from Back     Urine culture [[id number]] Collected:  09/10/18 1701    Order Status:  Completed Specimen:  Urine from Catheterized Updated:  09/12/18 1438     Urine Culture, Routine --     CANDIDA ALBICANS  > 100,000 cfu/ml  Treatment of asymptomatic candiduria is not recommended (except for   specific populations). Candida isolated in the urine typically   represents colonization. If an indwelling urinary catheter is present  it should be removed or replaced.      Narrative:       add on [facility] order #[id number] per Dr. Elina Sandhu @ 19:58    09/10/2018             Significant Diagnostics:

## 2018-09-19 NOTE — SUBJECTIVE & OBJECTIVE
Interval History/Significant Events: Reported run of 21 beats of V tach overnight. Patient was anxious so propofol drip was started. CRRT continued to run overnight with an increased UF from 200 to 450 ml/hr. Mucus plug event this morning after patients tube was suctioned. Patient became tachycardic and hypertensive with absent breath sounds bilaterally. Sats decreased into the 70s and tidal volumes less than 100. Patient was bagged and mucus plug was dislodged.     Review of Systems   Unable to perform ROS: Intubated     Objective:     Vital Signs (Most Recent):  Temp: 98.8 °F (37.1 °C) (09/19/18 1137)  Pulse: 94 (09/19/18 1137)  Resp: (!) 23 (09/19/18 1137)  BP: (!) 144/67 (09/19/18 1102)  SpO2: 99 % (09/19/18 1137) Vital Signs (24h Range):  Temp:  [98.1 °F (36.7 °C)-99.3 °F (37.4 °C)] 98.8 °F (37.1 °C)  Pulse:  [] 94  Resp:  [15-39] 23  SpO2:  [93 %-100 %] 99 %  BP: (111-178)/(55-99) 144/67  Arterial Line BP: (108-167)/(49-90) 144/67   Weight: 102.7 kg (226 lb 6.6 oz)  Body mass index is 38.86 kg/m².      Intake/Output Summary (Last 24 hours) at 9/19/2018 1219  Last data filed at 9/19/2018 1102  Gross per 24 hour   Intake 5170.43 ml   Output 6052 ml   Net -881.57 ml       Physical Exam   Constitutional: Vital signs are normal. She appears well-developed and well-nourished. She is cooperative. She appears distressed. She is intubated and restrained.   HENT:   Head: Normocephalic and atraumatic.   Mouth/Throat: Oropharyngeal exudate present.   Eyes: EOM and lids are normal. Right eye exhibits normal extraocular motion. Left eye exhibits normal extraocular motion.   Neck: Normal range of motion. Neck supple.   Trialysis central venous catheter in right internal jugular   Cardiovascular: Normal rate, regular rhythm, normal heart sounds and intact distal pulses. Exam reveals no gallop, no distant heart sounds and no friction rub.   No murmur heard.  3+ pitting edema of bilateral lower extremity up to  knees  Edema noted to right hand    Pulmonary/Chest: Accessory muscle usage present. No apnea, no tachypnea and no bradypnea. She is intubated. She is in respiratory distress. She has no decreased breath sounds. She has no wheezes. She has rhonchi in the right upper field, the right middle field, the right lower field, the left upper field, the left middle field and the left lower field. She has no rales.   Abdominal: Soft. Normal appearance. She exhibits distension. Bowel sounds are decreased. There is no tenderness. No hernia.   Genitourinary:   Genitourinary Comments: Rash between inguinal folds and upper thighs bilaterally   Musculoskeletal:   Arterial line in carotid artery of right wrist   Neurological: She is alert. She has normal strength.   Opens eyes spontaneously and follows commands.    Skin: Skin is dry and intact. She is not diaphoretic.       Vents:  Vent Mode: Spont (09/19/18 1137)  Set Rate: 0 bmp (09/19/18 1137)  Vt Set: 360 mL (09/19/18 1137)  Pressure Support: 5 cmH20 (09/19/18 1137)  PEEP/CPAP: 5 cmH20 (09/19/18 1137)  Oxygen Concentration (%): 30 (09/19/18 1137)  Peak Airway Pressure: 11 cmH2O (09/19/18 1137)  Plateau Pressure: 21 cmH20 (09/19/18 1137)  Total Ve: 9.09 mL (09/19/18 1137)  Negative Inspiratory Force (cm H2O): -24 (09/15/18 1155)  F/VT Ratio<105 (RSBI): (!) 58.23 (09/19/18 1137)  Lines/Drains/Airways     Central Venous Catheter Line                 Trialysis (Dialysis) Catheter 09/12/18 1326 right internal jugular 6 days          Drain                 NG/OG Tube 09/12/18 1325 orogastric Center mouth 6 days         Urethral Catheter 09/13/18 1438 Temperature probe 5 days          Airway                 Airway - Non-Surgical 09/12/18 1012 Endotracheal Tube 7 days          Arterial Line                 Arterial Line 09/15/18 1500 Left Radial 3 days          Pressure Ulcer                 Negative Pressure Wound Therapy  42 days          Peripheral Intravenous Line                  Midline Catheter Insertion/Assessment  - Single Lumen 09/17/18 1235 Right cephalic vein (lateral side of arm) 20g x 10cm 1 day              Significant Labs:    CBC/Anemia Profile:  Recent Labs   Lab  09/18/18   0119  09/19/18   0109   WBC  20.58*  19.17*   HGB  7.3*  7.0*   HCT  22.3*  22.0*   PLT  225  177   MCV  77*  80*   RDW  17.3*  18.1*        Chemistries:  Recent Labs   Lab  09/18/18   0119   09/18/18   2204  09/19/18   0109  09/19/18   0125  09/19/18   0508  09/19/18   0834   NA   --    < >  137  139  139  138  141  144   K   --    < >  4.0  4.2  4.2  4.2  4.2  4.5   CL   --    < >  106  107  107  107  110  111*   CO2   --    < >  25  26  26  24  26  26   BUN   --    < >  10  8  8  9  6*  6*   CREATININE   --    < >  0.7  0.7  0.7  0.7  0.5  0.6   CALCIUM   --    < >  8.0*  7.9*  7.9*  7.8*  7.9*  8.1*   ALBUMIN  1.7*   < >  1.6*  1.6*  1.6*  1.5*   --    --    PROT  5.2*   --    --   5.0*   --    --    --    BILITOT  0.5   --    --   0.6   --    --    --    ALKPHOS  117   --    --   110   --    --    --    ALT  8*   --    --   6*   --    --    --    AST  12   --    --   13   --    --    --    MG  1.7   < >  1.6  1.6  1.6  1.6  1.6   --    PHOS  3.2   < >  2.0*  1.7*  1.9*  1.9*   --    --     < > = values in this interval not displayed.       All pertinent labs within the past 24 hours have been reviewed.    Significant Imaging:  I have reviewed and interpreted all pertinent imaging results/findings within the past 24 hours.

## 2018-09-19 NOTE — ANESTHESIA PREPROCEDURE EVALUATION
[facility]  Anesthesia Pre-Operative Evaluation         Patient Name: Medina Frazier  YOB: 1935  MRN: [id number]    SUBJECTIVE:     Pre-operative evaluation for Procedure(s) (LRB):  DEBRIDEMENT, WOUND, Lumbar wound washout (N/A)     09/19/2018    Medina Frazier is a 82 y.o. female with HFpEF, CAD s/p VIVIANA Lcx (June 2017), HTN, DM2, lumbar stenosis s/p lumbar fusion in June 2018 with subsequent hardware infection and I&D on 7/2 and repeat lumbar I&D, hardware removal, decompression and wound vac placement to L2-L3 due to continued epidural abscess. [place] on 9/8 from [facility] after labwork revealed JEY, hyponatremia. Additionally, CT lumbar spine obtained 9/5 was concerning for persistent osteomyelitis, epidural abscess in L2-L3 region.      Admission complicated by continued JEY and hyponatremia felt to be related to acute on chronic diastolic heart failure, volume overload. Nephrology was consulted and started on lasix 100 mg BID with minimal urine output and creatinine rising. She has been on vancomycin, cefepime since 9/9 and blood cultures from 9/11 are NGTD.      Transferred to MICU on  9/12 for acute hypercapnic respiratory failure with concern for aspiration event, worsening encephalopathy, and worsening JEY. She was intubated and she developed shock requiring levophed. IR successfully drained epidural abscess (9/13). Family meeting on 9/14, code status changed to DNR.  Weaned off vasopressors on 9/16.  Remains on ventilator due to significant pulmonary edema.  Aggressive diuresis started without significant response. 9/17 Patient had episodes of A fib with RVR and was placed on amiodarone drip. CRRT started overnight. During CRRT, patient became hypotensive and bradycardic so amio was discontinued and patient was placed back on levophed. Patient still receiving CRRT 9/18 morning but vitals have stabilized and levo has been discontinued.       Patient now  "presents for the above procedure(s).    As of 9/19/18 1:29PM, primary team is preparing to extubate patient to BIPAP. Off levophed. Off CRRT, trial of lasix planned later today.    Of note, daughter notes a history of delayed emergence due to "gas induction" and did not have any problems with "just IV."    LDA: None documented.       [facility] (Dialysis) Catheter 09/12/18 1326 right internal jugular (Active)   IV Device Securement sutures 9/19/2018  3:05 AM   Additional Site Signs no drainage;no edema;no warmth;no erythema 9/19/2018  3:05 AM   Patency/Care flushed w/o difficulty;infusing 9/19/2018  3:05 AM   Site Assessment Clean;Dry;Intact;No redness;No swelling 9/19/2018  3:05 AM   Status Accessed 9/19/2018  3:05 AM   Dressing Intervention Dressing reinforced 9/19/2018  3:05 AM   Dressing Status Biopatch in place;Clean;Dry;Intact 9/19/2018  3:05 AM   Dressing Change Due 09/22/18 9/19/2018  3:05 AM   Verification by X-ray Yes 9/18/2018  7:05 PM   Site Condition No complications 9/19/2018  3:05 AM   Dressing Occlusive 9/19/2018  3:05 AM   Daily Line Review Performed 9/19/2018  3:05 AM   Number of days: 6            Midline Catheter Insertion/Assessment  - Single Lumen 09/17/18 1235 Right cephalic vein (lateral side of arm) 20g x 10cm (Active)   Site Assessment Clean;Dry;Intact;No redness;No swelling 9/19/2018  3:05 AM   IV Device Securement catheter securement device 9/19/2018  3:05 AM   Line Status Infusing 9/19/2018  3:05 AM   Dressing Status Biopatch in place;Clean;Dry;Intact 9/19/2018  3:05 AM   Dressing Intervention Dressing reinforced 9/19/2018  3:05 AM   Dressing Change Due 09/24/18 9/19/2018  3:05 AM   Site Change Due 09/24/18 9/18/2018  7:05 PM   Reason Not Rotated Not due 9/19/2018  3:05 AM   Number of days: 1            Arterial Line 09/15/18 1500 Left Radial (Active)   Site Assessment Clean;Dry;Intact;No redness;No swelling 9/19/2018  3:05 AM   Line Status Pulsatile blood flow 9/19/2018  3:05 AM   Art " Line Waveform Appropriate;Square wave test performed 9/19/2018  3:05 AM   Arterial Line Interventions Zeroed and calibrated;Leveled;Flushed per protocol 9/19/2018  3:05 AM   Color/Movement/Sensation Capillary refill less than 3 sec 9/19/2018  3:05 AM   Dressing Type Transparent 9/19/2018  3:05 AM   Dressing Status Biopatch in place;Clean;Dry;Intact 9/19/2018  3:05 AM   Dressing Intervention Dressing reinforced 9/19/2018  3:05 AM   Dressing Change Due 09/21/18 9/19/2018  3:05 AM   Number of days: 3            NG/OG Tube 09/12/18 1325 orogastric Center mouth (Active)   Placement Check placement verified by distal tube length measurement 9/19/2018  3:05 AM   Distal Tube Length (cm) 57 9/18/2018  7:05 PM   Tolerance no signs/symptoms of discomfort 9/19/2018  3:05 AM   Securement taped to commercial device 9/19/2018  3:05 AM   Clamp Status/Tolerance clamped 9/19/2018  3:05 AM   Suction Setting/Drainage Method suction at;low;intermittent setting 9/16/2018  3:05 AM   Insertion Site Appearance no redness, warmth, tenderness, skin breakdown, drainage 9/18/2018  3:00 PM   Flush/Irrigation flushed w/;water;no resistance met 9/18/2018  3:00 PM   Feeding Method continuous 9/18/2018  7:01 AM   Feeding Action feeding continued 9/18/2018  3:00 PM   Current Rate (mL/hr) 40 mL/hr 9/18/2018 11:00 AM   Goal Rate (mL/hr) 40 mL/hr 9/18/2018 11:00 AM   Intake (mL) 60 mL 9/16/2018 11:05 PM   Intake (mL) - Formula Tube Feeding 0 9/18/2018  6:00 PM   Residual Amount (ml) 110 ml 9/18/2018 11:00 AM   Number of days: 6            Urethral Catheter 09/13/18 1438 Temperature probe (Active)   Site Assessment Clean;Intact 9/19/2018  3:05 AM   Collection Container Urimeter 9/19/2018  3:05 AM   Securement Method secured to top of thigh w/ adhesive device 9/19/2018  3:05 AM   Catheter Care Performed yes 9/19/2018  3:05 AM   Reason for Continuing Urinary Catheterization Critically ill in ICU requiring intensive monitoring 9/19/2018  3:05 AM   CAUTI  "Prevention Bundle StatLock in place w 1" slack;Intact seal between catheter & drainage tubing;Drainage bag off the floor;Green sheeting clip in use;No dependent loops or kinks;Drainage bag below bladder;Drainage bag not overfilled (<2/3 full) 9/18/2018  7:05 PM   Output (mL) 20 mL 9/19/2018  6:05 AM   Number of days: 5       Prev airway:   8/07/18  DL, Mac 3, ETT 7.0, Grade I, easy mask, no complications    Drips: None documented.      Patient Active Problem List   Diagnosis    GERD (gastroesophageal reflux disease)    Hypertension    Diabetes mellitus, type II    Coronary artery disease involving native coronary artery of native heart without angina pectoris    Osteoarthritis    Gait disorder    Morbid obesity with body mass index (BMI) of 40.0 to 49.9    Primary localized osteoarthrosis, lower leg    Infected prosthetic knee joint    Anemia    Age-related osteoporosis without current pathological fracture    Acute midline low back pain with sciatica    Lumbar stenosis    CHF (congestive heart failure), NYHA class IV    Pulmonary hypertension    Closed left hip fracture    Decubitus ulcer of buttock, stage 2    Suspected pressure injury of deep tissue    PAF (paroxysmal atrial fibrillation)    Altered mental status    Pressure ulcer, stage 2    Acute cystitis without hematuria    Spinal stenosis of lumbar region with neurogenic claudication    S/P lumbar fusion    Intertrigo    Alteration in nutrition    Postoperative wound infection    Decubitus ulcer of right buttock, stage 2    Increased nutritional needs    Acute blood loss anemia    JEY (acute kidney injury)    Non-healing surgical wound    Spinal epidural abscess    Hyponatremia    ATN (acute tubular necrosis)    Alteration in skin integrity related to surgical incision    Gram-negative bacteremia    Acute hypercapnic respiratory failure    Septic shock    Osteomyelitis of Lumbar Spine    Encephalopathy, metabolic    " Hypothyroidism       Review of patient's allergies indicates:   Allergen Reactions    Codeine Nausea Only    Penicillins Swelling     Able to take amoxil and cephalosporins        Current Inpatient Medications:   albuterol-ipratropium  3 mL Nebulization Q4H    chlorhexidine  15 mL Mouth/Throat BID    heparin (porcine)  5,000 Units Subcutaneous Q8H    insulin detemir U-100  5 Units Subcutaneous QHS    levothyroxine  150 mcg Per OG tube Before breakfast    miconazole NITRATE 2 %   Topical (Top) BID    [START ON 9/20/2018] pantoprazole  40 mg Per OG tube Daily    piperacillin-tazobactam (ZOSYN) IVPB  4.5 g Intravenous Q8H    vancomycin (VANCOCIN) IVPB  1,250 mg Intravenous Once       No current facility-administered medications on file prior to encounter.      Current Outpatient Medications on File Prior to Encounter   Medication Sig Dispense Refill    acetaminophen (TYLENOL) 325 MG tablet Take 650 mg by mouth every 4 (four) hours as needed for Pain or Temperature greater than (100).      albuterol (ACCUNEB) 0.63 mg/3 mL Nebu Take 0.63 mg by nebulization every 6 (six) hours as needed (sob/wheezing). Rescue       atorvastatin (LIPITOR) 40 MG tablet Take 40 mg by mouth every evening.       bisacodyl (DULCOLAX, BISACODYL,) 10 mg Supp Place 10 mg rectally every 8 (eight) hours as needed (constipation).      calcium carbonate-simethicone (MAALOX ADVANCED) 1,000-60 mg Chew Take 1 tablet by mouth every 4 (four) hours as needed (heartburn).      carvedilol (COREG) 25 MG tablet Take 25 mg by mouth 2 (two) times daily.       clopidogrel (PLAVIX) 75 mg tablet Take 1 tablet (75 mg total) by mouth once daily. (Patient taking differently: Take 75 mg by mouth every morning. )      diazePAM (VALIUM) 5 MG tablet Take 5 mg by mouth every 8 (eight) hours as needed (spasms).       DULoxetine (CYMBALTA) 30 MG capsule Take 30 mg by mouth once daily.      furosemide (LASIX) 20 MG tablet Take 20 mg by mouth every morning.        HYDROcodone-acetaminophen (NORCO) 7.5-325 mg per tablet Take 1 tablet by mouth every 6 (six) hours as needed for Pain.      insulin glargine (LANTUS) 100 unit/mL injection Inject 30 Units into the skin every evening.       isosorbide dinitrate (ISORDIL) 20 MG tablet Take 20 mg by mouth 2 (two) times daily.       levothyroxine (SYNTHROID) 150 MCG tablet Take 150 mcg by mouth before breakfast.       magnesium hydroxide 400 mg/5 ml (MILK OF MAGNESIA) 400 mg/5 mL Susp Take 30 mLs by mouth daily as needed (constipation).       OMEGA-3/DHA/EPA/FISH OIL (OMEGA-3 FISH OIL ORAL) Take 2 capsules by mouth every evening.       ondansetron (ZOFRAN) 8 MG tablet Take by mouth every 8 (eight) hours as needed for Nausea.      pantoprazole (PROTONIX) 40 MG tablet Take 40 mg by mouth every morning.       polyethylene glycol (GLYCOLAX) 17 gram PwPk Take 17 g by mouth every evening. Mix in 8 ounces of water.If stool is too loose,may use every other night      rivaroxaban (XARELTO) 15 mg Tab Take 1 tablet (15 mg total) by mouth daily with dinner or evening meal.      sildenafil (REVATIO) 20 mg Tab Take 10 mg by mouth 3 (three) times daily.      spironolactone (ALDACTONE) 50 MG tablet Take 50 mg by mouth once daily.      ciprofloxacin, CIPRO,400mg/200ml D5W IVPB (CIPRO IN D5W) 400 mg/200 mL IVPB Inject 400 mg into the vein every 12 (twelve) hours.      food supplemt, lactose-reduced (ENSURE ACTIVE LIGHT) Liqd Take by mouth as needed. FOR LOW BLOOD SUGAR      heparin flush,porcine,-0.9NaCl 100 unit/mL Kit Inject 5 mLs into the vein once daily. To each port      nut.tx.gluc.intol,lac-free,soy (GLUCERNA SHAKE) Liqd Take by mouth as needed. TX BLOOD SUGAR LOWS OR HIGHS         Past Surgical History:   Procedure Laterality Date    ANGIOGRAM-CORONARY N/A 6/2/2017    Performed by Yury Bailey MD at [facility] CATH    ANGIOPLASTY  2008    CARDIAC STENTS    APPENDECTOMY      APPLICATION OF WOUND VACUUM-ASSISTED CLOSURE  DEVICE N/A 7/2/2018    Procedure: APPLICATION, WOUND VAC;  Surgeon: Jeremie Gutiérrez Jr., MD;  Location: [facility] OR;  Service: Orthopedics;  Laterality: N/A;    APPLICATION OF WOUND VACUUM-ASSISTED CLOSURE DEVICE N/A 8/7/2018    Procedure: APPLICATION, WOUND VAC;  Surgeon: Jeremie Gutiérrez Jr., MD;  Location: [facility] OR;  Service: Orthopedics;  Laterality: N/A;    APPLICATION, DRESSING, WOUND Left 8/11/2013    Performed by Jwuan Liu [facility] at [facility] OR    APPLICATION, WOUND VAC N/A 8/7/2018    Performed by Jeremie Gutiérrez Jr., MD at [facility] OR    APPLICATION, WOUND VAC N/A 7/2/2018    Performed by Jeremie Gutiérrez Jr., MD at [facility] OR    ARTHROPLASTY, KNEE, TOTAL Left 3/18/2013    Performed by Juwan Liu [facility] at [facility] OR    ATHERECTOMY N/A 6/5/2017    Performed by Lokesh Thakur MD at [facility] CATH    BLADDER SUSPENSION      BONE GRAFT N/A 6/5/2018    Procedure: BONE GRAFT;  Surgeon: Jeremie Gutiérrez Jr., MD;  Location: [facility] OR;  Service: Orthopedics;  Laterality: N/A;    BONE GRAFT N/A 6/5/2018    Performed by Jeremie Gutiérrez Jr., MD at [facility] OR    CARDIAC SURGERY  1996    CABG    CARDIAC SURGERY      Stents    CORONARY ARTERY BYPASS GRAFT  1996    EXPLORATION, WOUND-lumbar N/A 8/7/2018    Performed by Jeremie Gutiérrez Jr., MD at [facility] OR    FRACTURE SURGERY Left     Lt hand    FRACTURE SURGERY Left 1993    Lt Knee    FUSION-TRANSLUMINAL LUMBAR INTERBODY (TLIF) L2-3 W/ INSTRUMENTATION N/A 6/5/2018    Performed by Jeremie Gutiérrez Jr., MD at [facility] OR    HEMORRHOID SURGERY      HEMORRHOID SURGERY      HYSTERECTOMY  1984    INCISION AND DRAINAGE N/A 7/2/2018    Procedure: INCISION AND DRAINAGE (I & D) LUMBAR SPINE W/ANTIBIOTIC BEAD PLACEMENT;  Surgeon: Jeremie Gutiérrez Jr., MD;  Location: [facility] OR;  Service: Orthopedics;  Laterality: N/A;    INCISION AND DRAINAGE (I & D) LUMBAR SPINE W/ANTIBIOTIC BEAD PLACEMENT N/A 7/2/2018    Performed by Jeremie Gutiérrez  MD [facility] at [facility]    INCISION AND DRAINAGE (I & D)-EXTREMITY-LOWER Left 8/11/2013    [facility] OR    INCISION AND DRAINAGE POSTERIOR LUMBAR SPINE  07/02/2018    INSERTION, PICC Right 7/2/2018    Performed by Jerem[facility] Jr., [facility] at [facility] OR    JOINT REPLACEMENT Left     KNEE SURGERY Left     POSTOP TKR INFECTION TX    LUMBAR EPIDURAL INJECTION      OPEN REDUCTION INTERNAL FIXATION-HIP TFN Left 9/21/2017    Performed by LOY Early II, MD at [facility] OR    PERIPHERALLY INSERTED CENTRAL CATHETER INSERTION Right 7/2/2018    Procedure: INSERTION, PICC;  Surgeon: Jeremie Gutiérrez Jr., MD;  Location: [facility] OR;  Service: Orthopedics;  Laterality: Right;    REMOVAL OF HARDWARE FROM SPINE N/A 8/7/2018    Procedure: REMOVAL, HARDWARE, SPINE;  Surgeon: Jeremie Gutiérrez Jr., MD;  Location: [facility] OR;  Service: Orthopedics;  Laterality: N/A;    REMOVAL, HARDWARE, SPINE N/A 8/7/2018    Performed by Jeremie [facility]rez Jr., MD at [facility] OR    REMOVAL, PROSTHESIS, KNEE Left 11/20/2013    [facility] at [facility] OR    THYROIDECTOMY, PARTIAL      TOTAL KNEE  PROSTHESIS REMOVAL W/ SPACER INSERTION Left     TRANSFORAMINAL LUMBAR INTERBODY FUSION (TLIF) OF SPINE WITH PERCUTANEOUS INSTRUMENTATION N/A 6/5/2018    Procedure: FUSION-TRANSLUMINAL LUMBAR INTERBODY (TLIF) L2-3 W/ INSTRUMENTATION;  Surgeon: Jeremie Gutiérrez Jr., MD;  Location: [facility] OR;  Service: Orthopedics;  Laterality: N/A;    WOUND EXPLORATION N/A 8/7/2018    Procedure: EXPLORATION, WOUND-lumbar;  Surgeon: Jeremie Gutiérrez Jr., MD;  Location: [facility] OR;  Service: Orthopedics;  Laterality: N/A;       Social History     Socioeconomic History    Marital status:      Spouse name: Not on file    Number of children: Not on file    Years of education: Not on file    Highest education level: Not on file   Social Needs    Financial resource strain: Not on file    Food insecurity  - worry: Not on file    Food insecurity - inability: Not on file    Transportation needs - medical: Not on file    Transportation needs - non-medical: Not on file   Occupational History    Not on file   Tobacco Use    Smoking status: Former Smoker     Types: Cigarettes     Last attempt to quit: 3/13/1987     Years since quittin.5    Smokeless tobacco: Never Used   Substance and Sexual Activity    Alcohol use: No     Frequency: Never    Drug use: No    Sexual activity: No   Other Topics Concern    Not on file   Social History Narrative    Not on file       OBJECTIVE:     Vital Signs Range (Last 24H):  Temp:  [36.6 °C (97.9 °F)-37.4 °C (99.3 °F)]   Pulse:  []   Resp:  [15-39]   BP: (111-178)/(55-99)   SpO2:  [93 %-100 %]   Arterial Line BP: (108-167)/(49-90)       Significant Labs:  Lab Results   Component Value Date    WBC 19.17 (H) 2018    HGB 7.0 (L) 2018    HCT 22.0 (L) 2018     2018    CHOL 82 (L) 2009    TRIG 51 2009    HDL 47 2009    ALT 6 (L) 2018    AST 13 2018     2018    K 4.5 2018     (H) 2018    CREATININE 0.6 2018    BUN 6 (L) 2018    CO2 26 2018    TSH 3.958 2018    INR 1.2 2018    HGBA1C 7.6 (H) 2018       Diagnostic Studies: No relevant studies.    EKG:   Atrial flutter with variable A-V block  Rightward axis  Low voltage QRS  Nonspecific T wave abnormality  Abnormal ECG  When compared with ECG of 15-SEP-2018 20:56,  Atrial flutter has replaced Atrial fibrillation  QT has lengthened  Confirmed by MANPREET ESTES MD ([id number]) on 2018 5:26:11 PM    2D ECHO:  Results for orders placed or performed during the hospital encounter of 18   2D echo with color flow doppler   Result Value Ref Range    EF 55 55 - 65    Est. PA Systolic Pressure 87.59 (A)     Tricuspid Valve Regurgitation MODERATE (A)          ASSESSMENT/PLAN:         Anesthesia Evaluation    I  have reviewed the Patient Summary Reports.    I have reviewed the Nursing Notes.   I have reviewed the Medications.     Review of Systems  Anesthesia Hx:  Hx of Anesthetic complications  History of prior surgery of interest to airway management or planning: Previous anesthesia: General Personal Hx of Anesthesia complications Slow To Awaken/Delayed Emergence and moderate, did not delay extubation, but prolonged PACU stay   Social:  Former Smoker    Cardiovascular:   Hypertension Valvular problems/Murmurs CAD  Dysrhythmias atrial fibrillation CHF pHTN pa systolic 60 in 2017  RV function mod decreased on echo   Pulmonary:   Asthma Sleep Apnea    Renal/:   Chronic Renal Disease, ARF    Hepatic/GI:   GERD    Musculoskeletal:   Arthritis     Neurological:   TIA,    Endocrine:   Diabetes, type 2 Hypothyroidism        Physical Exam  General:  Well nourished    Airway/Jaw/Neck:  Airway Findings: Pre-Existing Airway Tube(s): Oral Endotracheal tube General Airway Assessment: Adult  TM Distance: Normal, at least 6 cm       Chest/Lungs:  Chest/Lungs Findings: Mechanical breath sounds     Heart/Vascular:  Heart Findings: Rate: Normal  Rhythm: Regular Rhythm     Abdomen:  Abdomen Findings:  Normal, Soft, Nontender     Musculoskeletal:  Musculoskeletal Findings: Normal    Mental Status:  Mental Status Findings:  Cooperative         Anesthesia Plan  Type of Anesthesia, risks & benefits discussed:  Anesthesia Type:  general  Patient's Preference:   Intra-op Monitoring Plan: standard ASA monitors  Intra-op Monitoring Plan Comments:   Post Op Pain Control Plan: multimodal analgesia, IV/PO Opioids PRN and per primary service following discharge from PACU  Post Op Pain Control Plan Comments:   Induction:   IV  Beta Blocker:  Patient is not currently on a Beta-Blocker (No further documentation required).       Informed Consent: Patient representative understands risks and agrees with Anesthesia plan.  Questions answered. Anesthesia  consent signed with patient representative.  ASA Score: 4     Day of Surgery Review of History & Physical:    H&P update referred to the surgeon.         Ready For Surgery From Anesthesia Perspective.

## 2018-09-19 NOTE — ASSESSMENT & PLAN NOTE
82 year old female with history of L2-3 fusion 6/5 complicated by post operative infection with Enterobacter cloacae. She has been on outpatient IV Ciprofloxacin and has undergone an I&D on 7/2 with partial hardware removal on 8/7 without resolution of her infection. 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     Patient's hospital course has been complicated by acute respiratory failure requiring intubation and transfer to the ICU with CXR showing pulmonary edema. Surgery has been postponed due to decline in medical status. Blood cultures 9/9 returned positive for Prevotella. She underwent IR drainage of paraspinal abscess on 9/13 with 5 mL purulent fluid removed. Gram stain is showing many GNRs, moderate gram negative diplococci and few GPRs. Abscess cultures are also showing Prevotella.      She is currently on empiric Vanc and Zosyn. Patient remains intubated in the ICU. Afebrile. Leukocytosis/CRP/procalc are trending down. Repeat blood cultures are NGTD. Volume status improved. She is scheduled to go to the OR tomorrow with NSGY for a washout.       Plan  - Continue Zosyn 4.5 g IV q 8 hours   - Recommend discontinuing Vancomycin given no GPCs on current culture data  - When taken to the OR, please obtain surgical cultures and send for gram stain, aerobic, anaerobic, AFB and fungal cultures   - Will follow cultures to guide antibiotic therapy.  - Anticipate 8 weeks of antibiotics      - ID will follow.

## 2018-09-19 NOTE — PROGRESS NOTES
[facility]  Neurosurgery  Progress Note    Subjective:     History of Present Illness: Ms Medina Frazier is an 82yoF with PMHx DMII, HTN, CHF, CAD, CHF, CKD, HLD, GERD, hypothyroidism,  s/p L2-3 TLIF on 6/5/18 by Dr. Gutiérrez at [facility], complicated by infection & subsequent washout &  hardware removal, who is transferred from [facility] for neurosurgical evaluation of L2-3 osteomyelitis.  Following her initial surgery, she was found to have a lumbar incision wound infection that was washed out on 7/2/18.  Surgical cultures grew E. Cloacae & a PICC was placed.  She was discharged on IV Cipro & a wound vac.  She then had hardware removal on 8/7.  She has had progressive worsening of pain & infection since that time.   Most recent CT Lspine shows L2-3 osteomyelitis with paraspinal fluid collection L1-3.      Post-Op Info:  Procedure(s) (LRB):  FUSION, SPINE, LUMBAR, TLIF, WITH PERCUTANEOUS INSTRUMENTATION L1-5, depuy, neuromonitoring, 4 post bed (N/A)         Interval History: RUE DVT on US    Medications:  Continuous Infusions:    Scheduled Meds:   albuterol-ipratropium  3 mL Nebulization Q4H    chlorhexidine  15 mL Mouth/Throat BID    heparin (porcine)  5,000 Units Subcutaneous Q8H    insulin detemir U-100  5 Units Subcutaneous QHS    levothyroxine  150 mcg Per OG tube Before breakfast    miconazole NITRATE 2 %   Topical (Top) BID    [START ON 9/20/2018] pantoprazole  40 mg Per OG tube Daily    piperacillin-tazobactam (ZOSYN) IVPB  4.5 g Intravenous Q8H     PRN Meds:dextrose 50%, dextrose 50%, fentaNYL citrate (PF), glucagon (human recombinant), glucose, glucose, insulin aspart U-100, ondansetron     Review of Systems    Objective:     Weight: 102.7 kg (226 lb 6.6 oz)  Body mass index is 38.86 kg/m².  Vital Signs (Most Recent):  Temp: 98.8 °F (37.1 °C) (09/19/18 1251)  Pulse: 93 (09/19/18 1251)  Resp: 19 (09/19/18 1251)  BP: 135/62 (09/19/18 1202)  SpO2: 95 % (09/19/18 1251) Vital Signs (24h  Range):  Temp:  [98.1 °F (36.7 °C)-99.3 °F (37.4 °C)] 98.8 °F (37.1 °C)  Pulse:  [] 93  Resp:  [15-38] 19  SpO2:  [93 %-100 %] 95 %  BP: (111-178)/(55-99) 135/62  Arterial Line BP: (108-167)/(49-90) 135/62     Date 09/19/18 0700 - 09/20/18 0659   Shift 8571-5624 5087-1194 1419-0393 24 Hour Total   INTAKE   I.V.(mL/kg) 861.7(8.4)   861.7(8.4)   IV Piggyback 250   250   Shift Total(mL/kg) 1111.7(10.8)   1111.7(10.8)   OUTPUT   Other 1721   1721   Shift Total(mL/kg) 1721(16.8)   1721(16.8)   Weight (kg) 102.7 102.7 102.7 102.7              Vent Mode: Spont  Oxygen Concentration (%):  [30] 30  Resp Rate Total:  [17 br/min-27 br/min] 18 br/min  Vt Set:  [0 mL-360 mL] 360 mL  PEEP/CPAP:  [5 cmH20] 5 cmH20  Pressure Support:  [0 cmH20-5 cmH20] 5 cmH20  Mean Airway Pressure:  [7.4 paI88-70 cmH20] 7.4 cmH20         NG/OG Tube 09/12/18 1325 orogastric Center mouth (Active)   Placement Check placement verified by x-ray;placement verified by distal tube length measurement 9/18/2018 11:00 AM   Distal Tube Length (cm) 57 9/18/2018 11:00 AM   Tolerance no signs/symptoms of discomfort 9/18/2018 11:00 AM   Securement taped to commercial device 9/18/2018 11:00 AM   Clamp Status/Tolerance unclamped 9/18/2018 11:00 AM   Suction Setting/Drainage Method suction at;low;intermittent setting 9/16/2018  3:05 AM   Insertion Site Appearance no redness, warmth, tenderness, skin breakdown, drainage 9/18/2018 11:00 AM   Flush/Irrigation flushed w/;water;no resistance met 9/18/2018 11:00 AM   Feeding Method continuous 9/18/2018  7:01 AM   Feeding Action feeding continued 9/18/2018 11:00 AM   Current Rate (mL/hr) 40 mL/hr 9/18/2018 11:00 AM   Goal Rate (mL/hr) 40 mL/hr 9/18/2018 11:00 AM   Intake (mL) 60 mL 9/16/2018 11:05 PM   Intake (mL) - Formula Tube Feeding 40 9/18/2018 12:00 PM   Residual Amount (ml) 110 ml 9/18/2018 11:00 AM            Urethral Catheter 09/13/18 1438 Temperature probe (Active)   Site Assessment Clean;Intact 9/18/2018  "11:00 AM   Collection Container Urimeter 9/18/2018 11:00 AM   Securement Method secured to top of thigh w/ adhesive device 9/18/2018 11:00 AM   Catheter Care Performed yes 9/18/2018 11:00 AM   Reason for Continuing Urinary Catheterization Critically ill in ICU requiring intensive monitoring 9/18/2018 11:00 AM   CAUTI Prevention Bundle StatLock in place w 1" slack;Intact seal between catheter & drainage tubing;Drainage bag off the floor;Green sheeting clip in use;No dependent loops or kinks;Drainage bag not overfilled (<2/3 full);Drainage bag below bladder 9/18/2018 11:00 AM   Output (mL) 15 mL 9/18/2018 12:00 PM            Trialysis (Dialysis) Catheter 09/12/18 1326 right internal jugular (Active)   IV Device Securement sutures 9/18/2018 11:00 AM   Additional Site Signs no drainage;no streak formation;no palpable cord;no pain;no edema;no warmth;no erythema 9/18/2018  7:01 AM   Patency/Care infusing 9/18/2018 11:00 AM   Site Assessment Clean;Dry;Intact;No redness;No swelling 9/18/2018 11:00 AM   Status Accessed 9/18/2018 11:00 AM   Dressing Intervention Dressing reinforced 9/18/2018 11:00 AM   Dressing Status Biopatch in place;Clean;Dry;Intact 9/18/2018 11:00 AM   Dressing Change Due 09/22/18 9/18/2018 11:00 AM   Verification by X-ray Yes 9/18/2018 11:00 AM   Site Condition No complications 9/18/2018 11:00 AM   Dressing Occlusive 9/18/2018 11:00 AM   Daily Line Review Performed 9/18/2018 11:00 AM       Neurosurgery Physical Exam     [id number]  PERRL  FC x4  CNII-XII grossly intact        Significant Labs:  Recent Labs   Lab  09/19/18   0109  09/19/18   0125  09/19/18   0508  09/19/18   0834   GLU  145*  145*  154*  139*  134*   NA  139  139  138  141  144   K  4.2  4.2  4.2  4.2  4.5   CL  107  107  107  110  111*   CO2  26  26  24  26  26   BUN  8  8  9  6*  6*   CREATININE  0.7  0.7  0.7  0.5  0.6   CALCIUM  7.9*  7.9*  7.8*  7.9*  8.1*   MG  1.6  1.6  1.6  1.6   --      Recent Labs   Lab  09/18/18   0119  " 09/19/18   0109   WBC  20.58*  19.17*   HGB  7.3*  7.0*   HCT  22.3*  22.0*   PLT  225  177     Recent Labs   Lab  09/18/18   0119  09/19/18   0109   INR  1.1  1.2     Microbiology Results (last 7 days)     Procedure Component Value Units Date/Time    Blood culture [[id number]] Collected:  09/15/18 1819    Order Status:  Completed Specimen:  Blood from Peripheral, Upper Arm, Left Updated:  09/18/18 2012     Blood Culture, Routine No Growth to date     Blood Culture, Routine No Growth to date     Blood Culture, Routine No Growth to date     Blood Culture, Routine No Growth to date    Narrative:       Blood cultures x 2 different sites. 4 bottles total. Please  draw cultures before administering antibiotics.    Blood culture [[id number]] Collected:  09/15/18 1805    Order Status:  Completed Specimen:  Blood from Peripheral, Upper Arm, Right Updated:  09/18/18 2012     Blood Culture, Routine No Growth to date     Blood Culture, Routine No Growth to date     Blood Culture, Routine No Growth to date     Blood Culture, Routine No Growth to date    Narrative:       Blood cultures from 2 different sites. 4 bottles total.  Please draw before starting antibiotics.    Culture, Anaerobe [[id number]] Collected:  09/13/18 0834    Order Status:  Completed Specimen:  Abscess from Back Updated:  09/18/18 1340     Anaerobic Culture --     PREVOTELLA (B.) BIVIA  Many      Narrative:       lumbar paraspinal fluid collection drainage    Aerobic culture [[id number]] Collected:  09/13/18 0834    Order Status:  Completed Specimen:  Abscess from Back Updated:  09/17/18 0848     Aerobic Bacterial Culture No growth    Narrative:       lumbar paraspinal fluid collection drainage    Blood culture [[id number]] Collected:  09/11/18 1011    Order Status:  Completed Specimen:  Blood Updated:  09/16/18 1212     Blood Culture, Routine No growth after 5 days.    Narrative:       Please draw from 2 separate sites 30 minutes apart. Thank you    Blood culture  [[id number]] Collected:  09/11/18 1003    Order Status:  Completed Specimen:  Blood Updated:  09/16/18 1212     Blood Culture, Routine No growth after 5 days.    Gram stain [[id number]] Collected:  09/13/18 0834    Order Status:  Completed Specimen:  Abscess from Back Updated:  09/13/18 1946     Gram Stain Result Many WBC's      Many Gram negative rods      Few Gram positive rods    Blood culture [[id number]] Collected:  09/09/18 1731    Order Status:  Completed Specimen:  Blood Updated:  09/13/18 1418     Blood Culture, Routine Gram stain terrell bottle: Gram negative rods      Blood Culture, Routine Results called to and read back by:Sofia Noriega RN 09/11/2018  06:01     Blood Culture, Routine PREVOTELLA (B.) BIVIA    Narrative:       From 2 different sites 30 minutes apart    Blood culture [[id number]] Collected:  09/09/18 1555    Order Status:  Completed Specimen:  Blood Updated:  09/13/18 1417     Blood Culture, Routine Gram stain terrell bottle: Gram negative rods      Blood Culture, Routine Positive results previously called 09/11/2018  13:30     Blood Culture, Routine PREVOTELLA (B.) BIVIA    Gram stain [[id number]] Collected:  09/13/18 0834    Order Status:  Completed Specimen:  Abscess from Back Updated:  09/13/18 1153     Gram Stain Result Many WBC's      Many Gram negative rods      Moderate Gram negative diplococci    Fungus culture [[id number]] Collected:  09/13/18 0834    Order Status:  Sent Specimen:  Abscess from Back Updated:  09/13/18 1040    Gram stain [[id number]]     Order Status:  Completed Specimen:  Abscess from Back     Urine culture [[id number]] Collected:  09/10/18 1701    Order Status:  Completed Specimen:  Urine from Catheterized Updated:  09/12/18 1438     Urine Culture, Routine --     CANDIDA ALBICANS  > 100,000 cfu/ml  Treatment of asymptomatic candiduria is not recommended (except for   specific populations). Candida isolated in the urine typically   represents colonization. If an indwelling  urinary catheter is present  it should be removed or replaced.      Narrative:       add on [facility]URN order #[id number] per Dr. Elina Sandhu @ 19:58    09/10/2018             Significant Diagnostics:      Assessment/Plan:     Spinal epidural abscess    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       -Pt needs medical clearance for surgery tmrw especially in light of the newly diagnosed RUE DVT  -Pt will need 2 stage surgery: washout and then instrumentation following.  -MRI with L2-3 osteodiscitis, large fluid collection in surgical bed, as well a prevertebral fluid collection L1,2 s/p IR drainage on 9/13   -Plan for OR postponed secondary to dismal medical status and inability to tolerate surgery at this time  -Please wear LSO brace at all times  -Please hold Xarelto & Plavix   -Abx per infectious disease recs   -Continue wound care and wound vac  -Medical management per primary team            Everett Watson MD  Neurosurgery  [facility]

## 2018-09-19 NOTE — PROGRESS NOTES
Pt extubated per MD order to Bipap 16/8 and 30%. Pt is alert and a 100% will continue to monitor.

## 2018-09-19 NOTE — ASSESSMENT & PLAN NOTE
--secondary to spinal abscesses  --blood cultures with prevotella, repeat blood cultures ngtd  --gram stain from spinal abscess with prevotella  --currently off norepinephrine.   --per ID recs: continue zosyn and discontinue vanc as no GPC on most recent cultures  --Not fully able to obtain source control as IR intervention (9/12) only able to partially drain one abscess.   --Washout with Neurosurgery given improvement in hemodynamics either this Thursday (9/20) or sometime next week.

## 2018-09-19 NOTE — PLAN OF CARE
Patient has been off vasopressors since 9/17. CRRT on 9/17 & 9/18 for further volume removal and clearance. Electrolytes normalized. Passed SBT and extubated today. Now satting 100% with 3L of NC. Patient is medically optimized for neurosurgery washout tomorrow.       Leslie Johnson PA-C  Critical Care Medicine  9/19/2018   4:50 PM

## 2018-09-19 NOTE — ASSESSMENT & PLAN NOTE
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       -Pt needs medical clearance for surgery tmrw especially in light of the newly diagnosed RUE DVT  -Pt will need 2 stage surgery: washout and then instrumentation following.  -MRI with L2-3 osteodiscitis, large fluid collection in surgical bed, as well a prevertebral fluid collection L1,2 s/p IR drainage on 9/13   -Plan for OR postponed secondary to dismal medical status and inability to tolerate surgery at this time  -Please wear LSO brace at all times  -Please hold Xarelto & Plavix   -Abx per infectious disease recs   -Continue wound care and wound vac  -Medical management per primary team

## 2018-09-19 NOTE — ASSESSMENT & PLAN NOTE
--s/p lumbar fusion/hardware placement June 2018 with subsequent osteo and abscess.   --LSO brace ordered.  Per conversation with Neurosurgery, will need to wear brace while OOB

## 2018-09-19 NOTE — SUBJECTIVE & OBJECTIVE
Interval History: on SLED overnight, net negative by 1.4L    Review of patient's allergies indicates:   Allergen Reactions    Codeine Nausea Only    Penicillins Swelling     Able to take amoxil and cephalosporins      Current Facility-Administered Medications   Medication Frequency    0.9%  NaCl infusion (CRRT USE ONLY) Continuous    albuterol-ipratropium 2.5 mg-0.5 mg/3 mL nebulizer solution 3 mL Q4H    chlorhexidine 0.12 % solution 15 mL BID    dextrose 50% injection 12.5 g PRN    dextrose 50% injection 25 g PRN    fentaNYL (SUBLIMAZE) 50 mcg/mL injection     fentaNYL citrate (PF) Syrg 25 mcg Q2H PRN    glucagon (human recombinant) injection 1 mg PRN    glucose chewable tablet 16 g PRN    glucose chewable tablet 24 g PRN    heparin (porcine) injection 5,000 Units Q8H    insulin aspart U-100 pen 0-5 Units Q4H PRN    insulin detemir U-100 pen 5 Units QHS    levothyroxine tablet 150 mcg Before breakfast    miconazole NITRATE 2 % top powder BID    ondansetron injection 4 mg Q8H PRN    pantoprazole injection 40 mg Daily    piperacillin-tazobactam 4.5 g in sodium chloride 0.9% 100 mL IVPB (ready to mix system) Q8H    propofol (DIPRIVAN) 10 mg/mL infusion Continuous    sodium phosphate 20.01 mmol in dextrose 5 % 250 mL IVPB PRN    sodium phosphate 30 mmol in dextrose 5 % 250 mL IVPB PRN    sodium phosphate 39.99 mmol in dextrose 5 % 250 mL IVPB PRN    vancomycin (VANCOCIN) 1,250 mg in dextrose 5 % 250 mL IVPB Once       Objective:     Vital Signs (Most Recent):  Temp: 99.1 °F (37.3 °C) (09/19/18 0906)  Pulse: (!) 114 (09/19/18 0906)  Resp: (!) 25 (09/19/18 0906)  BP: (!) 154/79 (09/19/18 0802)  SpO2: (!) 94 % (09/19/18 0906)  O2 Device (Oxygen Therapy): ventilator (09/19/18 0906) Vital Signs (24h Range):  Temp:  [97.7 °F (36.5 °C)-99.3 °F (37.4 °C)] 99.1 °F (37.3 °C)  Pulse:  [] 114  Resp:  [15-39] 25  SpO2:  [93 %-100 %] 94 %  BP: (111-178)/(55-99) 154/79  Arterial Line BP:  (108-167)/(49-90) 154/76     Weight: 102.7 kg (226 lb 6.6 oz) (09/14/18 0505)  Body mass index is 38.86 kg/m².  Body surface area is 2.15 meters squared.    I/O last 3 completed shifts:  In: 6847.5 [I.V.:5147.5; NG/GT:800; IV Piggyback:900]  Out: 7776 [Urine:305; Other:7471]    Physical Exam   HENT:   Head: Atraumatic.   Neck: No JVD present.   Cardiovascular: Normal rate and regular rhythm.   Pulmonary/Chest: Effort normal. She has rales.   On vent, intubated   Abdominal: Soft. She exhibits no distension.   Musculoskeletal: She exhibits edema. She exhibits no tenderness.   Skin: Skin is warm.

## 2018-09-19 NOTE — ASSESSMENT & PLAN NOTE
Non-oliguric, likely multifactorial, the urine sodium of 10, could be consistent with CRS in this patient with what appears to be acute CHF exacerbation and volume overload, however would be careful reading too much in to this urinary sodium value, also consider ATN toxic secondary infection, ischemic ATN, also possibly vancomycin contributing to worsening sCr since admit further noted with WBCs in the urine of > 100, need to r/o infection which would be unlikely to cause an JEY (unless there is a bilateral pyelonephritis, which clinically does not fit, nor consistent with U/S findings), in short, if urine cultures negative need to consider AIN (outpatient abxs), absence of eosinophilia, rash or fever, may make a little less likely, but does not rule out.    Renal ultrasound unremarkable and negative for hydronephrosis.    This morning net negative by 1.4L, lytes and acid/base are stable, she is having some minimal urine output    Plan/Recommendations:  -hold RRT this morning  -challenge with high dose lasix (160mg x 1) this afternoon  -closely monitor for response to above, if no response then likely would need to go back on SLED, although continuous volume removal and clearance not needed, so likely can make decision tomorrow and of course if response to lasix, then resume diuretics for volume removal

## 2018-09-19 NOTE — ASSESSMENT & PLAN NOTE
--multifactorial: sepsis/uremia, medications  --non focal on exam  --awake and alert; following commands appropriately  --weaned off sedation and levophed (9/17)

## 2018-09-19 NOTE — PROGRESS NOTES
[facility]  Nephrology  Progress Note    Patient Name: Medina Frazier  MRN: [id number]  Admission Date: 9/8/2018  Hospital Length of Stay: 11 days  Attending Provider: An Ward MD   Primary Care Physician: Hao Garcia MD  Principal Problem:Acute hypercapnic respiratory failure    Subjective:     HPI: 81yo WF who was to have a lumbar fusion later in the month and was admitted due to abnormal pre-op labs.  Nephrology consulted for JEY (sCr 2.2 on admit, 3.1 at time of consult, normal baseline), also with a sodium of 119 (133 on August 10th). Patient appears somnolent, but wakes up and appropriately answers questions, per family she is at her baseline.  Patient is not complaining of increasing shortness of breath, she in on 3L O2 when seen, this is what she is on at home, she has history of LENA and is supposed to be on CPAP at night, but not compliant.  CXR reviewed and looks like pulmonary edema/congestion on admit.    Interval History: on SLED overnight, net negative by 1.4L    Review of patient's allergies indicates:   Allergen Reactions    Codeine Nausea Only    Penicillins Swelling     Able to take amoxil and cephalosporins      Current Facility-Administered Medications   Medication Frequency    0.9%  NaCl infusion (CRRT USE ONLY) Continuous    albuterol-ipratropium 2.5 mg-0.5 mg/3 mL nebulizer solution 3 mL Q4H    chlorhexidine 0.12 % solution 15 mL BID    dextrose 50% injection 12.5 g PRN    dextrose 50% injection 25 g PRN    fentaNYL (SUBLIMAZE) 50 mcg/mL injection     fentaNYL citrate (PF) Syrg 25 mcg Q2H PRN    glucagon (human recombinant) injection 1 mg PRN    glucose chewable tablet 16 g PRN    glucose chewable tablet 24 g PRN    heparin (porcine) injection 5,000 Units Q8H    insulin aspart U-100 pen 0-5 Units Q4H PRN    insulin detemir U-100 pen 5 Units QHS    levothyroxine tablet 150 mcg Before breakfast    miconazole NITRATE 2 % top powder BID    ondansetron  injection 4 mg Q8H PRN    pantoprazole injection 40 mg Daily    piperacillin-tazobactam 4.5 g in sodium chloride 0.9% 100 mL IVPB (ready to mix system) Q8H    propofol (DIPRIVAN) 10 mg/mL infusion Continuous    sodium phosphate 20.01 mmol in dextrose 5 % 250 mL IVPB PRN    sodium phosphate 30 mmol in dextrose 5 % 250 mL IVPB PRN    sodium phosphate 39.99 mmol in dextrose 5 % 250 mL IVPB PRN    vancomycin (VANCOCIN) 1,250 mg in dextrose 5 % 250 mL IVPB Once       Objective:     Vital Signs (Most Recent):  Temp: 99.1 °F (37.3 °C) (09/19/18 0906)  Pulse: (!) 114 (09/19/18 0906)  Resp: (!) 25 (09/19/18 0906)  BP: (!) 154/79 (09/19/18 0802)  SpO2: (!) 94 % (09/19/18 0906)  O2 Device (Oxygen Therapy): ventilator (09/19/18 0906) Vital Signs (24h Range):  Temp:  [97.7 °F (36.5 °C)-99.3 °F (37.4 °C)] 99.1 °F (37.3 °C)  Pulse:  [] 114  Resp:  [15-39] 25  SpO2:  [93 %-100 %] 94 %  BP: (111-178)/(55-99) 154/79  Arterial Line BP: (108-167)/(49-90) 154/76     Weight: 102.7 kg (226 lb 6.6 oz) (09/14/18 0505)  Body mass index is 38.86 kg/m².  Body surface area is 2.15 meters squared.    I/O last 3 completed shifts:  In: 6847.5 [I.V.:5147.5; NG/GT:800; IV Piggyback:900]  Out: 7776 [Urine:305; Other:7471]    Physical Exam   HENT:   Head: Atraumatic.   Neck: No JVD present.   Cardiovascular: Normal rate and regular rhythm.   Pulmonary/Chest: Effort normal. She has rales.   On vent, intubated   Abdominal: Soft. She exhibits no distension.   Musculoskeletal: She exhibits edema. She exhibits no tenderness.   Skin: Skin is warm.       Assessment/Plan:     JEY (acute kidney injury)    Non-oliguric, likely multifactorial, the urine sodium of 10, could be consistent with CRS in this patient with what appears to be acute CHF exacerbation and volume overload, however would be careful reading too much in to this urinary sodium value, also consider ATN toxic secondary infection, ischemic ATN, also possibly vancomycin contributing to  worsening sCr since admit further noted with WBCs in the urine of > 100, need to r/o infection which would be unlikely to cause an JEY (unless there is a bilateral pyelonephritis, which clinically does not fit, nor consistent with U/S findings), in short, if urine cultures negative need to consider AIN (outpatient abxs), absence of eosinophilia, rash or fever, may make a little less likely, but does not rule out.    Renal ultrasound unremarkable and negative for hydronephrosis.    This morning net negative by 1.4L, lytes and acid/base are stable, she is having some minimal urine output    Plan/Recommendations:  -hold RRT this morning  -challenge with high dose lasix (160mg x 1) this afternoon  -closely monitor for response to above, if no response then likely would need to go back on SLED, although continuous volume removal and clearance not needed, so likely can make decision tomorrow and of course if response to lasix, then resume diuretics for volume removal            Thank you for your consult.    Allen Barnes MD  Nephrology  [facility]

## 2018-09-19 NOTE — PROGRESS NOTES
CRRT:    Daily checks done, UF increased from 200  to 450ml/hr as ordered.   Access: right IJ catheter with good flow.

## 2018-09-19 NOTE — ASSESSMENT & PLAN NOTE
--most likely cardiorenal syndrome vs sepsis (ATN)  --retroperitoneal ultrasound on 9/9 consistent with medical renal disease.   --Nephrology following   --Underwent CRRT 9/17 & 9/18  --Per nephro, f/u diuresis trial with 160 mg of lasix; strict intake and output measurements

## 2018-09-19 NOTE — ASSESSMENT & PLAN NOTE
--Prior ECHO shows diastolic dysfunction (7/2018)  --Echo (9/13) EF 55-60%; concentric remodeling with RV enlargement; no WMA  --s/p CRRT (9/18)  --diuresis trial today

## 2018-09-19 NOTE — NURSING
0710 PT , Pt banging on bed. Pt propofol turned off due to possible extubation. Critical care team notified. Orders for 25mcg of Fentanyl. Orders carried out.     0721 Pt . Pt banging on bed. Critical care team called, no answer.     0723 Critical care team at bedside. . CLARIBEL Cornejo placed pt back on Rate of 16 on vent. Pt HR dropped to 98, SBP 140s. CLARIBEL Cornejo suctioned pt. Pt O2 stats dropped to 70s. RN gave 100% O2 on vent and notified CLARIBEL Cornejo at bedside that pt isn't getting any tidal volumes in. NP began to bag pt. RT called STAT. Chest X-ray called STAT x2. Pulse ox changed x2. Pt O2 stat now 98% via bagging pt. Critical care MD at bedside.

## 2018-09-20 NOTE — ASSESSMENT & PLAN NOTE
-no response to high dose lasix challenge yesterday, SCUF started last night, CXR remaining congested

## 2018-09-20 NOTE — PROGRESS NOTES
[facility]  Infectious Disease  Progress Note    Patient Name: Medina Frazier  MRN: [id number]  Admission Date: 9/8/2018  Length of Stay: 12 days  Attending Physician: An Ward MD  Primary Care Provider: Hao Garcia MD    Isolation Status: No active isolations  Assessment/Plan:      Spinal epidural abscess    82 year old female with history of L2-3 fusion 6/5 complicated by post operative infection with Enterobacter cloacae. She has been on outpatient IV Ciprofloxacin and has undergone an I&D on 7/2 with partial hardware removal on 8/7 without resolution of her infection. 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     Patient's hospital course has been complicated by acute respiratory failure requiring intubation and transfer to the ICU with CXR showing pulmonary edema. Surgery has been postponed due to decline in medical status. Blood cultures 9/9 returned positive for Prevotella. She underwent IR drainage of paraspinal abscess on 9/13 with 5 mL purulent fluid removed. Gram stain is showing many GNRs, moderate gram negative diplococci and few GPRs. Abscess cultures are also showing Prevotella.      She is currently on Zosyn.  Afebrile. Leukocytosis/CRP/procalc are trending down. Repeat blood cultures are NGTD. She was scheduled to go to the OR today but is postponed due to decline in respiratory status. She is currently on bipap    Plan  - Continue Zosyn 4.5 g IV q 8 hours   - surgery postponed as pt not medically stable at this time 2/2 worsening respiratory status. She is currently on bipap.   - When taken to the OR, please obtain surgical cultures and send for gram stain, aerobic, anaerobic, AFB and fungal cultures   - Anticipate 8 weeks of antibiotics      - continue current therapy. ID will follow.            Thank you for your consult. I will follow-up  with patient. Please contact us if you have any additional questions.    Tawanda Patel PA-C  Infectious Disease  [facility]  [phone]    Subjective:     Principal Problem:Acute hypercapnic respiratory failure    HPI: Medina Frazier is an 82 year old female who underwent L2-3 fusion on 6/5/18. She did well until shortly after staples removed,   drainage from the lower part of the incision was noted. On 7/2 she went to the OR for an I&D. Purulence was encountered. Surgical cultures grew Enterobacter cloacae. She was seen by Dr. Lenz, ID provider who started patient on IV Ciprofloxacin for 6 weeks. Patient's infection did not resolve and wound continued to drain. She was taken back to the OR on 8/7 for hardware removal, however appears on most recent imaging an intervertebral cage remains in place. No new surgical cultures obtained at that time. She was continued on Cipro. Patient's back pain has worsened over the past week and is now wrapping around to her anterior abdomen. She presented to an [facility]  ED in [place] for evaluation. 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 Labs notable for elevated WBC, ESR, CRP and pyuria. Procalc 19. JEY.  Blood and urine culture negative.  She was transferred here for higher level of care. Currently she is on Cipro from her previous cultures, and was started on Vanc on 9/8. Patient denies fevers, chills, sweats. Reports severe back pain and lower extremity weakness. Magaña in place.    Of note, patient has a documented PCN allergy. She said she has tolerated PCN in the recent past without adverse reactions. 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 No problems since.      Interval History:   Surgery deferred toady as pt not medically stable 2/2 worsening respiratory status. . Pt currently on bipap.  Continue to follow closely.     Review of Systems   Unable to perform ROS: Acuity of condition     Objective:     Vital Signs (Most Recent):  Temp: 96.6 °F (35.9 °C) (09/20/18 1500)  Pulse: 88 (09/20/18 1554)  Resp: 17 (09/20/18 1554)  BP: 135/81 (09/20/18 1500)  SpO2: 100 % (09/20/18 1554) Vital Signs (24h Range):  Temp:  [95.7 °F (35.4 °C)-99 °F (37.2 °C)] 96.6 °F (35.9 °C)  Pulse:  [] 88  Resp:  [13-46] 17  SpO2:  [92 %-100 %] 100 %  BP: (119-167)/(62-98) 135/81  Arterial Line BP: (121-162)/(59-84) 135/59     Weight: 123 kg (271 lb 2.7 oz)  Body mass index is 46.55 kg/m².    Estimated Creatinine Clearance: 48.4 mL/min (based on SCr of 1.2 mg/dL).    Physical Exam   Constitutional: She appears well-developed and well-nourished. She appears ill.   Neck: JVD present.   RIJ CVC in place   Cardiovascular: Normal rate. An irregularly irregular rhythm present. Exam reveals no friction rub.   No murmur heard.  Pulmonary/Chest: Effort normal. She has no wheezes. She has rales.   On bipap   Abdominal: Soft. Bowel sounds are normal. She exhibits no distension.   Obese abdomen   Musculoskeletal: She exhibits edema (BLE, right hand).   +2 pitting edema   Neurological: She is alert.   Skin: Skin is warm and dry. She is not diaphoretic. No erythema.   Lumbar wound not examined today.   Nursing note and vitals reviewed.      Significant Labs:   Blood Culture:   Recent Labs   Lab  09/09/18   1731  09/11/18   1003  09/11/18   1011  09/15/18   1805  09/15/18   1819   LABBLOO  Gram stain terrell bottle: Gram negative rods   Results called to and read back by:Sofia Noriega RN 09/11/2018  06:01  PREVOTELLA (B.) BIVIA  No growth after 5 days.  No growth after 5 days.  No Growth to date  No Growth to date  No Growth to date  No Growth to date  No Growth to date  No Growth to date  No Growth to date  No Growth to date  No Growth to date  No Growth to date     CBC:   Recent Labs   Lab  09/19/18   0109  09/20/18   0029   WBC   19.17*  17.36*   HGB  7.0*  7.6*   HCT  22.0*  24.2*   PLT  177  221     CMP:   Recent Labs   Lab  09/19/18   0109   09/20/18   0029  09/20/18   0404  09/20/18   0812  09/20/18   1426   NA  139  139   < >  141  140  141  142  142   K  4.2  4.2   < >  4.9  4.8  4.8  4.7  4.7   CL  107  107   < >  110  109  112*  112*  112*   CO2  26  26   < >  22*  20*  19*  20*  20*   GLU  145*  145*   < >  178*  162*  160*  167*  167*   BUN  8  8   < >  12  14  15  17  17   CREATININE  0.7  0.7   < >  1.0  1.1  1.1  1.2  1.2   CALCIUM  7.9*  7.9*   < >  8.9  8.9  9.3  9.3  9.3   PROT  5.0*   --   5.7*   --    --    --    ALBUMIN  1.6*  1.6*   < >  1.8*  1.9*   --   2.0*  2.0*   BILITOT  0.6   --   0.7   --    --    --    ALKPHOS  110   --   100   --    --    --    AST  13   --   18   --    --    --    ALT  6*   --   8*   --    --    --    ANIONGAP  6*  6*   < >  9  11  10  10  10   EGFRNONAA  >60.0  >60.0   < >  53.3*  47.5*  47.5*  42.8*  42.8*    < > = values in this interval not displayed.     Respiratory Culture: No results for input(s): GSRESP, RESPIRATORYC in the last 4320 hours.  Urine Culture:   Recent Labs   Lab  06/07/18   1128  09/07/18   2255  09/10/18   1701   LABURIN  No growth  No significant growth  BEN ALBICANS  > 100,000 cfu/ml  Treatment of asymptomatic candiduria is not recommended (except for   specific populations). Candida isolated in the urine typically   represents colonization. If an indwelling urinary catheter is present  it should be removed or replaced.       Urine Studies:   Recent Labs   Lab  07/02/18   1205   09/10/18   1701   COLORU  Yellow   < >  Yellow   APPEARANCEUA  Clear   < >  Cloudy*   PHUR  6.0   < >  5.0   SPECGRAV  1.015   < >  1.020   PROTEINUA  Negative   < >  Negative   GLUCUA  Negative   < >  Negative   KETONESU  Negative   < >  Negative   BILIRUBINUA  Negative   < >  Negative   OCCULTUA  Trace*   < >  3+*   NITRITE  Positive*   < >  Negative   UROBILINOGEN   0.2   < >  Negative   LEUKOCYTESUR  Negative   < >  3+*   RBCUA  1   < >  64*   WBCUA  2   < >  48*   BACTERIA  Few*   --   Rare   SQUAMEPITHEL   --    --   1   HYALINECASTS  1   --    --     < > = values in this interval not displayed.     Wound Culture:   Recent Labs   Lab  07/02/18   1432  09/13/18   0834   LABAERO  ENTEROBACTER CLOACAE COMPLEX  Rare  For susceptibility see order # [id number]    ENTEROBACTER CLOACAE COMPLEX  Moderate    No growth     All pertinent labs within the past 24 hours have been reviewed.    Significant Imaging: I have reviewed all pertinent imaging results/findings within the past 24 hours.

## 2018-09-20 NOTE — ASSESSMENT & PLAN NOTE
--secondary to spinal abscesses  --blood cultures with prevotella, repeat blood cultures ngtd  --gram stain from spinal abscess with prevotella  --currently off norepinephrine.   --per ID recs: continue zosyn and discontinue vanc as no GPC on most recent cultures  --Not fully able to obtain source control as IR intervention (9/12) only able to partially drain one abscess.   --Washout with Neurosurgery given improvement in hemodynamics either this Thursday (9/20).

## 2018-09-20 NOTE — PROGRESS NOTES
[facility]  Nephrology  Progress Note    Patient Name: Medina Frazier  MRN: [id number]  Admission Date: 9/8/2018  Hospital Length of Stay: 12 days  Attending Provider: An Ward MD   Primary Care Physician: Hao Garcia MD  Principal Problem:Acute hypercapnic respiratory failure    Subjective:     HPI: 81yo WF who was to have a lumbar fusion later in the month and was admitted due to abnormal pre-op labs.  Nephrology consulted for JEY (sCr 2.2 on admit, 3.1 at time of consult, normal baseline), also with a sodium of 119 (133 on August 10th). Patient appears somnolent, but wakes up and appropriately answers questions, per family she is at her baseline.  Patient is not complaining of increasing shortness of breath, she in on 3L O2 when seen, this is what she is on at home, she has history of LENA and is supposed to be on CPAP at night, but not compliant.  CXR reviewed and looks like pulmonary edema/congestion on admit.    Interval History: extubated, on BiPap this morning, no response to lasix challenge yesterday afternoon, started SCUF overnight    Review of patient's allergies indicates:   Allergen Reactions    Codeine Nausea Only    Penicillins Swelling     Able to take amoxil and cephalosporins      Current Facility-Administered Medications   Medication Frequency    0.9%  NaCl infusion (CRRT USE ONLY) Continuous    albuterol-ipratropium 2.5 mg-0.5 mg/3 mL nebulizer solution 3 mL Q4H    dextrose 50% injection 12.5 g PRN    dextrose 50% injection 25 g PRN    fentaNYL citrate (PF) Syrg 25 mcg Q2H PRN    glucagon (human recombinant) injection 1 mg PRN    glucose chewable tablet 16 g PRN    glucose chewable tablet 24 g PRN    insulin aspart U-100 pen 0-5 Units Q4H PRN    insulin detemir U-100 pen 5 Units QHS    levothyroxine tablet 150 mcg Before breakfast    miconazole NITRATE 2 % top powder BID    ondansetron injection 4 mg Q8H PRN    pantoprazole suspension 40 mg Daily     piperacillin-tazobactam 4.5 g in sodium chloride 0.9% 100 mL IVPB (ready to mix system) Q8H    sodium phosphate 20.01 mmol in dextrose 5 % 250 mL IVPB PRN    sodium phosphate 30 mmol in dextrose 5 % 250 mL IVPB PRN    sodium phosphate 39.99 mmol in dextrose 5 % 250 mL IVPB PRN       Objective:     Vital Signs (Most Recent):  Temp: (!) 95.9 °F (35.5 °C) (09/20/18 0921)  Pulse: 98 (09/20/18 0921)  Resp: (!) 32 (09/20/18 0921)  BP: (!) 167/90 (09/20/18 0505)  SpO2: 100 % (09/20/18 0921)  O2 Device (Oxygen Therapy): BiPAP (09/20/18 0921) Vital Signs (24h Range):  Temp:  [95.9 °F (35.5 °C)-99 °F (37.2 °C)] 95.9 °F (35.5 °C)  Pulse:  [] 98  Resp:  [13-46] 32  SpO2:  [93 %-100 %] 100 %  BP: (120-167)/(57-92) 167/90  Arterial Line BP: (120-162)/(57-84) 124/59     Weight: 123 kg (271 lb 2.7 oz) (09/20/18 0505)  Body mass index is 46.55 kg/m².  Body surface area is 2.36 meters squared.    I/O last 3 completed shifts:  In: 8067.6 [I.V.:7317.6; IV Piggyback:750]  Out: 6739 [Urine:95; Other:6644]    Physical Exam   HENT:   Head: Normocephalic and atraumatic.   Neck: No JVD present.   Cardiovascular: Normal rate and regular rhythm.   Pulmonary/Chest: Effort normal. She has rales.   Abdominal: Soft. She exhibits no distension.   Musculoskeletal: She exhibits no edema or tenderness.   Neurological: She is alert.   Skin: Skin is warm.           Assessment/Plan:     JEY (acute kidney injury)    Now with severe oliguria, likely multifactorial, the urine sodium of 10, could be consistent with CRS, but may still have an ATN component.    Lytes and acid/base stable this morning, no response to high dose IV lasix yesterday, CXR congested, started on SCUF, but still net positive by 1.3L past 24hrs, off vent, on BIPap      Plan/Recommendations:  -continue SCUF for volume removal with goal of running net negative over the next 24hrs  -reassess for full RRT in the am        CHF (congestive heart failure), NYHA class IV    -no  response to high dose lasix challenge yesterday, SCUF started last night, CXR remaining congested            Thank you for your consult.     Allen Barnes MD  Nephrology  [facility]

## 2018-09-20 NOTE — NURSING
Critical care team at bedside.    Pt complaining of chest pain and shortness of breath.  Troponin drawn, EKG done, ABG drawn, KUB and CXR ordered, and BiPAP applied.  Pt's temp 95.9F, warming blanket applied. Unable to attain 02 sats at multiple sites, ABG to be completed again in 1 hour.

## 2018-09-20 NOTE — ASSESSMENT & PLAN NOTE
--Prior ECHO shows diastolic dysfunction (7/2018)  --Echo (9/13) EF 55-60%; concentric remodeling with RV enlargement; no WMA  --s/p CRRT (9/18)  --diuresis trial yesterday failed   --CRRT overnight 9/20

## 2018-09-20 NOTE — ASSESSMENT & PLAN NOTE
--2/2 pulmonary edema in setting of acute on chronic diastolic HF, volume overload and JEY  --chest xray (9/17) with continued pulmonary edema however has improved since 9/15 CXR  --bedside ultrasound of right pleural effusion on 9/14 with small pocket not amendable to thoracentesis.  --s/p CRRT  --Mucus plug event today, resolved with bagging.   --Repeat CXR today showed worsening edema

## 2018-09-20 NOTE — SUBJECTIVE & OBJECTIVE
Interval History/Significant Events: started on CRRT  Review of Systems   Constitutional: Positive for activity change. Negative for fever.   HENT: Negative for congestion and sore throat.    Eyes: Negative for photophobia and visual disturbance.   Respiratory: Positive for shortness of breath. Negative for cough.    Cardiovascular: Negative for chest pain and leg swelling.   Gastrointestinal: Negative for abdominal pain and diarrhea.   Endocrine: Negative for polyphagia and polyuria.   Genitourinary: Negative for dysuria and flank pain.   Neurological: Negative for dizziness and headaches.   Psychiatric/Behavioral: Negative for agitation and confusion. The patient is nervous/anxious.      Objective:     Vital Signs (Most Recent):  Temp: 98.1 °F (36.7 °C) (09/20/18 0305)  Pulse: 110 (09/20/18 0305)  Resp: (!) 25 (09/20/18 0305)  BP: (!) 153/89 (09/20/18 0305)  SpO2: 97 % (09/20/18 0305) Vital Signs (24h Range):  Temp:  [98.1 °F (36.7 °C)-99.1 °F (37.3 °C)] 98.1 °F (36.7 °C)  Pulse:  [] 110  Resp:  [17-46] 25  SpO2:  [93 %-100 %] 97 %  BP: (111-176)/(55-99) 153/89  Arterial Line BP: (108-167)/(49-90) 162/76   Weight: 102.7 kg (226 lb 6.6 oz)  Body mass index is 38.86 kg/m².      Intake/Output Summary (Last 24 hours) at 9/20/2018 0429  Last data filed at 9/20/2018 0305  Gross per 24 hour   Intake 5306.5 ml   Output 3134 ml   Net 2172.5 ml       Physical Exam   Constitutional: Vital signs are normal. She appears well-developed and well-nourished. She is cooperative. She appears distressed. She is restrained.   HENT:   Head: Normocephalic and atraumatic.   Eyes: EOM and lids are normal. Right eye exhibits normal extraocular motion. Left eye exhibits normal extraocular motion.   Neck: Normal range of motion. Neck supple.   Trialysis central venous catheter in right internal jugular   Cardiovascular: Normal rate, regular rhythm, normal heart sounds and intact distal pulses. Exam reveals no gallop, no distant heart  sounds and no friction rub.   No murmur heard.  3+ pitting edema of bilateral lower extremity up to knees  Edema noted to right hand    Pulmonary/Chest: No apnea and no tachypnea. No respiratory distress. She has no decreased breath sounds. She has no wheezes. She has rhonchi in the right middle field, the right lower field, the left middle field and the left lower field. She has no rales.   Abdominal: Soft. Normal appearance and bowel sounds are normal. She exhibits no distension. There is no tenderness. No hernia.   Genitourinary:   Genitourinary Comments: Rash between inguinal folds and upper thighs bilaterally   Musculoskeletal:   Arterial line in carotid artery of right wrist   Neurological: She is alert. She has normal strength.   Skin: Skin is dry and intact. She is not diaphoretic.   Psychiatric:   Insomnia   anxious        Vents:  Vent Mode: Spont (09/19/18 1406)  Set Rate: 0 bmp (09/19/18 1406)  Vt Set: 360 mL (09/19/18 1406)  Pressure Support: 5 cmH20 (09/19/18 1406)  PEEP/CPAP: 5 cmH20 (09/19/18 1406)  Oxygen Concentration (%): 30 (09/19/18 2035)  Peak Airway Pressure: 10 cmH2O (09/19/18 1406)  Plateau Pressure: 21 cmH20 (09/19/18 1406)  Total Ve: 4.59 mL (09/19/18 1406)  Negative Inspiratory Force (cm H2O): -24 (09/19/18 1251)  F/VT Ratio<105 (RSBI): (!) 44.92 (09/19/18 1251)  Lines/Drains/Airways     Central Venous Catheter Line                 Trialysis (Dialysis) Catheter 09/12/18 1326 right internal jugular 7 days          Drain                 Urethral Catheter 09/13/18 1438 Temperature probe 6 days          Arterial Line                 Arterial Line 09/15/18 1500 Left Radial 4 days          Pressure Ulcer                 Negative Pressure Wound Therapy  43 days          Peripheral Intravenous Line                 Midline Catheter Insertion/Assessment  - Single Lumen 09/17/18 1235 Right cephalic vein (lateral side of arm) 20g x 10cm 2 days              Significant Labs:    CBC/Anemia  Profile:  Recent Labs   Lab  09/19/18   0109  09/20/18   0029   WBC  19.17*  17.36*   HGB  7.0*  7.6*   HCT  22.0*  24.2*   PLT  177  221   MCV  80*  80*   RDW  18.1*  18.7*        Chemistries:  Recent Labs   Lab  09/19/18   0109  09/19/18   0125  09/19/18   0508  09/19/18   0834  09/19/18   1616  09/20/18   0029   NA  139  139  138  141  144  140  141   K  4.2  4.2  4.2  4.2  4.5  4.7  4.9   CL  107  107  107  110  111*  108  110   CO2  26  26  24  26  26  24  22*   BUN  8  8  9  6*  6*  9  12   CREATININE  0.7  0.7  0.7  0.5  0.6  0.8  1.0   CALCIUM  7.9*  7.9*  7.8*  7.9*  8.1*  8.3*  8.9   ALBUMIN  1.6*  1.6*  1.5*   --    --    --   1.8*   PROT  5.0*   --    --    --    --   5.7*   BILITOT  0.6   --    --    --    --   0.7   ALKPHOS  110   --    --    --    --   100   ALT  6*   --    --    --    --   8*   AST  13   --    --    --    --   18   MG  1.6  1.6  1.6  1.6   --    --    --    PHOS  1.7*  1.9*  1.9*   --    --    --   3.5       All pertinent labs within the past 24 hours have been reviewed.    Significant Imaging:  I have reviewed and interpreted all pertinent imaging results/findings within the past 24 hours.

## 2018-09-20 NOTE — PROGRESS NOTES
[facility]  Neurosurgery  Progress Note    Subjective:     History of Present Illness: Ms Medina Frazier is an 82yoF with PMHx DMII, HTN, CHF, CAD, CHF, CKD, HLD, GERD, hypothyroidism,  s/p L2-3 TLIF on 6/5/18 by Dr. Gutiérrez at [facility], complicated by infection & subsequent washout &  hardware removal, who is transferred from [facility] for neurosurgical evaluation of L2-3 osteomyelitis.  Following her initial surgery, she was found to have a lumbar incision wound infection that was washed out on 7/2/18.  Surgical cultures grew E. Cloacae & a PICC was placed.  She was discharged on IV Cipro & a wound vac.  She then had hardware removal on 8/7.  She has had progressive worsening of pain & infection since that time.   Most recent CT Lspine shows L2-3 osteomyelitis with paraspinal fluid collection L1-3.      Post-Op Info:  Procedure(s) (LRB):  DEBRIDEMENT, WOUND, Lumbar wound washout (N/A)   Day of Surgery     Interval History: pulmonary edema worsening.    Medications:  Continuous Infusions:   sodium chloride 0.9% 200 mL/hr at 09/20/18 0900    sodium chloride 0.9% 200 mL/hr at 09/20/18 1000     Scheduled Meds:   albuterol-ipratropium  3 mL Nebulization Q4H    insulin detemir U-100  5 Units Subcutaneous QHS    levothyroxine  150 mcg Per OG tube Before breakfast    miconazole NITRATE 2 %   Topical (Top) BID    pantoprazole  40 mg Per OG tube Daily    piperacillin-tazobactam (ZOSYN) IVPB  4.5 g Intravenous Q12H     PRN Meds:dextrose 50%, dextrose 50%, fentaNYL citrate (PF), glucagon (human recombinant), glucose, glucose, insulin aspart U-100, ondansetron, sodium phosphate IVPB, sodium phosphate IVPB, sodium phosphate IVPB, sodium phosphate IVPB, sodium phosphate IVPB, sodium phosphate IVPB     Review of Systems  Objective:     Weight: 123 kg (271 lb 2.7 oz)  Body mass index is 46.55 kg/m².  Vital Signs (Most Recent):  Temp: (!) 95.9 °F (35.5 °C) (09/20/18 1000)  Pulse: 92 (09/20/18 1000)  Resp: (!)  "28 (09/20/18 1000)  BP: (!) 144/91 (09/20/18 0900)  SpO2: 100 % (09/20/18 0921) Vital Signs (24h Range):  Temp:  [95.9 °F (35.5 °C)-99 °F (37.2 °C)] 95.9 °F (35.5 °C)  Pulse:  [] 92  Resp:  [13-46] 28  SpO2:  [92 %-100 %] 100 %  BP: (120-167)/(57-92) 144/91  Arterial Line BP: (120-162)/(57-84) 142/64     Date 09/20/18 0700 - 09/21/18 0659   Shift 3693-1211 6785-8382 3943-9401 24 Hour Total   INTAKE   I.V.(mL/kg) 800(6.5)   800(6.5)   Shift Total(mL/kg) 800(6.5)   800(6.5)   OUTPUT   Other 1244   1244   Shift Total(mL/kg) 1244(10.1)   1244(10.1)   Weight (kg) 123 123 123 123              Vent Mode: Spont  Oxygen Concentration (%):  [30-60] 60  Resp Rate Total:  [15 br/min-30 br/min] 22 br/min  Vt Set:  [360 mL] 360 mL  PEEP/CPAP:  [5 cmH20] 5 cmH20  Pressure Support:  [5 cmH20] 5 cmH20  Mean Airway Pressure:  [7.4 cmH20-7.9 cmH20] 7.9 cmH20         Urethral Catheter 09/13/18 1438 Temperature probe (Active)   Site Assessment Clean;Intact 9/20/2018  7:01 AM   Collection Container Urimeter 9/20/2018  7:01 AM   Securement Method secured to top of thigh w/ adhesive device 9/20/2018  7:01 AM   Catheter Care Performed yes 9/20/2018  7:01 AM   Reason for Continuing Urinary Catheterization Critically ill in ICU requiring intensive monitoring 9/20/2018  7:01 AM   CAUTI Prevention Bundle StatLock in place w 1" slack;Intact seal between catheter & drainage tubing;Drainage bag off the floor;Green sheeting clip in use;No dependent loops or kinks;Drainage bag not overfilled (<2/3 full);Drainage bag below bladder 9/20/2018  7:01 AM   Output (mL) 10 mL 9/19/2018  8:05 PM            Trialysis (Dialysis) Catheter 09/12/18 1326 right internal jugular (Active)   IV Device Securement sutures 9/20/2018  7:01 AM   Additional Site Signs no erythema;no warmth;no edema;no pain;no palpable cord;no streak formation;no drainage 9/20/2018  7:01 AM   Patency/Care flushed w/o difficulty 9/20/2018  7:01 AM   Site Assessment Clean;Dry;Intact;No " redness;No swelling 9/20/2018  7:01 AM   Status Accessed 9/20/2018  7:01 AM   Flows Good 9/20/2018  7:01 AM   Dressing Intervention Dressing reinforced 9/20/2018  7:01 AM   Dressing Status Biopatch in place;Clean;Dry;Intact 9/20/2018  7:01 AM   Dressing Change Due 09/22/18 9/20/2018  7:01 AM   Verification by X-ray Yes 9/19/2018  7:02 AM   Site Condition No complications 9/20/2018  3:05 AM   Dressing Occlusive 9/20/2018  3:05 AM   Daily Line Review Performed 9/20/2018  3:05 AM       Neurosurgery Physical Exam   E3VTM6  PERRL  FC x4  CNII-XII grossly intact        Significant Labs:  Recent Labs   Lab  09/19/18   0125  09/19/18   0508   09/20/18   0029  09/20/18   0404  09/20/18   0812   GLU  154*  139*   < >  178*  162*  160*   NA  138  141   < >  141  140  141   K  4.2  4.2   < >  4.9  4.8  4.8   CL  107  110   < >  110  109  112*   CO2  24  26   < >  22*  20*  19*   BUN  9  6*   < >  12  14  15   CREATININE  0.7  0.5   < >  1.0  1.1  1.1   CALCIUM  7.8*  7.9*   < >  8.9  8.9  9.3   MG  1.6  1.6  1.6   --    --   2.1   --     < > = values in this interval not displayed.     Recent Labs   Lab  09/19/18   0109  09/20/18   0029   WBC  19.17*  17.36*   HGB  7.0*  7.6*   HCT  22.0*  24.2*   PLT  177  221     Recent Labs   Lab  09/19/18   0109  09/20/18   0029   INR  1.2  1.2   APTT   --   22.3     Microbiology Results (last 7 days)     Procedure Component Value Units Date/Time    Fungus culture [[id number]] Collected:  09/13/18 0834    Order Status:  Completed Specimen:  Abscess from Back Updated:  09/20/18 0729     Fungus (Mycology) Culture Culture in progress    Narrative:       Paraspinal abscess    Blood culture [[id number]] Collected:  09/15/18 1819    Order Status:  Completed Specimen:  Blood from Peripheral, Upper Arm, Left Updated:  09/19/18 2012     Blood Culture, Routine No Growth to date     Blood Culture, Routine No Growth to date     Blood Culture, Routine No Growth to date     Blood Culture, Routine No  Growth to date     Blood Culture, Routine No Growth to date    Narrative:       Blood cultures x 2 different sites. 4 bottles total. Please  draw cultures before administering antibiotics.    Blood culture [[id number]] Collected:  09/15/18 1805    Order Status:  Completed Specimen:  Blood from Peripheral, Upper Arm, Right Updated:  09/19/18 2012     Blood Culture, Routine No Growth to date     Blood Culture, Routine No Growth to date     Blood Culture, Routine No Growth to date     Blood Culture, Routine No Growth to date     Blood Culture, Routine No Growth to date    Narrative:       Blood cultures from 2 different sites. 4 bottles total.  Please draw before starting antibiotics.    Culture, Anaerobe [[id number]] Collected:  09/13/18 0834    Order Status:  Completed Specimen:  Abscess from Back Updated:  09/18/18 1340     Anaerobic Culture --     PREVOTELLA (B.) BIVIA  Many      Narrative:       lumbar paraspinal fluid collection drainage    Aerobic culture [[id number]] Collected:  09/13/18 0834    Order Status:  Completed Specimen:  Abscess from Back Updated:  09/17/18 0848     Aerobic Bacterial Culture No growth    Narrative:       lumbar paraspinal fluid collection drainage    Blood culture [[id number]] Collected:  09/11/18 1011    Order Status:  Completed Specimen:  Blood Updated:  09/16/18 1212     Blood Culture, Routine No growth after 5 days.    Narrative:       Please draw from 2 separate sites 30 minutes apart. Thank you    Blood culture [[id number]] Collected:  09/11/18 1003    Order Status:  Completed Specimen:  Blood Updated:  09/16/18 1212     Blood Culture, Routine No growth after 5 days.    Gram stain [[id number]] Collected:  09/13/18 0834    Order Status:  Completed Specimen:  Abscess from Back Updated:  09/13/18 1946     Gram Stain Result Many WBC's      Many Gram negative rods      Few Gram positive rods    Blood culture [[id number]] Collected:  09/09/18 1731    Order Status:  Completed Specimen:  Blood  Updated:  09/13/18 1418     Blood Culture, Routine Gram stain terrell bottle: Gram negative rods      Blood Culture, Routine Results called to and read back by:Sofia Noriega RN 09/11/2018  06:01     Blood Culture, Routine PREVOTELLA (B.) BIVIA    Narrative:       From 2 different sites 30 minutes apart    Blood culture [[id number]] Collected:  09/09/18 1555    Order Status:  Completed Specimen:  Blood Updated:  09/13/18 1417     Blood Culture, Routine Gram stain terrell bottle: Gram negative rods      Blood Culture, Routine Positive results previously called 09/11/2018  13:30     Blood Culture, Routine PREVOTELLA (B.) BIVIA    Gram stain [[id number]] Collected:  09/13/18 0834    Order Status:  Completed Specimen:  Abscess from Back Updated:  09/13/18 1153     Gram Stain Result Many WBC's      Many Gram negative rods      Moderate Gram negative diplococci            Significant Diagnostics:  CXR: reviewed    Assessment/Plan:     Spinal epidural abscess    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       -Worsened respiratory status secondary to pulmonary edema  -Pt remains too unstable for OR today  -Continue diuresis for volume overload and pulmonary edema per MICU and Renal teams.  -Pt will need 2 stage surgery: washout and then instrumentation following.  -MRI with L2-3 osteodiscitis, large fluid collection in surgical bed, as well a prevertebral fluid collection L1,2 s/p IR drainage on 9/13   -Please wear LSO brace at all times  -Please hold Xarelto & Plavix   -Abx per infectious disease recs   -Continue wound care and wound vac  -Will reconsider surgery when more medically optimized.            Thierno Carlos,   Neurosurgery  [facility]

## 2018-09-20 NOTE — SUBJECTIVE & OBJECTIVE
Interval History: pulmonary edema worsening.    Medications:  Continuous Infusions:   sodium chloride 0.9% 200 mL/hr at 09/20/18 0900    sodium chloride 0.9% 200 mL/hr at 09/20/18 1000     Scheduled Meds:   albuterol-ipratropium  3 mL Nebulization Q4H    insulin detemir U-100  5 Units Subcutaneous QHS    levothyroxine  150 mcg Per OG tube Before breakfast    miconazole NITRATE 2 %   Topical (Top) BID    pantoprazole  40 mg Per OG tube Daily    piperacillin-tazobactam (ZOSYN) IVPB  4.5 g Intravenous Q12H     PRN Meds:dextrose 50%, dextrose 50%, fentaNYL citrate (PF), glucagon (human recombinant), glucose, glucose, insulin aspart U-100, ondansetron, sodium phosphate IVPB, sodium phosphate IVPB, sodium phosphate IVPB, sodium phosphate IVPB, sodium phosphate IVPB, sodium phosphate IVPB     Review of Systems  Objective:     Weight: 123 kg (271 lb 2.7 oz)  Body mass index is 46.55 kg/m².  Vital Signs (Most Recent):  Temp: (!) 95.9 °F (35.5 °C) (09/20/18 1000)  Pulse: 92 (09/20/18 1000)  Resp: (!) 28 (09/20/18 1000)  BP: (!) 144/91 (09/20/18 0900)  SpO2: 100 % (09/20/18 0921) Vital Signs (24h Range):  Temp:  [95.9 °F (35.5 °C)-99 °F (37.2 °C)] 95.9 °F (35.5 °C)  Pulse:  [] 92  Resp:  [13-46] 28  SpO2:  [92 %-100 %] 100 %  BP: (120-167)/(57-92) 144/91  Arterial Line BP: (120-162)/(57-84) 142/64     Date 09/20/18 0700 - 09/21/18 0659   Shift 9509-6272 0133-8973 7026-1545 24 Hour Total   INTAKE   I.V.(mL/kg) 800(6.5)   800(6.5)   Shift Total(mL/kg) 800(6.5)   800(6.5)   OUTPUT   Other 1244   1244   Shift Total(mL/kg) 1244(10.1)   1244(10.1)   Weight (kg) 123 123 123 123              Vent Mode: Spont  Oxygen Concentration (%):  [30-60] 60  Resp Rate Total:  [15 br/min-30 br/min] 22 br/min  Vt Set:  [360 mL] 360 mL  PEEP/CPAP:  [5 cmH20] 5 cmH20  Pressure Support:  [5 cmH20] 5 cmH20  Mean Airway Pressure:  [7.4 cmH20-7.9 cmH20] 7.9 cmH20         Urethral Catheter 09/13/18 1438 Temperature probe (Active)   Site  "Assessment Clean;Intact 9/20/2018  7:01 AM   Collection Container Urimeter 9/20/2018  7:01 AM   Securement Method secured to top of thigh w/ adhesive device 9/20/2018  7:01 AM   Catheter Care Performed yes 9/20/2018  7:01 AM   Reason for Continuing Urinary Catheterization Critically ill in ICU requiring intensive monitoring 9/20/2018  7:01 AM   CAUTI Prevention Bundle StatLock in place w 1" slack;Intact seal between catheter & drainage tubing;Drainage bag off the floor;Green sheeting clip in use;No dependent loops or kinks;Drainage bag not overfilled (<2/3 full);Drainage bag below bladder 9/20/2018  7:01 AM   Output (mL) 10 mL 9/19/2018  8:05 PM            Trialysis (Dialysis) Catheter 09/12/18 1326 right internal jugular (Active)   IV Device Securement sutures 9/20/2018  7:01 AM   Additional Site Signs no erythema;no warmth;no edema;no pain;no palpable cord;no streak formation;no drainage 9/20/2018  7:01 AM   Patency/Care flushed w/o difficulty 9/20/2018  7:01 AM   Site Assessment Clean;Dry;Intact;No redness;No swelling 9/20/2018  7:01 AM   Status Accessed 9/20/2018  7:01 AM   Flows Good 9/20/2018  7:01 AM   Dressing Intervention Dressing reinforced 9/20/2018  7:01 AM   Dressing Status Biopatch in place;Clean;Dry;Intact 9/20/2018  7:01 AM   Dressing Change Due 09/22/18 9/20/2018  7:01 AM   Verification by X-ray Yes 9/19/2018  7:02 AM   Site Condition No complications 9/20/2018  3:05 AM   Dressing Occlusive 9/20/2018  3:05 AM   Daily Line Review Performed 9/20/2018  3:05 AM       Neurosurgery Physical Exam   E3VTM6  PERRL  FC x4  CNII-XII grossly intact        Significant Labs:  Recent Labs   Lab  09/19/18   0125  09/19/18   0508   09/20/18   0029  09/20/18   0404  09/20/18   0812   GLU  154*  139*   < >  178*  162*  160*   NA  138  141   < >  141  140  141   K  4.2  4.2   < >  4.9  4.8  4.8   CL  107  110   < >  110  109  112*   CO2  24  26   < >  22*  20*  19*   BUN  9  6*   < >  12  14  15   CREATININE  0.7  0.5  "  < >  1.0  1.1  1.1   CALCIUM  7.8*  7.9*   < >  8.9  8.9  9.3   MG  1.6  1.6  1.6   --    --   2.1   --     < > = values in this interval not displayed.     Recent Labs   Lab  09/19/18   0109  09/20/18   0029   WBC  19.17*  17.36*   HGB  7.0*  7.6*   HCT  22.0*  24.2*   PLT  177  221     Recent Labs   Lab  09/19/18   0109  09/20/18   0029   INR  1.2  1.2   APTT   --   22.3     Microbiology Results (last 7 days)     Procedure Component Value Units Date/Time    Fungus culture [[id number]] Collected:  09/13/18 0834    Order Status:  Completed Specimen:  Abscess from Back Updated:  09/20/18 0729     Fungus (Mycology) Culture Culture in progress    Narrative:       Paraspinal abscess    Blood culture [[id number]] Collected:  09/15/18 1819    Order Status:  Completed Specimen:  Blood from Peripheral, Upper Arm, Left Updated:  09/19/18 2012     Blood Culture, Routine No Growth to date     Blood Culture, Routine No Growth to date     Blood Culture, Routine No Growth to date     Blood Culture, Routine No Growth to date     Blood Culture, Routine No Growth to date    Narrative:       Blood cultures x 2 different sites. 4 bottles total. Please  draw cultures before administering antibiotics.    Blood culture [[id number]] Collected:  09/15/18 1805    Order Status:  Completed Specimen:  Blood from Peripheral, Upper Arm, Right Updated:  09/19/18 2012     Blood Culture, Routine No Growth to date     Blood Culture, Routine No Growth to date     Blood Culture, Routine No Growth to date     Blood Culture, Routine No Growth to date     Blood Culture, Routine No Growth to date    Narrative:       Blood cultures from 2 different sites. 4 bottles total.  Please draw before starting antibiotics.    Culture, Anaerobe [[id number]] Collected:  09/13/18 0834    Order Status:  Completed Specimen:  Abscess from Back Updated:  09/18/18 1340     Anaerobic Culture --     PREVOTELLA (B.) BIVIA  Many      Narrative:       lumbar paraspinal fluid  collection drainage    Aerobic culture [[id number]] Collected:  09/13/18 0834    Order Status:  Completed Specimen:  Abscess from Back Updated:  09/17/18 0848     Aerobic Bacterial Culture No growth    Narrative:       lumbar paraspinal fluid collection drainage    Blood culture [[id number]] Collected:  09/11/18 1011    Order Status:  Completed Specimen:  Blood Updated:  09/16/18 1212     Blood Culture, Routine No growth after 5 days.    Narrative:       Please draw from 2 separate sites 30 minutes apart. Thank you    Blood culture [[id number]] Collected:  09/11/18 1003    Order Status:  Completed Specimen:  Blood Updated:  09/16/18 1212     Blood Culture, Routine No growth after 5 days.    Gram stain [[id number]] Collected:  09/13/18 0834    Order Status:  Completed Specimen:  Abscess from Back Updated:  09/13/18 1946     Gram Stain Result Many WBC's      Many Gram negative rods      Few Gram positive rods    Blood culture [[id number]] Collected:  09/09/18 1731    Order Status:  Completed Specimen:  Blood Updated:  09/13/18 1418     Blood Culture, Routine Gram stain terrell bottle: Gram negative rods      Blood Culture, Routine Results called to and read back by:Sofia Noriega RN 09/11/2018  06:01     Blood Culture, Routine PREVOTELLA (B.) BIVIA    Narrative:       From 2 different sites 30 minutes apart    Blood culture [[id number]] Collected:  09/09/18 1555    Order Status:  Completed Specimen:  Blood Updated:  09/13/18 1417     Blood Culture, Routine Gram stain terrell bottle: Gram negative rods      Blood Culture, Routine Positive results previously called 09/11/2018  13:30     Blood Culture, Routine PREVOTELLA (B.) BIVIA    Gram stain [[id number]] Collected:  09/13/18 0834    Order Status:  Completed Specimen:  Abscess from Back Updated:  09/13/18 1153     Gram Stain Result Many WBC's      Many Gram negative rods      Moderate Gram negative diplococci            Significant Diagnostics:  CXR: reviewed

## 2018-09-20 NOTE — CARE UPDATE
Neurosurgery paged regarding surgical consent forms as it is per nurse documented that patient's  has dementia. NSGY explained that both  and daughter were present for entirety of consent conversation. NSGY told by patient's daughter that either she or  could sign consents,  signed for patient as representative. Both in agreement at time for proceeding with surgery, consents witnessed by nurse. All questions answered in full.    Will confirm with staff in AM that consents are appropriate.

## 2018-09-20 NOTE — SUBJECTIVE & OBJECTIVE
Interval History:   Surgery deferred toady as pt not medically stable 2/2 worsening respiratory status. . Pt currently on bipap. Continue to follow closely.     Review of Systems   Unable to perform ROS: Acuity of condition     Objective:     Vital Signs (Most Recent):  Temp: 96.6 °F (35.9 °C) (09/20/18 1500)  Pulse: 88 (09/20/18 1554)  Resp: 17 (09/20/18 1554)  BP: 135/81 (09/20/18 1500)  SpO2: 100 % (09/20/18 1554) Vital Signs (24h Range):  Temp:  [95.7 °F (35.4 °C)-99 °F (37.2 °C)] 96.6 °F (35.9 °C)  Pulse:  [] 88  Resp:  [13-46] 17  SpO2:  [92 %-100 %] 100 %  BP: (119-167)/(62-98) 135/81  Arterial Line BP: (121-162)/(59-84) 135/59     Weight: 123 kg (271 lb 2.7 oz)  Body mass index is 46.55 kg/m².    Estimated Creatinine Clearance: 48.4 mL/min (based on SCr of 1.2 mg/dL).    Physical Exam   Constitutional: She appears well-developed and well-nourished. She appears ill.   Neck: JVD present.   RIJ CVC in place   Cardiovascular: Normal rate. An irregularly irregular rhythm present. Exam reveals no friction rub.   No murmur heard.  Pulmonary/Chest: Effort normal. She has no wheezes. She has rales.   On bipap   Abdominal: Soft. Bowel sounds are normal. She exhibits no distension.   Obese abdomen   Musculoskeletal: She exhibits edema (BLE, right hand).   +2 pitting edema   Neurological: She is alert.   Skin: Skin is warm and dry. She is not diaphoretic. No erythema.   Lumbar wound not examined today.   Nursing note and vitals reviewed.      Significant Labs:   Blood Culture:   Recent Labs   Lab  09/09/18   1731  09/11/18   1003  09/11/18   1011  09/15/18   1805  09/15/18   1819   LABBLOO  Gram stain terrell bottle: Gram negative rods   Results called to and read back by:Sofia Noriega RN 09/11/2018  06:01  PREVOTELLA (B.) BIVIA  No growth after 5 days.  No growth after 5 days.  No Growth to date  No Growth to date  No Growth to date  No Growth to date  No Growth to date  No Growth to date  No Growth to date   No Growth to date  No Growth to date  No Growth to date     CBC:   Recent Labs   Lab  09/19/18   0109  09/20/18   0029   WBC  19.17*  17.36*   HGB  7.0*  7.6*   HCT  22.0*  24.2*   PLT  177  221     CMP:   Recent Labs   Lab  09/19/18   0109   09/20/18   0029  09/20/18   0404  09/20/18   0812  09/20/18   1426   NA  139  139   < >  141  140  141  142  142   K  4.2  4.2   < >  4.9  4.8  4.8  4.7  4.7   CL  107  107   < >  110  109  112*  112*  112*   CO2  26  26   < >  22*  20*  19*  20*  20*   GLU  145*  145*   < >  178*  162*  160*  167*  167*   BUN  8  8   < >  12  14  15  17  17   CREATININE  0.7  0.7   < >  1.0  1.1  1.1  1.2  1.2   CALCIUM  7.9*  7.9*   < >  8.9  8.9  9.3  9.3  9.3   PROT  5.0*   --   5.7*   --    --    --    ALBUMIN  1.6*  1.6*   < >  1.8*  1.9*   --   2.0*  2.0*   BILITOT  0.6   --   0.7   --    --    --    ALKPHOS  110   --   100   --    --    --    AST  13   --   18   --    --    --    ALT  6*   --   8*   --    --    --    ANIONGAP  6*  6*   < >  9  11  10  10  10   EGFRNONAA  >60.0  >60.0   < >  53.3*  47.5*  47.5*  42.8*  42.8*    < > = values in this interval not displayed.     Respiratory Culture: No results for input(s): GSRESP, RESPIRATORYC in the last 4320 hours.  Urine Culture:   Recent Labs   Lab  06/07/18   1128  09/07/18   2255  09/10/18   1701   LABURIN  No growth  No significant growth  BEN ALBICANS  > 100,000 cfu/ml  Treatment of asymptomatic candiduria is not recommended (except for   specific populations). Candida isolated in the urine typically   represents colonization. If an indwelling urinary catheter is present  it should be removed or replaced.       Urine Studies:   Recent Labs   Lab  07/02/18   1205   09/10/18   1701   COLORU  Yellow   < >  Yellow   APPEARANCEUA  Clear   < >  Cloudy*   PHUR  6.0   < >  5.0   SPECGRAV  1.015   < >  1.020   PROTEINUA  Negative   < >  Negative   GLUCUA  Negative   < >  Negative   KETONESU  Negative   < >   Negative   BILIRUBINUA  Negative   < >  Negative   OCCULTUA  Trace*   < >  3+*   NITRITE  Positive*   < >  Negative   UROBILINOGEN  0.2   < >  Negative   LEUKOCYTESUR  Negative   < >  3+*   RBCUA  1   < >  64*   WBCUA  2   < >  48*   BACTERIA  Few*   --   Rare   SQUAMEPITHEL   --    --   1   HYALINECASTS  1   --    --     < > = values in this interval not displayed.     Wound Culture:   Recent Labs   Lab  07/02/18   1432  09/13/18   0834   LABAERO  ENTEROBACTER CLOACAE COMPLEX  Rare  For susceptibility see order # [id number]    ENTEROBACTER CLOACAE COMPLEX  Moderate    No growth     All pertinent labs within the past 24 hours have been reviewed.    Significant Imaging: I have reviewed all pertinent imaging results/findings within the past 24 hours.

## 2018-09-20 NOTE — NURSING
Pt complaining of shortness of breath.  Pt using accessory muscles to breath.  Pt's oxygen saturation above 95%.  Respiratory called in the room.  Pt given a breathing treatment and put on bipap.  CCT notified.  Will continue to monitor.

## 2018-09-20 NOTE — ASSESSMENT & PLAN NOTE
82 year old female with history of L2-3 fusion 6/5 complicated by post operative infection with Enterobacter cloacae. She has been on outpatient IV Ciprofloxacin and has undergone an I&D on 7/2 with partial hardware removal on 8/7 without resolution of her infection. 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     Patient's hospital course has been complicated by acute respiratory failure requiring intubation and transfer to the ICU with CXR showing pulmonary edema. Surgery has been postponed due to decline in medical status. Blood cultures 9/9 returned positive for Prevotella. She underwent IR drainage of paraspinal abscess on 9/13 with 5 mL purulent fluid removed. Gram stain is showing many GNRs, moderate gram negative diplococci and few GPRs. Abscess cultures are also showing Prevotella.      She is currently on Zosyn.  Afebrile. Leukocytosis/CRP/procalc are trending down. Repeat blood cultures are NGTD. She was scheduled to go to the OR today but is postponed due to decline in respiratory status. She is currently on bipap    Plan  - Continue Zosyn 4.5 g IV q 8 hours   - surgery postponed as pt not medically stable at this time 2/2 worsening respiratory status. She is currently on bipap.   - When taken to the OR, please obtain surgical cultures and send for gram stain, aerobic, anaerobic, AFB and fungal cultures   - Anticipate 8 weeks of antibiotics      - continue current therapy. ID will follow.

## 2018-09-20 NOTE — PROGRESS NOTES
CRRT:    Nephro on-call ordered SCUF.    SCUF initiated via right IJ catheter, no outflow on Arterial port, with good flow on venous port. Reversed A and V ports. UF set to 450ml/hr

## 2018-09-20 NOTE — PLAN OF CARE
Problem: Patient Care Overview  Goal: Plan of Care Review  Outcome: Ongoing (interventions implemented as appropriate)  POC reviewed with pt at 0400. Pt verbalized understanding. Questions and concerns addressed. Pt on and off bipap throughout the night.  CRRT started.  Preop prep done for pt's washout today. Pt progressing toward goals. Will continue to monitor. See flowsheets for full assessment and VS info

## 2018-09-20 NOTE — ASSESSMENT & PLAN NOTE
Now with severe oliguria, likely multifactorial, the urine sodium of 10, could be consistent with CRS, but may still have an ATN component.    Lytes and acid/base stable this morning, no response to high dose IV lasix yesterday, CXR congested, started on SCUF, but still net positive by 1.3L past 24hrs, off vent, on BIPap      Plan/Recommendations:  -continue SCUF for volume removal with goal of running net negative over the next 24hrs  -reassess for full RRT in the am

## 2018-09-20 NOTE — PROGRESS NOTES
[facility]  Critical Care Medicine  Progress Note    Patient Name: Medina Frazier  MRN: [id number]  Admission Date: 9/8/2018  Hospital Length of Stay: 12 days  Code Status: Partial Code  Attending Provider: An Ward MD  Primary Care Provider: Hao Garcia MD   Principal Problem: Acute hypercapnic respiratory failure    Subjective:     HPI:  Mrs. Frazier is a 82 yr old female with history significant for diastolic HF, CAD s/p VIVIANA Lcx (June 2017), HTN, DM2, and lumbar stenosis s/p lumbar fusion in June 2018 with subsequent hardware infection and I&D on 7/2 and repeat lumbar I&D, hardware removal, decompression and wound vac placement to L2-L3 due to continued epidural abscess. She was discharged recently to a SNF for rehab and continued IV antibiotic therapy. She originally presented to [facility] on 9/8 from [facility] after labwork revealed JEY, hyponatremia. Additionally, CT lumbar spine obtained 9/5 was concerning for persistent osteomyelitis, epidural abscess in L2-L3 region.     She was transferred to [facility] on 9/8 for higher level of care and neurosurgery evaluation. Admission has been complicated by continued JEY with creatinine up trending from baseline 0.8 to 1.0 to 3.0 along with hyponatremia felt to be related to acute on chronic diastolic heart failure, volume overload. Nephrology was consulted and has been assisting with management. She was started on lasix 100 mg BID with minimal urine output response and creatinine slowly uptrending. Additionally, blood cultures obtained 9/9 are growing GNR's, awaiting speciation. Suspect this is enterobacter given continued osteomyelitis of L2-L3, complex fluid collection within laminectomy bed measuring 3x8x5 cm concerning for abscess, and additional prevertebral collection anterior to L1 and L2 vertebral bodies measuring 4x6x1 abutting the abdominal aorta concerning for abscess noted on MRI from 9/10. Neurosurgery were planning on  repeat I&D for source control but held off given the above medical issues with JEY, hyponatremia. She has been on vancomycin, cefepime since 9/9 and blood cultures from 9/11 are NGTD.     She was transferred to the MICU on the AM of 9/12 for acute hypercapnic respiratory failure with concern for aspiration event, worsening encephalopathy, and worsening JEY.    .          Hospital/ICU Course:  Upon transfer to MICU, Mrs. Frazier's respiratory and mental status worsened despite Bipap and lasix trial. She was intubated and she developed shock requiring low dose levophed, felt to be septic in setting of bacteremia. A trialysis line was placed for venous access, vasopressors, and possible need for CRRT. Arterial line placed for frequent ABGs. Cefepime and vancomycin continued as ID and neurosurgery following. Added on flagyl as micro lab thinks BCx 9/9 may be growing anaerobes. IR successfully drained epidural abscess (9/13). Continuing lasix trial as patient still appears volume overloaded. Norepinephrine requirements increasing on 9/14.  Discussed with neurosurgery, would need to be off vasopressors and hemodynamically stable prior to surgical intervention. Family meeting on 9/14, code status changed to DNR.  Weaned off vasopressors on 9/16.  Remains on ventilator due to significant pulmonary edema.  Aggressive diuresis started without significant response. Patient had an episode of A fib RVR and put on amiodarone drip on 9/17. CRRT started 9/17 however patient became hypotensive and bradycardic requiring levophed again. Discontinued amiodarone at that time. Patient successfully weaned off levophed. CRRT ran continuously 9/18. Mucus plug event (9/19) after patient's tube was suctioned. Patient became tachycardic and hypertensive with absent breath sounds bilaterally. Sats decreased into the 70s and tidal volumes less than 100. Patient was bagged and mucus plug was dislodged.  Passed SBT and extubated to bipap (9/19).  CRRT discontinued and pending diuresis trial.9/20 patient devolved shortness of breath overnight, repeated chest x-ray showed worsening pulmonary edema, patient has decrease urine output despite lasix challenge, contacted nephrology to stat CRRT.      Interval History/Significant Events: started on CRRT  Review of Systems   Constitutional: Positive for activity change. Negative for fever.   HENT: Negative for congestion and sore throat.    Eyes: Negative for photophobia and visual disturbance.   Respiratory: Positive for shortness of breath. Negative for cough.    Cardiovascular: Negative for chest pain and leg swelling.   Gastrointestinal: Negative for abdominal pain and diarrhea.   Endocrine: Negative for polyphagia and polyuria.   Genitourinary: Negative for dysuria and flank pain.   Neurological: Negative for dizziness and headaches.   Psychiatric/Behavioral: Negative for agitation and confusion. The patient is nervous/anxious.      Objective:     Vital Signs (Most Recent):  Temp: 98.1 °F (36.7 °C) (09/20/18 0305)  Pulse: 110 (09/20/18 0305)  Resp: (!) 25 (09/20/18 0305)  BP: (!) 153/89 (09/20/18 0305)  SpO2: 97 % (09/20/18 0305) Vital Signs (24h Range):  Temp:  [98.1 °F (36.7 °C)-99.1 °F (37.3 °C)] 98.1 °F (36.7 °C)  Pulse:  [] 110  Resp:  [17-46] 25  SpO2:  [93 %-100 %] 97 %  BP: (111-176)/(55-99) 153/89  Arterial Line BP: (108-167)/(49-90) 162/76   Weight: 102.7 kg (226 lb 6.6 oz)  Body mass index is 38.86 kg/m².      Intake/Output Summary (Last 24 hours) at 9/20/2018 0429  Last data filed at 9/20/2018 0305  Gross per 24 hour   Intake 5306.5 ml   Output 3134 ml   Net 2172.5 ml       Physical Exam   Constitutional: Vital signs are normal. She appears well-developed and well-nourished. She is cooperative. She appears distressed. She is restrained.   HENT:   Head: Normocephalic and atraumatic.   Eyes: EOM and lids are normal. Right eye exhibits normal extraocular motion. Left eye exhibits normal  extraocular motion.   Neck: Normal range of motion. Neck supple.   Trialysis central venous catheter in right internal jugular   Cardiovascular: Normal rate, regular rhythm, normal heart sounds and intact distal pulses. Exam reveals no gallop, no distant heart sounds and no friction rub.   No murmur heard.  3+ pitting edema of bilateral lower extremity up to knees  Edema noted to right hand    Pulmonary/Chest: No apnea and no tachypnea. No respiratory distress. She has no decreased breath sounds. She has no wheezes. She has rhonchi in the right middle field, the right lower field, the left middle field and the left lower field. She has no rales.   Abdominal: Soft. Normal appearance and bowel sounds are normal. She exhibits no distension. There is no tenderness. No hernia.   Genitourinary:   Genitourinary Comments: Rash between inguinal folds and upper thighs bilaterally   Musculoskeletal:   Arterial line in carotid artery of right wrist   Neurological: She is alert. She has normal strength.   Skin: Skin is dry and intact. She is not diaphoretic.   Psychiatric:   Insomnia   anxious        Vents:  Vent Mode: Spont (09/19/18 1406)  Set Rate: 0 bmp (09/19/18 1406)  Vt Set: 360 mL (09/19/18 1406)  Pressure Support: 5 cmH20 (09/19/18 1406)  PEEP/CPAP: 5 cmH20 (09/19/18 1406)  Oxygen Concentration (%): 30 (09/19/18 2035)  Peak Airway Pressure: 10 cmH2O (09/19/18 1406)  Plateau Pressure: 21 cmH20 (09/19/18 1406)  Total Ve: 4.59 mL (09/19/18 1406)  Negative Inspiratory Force (cm H2O): -24 (09/19/18 1251)  F/VT Ratio<105 (RSBI): (!) 44.92 (09/19/18 1251)  Lines/Drains/Airways     Central Venous Catheter Line                 Trialysis (Dialysis) Catheter 09/12/18 1326 right internal jugular 7 days          Drain                 Urethral Catheter 09/13/18 1438 Temperature probe 6 days          Arterial Line                 Arterial Line 09/15/18 1500 Left Radial 4 days          Pressure Ulcer                 Negative Pressure  Wound Therapy  43 days          Peripheral Intravenous Line                 Midline Catheter Insertion/Assessment  - Single Lumen 09/17/18 1235 Right cephalic vein (lateral side of arm) 20g x 10cm 2 days              Significant Labs:    CBC/Anemia Profile:  Recent Labs   Lab  09/19/18   0109  09/20/18   0029   WBC  19.17*  17.36*   HGB  7.0*  7.6*   HCT  22.0*  24.2*   PLT  177  221   MCV  80*  80*   RDW  18.1*  18.7*        Chemistries:  Recent Labs   Lab  09/19/18   0109  09/19/18   0125  09/19/18   0508  09/19/18   0834  09/19/18   1616  09/20/18   0029   NA  139  139  138  141  144  140  141   K  4.2  4.2  4.2  4.2  4.5  4.7  4.9   CL  107  107  107  110  111*  108  110   CO2  26  26  24  26  26  24  22*   BUN  8  8  9  6*  6*  9  12   CREATININE  0.7  0.7  0.7  0.5  0.6  0.8  1.0   CALCIUM  7.9*  7.9*  7.8*  7.9*  8.1*  8.3*  8.9   ALBUMIN  1.6*  1.6*  1.5*   --    --    --   1.8*   PROT  5.0*   --    --    --    --   5.7*   BILITOT  0.6   --    --    --    --   0.7   ALKPHOS  110   --    --    --    --   100   ALT  6*   --    --    --    --   8*   AST  13   --    --    --    --   18   MG  1.6  1.6  1.6  1.6   --    --    --    PHOS  1.7*  1.9*  1.9*   --    --    --   3.5       All pertinent labs within the past 24 hours have been reviewed.    Significant Imaging:  I have reviewed and interpreted all pertinent imaging results/findings within the past 24 hours.    Assessment/Plan:     Neuro   Encephalopathy, metabolic    --multifactorial: sepsis/uremia, medications  --non focal on exam  --awake and alert; following commands appropriately  --weaned off sedation and levophed (9/17)        Lumbar stenosis    --s/p lumbar fusion/hardware placement June 2018 with subsequent osteo and abscess.   --LSO brace ordered.  Per conversation with Neurosurgery, will need to wear brace while OOB          Derm   Alteration in skin integrity related to surgical incision    --s/p wound vac placement to lower back.  Continue per neurosurgery        Pulmonary   * Acute hypercapnic respiratory failure    --2/2 pulmonary edema in setting of acute on chronic diastolic HF, volume overload and JEY  --chest xray (9/17) with continued pulmonary edema however has improved since 9/15 CXR  --bedside ultrasound of right pleural effusion on 9/14 with small pocket not amendable to thoracentesis.  --s/p CRRT  --Mucus plug event today, resolved with bagging.   --Repeat CXR today showed worsening edema                    Pulmonary hypertension    --2/2 diastolic HF. No known lung disease  --Echo (9/13) PA systolic pressure estimated at 88 mm Hg  --reportedly on revatio at home.             Cardiac/Vascular   PAF (paroxysmal atrial fibrillation)    --CHADS vasc 5 (HTN, DM, Age, and female); hold home rivaroxaban   --hold now given spinal abscess.           CHF (congestive heart failure), NYHA class IV    --Prior ECHO shows diastolic dysfunction (7/2018)  --Echo (9/13) EF 55-60%; concentric remodeling with RV enlargement; no WMA  --s/p CRRT (9/18)  --diuresis trial yesterday failed   --CRRT overnight 9/20        Coronary artery disease involving native coronary artery of native heart without angina pectoris    --hold BB in setting of recent shock  --hold statin for now  --s/p VIVIANA to Lcx in June 2017 and on plavix prior to admission. Holding in setting of possible upcoming procedures.             Hypertension    --holding antihypertensives in the setting of recent shock and borderline hypotension.         Renal/   JEY (acute kidney injury)    --most likely cardiorenal syndrome vs sepsis (ATN)  --retroperitoneal ultrasound on 9/9 consistent with medical renal disease.   --Nephrology following   --Underwent CRRT 9/17 & 9/18  --Low urine out put despite lasix trial    -- started on CRRT overnight 9/20         ID   Osteomyelitis of Lumbar Spine    --see above        Septic shock    --secondary to spinal abscesses  --blood cultures with prevotella,  repeat blood cultures ngtd  --gram stain from spinal abscess with prevotella  --currently off norepinephrine.   --per ID recs: continue zosyn and discontinue vanc as no GPC on most recent cultures  --Not fully able to obtain source control as IR intervention (9/12) only able to partially drain one abscess.   --Washout with Neurosurgery given improvement in hemodynamics either this Thursday (9/20).        Gram-negative bacteremia    --noted on cultures from 9/9; cleared on 9/11; Positive for Prevotella  --See above          Spinal epidural abscess    --noted on MRI along with osteo  --IR drained paraspinal abscess 9/13   --see septic shock for details.         Oncology   Anemia    --likely anemia of chronic disease. Trend Hgb for now  --no signs of acute blood loss  --transfuse for Hgb <7        Endocrine   Hypothyroidism    --continue synthroid        Diabetes mellitus, type II    --likely steroid induced and secondary to infection  --steroids weaned off 9/16  --continue insulin infusion (started 9/16)  --A1c 7.6, on lantus 30 units every evening outpatient          GI   GERD (gastroesophageal reflux disease)    --continue PPI           Critical Care Daily Checklist:    A: Awake: RASS Goal/Actual Goal: RASS Goal: 0-->alert and calm  Actual: [place] Agitation Sedation Scale (RASS): Alert and calm   B: Spontaneous Breathing Trial Performed?     C: SAT & SBT Coordinated?  n/a                     D: Delirium: CAM-ICU Overall CAM-ICU: Negative   E: Early Mobility Performed? NO   F: Feeding Goal: Goals: Pt to receive nutrition by RD follow up  Status: Nutrition Goal Status: goal met   Current Diet Order   Procedures    Diet NPO      AS: Analgesia/Sedation NON   T: Thromboembolic Prophylaxis HELD   H: HOB > 300 YES   U: Stress Ulcer Prophylaxis (if needed) Protonix    G: Glucose Control NON   B: Bowel Function Stool Occurrence: 1   I: Indwelling Catheter (Lines & Kong) Necessity A-LINE, CENTRAL LINE, KONG CATH    D:  De-escalation of Antimicrobials/Pharmacotherapies ZOSYN     Plan for the day/ETD OR    Code Status:  Family/Goals of Care: Partial Code         Critical secondary to Acute hypercapnic respiratory failure     Critical care was time spent personally by me on the following activities: development of treatment plan with patient or surrogate and bedside caregivers, discussions with consultants, evaluation of patient's response to treatment, examination of patient, ordering and performing treatments and interventions, ordering and review of laboratory studies, ordering and review of radiographic studies, pulse oximetry, re-evaluation of patient's condition. This critical care time did not overlap with that of any other provider or involve time for any procedures.     TANI Esparza  Critical Care Medicine  [facility]

## 2018-09-20 NOTE — SUBJECTIVE & OBJECTIVE
Interval History: extubated, on BiPap this morning, no response to lasix challenge yesterday afternoon, started SCUF overnight    Review of patient's allergies indicates:   Allergen Reactions    Codeine Nausea Only    Penicillins Swelling     Able to take amoxil and cephalosporins      Current Facility-Administered Medications   Medication Frequency    0.9%  NaCl infusion (CRRT USE ONLY) Continuous    albuterol-ipratropium 2.5 mg-0.5 mg/3 mL nebulizer solution 3 mL Q4H    dextrose 50% injection 12.5 g PRN    dextrose 50% injection 25 g PRN    fentaNYL citrate (PF) Syrg 25 mcg Q2H PRN    glucagon (human recombinant) injection 1 mg PRN    glucose chewable tablet 16 g PRN    glucose chewable tablet 24 g PRN    insulin aspart U-100 pen 0-5 Units Q4H PRN    insulin detemir U-100 pen 5 Units QHS    levothyroxine tablet 150 mcg Before breakfast    miconazole NITRATE 2 % top powder BID    ondansetron injection 4 mg Q8H PRN    pantoprazole suspension 40 mg Daily    piperacillin-tazobactam 4.5 g in sodium chloride 0.9% 100 mL IVPB (ready to mix system) Q8H    sodium phosphate 20.01 mmol in dextrose 5 % 250 mL IVPB PRN    sodium phosphate 30 mmol in dextrose 5 % 250 mL IVPB PRN    sodium phosphate 39.99 mmol in dextrose 5 % 250 mL IVPB PRN       Objective:     Vital Signs (Most Recent):  Temp: (!) 95.9 °F (35.5 °C) (09/20/18 0921)  Pulse: 98 (09/20/18 0921)  Resp: (!) 32 (09/20/18 0921)  BP: (!) 167/90 (09/20/18 0505)  SpO2: 100 % (09/20/18 0921)  O2 Device (Oxygen Therapy): BiPAP (09/20/18 0921) Vital Signs (24h Range):  Temp:  [95.9 °F (35.5 °C)-99 °F (37.2 °C)] 95.9 °F (35.5 °C)  Pulse:  [] 98  Resp:  [13-46] 32  SpO2:  [93 %-100 %] 100 %  BP: (120-167)/(57-92) 167/90  Arterial Line BP: (120-162)/(57-84) 124/59     Weight: 123 kg (271 lb 2.7 oz) (09/20/18 0505)  Body mass index is 46.55 kg/m².  Body surface area is 2.36 meters squared.    I/O last 3 completed shifts:  In: 8067.6 [I.V.:7317.6; IV  Piggyback:750]  Out: 6739 [Urine:95; Other:6644]    Physical Exam   HENT:   Head: Normocephalic and atraumatic.   Neck: No JVD present.   Cardiovascular: Normal rate and regular rhythm.   Pulmonary/Chest: Effort normal. She has rales.   Abdominal: Soft. She exhibits no distension.   Musculoskeletal: She exhibits no edema or tenderness.   Neurological: She is alert.   Skin: Skin is warm.

## 2018-09-20 NOTE — PLAN OF CARE
Problem: Patient Care Overview  Goal: Plan of Care Review  Outcome: Ongoing (interventions implemented as appropriate)  POC reviewed with pt and family at 1400.   Pt verbalized understanding.   Questions and concerns addressed.   See flowsheets for full assessment and VS info.     CRRT currently running.  KUB/CXR done.  Trending troponins.  EKG done.   Pt currently on BiPAP.  NPO.  Surgery cancelled today due to respiratory status.

## 2018-09-20 NOTE — ASSESSMENT & PLAN NOTE
--most likely cardiorenal syndrome vs sepsis (ATN)  --retroperitoneal ultrasound on 9/9 consistent with medical renal disease.   --Nephrology following   --Underwent CRRT 9/17 & 9/18  --Low urine out put despite lasix trial    -- started on CRRT overnight 9/20

## 2018-09-20 NOTE — ASSESSMENT & PLAN NOTE
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       -Worsened respiratory status secondary to pulmonary edema  -Pt remains too unstable for OR today  -Continue diuresis for volume overload and pulmonary edema per MICU and Renal teams.  -Pt will need 2 stage surgery: washout and then instrumentation following.  -MRI with L2-3 osteodiscitis, large fluid collection in surgical bed, as well a prevertebral fluid collection L1,2 s/p IR drainage on 9/13   -Please wear LSO brace at all times  -Please hold Xarelto & Plavix   -Abx per infectious disease recs   -Continue wound care and wound vac  -Will reconsider surgery when more medically optimized.

## 2018-09-20 NOTE — ASSESSMENT & PLAN NOTE
--2/2 diastolic HF. No known lung disease  --Echo (9/13) PA systolic pressure estimated at 88 mm Hg  --reportedly on revatio at home.

## 2018-09-21 NOTE — ASSESSMENT & PLAN NOTE
--Prior ECHO shows diastolic dysfunction (7/2018)  --Echo (9/13) EF 55-60%; concentric remodeling with RV enlargement; no WMA  --Continuing SLED

## 2018-09-21 NOTE — ASSESSMENT & PLAN NOTE
-continues with no significant urine output, gradually improving, continue volume removal with UF on RRT

## 2018-09-21 NOTE — ASSESSMENT & PLAN NOTE
Now with severe oliguria, likely multifactorial, the urine sodium of 10, could be consistent with CRS, but may still have an ATN component.    Lytes stable, bicarb down to 17, resp status gradually improving, SCUF x ~past 24hrs      Plan/Recommendations:  -change SCUF to SLED and continue running mostly for UF, but as there is no urine output would anticipate clearance to be need clearance shortly  -tomorrow make repeat assessment for need of SLED vs SCUF, possibly can go to nightly sessions  -if there is any  in urine output, then could also consider lasix challenge, but w/ anuria does not make sense to challenge with diuretic

## 2018-09-21 NOTE — NURSING
Pt had 10 beat run of vtach, non- symptomatic, and electrolytes WNL.  Notified critical care team, no new orders at this time.

## 2018-09-21 NOTE — PROGRESS NOTES
[facility]  Critical Care Medicine  Progress Note    Patient Name: Medina Frazier  MRN: [id number]  Admission Date: 9/8/2018  Hospital Length of Stay: 13 days  Code Status: Partial Code  Attending Provider: An Ward MD  Primary Care Provider: Hao Garcia MD   Principal Problem: Acute hypercapnic respiratory failure    Subjective:     HPI:  Mrs. Frazier is a 82 yr old female with history significant for diastolic HF, CAD s/p VIVIANA Lcx (June 2017), HTN, DM2, and lumbar stenosis s/p lumbar fusion in June 2018 with subsequent hardware infection and I&D on 7/2 and repeat lumbar I&D, hardware removal, decompression and wound vac placement to L2-L3 due to continued epidural abscess. She was discharged recently to a SNF for rehab and continued IV antibiotic therapy. She originally presented to [facility] on 9/8 from [facility] after labwork revealed JEY, hyponatremia. Additionally, CT lumbar spine obtained 9/5 was concerning for persistent osteomyelitis, epidural abscess in L2-L3 region.     She was transferred to [facility] on 9/8 for higher level of care and neurosurgery evaluation. Admission has been complicated by continued JEY with creatinine up trending from baseline 0.8 to 1.0 to 3.0 along with hyponatremia felt to be related to acute on chronic diastolic heart failure, volume overload. Nephrology was consulted and has been assisting with management. She was started on lasix 100 mg BID with minimal urine output response and creatinine slowly uptrending. Additionally, blood cultures obtained 9/9 are growing GNR's, awaiting speciation. Suspect this is enterobacter given continued osteomyelitis of L2-L3, complex fluid collection within laminectomy bed measuring 3x8x5 cm concerning for abscess, and additional prevertebral collection anterior to L1 and L2 vertebral bodies measuring 4x6x1 abutting the abdominal aorta concerning for abscess noted on MRI from 9/10. Neurosurgery were planning on  repeat I&D for source control but held off given the above medical issues with JEY, hyponatremia. She has been on vancomycin, cefepime since 9/9 and blood cultures from 9/11 are NGTD.     She was transferred to the MICU on the AM of 9/12 for acute hypercapnic respiratory failure with concern for aspiration event, worsening encephalopathy, and worsening JEY.    .          Hospital/ICU Course:  Upon transfer to MICU, Mrs. Frazier's respiratory and mental status worsened despite Bipap and lasix trial. She was intubated and she developed shock requiring low dose levophed, felt to be septic in setting of bacteremia. A trialysis line was placed for venous access, vasopressors, and possible need for CRRT. Arterial line placed for frequent ABGs. Cefepime and vancomycin continued as ID and neurosurgery following. Added on flagyl as micro lab thinks BCx 9/9 may be growing anaerobes. IR successfully drained epidural abscess (9/13). Continuing lasix trial as patient still appears volume overloaded. Norepinephrine requirements increasing on 9/14.  Discussed with neurosurgery, would need to be off vasopressors and hemodynamically stable prior to surgical intervention. Family meeting on 9/14, code status changed to DNR.  Weaned off vasopressors on 9/16.  Remains on ventilator due to significant pulmonary edema.  Aggressive diuresis started without significant response. Patient had an episode of A fib RVR and put on amiodarone drip on 9/17. CRRT started 9/17 however patient became hypotensive and bradycardic requiring levophed again. Discontinued amiodarone at that time. Patient successfully weaned off levophed. CRRT ran continuously 9/18. Mucus plug event (9/19) after patient's tube was suctioned. Patient became tachycardic and hypertensive with absent breath sounds bilaterally. Sats decreased into the 70s and tidal volumes less than 100. Patient was bagged and mucus plug was dislodged.  Passed SBT and extubated to bipap (9/19).  CRRT discontinued and pending diuresis trial.9/20 patient devolved shortness of breath overnight, repeated chest x-ray showed worsening pulmonary edema, patient has decrease urine output despite lasix challenge, contacted nephrology to stat CRRT. Patient receiving continuous CCRT. Bipap at night.       Interval History/Significant Events: Patient was not put on bipap last night however tolerated the night well. CRRT ran continuously throughout the night. No acute events overnight.     Review of Systems   Constitutional: Positive for chills. Negative for fatigue and fever.   HENT: Positive for dental problem (dental pain). Negative for sore throat.    Respiratory: Negative for cough, chest tightness and shortness of breath.    Cardiovascular: Negative for chest pain and palpitations.   Gastrointestinal: Negative for abdominal pain, diarrhea and nausea.   Genitourinary:        Oliguric    Musculoskeletal: Negative for back pain and joint swelling.   Skin: Negative for rash.   Neurological: Negative for weakness, light-headedness, numbness and headaches.   Psychiatric/Behavioral: Negative for agitation. The patient is not nervous/anxious.      Objective:     Vital Signs (Most Recent):  Temp: 97 °F (36.1 °C) (09/21/18 1200)  Pulse: 91 (09/21/18 1300)  Resp: 20 (09/21/18 1300)  BP: 139/85 (09/21/18 1300)  SpO2: 95 % (09/21/18 1300) Vital Signs (24h Range):  Temp:  [96.6 °F (35.9 °C)-98.2 °F (36.8 °C)] 97 °F (36.1 °C)  Pulse:  [] 91  Resp:  [13-43] 20  SpO2:  [93 %-100 %] 95 %  BP: (111-152)/(59-86) 139/85  Arterial Line BP: ()/(45-79) 139/70   Weight: 123 kg (271 lb 2.7 oz)  Body mass index is 46.55 kg/m².      Intake/Output Summary (Last 24 hours) at 9/21/2018 1544  Last data filed at 9/21/2018 1302  Gross per 24 hour   Intake 4700 ml   Output 7785 ml   Net -3085 ml       Physical Exam   Constitutional: She is oriented to person, place, and time. Vital signs are normal. She appears well-developed and  well-nourished. She is cooperative. No distress. She is not intubated.   HENT:   Head: Normocephalic and atraumatic.   Eyes: EOM and lids are normal. Right eye exhibits normal extraocular motion. Left eye exhibits normal extraocular motion.   Neck: Normal range of motion. Neck supple.   Trialysis central venous catheter in right internal jugular   Cardiovascular: Normal rate, regular rhythm, normal heart sounds and intact distal pulses. Exam reveals no gallop, no distant heart sounds and no friction rub.   No murmur heard.  3+ pitting edema of bilateral lower extremity up to knees  Edema noted to right hand    Pulmonary/Chest: Accessory muscle usage present. No apnea, no tachypnea and no bradypnea. She is not intubated. She is in respiratory distress. She has no decreased breath sounds. She has no wheezes. She has rhonchi in the right upper field, the right middle field, the right lower field, the left upper field, the left middle field and the left lower field. She has no rales.   Abdominal: Soft. Normal appearance. She exhibits distension. Bowel sounds are decreased. There is no tenderness. No hernia.   Genitourinary:   Genitourinary Comments: Rash between inguinal folds and upper thighs bilaterally   Musculoskeletal:   Arterial line in carotid artery of right wrist   Neurological: She is alert and oriented to person, place, and time. She has normal strength. GCS eye subscore is 4. GCS verbal subscore is 5. GCS motor subscore is 6.   Skin: Skin is dry and intact. She is not diaphoretic.   Psychiatric: She has a normal mood and affect. Her behavior is normal. Thought content normal.       Vents:  Vent Mode: Spont (09/19/18 1406)  Set Rate: 0 bmp (09/19/18 1406)  Vt Set: 360 mL (09/19/18 1406)  Pressure Support: 5 cmH20 (09/19/18 1406)  PEEP/CPAP: 5 cmH20 (09/19/18 1406)  Oxygen Concentration (%): 60 (09/20/18 1554)  Peak Airway Pressure: 10 cmH2O (09/19/18 1406)  Plateau Pressure: 21 cmH20 (09/19/18 1406)  Total  Ve: 4.59 mL (09/19/18 1406)  Negative Inspiratory Force (cm H2O): -24 (09/19/18 1251)  F/VT Ratio<105 (RSBI): (!) 44.92 (09/19/18 1251)  Lines/Drains/Airways     Central Venous Catheter Line                 Trialysis (Dialysis) Catheter 09/12/18 1326 right internal jugular 9 days          Drain                 Urethral Catheter 09/13/18 1438 Temperature probe 8 days          Arterial Line                 Arterial Line 09/15/18 1500 Left Radial 6 days          Pressure Ulcer                 Negative Pressure Wound Therapy  44 days          Peripheral Intravenous Line                 Midline Catheter Insertion/Assessment  - Single Lumen 09/17/18 1235 Right cephalic vein (lateral side of arm) 20g x 10cm 4 days              Significant Labs:    CBC/Anemia Profile:  Recent Labs   Lab  09/20/18   0029  09/21/18   0419   WBC  17.36*  18.13*   HGB  7.6*  7.1*   HCT  24.2*  24.3*   PLT  221  209   MCV  80*  84   RDW  18.7*  19.8*        Chemistries:  Recent Labs   Lab  09/20/18   0029   09/20/18   2203  09/21/18   0419  09/21/18   0736  09/21/18   1302   NA  141   < >  142  142  143  143  143  143  143  143   K  4.9   < >  4.8  4.8  4.8  4.8  4.9  4.7  4.7  4.7   CL  110   < >  112*  112*  114*  114*  114*  113*  113*  113*   CO2  22*   < >  19*  19*  19*  19*  17*  19*  19*  19*   BUN  12   < >  19  19  22  22  22  21  21  21   CREATININE  1.0   < >  1.4  1.4  1.5*  1.5*  1.6*  1.4  1.4  1.4   CALCIUM  8.9   < >  9.5  9.5  9.3  9.3  9.3  9.1  9.1  9.1   ALBUMIN  1.8*   < >  1.9*  1.9*  1.8*  1.8*  1.8*   --   2.0*  2.0*  2.0*   PROT  5.7*   --    --   5.7*   --    --    BILITOT  0.7   --    --   0.6   --    --    ALKPHOS  100   --    --   109   --    --    ALT  8*   --    --   13   --    --    AST  18   --    --   21   --    --    MG   --    < >  1.8  1.8  1.9  1.9   --   2.1  2.1  2.1   PHOS  3.5   < >  4.6*  4.6*  4.9*  4.9*  4.9*   --   5.0*  5.0*  5.0*    < > = values in this  interval not displayed.       All pertinent labs within the past 24 hours have been reviewed.    Significant Imaging:  I have reviewed and interpreted all pertinent imaging results/findings within the past 24 hours.    Assessment/Plan:     Neuro   Lumbar stenosis    --s/p lumbar fusion/hardware placement June 2018 with subsequent osteo and abscess.   --LSO brace ordered.  Per conversation with Neurosurgery, will need to wear brace while OOB          Encephalopathy, metabolic    --multifactorial: sepsis/uremia, medications  --non focal on exam  --weaned off sedation and levophed (9/17)  --awake and alert; following commands appropriately          Derm   Alteration in skin integrity related to surgical incision    --s/p wound vac placement to lower back. Continue per neurosurgery        Pulmonary   * Acute hypercapnic respiratory failure    --2/2 pulmonary edema in setting of acute on chronic diastolic HF, volume overload and JEY  --Repeat CXR 9/21 grossly unchanged since from previous 9/20  --CRRT ran continuously overnight  --Bipap qhs, tolerating 10L NC throughout the day                Pulmonary hypertension    --2/2 diastolic HF. No known lung disease  --Echo (9/13) PA systolic pressure estimated at 88 mm Hg  --reportedly on revatio at home.             Cardiac/Vascular   PAF (paroxysmal atrial fibrillation)    --CHADS vasc 5 (HTN, DM, Age, and female); hold home rivaroxaban   --hold now given spinal abscess.           Coronary artery disease involving native coronary artery of native heart without angina pectoris    --hold BB in setting of recent shock  --hold statin for now  --s/p VIVIANA to Lcx in June 2017 and on plavix prior to admission. Holding in setting of possible upcoming procedures.             Hypertension    --holding antihypertensives in the setting of recent shock and borderline hypotension.         CHF (congestive heart failure), NYHA class IV    --Prior ECHO shows diastolic dysfunction  (7/2018)  --Echo (9/13) EF 55-60%; concentric remodeling with RV enlargement; no WMA  --Continuing SLED        Renal/   JEY (acute kidney injury)    --most likely cardiorenal syndrome vs sepsis (ATN)  --retroperitoneal ultrasound on 9/9 consistent with medical renal disease.   --Nephrology following   --Patient to continue on SLED, will re-eval if can transition to SLED only nightly        ID   Osteomyelitis of Lumbar Spine    --see above        Gram-negative bacteremia    --noted on cultures from 9/9; cleared on 9/11; Positive for Prevotella  --See above          Spinal epidural abscess    --noted on MRI along with osteo  --IR drained paraspinal abscess 9/13   --see septic shock for details.         Septic shock    --secondary to spinal abscesses  --blood cultures with prevotella, repeat blood cultures ngtd  --gram stain from spinal abscess with prevotella  --currently off norepinephrine.   --per ID recs: continue zosyn, will anticipate 8 weeks of abx   --Not fully able to obtain source control as IR intervention (9/12) only able to partially drain one abscess.   --[place]out with Neurosurgery pending hemodynamic stability        Oncology   Anemia    --likely anemia of chronic disease. Trend Hgb for now  --no signs of acute blood loss  --transfuse for Hgb <7        Endocrine   Hypothyroidism    --continue synthroid        Diabetes mellitus, type II    --likely steroid induced and secondary to infection  --steroids weaned off 9/16  --continue insulin infusion (started 9/16)  --A1c 7.6, on lantus 30 units every evening outpatient          GI   GERD (gastroesophageal reflux disease)    --continue PPI           Critical Care Daily Checklist:    A: Awake: RASS Goal/Actual Goal: RASS Goal: 0-->alert and calm  Actual: [place] Agitation Sedation Scale (RASS): Alert and calm   B: Spontaneous Breathing Trial Performed?  N/A   N SAT & SBT Coordinated?  N/A                      D: Delirium: CAM-ICU Overall CAM-ICU: Negative    E: Early Mobility Performed? Yes   F: Feeding Goal: Goals: Pt to receive nutrition by RD follow up  Status: Nutrition Goal Status: goal met   Current Diet Order   Procedures    Diet Dysphagia Mechanical Soft (IDDSI Level 5) [facility]; Consistent Carbohydrate; Nectar Thick     Order Specific Question:   Indicate patient location for additional diet options:     Answer:   [facility]     Order Specific Question:   Additional Diet Options:     Answer:   Consistent Carbohydrate     Order Specific Question:   Fluid consistency:     Answer:   Nectar Thick      AS: Analgesia/Sedation none   T: Thromboembolic Prophylaxis SCDs   H: HOB > 300 Yes   U: Stress Ulcer Prophylaxis (if needed) ppi   G: Glucose Control Within goal 140-180   B: Bowel Function Stool Occurrence: 0   I: Indwelling Catheter (Lines & [place]) Necessity All needed   D: De-escalation of Antimicrobials/Pharmacotherapies none    Plan for the day/ETD Supportive care, stabilize hemodynamics    Code Status:  Family/Goals of Care: Partial Code       Discussed with Dr. Ward.    Critical Care Time: 45 minutes  Critical secondary to Patient has a condition that poses threat to life and bodily function: JEY requiring continuous CRRT       Critical care was time spent personally by me on the following activities: development of treatment plan with patient or surrogate and bedside caregivers, discussions with consultants, evaluation of patient's response to treatment, examination of patient, ordering and performing treatments and interventions, ordering and review of laboratory studies, ordering and review of radiographic studies, pulse oximetry, re-evaluation of patient's condition. This critical care time did not overlap with that of any other provider or involve time for any procedures.     Leslie Johnson PA-C  Critical Care Medicine  [facility]

## 2018-09-21 NOTE — ASSESSMENT & PLAN NOTE
--most likely cardiorenal syndrome vs sepsis (ATN)  --retroperitoneal ultrasound on 9/9 consistent with medical renal disease.   --Nephrology following   --Patient to continue on SLED, will re-eval if can transition to SLED only nightly

## 2018-09-21 NOTE — PLAN OF CARE
Problem: Patient Care Overview  Goal: Plan of Care Review  Outcome: Ongoing (interventions implemented as appropriate)  POC reviewed with pt and family at 1330.   Pt and family verbalized understanding.   Questions and concerns addressed.   See flowsheets for full assessment and VS info.     CRRT currently running.  NPO.  Wound vac dressing changed per wound care RN.

## 2018-09-21 NOTE — PROGRESS NOTES
Critical Care Service MD notified of hemoglobin 7.1. Pt has been trending between 7.0-7.6. No new orders at this time. Will continue to monitor.

## 2018-09-21 NOTE — PROGRESS NOTES
[facility]  Infectious Disease  Progress Note    Patient Name: Medina Frazier  MRN: [id number]  Admission Date: 9/8/2018  Length of Stay: 13 days  Attending Physician: An Ward MD  Primary Care Provider: Hao Garcia MD    Isolation Status: No active isolations  Assessment/Plan:      Spinal epidural abscess    82 year old female with history of L2-3 fusion 6/5 complicated by post operative infection with Enterobacter cloacae. She has been on outpatient IV Ciprofloxacin and has undergone an I&D on 7/2 with partial hardware removal on 8/7 without resolution of her infection. 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     Patient's hospital course has been complicated by acute respiratory failure requiring intubation and transfer to the ICU with CXR showing pulmonary edema. Surgery has been postponed due to decline in medical status. Blood cultures 9/9 returned positive for Prevotella. She underwent IR drainage of paraspinal abscess on 9/13 with 5 mL purulent fluid removed. Gram stain is showing many GNRs, moderate gram negative diplococci and few GPRs. Abscess cultures are also showing Prevotella.      Afebrile. Leukocytosis/CRP/procalc are trending down. Repeat blood cultures are NGTD. She was scheduled to go to the OR but is postponed due to decline in respiratory status. She is currently on nasal cannula     Plan  - Continue Zosyn 4.5 g IV q 8 hours   - surgery date to be determined. Will follow up with neurosurgery.   - When taken to the OR, please obtain surgical cultures and send for gram stain, aerobic, anaerobic, AFB and fungal cultures   - Anticipate 8 weeks of antibiotics      - ID will follow.            Thank you for your consult. I will follow-up with patient. Please contact us if you have any additional questions.    Tawanda Patel PA-C  Infectious  Disease  [facility]  [phone]    Subjective:     Principal Problem:Acute hypercapnic respiratory failure    HPI: Medina Frazier is an 82 year old female who underwent L2-3 fusion on 6/5/18. She did well until shortly after staples removed,   drainage from the lower part of the incision was noted. On 7/2 she went to the OR for an I&D. Purulence was encountered. Surgical cultures grew Enterobacter cloacae. She was seen by Dr. Lenz, ID provider who started patient on IV Ciprofloxacin for 6 weeks. Patient's infection did not resolve and wound continued to drain. She was taken back to the OR on 8/7 for hardware removal, however appears on most recent imaging an intervertebral cage remains in place. No new surgical cultures obtained at that time. She was continued on Cipro. Patient's back pain has worsened over the past week and is now wrapping around to her anterior abdomen. She presented to an OSH  ED in [place] for evaluation. 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 Labs notable for elevated WBC, ESR, CRP and pyuria. Procalc 19. JEY.  Blood and urine culture negative.  She was transferred here for higher level of care. Currently she is on Cipro from her previous cultures, and was started on Vanc on 9/8. Patient denies fevers, chills, sweats. Reports severe back pain and lower extremity weakness. Magaña in place.    Of note, patient has a documented PCN allergy. She said she has tolerated PCN in the recent past without adverse reactions. 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 No problems since.      Interval History:   SOB much improved  On nasal cannula  Surgery to be determined  No fever chills or night sweats  Pt seen with family at bedside   Repeat CXR with increased opacification bilaterally    Review of Systems    Constitutional: Negative for chills, diaphoresis, fatigue and fever.   Respiratory: Positive for cough and shortness of breath.    Cardiovascular: Positive for leg swelling. Negative for chest pain.   Gastrointestinal: Negative for diarrhea, nausea and vomiting.   Genitourinary: Negative for dysuria.   Musculoskeletal: Positive for back pain.   Neurological: Negative for headaches.     Objective:     Vital Signs (Most Recent):  Temp: 97 °F (36.1 °C) (09/21/18 1200)  Pulse: 91 (09/21/18 1300)  Resp: 20 (09/21/18 1300)  BP: 139/85 (09/21/18 1300)  SpO2: 95 % (09/21/18 1300) Vital Signs (24h Range):  Temp:  [96.6 °F (35.9 °C)-98.2 °F (36.8 °C)] 97 °F (36.1 °C)  Pulse:  [] 91  Resp:  [13-43] 20  SpO2:  [93 %-100 %] 95 %  BP: (111-152)/(59-86) 139/85  Arterial Line BP: ()/(45-79) 139/70     Weight: 123 kg (271 lb 2.7 oz)  Body mass index is 46.55 kg/m².    Estimated Creatinine Clearance: 41.5 mL/min (based on SCr of 1.4 mg/dL).    Physical Exam   Constitutional: She appears well-developed and well-nourished. She appears ill.   Neck: JVD present.   RIJ CVC in place   Cardiovascular: Normal rate. An irregularly irregular rhythm present. Exam reveals no friction rub.   No murmur heard.  Pulmonary/Chest: Effort normal. She has no wheezes. She has rales.   On nasal cannula    Abdominal: Soft. Bowel sounds are normal. She exhibits no distension.   Obese abdomen   Musculoskeletal: She exhibits edema (BLE, right hand).   +2 pitting edema   Neurological: She is alert.   Skin: Skin is warm and dry. She is not diaphoretic. No erythema.   Lumbar wound not examined today.   Nursing note and vitals reviewed.      Significant Labs:   Blood Culture:   Recent Labs   Lab  09/09/18   1731  09/11/18   1003  09/11/18   1011  09/15/18   1805  09/15/18   1819   LABBLOO  Gram stain terrell bottle: Gram negative rods   Results called to and read back by:Sofia Noriega RN 09/11/2018  06:01  PREVOTELLA (B.) BIVIA  No growth after 5  days.  No growth after 5 days.  No growth after 5 days.  No growth after 5 days.     CBC:   Recent Labs   Lab  09/20/18   0029  09/21/18   0419   WBC  17.36*  18.13*   HGB  7.6*  7.1*   HCT  24.2*  24.3*   PLT  221  209     CMP:   Recent Labs   Lab  09/20/18   0029   09/20/18   2203  09/21/18   0419  09/21/18   0736  09/21/18   1302   NA  141   < >  142  142  143  143  143  143  143  143   K  4.9   < >  4.8  4.8  4.8  4.8  4.9  4.7  4.7  4.7   CL  110   < >  112*  112*  114*  114*  114*  113*  113*  113*   CO2  22*   < >  19*  19*  19*  19*  17*  19*  19*  19*   GLU  178*   < >  169*  169*  162*  162*  160*  163*  163*  163*   BUN  12   < >  19  19  22  22  22  21  21  21   CREATININE  1.0   < >  1.4  1.4  1.5*  1.5*  1.6*  1.4  1.4  1.4   CALCIUM  8.9   < >  9.5  9.5  9.3  9.3  9.3  9.1  9.1  9.1   PROT  5.7*   --    --   5.7*   --    --    ALBUMIN  1.8*   < >  1.9*  1.9*  1.8*  1.8*  1.8*   --   2.0*  2.0*  2.0*   BILITOT  0.7   --    --   0.6   --    --    ALKPHOS  100   --    --   109   --    --    AST  18   --    --   21   --    --    ALT  8*   --    --   13   --    --    ANIONGAP  9   < >  11  11  10  10  12  11  11  11   EGFRNONAA  53.3*   < >  35.5*  35.5*  32.7*  32.7*  30.2*  35.5*  35.5*  35.5*    < > = values in this interval not displayed.     Wound Culture:   Recent Labs   Lab  07/02/18   1432  09/13/18   0834   LABAERO  ENTEROBACTER CLOACAE COMPLEX  Rare  For susceptibility see order # [id number]    ENTEROBACTER CLOACAE COMPLEX  Moderate    No growth     All pertinent labs within the past 24 hours have been reviewed.    Significant Imaging: I have reviewed all pertinent imaging results/findings within the past 24 hours.

## 2018-09-21 NOTE — PT/OT/SLP EVAL
Speech Language Pathology Evaluation  Bedside Swallow    Patient Name:  Medina Frazier   MRN:  [id number]   [id number]    Admitting Diagnosis: Acute hypercapnic respiratory failure    Recommendations:                 General Recommendations:  Dysphagia therapy  Diet recommendations:  Dental Soft, [place] Thick   Aspiration Precautions:  · Pt requires assistance to thicken liquids  · Thicken all liquids to nectar thick consistency- 6oz: 1 packet thickener  · NO straws  · PO meds crushed in pureed  · Fully awake and alert for PO intake  · Fully upright position for PO intake  · Small bites/ sips  · Slow rate of eating/ drinking  · 1 bite/ sip @ a time  · Refrain from talking prior to swallow completion   · Remain upright for 20-30 min post PO intake  · Avoid consistencies that melt to thin liquid (i.e., ice chips, hard candy, sherbert, ice cream, jello, etc.)  · Discontinue PO upon: coughing, throat clearing, choking, wet vocal quality, wet breath sounds, watery eyes, reddened facial features  General Precautions: Standard, aspiration, fall, respiratory, dental soft, nectar thick  Communication strategies:  go to room if call light pushed    History:     Past Medical History:   Diagnosis Date    Allergic sinusitis     Anticoagulant long-term use     Arthritis     Asthma     CHF (congestive heart failure)     Chronic kidney disease     COPD (chronic obstructive pulmonary disease)     Coronary artery disease     Diabetes mellitus     Diabetic neuropathy 2/27/2013    Encounter for blood transfusion     General anesthetics causing adverse effect in therapeutic use     DELAYED EMERGENCE    GERD (gastroesophageal reflux disease)     HHD (hypertensive heart disease)     High cholesterol     [place] (hard of hearing)     Hypertension     Hyponatremia 9/8/2018    Hypothyroidism     Lumbar spinal stenosis     MRSA (methicillin resistant Staphylococcus aureus) infection 7/7/2018    MRSA infection     PAST H/O,  CULTURE DONE 5/30/18 (+)    On home oxygen therapy     Osteoporosis     Pulmonary hypertension     Sleep apnea     STATES NOT USING C PAP    Spinal stenosis     Thyroid disease     TIA (transient ischemic attack)     UTI (urinary tract infection)     Venous insufficiency     Wears glasses     Wears partial dentures     UPPER FULL, LOWER PARTIAL       Past Surgical History:   Procedure Laterality Date    ANGIOGRAM-CORONARY N/A 6/2/2017    Performed by Yury Bailey MD at [facility] CATH    ANGIOPLASTY  2008    CARDIAC STENTS    APPENDECTOMY      APPLICATION OF WOUND VACUUM-ASSISTED CLOSURE DEVICE N/A 7/2/2018    Procedure: APPLICATION, WOUND VAC;  Surgeon: Jeremie Gutiérrez Jr., MD;  Location: [facility] OR;  Service: Orthopedics;  Laterality: N/A;    APPLICATION OF WOUND VACUUM-ASSISTED CLOSURE DEVICE N/A 8/7/2018    Procedure: APPLICATION, WOUND VAC;  Surgeon: Jeremie Gutiérrez Jr., MD;  Location: [facility] OR;  Service: Orthopedics;  Laterality: N/A;    APPLICATION, DRESSING, WOUND Left 8/11/2013    Performed by Juwan Liu [facility] at [facility] OR    APPLICATION, WOUND VAC N/A 8/7/2018    Performed by Jeremie Gutiérrez Jr., MD at [facility] OR    APPLICATION, WOUND VAC N/A 7/2/2018    Performed by Jeremie Gutiérrez Jr., MD at [facility] OR    ARTHROPLASTY, KNEE, TOTAL Left 3/18/2013    Performed by [facility] at [facility] OR    ATHERECTOMY N/A 6/5/2017    Performed by Lokesh Thakur MD at [facility] CATH    BLADDER SUSPENSION      BONE GRAFT N/A 6/5/2018    Procedure: BONE GRAFT;  Surgeon: Jeremie Gutiérrez Jr., MD;  Location: [facility] OR;  Service: Orthopedics;  Laterality: N/A;    BONE GRAFT N/A 6/5/2018    Performed by Jeremie Gutiérrez Jr., MD at [facility] OR    CARDIAC SURGERY  1996    CABG    CARDIAC SURGERY      Stents    CORONARY ARTERY BYPASS GRAFT  1996    DEBRIDEMENT, WOUND, Lumbar wound washout N/A 9/20/2018    Performed by Hari Redd [facility] at [facility] OR 2ND FLR    EXPLORATION,  WOUND-lumbar N/A 8/7/2018    Performed by Jeremie Gutiérrez Jr., MD at [facility] OR    FRACTURE SURGERY Left     Lt hand    FRACTURE SURGERY Left 1993    Lt Knee    FUSION-TRANSLUMINAL LUMBAR INTERBODY (TLIF) L2-3 W/ INSTRUMENTATION N/A 6/5/2018    Performed by Jeremie Gutiérrez Jr., MD at [facility] OR    HEMORRHOID SURGERY      HEMORRHOID SURGERY      HYSTERECTOMY  1984    INCISION AND DRAINAGE N/A 7/2/2018    Procedure: INCISION AND DRAINAGE (I & D) LUMBAR SPINE W/ANTIBIOTIC BEAD PLACEMENT;  Surgeon: Jeremie Gutiérrez Jr., MD;  Location: [facility] OR;  Service: Orthopedics;  Laterality: N/A;    INCISION AND DRAINAGE (I & D) LUMBAR SPINE W/ANTIBIOTIC BEAD PLACEMENT N/A 7/2/2018    Performed by Jeremie Gutiérrez Jr., MD at [facility] OR    INCISION AND DRAINAGE (I & D)-EXTREMITY-LOWER Left 8/11/2013    Performed by [facility] at [facility] OR    INCISION AND DRAINAGE POSTERIOR LUMBAR SPINE  07/02/2018    INSERTION, PICC Right 7/2/2018    Performed by Jeremie Gutiérrez Jr., MD at [facility] OR    JOINT REPLACEMENT Left     KNEE SURGERY Left     POSTOP TKR INFECTION TX    LUMBAR EPIDURAL INJECTION      OPEN REDUCTION INTERNAL FIXATION-HIP TFN Left 9/21/2017    Performed by LOY Early II, MD at [facility] OR    PERIPHERALLY INSERTED CENTRAL CATHETER INSERTION Right 7/2/2018    Procedure: INSERTION, PICC;  Surgeon: Jeremie Gutiérrez Jr., MD;  Location: [facility] OR;  Service: Orthopedics;  Laterality: Right;    REMOVAL OF HARDWARE FROM SPINE N/A 8/7/2018    Procedure: REMOVAL, HARDWARE, SPINE;  Surgeon: Jeremie Gutiérrez Jr., MD;  Location: [facility] OR;  Service: Orthopedics;  Laterality: N/A;    REMOVAL, HARDWARE, SPINE N/A 8/7/2018    Performed by Jeremie Gutiérrez Jr., MD at [facility] OR    REMOVAL, PROSTHESIS, KNEE Left 11/20/2013    Performed by [facility] at [facility] OR    THYROIDECTOMY, PARTIAL      TOTAL KNEE  PROSTHESIS REMOVAL W/ SPACER INSERTION Left     TRANSFORAMINAL LUMBAR  "INTERBODY FUSION (TLIF) OF SPINE WITH PERCUTANEOUS INSTRUMENTATION N/A 6/5/2018    Procedure: FUSION-TRANSLUMINAL LUMBAR INTERBODY (TLIF) L2-3 W/ INSTRUMENTATION;  Surgeon: Jeremie Gutiérrez Jr., MD;  Location: [facility] OR;  Service: Orthopedics;  Laterality: N/A;    WOUND EXPLORATION N/A 8/7/2018    Procedure: EXPLORATION, WOUND-lumbar;  Surgeon: Jeremie Gutiérrez Jr., MD;  Location: [facility] OR;  Service: Orthopedics;  Laterality: N/A;     Prior Intubation HX:  Intubated 9/12-9/19/18    Prior diet: Per pt's daughter, regular consistency solids and thin liquids.     Subjective     "Can I have more water?"    Pain/Comfort:  · Pain Rating 1: 0/10  · Pain Rating Post-Intervention 1: 0/10    Objective:     Oral Musculature Evaluation  · Oral Musculature: WFL  · Dentition: present and adequate  · Secretion Management: adequate  · Mucosal Quality: dry  · Mandibular Strength and Mobility: WFL  · Oral Labial Strength and Mobility: WFL  · Lingual Strength and Mobility: WFL  · Velar Elevation: WFL  · Buccal Strength and Mobility: WFL  · Volitional Cough: Weak  · Volitional Swallow: Dec'd hyolaryngeal excursion and elevation   · Voice Prior to PO Intake: Hoarse, dry     Bedside Swallow Eval:   Consistencies Assessed:  · Thin water- tsp x2  · Nectar thickened water- tsp x2, multiple cup sips in isolation and as pureed and regular consistency solid liquid wash  · Honey thickened water- tsp x2, cup sip x4  · Pureed apple sauce- tsp x5  · Regular consistency solid edmundo cracker- bite x2    Oral Phase:   · Prolonged mastication observed with regular consistency solid  · Appeared WFL with all other consistencies    Pharyngeal Phase:   · Significant coughing observed with thin liquids  · No further overt clinical signs aspiration observed with nectar and honey thickened liquids, pureed and regular consistency solids    Treatment:   Pt awake and alert upon entry. HOB raised. 3 daughters and  present at bedside. Extensive " education provided re: role of SLP, aspiration precautions listed above, thickened liquids, and SLP POC. Pt and family verbalized understanding of all education provided and agreement with SLP POC. White board updated. Thickening supplies provided. No further questions.     Assessment:     Medina Frazier is a 82 y.o. female with an SLP diagnosis of dysphagia.     Goals:   Multidisciplinary Problems     SLP Goals        Problem: SLP Goal    Goal Priority Disciplines Outcome   SLP Goal     SLP Ongoing (interventions implemented as appropriate)   Description:  Speech Language Pathology  Goals expected to be met by 9/28:  1. Pt will tolerate dental soft solids and nectar thick liquids with no overt clinical signs aspiration.   2. Pt will tolerate PO trials thin liquids with no overt clinical signs aspiration.   3. Pt will tolerate PO trials regular consistency solids with timely mastication and A-P transit and no overt clinical signs aspiration.                     Plan:     · Patient to be seen:  4 x/week   · Plan of Care expires:  10/20/18  · Plan of Care reviewed with:  patient, spouse, daughter   · SLP Follow-Up:  Yes       Discharge recommendations:  nursing facility, skilled     Time Tracking:     SLP Treatment Date:   09/21/18  Speech Start Time:  1451  Speech Stop Time:  1518     Speech Total Time (min):  27 min    Billable Minutes: Eval Swallow and Oral Function 27    ALIYA Reinoso, CCC-SLP  [phone]  9/21/2018

## 2018-09-21 NOTE — ASSESSMENT & PLAN NOTE
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       -Worsened respiratory status secondary to pulmonary edema  -Pt remains too unstable for OR  -Continue diuresis for volume overload and pulmonary edema per MICU and Renal teams.  -Pt will need 2 stage surgery: washout and then instrumentation following.  -MRI with L2-3 osteodiscitis, large fluid collection in surgical bed, as well a prevertebral fluid collection L1,2 s/p IR drainage on 9/13   -Please wear LSO brace at all times  -Please hold Xarelto & Plavix   -Abx per infectious disease recs   -Continue wound care and wound vac  -Will reconsider surgery when more medically optimized.

## 2018-09-21 NOTE — ASSESSMENT & PLAN NOTE
--secondary to spinal abscesses  --blood cultures with prevotella, repeat blood cultures ngtd  --gram stain from spinal abscess with prevotella  --currently off norepinephrine.   --per ID recs: continue zosyn, will anticipate 8 weeks of abx   --Not fully able to obtain source control as IR intervention (9/12) only able to partially drain one abscess.   --Washout with Neurosurgery pending hemodynamic stability

## 2018-09-21 NOTE — SUBJECTIVE & OBJECTIVE
"Interval History: naeon    Medications:  Continuous Infusions:  Scheduled Meds:   albuterol-ipratropium  3 mL Nebulization Q4H    insulin detemir U-100  5 Units Subcutaneous QHS    [START ON 9/22/2018] levothyroxine  150 mcg Oral Before breakfast    miconazole NITRATE 2 %   Topical (Top) BID    pantoprazole  40 mg Oral Daily    piperacillin-tazobactam (ZOSYN) IVPB  4.5 g Intravenous Q12H     PRN Meds:dextrose 50%, dextrose 50%, fentaNYL citrate (PF), glucagon (human recombinant), glucose, glucose, insulin aspart U-100, ondansetron, sodium phosphate IVPB, sodium phosphate IVPB, sodium phosphate IVPB     Review of Systems  Objective:     Weight: 123 kg (271 lb 2.7 oz)  Body mass index is 46.55 kg/m².  Vital Signs (Most Recent):  Temp: 97 °F (36.1 °C) (09/21/18 0900)  Pulse: (!) 115 (09/21/18 0900)  Resp: (!) 22 (09/21/18 0900)  BP: 120/60 (09/21/18 0900)  SpO2: 95 % (09/21/18 0900) Vital Signs (24h Range):  Temp:  [95.7 °F (35.4 °C)-98.2 °F (36.8 °C)] 97 °F (36.1 °C)  Pulse:  [] 115  Resp:  [13-31] 22  SpO2:  [93 %-100 %] 95 %  BP: (111-152)/(59-98) 120/60  Arterial Line BP: ()/(45-79) 142/76     Date 09/21/18 0700 - 09/22/18 0659   Shift 9688-7880 3196-3677 1919-7637 24 Hour Total   INTAKE   I.V.(mL/kg) 750(6.1)   750(6.1)   Shift Total(mL/kg) 750(6.1)   750(6.1)   OUTPUT   Other 1115   1115   Shift Total(mL/kg) 1115(9.1)   1115(9.1)   Weight (kg) 123 123 123 123              Oxygen Concentration (%):  [60] 60         Urethral Catheter 09/13/18 1438 Temperature probe (Active)   Site Assessment Clean;Intact 9/21/2018  7:01 AM   Collection Container Urimeter 9/21/2018  7:01 AM   Securement Method secured to top of thigh w/ adhesive device 9/21/2018  7:01 AM   Catheter Care Performed yes 9/21/2018  7:01 AM   Reason for Continuing Urinary Catheterization Critically ill in ICU requiring intensive monitoring 9/21/2018  7:01 AM   CAUTI Prevention Bundle StatLock in place w 1" slack;No dependent loops or " kinks;Drainage bag not overfilled (<2/3 full);Drainage bag below bladder;Drainage bag off the floor;Intact seal between catheter & drainage tubing;Green sheeting clip in use 9/21/2018  7:01 AM   Output (mL) 10 mL 9/19/2018  8:05 PM            Trialysis (Dialysis) Catheter 09/12/18 1326 right internal jugular (Active)   IV Device Securement sutures 9/21/2018  7:01 AM   Additional Site Signs no erythema;no warmth;no edema;no pain;no palpable cord;no streak formation;no drainage 9/21/2018  7:01 AM   Patency/Care flushed w/o difficulty 9/21/2018  7:01 AM   Site Assessment Clean;Dry;Intact;No redness;No swelling 9/21/2018  7:01 AM   Status Accessed 9/21/2018  7:01 AM   Flows Good 9/21/2018  7:01 AM   Dressing Intervention Dressing reinforced 9/21/2018  7:01 AM   Dressing Status Biopatch in place;Clean;Dry;Intact 9/21/2018  7:01 AM   Dressing Change Due 09/22/18 9/21/2018  7:01 AM   Verification by X-ray Yes 9/20/2018  7:05 PM   Site Condition No complications 9/21/2018  3:05 AM   Dressing Occlusive 9/20/2018  3:05 AM   Daily Line Review Performed 9/21/2018  3:05 AM       Neurosurgery Physical Exam     [id number]  PERRL  FC x4  CNII-XII grossly intact      Significant Labs:  Recent Labs   Lab  09/20/18   1426   09/20/18   2203  09/21/18   0419  09/21/18   0736   GLU  167*  167*   < >  169*  169*  162*  162*  160*   NA  142  142   < >  142  142  143  143  143   K  4.7  4.7   < >  4.8  4.8  4.8  4.8  4.9   CL  112*  112*   < >  112*  112*  114*  114*  114*   CO2  20*  20*   < >  19*  19*  19*  19*  17*   BUN  17  17   < >  19  19  22  22  22   CREATININE  1.2  1.2   < >  1.4  1.4  1.5*  1.5*  1.6*   CALCIUM  9.3  9.3   < >  9.5  9.5  9.3  9.3  9.3   MG  2.0  2.0   --   1.8  1.8  1.9  1.9   --     < > = values in this interval not displayed.     Recent Labs   Lab  09/20/18   0029  09/21/18   0419   WBC  17.36*  18.13*   HGB  7.6*  7.1*   HCT  24.2*  24.3*   PLT  221  209     Recent Labs   Lab   09/20/18   0029  09/21/18   0419   INR  1.2  1.2   APTT  22.3   --      Microbiology Results (last 7 days)     Procedure Component Value Units Date/Time    Blood culture [[id number]] Collected:  09/15/18 1805    Order Status:  Completed Specimen:  Blood from Peripheral, Upper Arm, Right Updated:  09/20/18 2012     Blood Culture, Routine No growth after 5 days.    Narrative:       Blood cultures from 2 different sites. 4 bottles total.  Please draw before starting antibiotics.    Blood culture [[id number]] Collected:  09/15/18 1819    Order Status:  Completed Specimen:  Blood from Peripheral, Upper Arm, Left Updated:  09/20/18 2012     Blood Culture, Routine No growth after 5 days.    Narrative:       Blood cultures x 2 different sites. 4 bottles total. Please  draw cultures before administering antibiotics.    Fungus culture [[id number]] Collected:  09/13/18 0834    Order Status:  Completed Specimen:  Abscess from Back Updated:  09/20/18 0729     Fungus (Mycology) Culture Culture in progress    Narrative:       Paraspinal abscess    Culture, Anaerobe [[id number]] Collected:  09/13/18 0834    Order Status:  Completed Specimen:  Abscess from Back Updated:  09/18/18 1340     Anaerobic Culture --     PREVOTELLA (B.) BIVIA  Many      Narrative:       lumbar paraspinal fluid collection drainage    Aerobic culture [[id number]] Collected:  09/13/18 0834    Order Status:  Completed Specimen:  Abscess from Back Updated:  09/17/18 0848     Aerobic Bacterial Culture No growth    Narrative:       lumbar paraspinal fluid collection drainage    Blood culture [[id number]] Collected:  09/11/18 1011    Order Status:  Completed Specimen:  Blood Updated:  09/16/18 1212     Blood Culture, Routine No growth after 5 days.    Narrative:       Please draw from 2 separate sites 30 minutes apart. Thank you    Blood culture [[id number]] Collected:  09/11/18 1003    Order Status:  Completed Specimen:  Blood Updated:  09/16/18 1212     Blood Culture,  Routine No growth after 5 days.            Significant Diagnostics:

## 2018-09-21 NOTE — SUBJECTIVE & OBJECTIVE
Interval History/Significant Events: Patient was not put on bipap last night however tolerated the night well. CRRT ran continuously throughout the night. No acute events overnight.     Review of Systems   Constitutional: Positive for chills. Negative for fatigue and fever.   HENT: Positive for dental problem (dental pain). Negative for sore throat.    Respiratory: Negative for cough, chest tightness and shortness of breath.    Cardiovascular: Negative for chest pain and palpitations.   Gastrointestinal: Negative for abdominal pain, diarrhea and nausea.   Genitourinary:        Oliguric    Musculoskeletal: Negative for back pain and joint swelling.   Skin: Negative for rash.   Neurological: Negative for weakness, light-headedness, numbness and headaches.   Psychiatric/Behavioral: Negative for agitation. The patient is not nervous/anxious.      Objective:     Vital Signs (Most Recent):  Temp: 97 °F (36.1 °C) (09/21/18 1200)  Pulse: 91 (09/21/18 1300)  Resp: 20 (09/21/18 1300)  BP: 139/85 (09/21/18 1300)  SpO2: 95 % (09/21/18 1300) Vital Signs (24h Range):  Temp:  [96.6 °F (35.9 °C)-98.2 °F (36.8 °C)] 97 °F (36.1 °C)  Pulse:  [] 91  Resp:  [13-43] 20  SpO2:  [93 %-100 %] 95 %  BP: (111-152)/(59-86) 139/85  Arterial Line BP: ()/(45-79) 139/70   Weight: 123 kg (271 lb 2.7 oz)  Body mass index is 46.55 kg/m².      Intake/Output Summary (Last 24 hours) at 9/21/2018 1544  Last data filed at 9/21/2018 1302  Gross per 24 hour   Intake 4700 ml   Output 7785 ml   Net -3085 ml       Physical Exam   Constitutional: She is oriented to person, place, and time. Vital signs are normal. She appears well-developed and well-nourished. She is cooperative. No distress. She is not intubated.   HENT:   Head: Normocephalic and atraumatic.   Eyes: EOM and lids are normal. Right eye exhibits normal extraocular motion. Left eye exhibits normal extraocular motion.   Neck: Normal range of motion. Neck supple.   Trialysis central  venous catheter in right internal jugular   Cardiovascular: Normal rate, regular rhythm, normal heart sounds and intact distal pulses. Exam reveals no gallop, no distant heart sounds and no friction rub.   No murmur heard.  3+ pitting edema of bilateral lower extremity up to knees  Edema noted to right hand    Pulmonary/Chest: Accessory muscle usage present. No apnea, no tachypnea and no bradypnea. She is not intubated. She is in respiratory distress. She has no decreased breath sounds. She has no wheezes. She has rhonchi in the right upper field, the right middle field, the right lower field, the left upper field, the left middle field and the left lower field. She has no rales.   Abdominal: Soft. Normal appearance. She exhibits distension. Bowel sounds are decreased. There is no tenderness. No hernia.   Genitourinary:   Genitourinary Comments: Rash between inguinal folds and upper thighs bilaterally   Musculoskeletal:   Arterial line in carotid artery of right wrist   Neurological: She is alert and oriented to person, place, and time. She has normal strength. GCS eye subscore is 4. GCS verbal subscore is 5. GCS motor subscore is 6.   Skin: Skin is dry and intact. She is not diaphoretic.   Psychiatric: She has a normal mood and affect. Her behavior is normal. Thought content normal.       Vents:  Vent Mode: Spont (09/19/18 1406)  Set Rate: 0 bmp (09/19/18 1406)  Vt Set: 360 mL (09/19/18 1406)  Pressure Support: 5 cmH20 (09/19/18 1406)  PEEP/CPAP: 5 cmH20 (09/19/18 1406)  Oxygen Concentration (%): 60 (09/20/18 1554)  Peak Airway Pressure: 10 cmH2O (09/19/18 1406)  Plateau Pressure: 21 cmH20 (09/19/18 1406)  Total Ve: 4.59 mL (09/19/18 1406)  Negative Inspiratory Force (cm H2O): -24 (09/19/18 1251)  F/VT Ratio<105 (RSBI): (!) 44.92 (09/19/18 1251)  Lines/Drains/Airways     Central Venous Catheter Line                 Trialysis (Dialysis) Catheter 09/12/18 1326 right internal jugular 9 days          Drain                  Urethral Catheter 09/13/18 1438 Temperature probe 8 days          Arterial Line                 Arterial Line 09/15/18 1500 Left Radial 6 days          Pressure Ulcer                 Negative Pressure Wound Therapy  44 days          Peripheral Intravenous Line                 Midline Catheter Insertion/Assessment  - Single Lumen 09/17/18 1235 Right cephalic vein (lateral side of arm) 20g x 10cm 4 days              Significant Labs:    CBC/Anemia Profile:  Recent Labs   Lab  09/20/18   0029  09/21/18   0419   WBC  17.36*  18.13*   HGB  7.6*  7.1*   HCT  24.2*  24.3*   PLT  221  209   MCV  80*  84   RDW  18.7*  19.8*        Chemistries:  Recent Labs   Lab  09/20/18   0029   09/20/18   2203  09/21/18   0419  09/21/18   0736  09/21/18   1302   NA  141   < >  142  142  143  143  143  143  143  143   K  4.9   < >  4.8  4.8  4.8  4.8  4.9  4.7  4.7  4.7   CL  110   < >  112*  112*  114*  114*  114*  113*  113*  113*   CO2  22*   < >  19*  19*  19*  19*  17*  19*  19*  19*   BUN  12   < >  19  19  22  22  22  21  21  21   CREATININE  1.0   < >  1.4  1.4  1.5*  1.5*  1.6*  1.4  1.4  1.4   CALCIUM  8.9   < >  9.5  9.5  9.3  9.3  9.3  9.1  9.1  9.1   ALBUMIN  1.8*   < >  1.9*  1.9*  1.8*  1.8*  1.8*   --   2.0*  2.0*  2.0*   PROT  5.7*   --    --   5.7*   --    --    BILITOT  0.7   --    --   0.6   --    --    ALKPHOS  100   --    --   109   --    --    ALT  8*   --    --   13   --    --    AST  18   --    --   21   --    --    MG   --    < >  1.8  1.8  1.9  1.9   --   2.1  2.1  2.1   PHOS  3.5   < >  4.6*  4.6*  4.9*  4.9*  4.9*   --   5.0*  5.0*  5.0*    < > = values in this interval not displayed.       All pertinent labs within the past 24 hours have been reviewed.    Significant Imaging:  I have reviewed and interpreted all pertinent imaging results/findings within the past 24 hours.

## 2018-09-21 NOTE — ASSESSMENT & PLAN NOTE
82 year old female with history of L2-3 fusion 6/5 complicated by post operative infection with Enterobacter cloacae. She has been on outpatient IV Ciprofloxacin and has undergone an I&D on 7/2 with partial hardware removal on 8/7 without resolution of her infection. 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     Patient's hospital course has been complicated by acute respiratory failure requiring intubation and transfer to the ICU with CXR showing pulmonary edema. Surgery has been postponed due to decline in medical status. Blood cultures 9/9 returned positive for Prevotella. She underwent IR drainage of paraspinal abscess on 9/13 with 5 mL purulent fluid removed. Gram stain is showing many GNRs, moderate gram negative diplococci and few GPRs. Abscess cultures are also showing Prevotella.      Afebrile. Leukocytosis/CRP/procalc are trending down. Repeat blood cultures are NGTD. She was scheduled to go to the OR but is postponed due to decline in respiratory status. She is currently on nasal cannula     Plan  - Continue Zosyn 4.5 g IV q 8 hours   - surgery date to be determined. Will follow up with neurosurgery.   - When taken to the OR, please obtain surgical cultures and send for gram stain, aerobic, anaerobic, AFB and fungal cultures   - Anticipate 8 weeks of antibiotics      - ID will follow.

## 2018-09-21 NOTE — ASSESSMENT & PLAN NOTE
--2/2 pulmonary edema in setting of acute on chronic diastolic HF, volume overload and JEY  --Repeat CXR 9/21 grossly unchanged since from previous 9/20  --CRRT ran continuously overnight  --Bipap qhs, tolerating 10L NC throughout the day

## 2018-09-21 NOTE — PROGRESS NOTES
[facility]  Nephrology  Progress Note    Patient Name: Medina Frazier  MRN: [id number]  Admission Date: 9/8/2018  Hospital Length of Stay: 13 days  Attending Provider: An Ward MD   Primary Care Physician: Hao Garcia MD  Principal Problem:Acute hypercapnic respiratory failure    Subjective:     HPI: 83yo WF who was to have a lumbar fusion later in the month and was admitted due to abnormal pre-op labs.  Nephrology consulted for JEY (sCr 2.2 on admit, 3.1 at time of consult, normal baseline), also with a sodium of 119 (133 on August 10th). Patient appears somnolent, but wakes up and appropriately answers questions, per family she is at her baseline.  Patient is not complaining of increasing shortness of breath, she in on 3L O2 when seen, this is what she is on at home, she has history of LENA and is supposed to be on CPAP at night, but not compliant.  CXR reviewed and looks like pulmonary edema/congestion on admit.    Interval History: more alert, asking coherent questions, off BIPap all night, still on hiflo (8L) of oxygen, CXR pending for this morning    Review of patient's allergies indicates:   Allergen Reactions    Codeine Nausea Only    Penicillins Swelling     Able to take amoxil and cephalosporins      Current Facility-Administered Medications   Medication Frequency    albuterol-ipratropium 2.5 mg-0.5 mg/3 mL nebulizer solution 3 mL Q4H    dextrose 50% injection 12.5 g PRN    dextrose 50% injection 25 g PRN    fentaNYL citrate (PF) Syrg 25 mcg Q2H PRN    glucagon (human recombinant) injection 1 mg PRN    glucose chewable tablet 16 g PRN    glucose chewable tablet 24 g PRN    insulin aspart U-100 pen 0-5 Units Q4H PRN    insulin detemir U-100 pen 5 Units QHS    [START ON 9/22/2018] levothyroxine tablet 150 mcg Before breakfast    miconazole NITRATE 2 % top powder BID    ondansetron injection 4 mg Q8H PRN    pantoprazole suspension 40 mg Daily     piperacillin-tazobactam 4.5 g in sodium chloride 0.9% 100 mL IVPB (ready to mix system) Q12H    sodium phosphate 20.01 mmol in dextrose 5 % 250 mL IVPB PRN    sodium phosphate 30 mmol in dextrose 5 % 250 mL IVPB PRN    sodium phosphate 39.99 mmol in dextrose 5 % 250 mL IVPB PRN       Objective:     Vital Signs (Most Recent):  Temp: 96.8 °F (36 °C) (09/21/18 1000)  Pulse: 99 (09/21/18 1000)  Resp: (!) 43 (09/21/18 1000)  BP: 139/61 (09/21/18 1000)  SpO2: 96 % (09/21/18 1000)  O2 Device (Oxygen Therapy): High Flow nasal Cannula (09/21/18 0738) Vital Signs (24h Range):  Temp:  [95.7 °F (35.4 °C)-98.2 °F (36.8 °C)] 96.8 °F (36 °C)  Pulse:  [] 99  Resp:  [13-43] 43  SpO2:  [93 %-100 %] 96 %  BP: (111-152)/(59-98) 139/61  Arterial Line BP: ()/(45-79) 148/76     Weight: 123 kg (271 lb 2.7 oz) (09/20/18 0505)  Body mass index is 46.55 kg/m².  Body surface area is 2.36 meters squared.    I/O last 3 completed shifts:  In: 9413.3 [I.V.:9013.3; IV Piggyback:400]  Out: 60310 [Urine:10; Other:73485]    Physical Exam   HENT:   Head: Atraumatic.   Neck: No JVD present.   Cardiovascular: Normal rate and regular rhythm.   Pulmonary/Chest: Effort normal. She has rales.   Abdominal: Soft. She exhibits no distension.   Musculoskeletal: She exhibits edema. She exhibits no tenderness.   Neurological: She is alert.   Skin: Skin is warm.           Assessment/Plan:     JEY (acute kidney injury)    Now with severe oliguria, likely multifactorial, the urine sodium of 10, could be consistent with CRS, but may still have an ATN component.    Lytes stable, bicarb down to 17, resp status gradually improving, SCUF x ~past 24hrs      Plan/Recommendations:  -change SCUF to SLED and continue running mostly for UF, but as there is no urine output would anticipate clearance to be need clearance shortly  -tomorrow make repeat assessment for need of SLED vs SCUF, possibly can go to nightly sessions  -if there is any  in urine output,  then could also consider lasix challenge, but w/ anuria does not make sense to challenge with diuretic        CHF (congestive heart failure), NYHA class IV    -continues with no significant urine output, gradually improving, continue volume removal with UF on RRT            Thank you for your consult.    Allen Barnes MD  Nephrology  [facility]

## 2018-09-21 NOTE — PLAN OF CARE
Problem: SLP Goal  Goal: SLP Goal  Outcome: Ongoing (interventions implemented as appropriate)  Clinical evaluation of swallow complete. Recommend initiate dental soft solids and nectar thickened liquids. NO straws. PO meds crushed in pureed. Pt requires assistance to thicken liquids. Strict aspiration precautions.     ALIYA Reinoso, CCC-SLP  [phone]  9/21/2018

## 2018-09-21 NOTE — SUBJECTIVE & OBJECTIVE
Interval History:   SOB much improved  On nasal cannula  Surgery to be determined  No fever chills or night sweats  Pt seen with family at bedside   Repeat CXR with increased opacification bilaterally    Review of Systems   Constitutional: Negative for chills, diaphoresis, fatigue and fever.   Respiratory: Positive for cough and shortness of breath.    Cardiovascular: Positive for leg swelling. Negative for chest pain.   Gastrointestinal: Negative for diarrhea, nausea and vomiting.   Genitourinary: Negative for dysuria.   Musculoskeletal: Positive for back pain.   Neurological: Negative for headaches.     Objective:     Vital Signs (Most Recent):  Temp: 97 °F (36.1 °C) (09/21/18 1200)  Pulse: 91 (09/21/18 1300)  Resp: 20 (09/21/18 1300)  BP: 139/85 (09/21/18 1300)  SpO2: 95 % (09/21/18 1300) Vital Signs (24h Range):  Temp:  [96.6 °F (35.9 °C)-98.2 °F (36.8 °C)] 97 °F (36.1 °C)  Pulse:  [] 91  Resp:  [13-43] 20  SpO2:  [93 %-100 %] 95 %  BP: (111-152)/(59-86) 139/85  Arterial Line BP: ()/(45-79) 139/70     Weight: 123 kg (271 lb 2.7 oz)  Body mass index is 46.55 kg/m².    Estimated Creatinine Clearance: 41.5 mL/min (based on SCr of 1.4 mg/dL).    Physical Exam   Constitutional: She appears well-developed and well-nourished. She appears ill.   Neck: JVD present.   RIJ CVC in place   Cardiovascular: Normal rate. An irregularly irregular rhythm present. Exam reveals no friction rub.   No murmur heard.  Pulmonary/Chest: Effort normal. She has no wheezes. She has rales.   On nasal cannula    Abdominal: Soft. Bowel sounds are normal. She exhibits no distension.   Obese abdomen   Musculoskeletal: She exhibits edema (BLE, right hand).   +2 pitting edema   Neurological: She is alert.   Skin: Skin is warm and dry. She is not diaphoretic. No erythema.   Lumbar wound not examined today.   Nursing note and vitals reviewed.      Significant Labs:   Blood Culture:   Recent Labs   Lab  09/09/18   1731  09/11/18   1007   09/11/18   1011  09/15/18   1805  09/15/18   1819   LABBLOO  Gram stain terrell bottle: Gram negative rods   Results called to and read back by:Sofia Noriega RN 09/11/2018  06:01  PREVOTELLA (B.) BIVIA  No growth after 5 days.  No growth after 5 days.  No growth after 5 days.  No growth after 5 days.     CBC:   Recent Labs   Lab  09/20/18   0029  09/21/18   0419   WBC  17.36*  18.13*   HGB  7.6*  7.1*   HCT  24.2*  24.3*   PLT  221  209     CMP:   Recent Labs   Lab  09/20/18   0029   09/20/18   2203  09/21/18   0419  09/21/18   0736  09/21/18   1302   NA  141   < >  142  142  143  143  143  143  143  143   K  4.9   < >  4.8  4.8  4.8  4.8  4.9  4.7  4.7  4.7   CL  110   < >  112*  112*  114*  114*  114*  113*  113*  113*   CO2  22*   < >  19*  19*  19*  19*  17*  19*  19*  19*   GLU  178*   < >  169*  169*  162*  162*  160*  163*  163*  163*   BUN  12   < >  19  19  22  22  22  21  21  21   CREATININE  1.0   < >  1.4  1.4  1.5*  1.5*  1.6*  1.4  1.4  1.4   CALCIUM  8.9   < >  9.5  9.5  9.3  9.3  9.3  9.1  9.1  9.1   PROT  5.7*   --    --   5.7*   --    --    ALBUMIN  1.8*   < >  1.9*  1.9*  1.8*  1.8*  1.8*   --   2.0*  2.0*  2.0*   BILITOT  0.7   --    --   0.6   --    --    ALKPHOS  100   --    --   109   --    --    AST  18   --    --   21   --    --    ALT  8*   --    --   13   --    --    ANIONGAP  9   < >  11  11  10  10  12  11  11  11   EGFRNONAA  53.3*   < >  35.5*  35.5*  32.7*  32.7*  30.2*  35.5*  35.5*  35.5*    < > = values in this interval not displayed.     Wound Culture:   Recent Labs   Lab  07/02/18   1432  09/13/18   0834   LABAERO  ENTEROBACTER CLOACAE COMPLEX  Rare  For susceptibility see order # [id number]    ENTEROBACTER CLOACAE COMPLEX  Moderate    No growth     All pertinent labs within the past 24 hours have been reviewed.    Significant Imaging: I have reviewed all pertinent imaging results/findings within the past 24 hours.

## 2018-09-21 NOTE — SUBJECTIVE & OBJECTIVE
Interval History: more alert, asking coherent questions, off BIPap all night, still on hiflo (8L) of oxygen, CXR pending for this morning    Review of patient's allergies indicates:   Allergen Reactions    Codeine Nausea Only    Penicillins Swelling     Able to take amoxil and cephalosporins      Current Facility-Administered Medications   Medication Frequency    albuterol-ipratropium 2.5 mg-0.5 mg/3 mL nebulizer solution 3 mL Q4H    dextrose 50% injection 12.5 g PRN    dextrose 50% injection 25 g PRN    fentaNYL citrate (PF) Syrg 25 mcg Q2H PRN    glucagon (human recombinant) injection 1 mg PRN    glucose chewable tablet 16 g PRN    glucose chewable tablet 24 g PRN    insulin aspart U-100 pen 0-5 Units Q4H PRN    insulin detemir U-100 pen 5 Units QHS    [START ON 9/22/2018] levothyroxine tablet 150 mcg Before breakfast    miconazole NITRATE 2 % top powder BID    ondansetron injection 4 mg Q8H PRN    pantoprazole suspension 40 mg Daily    piperacillin-tazobactam 4.5 g in sodium chloride 0.9% 100 mL IVPB (ready to mix system) Q12H    sodium phosphate 20.01 mmol in dextrose 5 % 250 mL IVPB PRN    sodium phosphate 30 mmol in dextrose 5 % 250 mL IVPB PRN    sodium phosphate 39.99 mmol in dextrose 5 % 250 mL IVPB PRN       Objective:     Vital Signs (Most Recent):  Temp: 96.8 °F (36 °C) (09/21/18 1000)  Pulse: 99 (09/21/18 1000)  Resp: (!) 43 (09/21/18 1000)  BP: 139/61 (09/21/18 1000)  SpO2: 96 % (09/21/18 1000)  O2 Device (Oxygen Therapy): High Flow nasal Cannula (09/21/18 0738) Vital Signs (24h Range):  Temp:  [95.7 °F (35.4 °C)-98.2 °F (36.8 °C)] 96.8 °F (36 °C)  Pulse:  [] 99  Resp:  [13-43] 43  SpO2:  [93 %-100 %] 96 %  BP: (111-152)/(59-98) 139/61  Arterial Line BP: ()/(45-79) 148/76     Weight: 123 kg (271 lb 2.7 oz) (09/20/18 0505)  Body mass index is 46.55 kg/m².  Body surface area is 2.36 meters squared.    I/O last 3 completed shifts:  In: 9413.3 [I.V.:9013.3; IV  Piggyback:400]  Out: 11972 [Urine:10; Other:37511]    Physical Exam   HENT:   Head: Atraumatic.   Neck: No JVD present.   Cardiovascular: Normal rate and regular rhythm.   Pulmonary/Chest: Effort normal. She has rales.   Abdominal: Soft. She exhibits no distension.   Musculoskeletal: She exhibits edema. She exhibits no tenderness.   Neurological: She is alert.   Skin: Skin is warm.

## 2018-09-21 NOTE — ASSESSMENT & PLAN NOTE
--multifactorial: sepsis/uremia, medications  --non focal on exam  --weaned off sedation and levophed (9/17)  --awake and alert; following commands appropriately

## 2018-09-22 NOTE — PLAN OF CARE
Problem: Patient Care Overview  Goal: Plan of Care Review  Outcome: Ongoing (interventions implemented as appropriate)  POC reviewed with Medina,  and daughter at 0500. Pt and family verbalized understanding. Questions and concerns addressed. No acute events overnight. Pt progressing toward goals. On continuous CRRT at UF rate of 400 mL/hr. Heparin drip titrated per nomogram. Placed on BiPAP overnight per order of the team. [place] cath discontinued; pt is anuric. Will continue to monitor. See flowsheets for full assessment and VS info

## 2018-09-22 NOTE — SUBJECTIVE & OBJECTIVE
Interval History: naeon    Medications:  Continuous Infusions:   sodium chloride 0.9% 200 mL/hr at 09/22/18 0600    heparin (porcine) in D5W 18.049 Units/kg/hr (09/22/18 0605)     Scheduled Meds:   albuterol-ipratropium  3 mL Nebulization Q4H    insulin detemir U-100  5 Units Subcutaneous QHS    levothyroxine  150 mcg Oral Before breakfast    miconazole NITRATE 2 %   Topical (Top) BID    pantoprazole  40 mg Oral Daily    piperacillin-tazobactam (ZOSYN) IVPB  4.5 g Intravenous Q8H     PRN Meds:sodium chloride, dextrose 50%, dextrose 50%, fentaNYL citrate (PF), glucagon (human recombinant), glucose, glucose, heparin (PORCINE), heparin (PORCINE), insulin aspart U-100, ondansetron, sodium phosphate IVPB, sodium phosphate IVPB, sodium phosphate IVPB     Review of Systems  Objective:     Weight: 123 kg (271 lb 2.7 oz)  Body mass index is 46.55 kg/m².  Vital Signs (Most Recent):  Temp: 98.1 °F (36.7 °C) (09/22/18 0605)  Pulse: 95 (09/22/18 0605)  Resp: 16 (09/22/18 0605)  BP: (!) 114/58 (09/22/18 0605)  SpO2: 99 % (09/22/18 0605) Vital Signs (24h Range):  Temp:  [96.8 °F (36 °C)-99 °F (37.2 °C)] 98.1 °F (36.7 °C)  Pulse:  [] 95  Resp:  [13-43] 16  SpO2:  [90 %-100 %] 99 %  BP: ()/() 114/58  Arterial Line BP: ()/(44-83) 111/56                 Oxygen Concentration (%):  [60] 60  Resp Rate Total:  [2 br/min-24 br/min] 16 br/min         Trialysis (Dialysis) Catheter 09/12/18 1326 right internal jugular (Active)   IV Device Securement sutures 9/22/2018  3:05 AM   Additional Site Signs no erythema;no warmth;no edema;no pain;no palpable cord;no streak formation;no drainage 9/22/2018  3:05 AM   Patency/Care flushed w/o difficulty 9/22/2018  3:05 AM   Site Assessment Clean;Dry;Intact;No redness;No swelling 9/22/2018  3:05 AM   Status Deaccessed 9/22/2018  3:05 AM   Flows Good 9/22/2018  3:05 AM   Dressing Intervention Dressing reinforced 9/22/2018  3:05 AM   Dressing Status Biopatch in  place;Clean;Dry;New drainage 9/22/2018  3:05 AM   Dressing Change Due 09/22/18 9/22/2018  3:05 AM   Verification by X-ray Yes 9/21/2018  7:05 PM   Site Condition No complications 9/22/2018  3:05 AM   Dressing Occlusive 9/20/2018  3:05 AM   Daily Line Review Performed 9/22/2018  3:05 AM       Neurosurgery Physical Exam   [id number]  PERRL  FC in all 4s, wiggles toes bilaterally  SILT    Significant Labs:  Recent Labs   Lab  09/21/18   2141 09/21/18   2330  09/22/18   0210  09/22/18   0440   GLU  216*  216*  216*  200*  184*  165*  165*   NA  142  142  142  143  142  140  140   K  4.7  4.7  4.7  4.7  4.5  4.4  4.4   CL  111*  111*  111*  111*  109  108  108   CO2  19*  19*  19*  20*  22*  23  23   BUN  18  18  18  14  11  9  9   CREATININE  1.3  1.3  1.3  1.0  0.8  0.8  0.8   CALCIUM  8.7  8.7  8.7  8.5*  8.5*  8.5*  8.5*   MG  1.9  1.9  1.9   --   1.7  1.7  1.7     Recent Labs   Lab  09/21/18 0419 09/21/18   1527  09/22/18   0210   WBC  18.13*  16.02*  14.24*  14.24*   HGB  7.1*  7.3*  6.8*  6.8*   HCT  24.3*  23.7*  22.1*  22.1*   PLT  209  213  193  193     Recent Labs   Lab  09/21/18   0419 09/21/18   1527  09/21/18   2141  09/22/18   0210  09/22/18   0440   INR  1.2   --   1.2   --   1.2   --    APTT   --    < >  <21.0  24.2  23.2  23.5    < > = values in this interval not displayed.     Microbiology Results (last 7 days)     Procedure Component Value Units Date/Time    Blood culture [[id number]] Collected:  09/15/18 1805    Order Status:  Completed Specimen:  Blood from Peripheral, Upper Arm, Right Updated:  09/20/18 2012     Blood Culture, Routine No growth after 5 days.    Narrative:       Blood cultures from 2 different sites. 4 bottles total.  Please draw before starting antibiotics.    Blood culture [[id number]] Collected:  09/15/18 1819    Order Status:  Completed Specimen:  Blood from Peripheral, Upper Arm, Left Updated:  09/20/18 2012     Blood Culture, Routine No growth  after 5 days.    Narrative:       Blood cultures x 2 different sites. 4 bottles total. Please  draw cultures before administering antibiotics.    Fungus culture [[id number]] Collected:  09/13/18 0834    Order Status:  Completed Specimen:  Abscess from Back Updated:  09/20/18 0729     Fungus (Mycology) Culture Culture in progress    Narrative:       Paraspinal abscess    Culture, Anaerobe [[id number]] Collected:  09/13/18 0834    Order Status:  Completed Specimen:  Abscess from Back Updated:  09/18/18 1340     Anaerobic Culture --     PREVOTELLA (B.) BIVIA  Many      Narrative:       lumbar paraspinal fluid collection drainage    Aerobic culture [[id number]] Collected:  09/13/18 0834    Order Status:  Completed Specimen:  Abscess from Back Updated:  09/17/18 0848     Aerobic Bacterial Culture No growth    Narrative:       lumbar paraspinal fluid collection drainage    Blood culture [[id number]] Collected:  09/11/18 1011    Order Status:  Completed Specimen:  Blood Updated:  09/16/18 1212     Blood Culture, Routine No growth after 5 days.    Narrative:       Please draw from 2 separate sites 30 minutes apart. Thank you    Blood culture [[id number]] Collected:  09/11/18 1003    Order Status:  Completed Specimen:  Blood Updated:  09/16/18 1212     Blood Culture, Routine No growth after 5 days.            Significant Diagnostics:  CXR: reviewed, worsened from prior.

## 2018-09-22 NOTE — PROGRESS NOTES
[facility]  Critical Care Medicine  Progress Note    Patient Name: Medina Frazier  MRN: [id number]  Admission Date: 9/8/2018  Hospital Length of Stay: 14 days  Code Status: Partial Code  Attending Provider: Maribel Coy MD  Primary Care Provider: Hao Garcia MD   Principal Problem: Acute hypercapnic respiratory failure    Subjective:     HPI:  Mrs. Frazier is a 82 yr old female with history significant for diastolic HF, CAD s/p VIVIANA Lcx (June 2017), HTN, DM2, and lumbar stenosis s/p lumbar fusion in June 2018 with subsequent hardware infection and I&D on 7/2 and repeat lumbar I&D, hardware removal, decompression and wound vac placement to L2-L3 due to continued epidural abscess. She was discharged recently to a SNF for rehab and continued IV antibiotic therapy. She originally presented to [facility] on 9/8 from [facility] after labwork revealed JEY, hyponatremia. Additionally, CT lumbar spine obtained 9/5 was concerning for persistent osteomyelitis, epidural abscess in L2-L3 region.     She was transferred to [facility] on 9/8 for higher level of care and neurosurgery evaluation. Admission has been complicated by continued JEY with creatinine up trending from baseline 0.8 to 1.0 to 3.0 along with hyponatremia felt to be related to acute on chronic diastolic heart failure, volume overload. Nephrology was consulted and has been assisting with management. She was started on lasix 100 mg BID with minimal urine output response and creatinine slowly uptrending. Additionally, blood cultures obtained 9/9 are growing GNR's, awaiting speciation. Suspect this is enterobacter given continued osteomyelitis of L2-L3, complex fluid collection within laminectomy bed measuring 3x8x5 cm concerning for abscess, and additional prevertebral collection anterior to L1 and L2 vertebral bodies measuring 4x6x1 abutting the abdominal aorta concerning for abscess noted on MRI from 9/10. Neurosurgery were planning on  repeat I&D for source control but held off given the above medical issues with JEY, hyponatremia. She has been on vancomycin, cefepime since 9/9 and blood cultures from 9/11 are NGTD.     She was transferred to the MICU on the AM of 9/12 for acute hypercapnic respiratory failure with concern for aspiration event, worsening encephalopathy, and worsening JEY.    .          Hospital/ICU Course:  Upon transfer to MICU, Mrs. Frazier's respiratory and mental status worsened despite Bipap and lasix trial. She was intubated and she developed shock requiring low dose levophed, felt to be septic in setting of bacteremia. A trialysis line was placed for venous access, vasopressors, and possible need for CRRT. Arterial line placed for frequent ABGs. Cefepime and vancomycin continued as ID and neurosurgery following. Added on flagyl as micro lab thinks BCx 9/9 may be growing anaerobes. IR successfully drained epidural abscess (9/13). Continuing lasix trial as patient still appears volume overloaded. Norepinephrine requirements increasing on 9/14.  Discussed with neurosurgery, would need to be off vasopressors and hemodynamically stable prior to surgical intervention. Family meeting on 9/14, code status changed to DNR.  Weaned off vasopressors on 9/16.  Remains on ventilator due to significant pulmonary edema.  Aggressive diuresis started without significant response. Patient had an episode of A fib RVR and put on amiodarone drip on 9/17. CRRT started 9/17 however patient became hypotensive and bradycardic requiring levophed again. Discontinued amiodarone at that time. Patient successfully weaned off levophed. CRRT ran continuously 9/18. Mucus plug event (9/19) after patient's tube was suctioned. Patient became tachycardic and hypertensive with absent breath sounds bilaterally. Sats decreased into the 70s and tidal volumes less than 100. Patient was bagged and mucus plug was dislodged.  Passed SBT and extubated to bipap (9/19).  CRRT discontinued and pending diuresis trial.9/20 patient devolved shortness of breath overnight, repeated chest x-ray showed worsening pulmonary edema, patient has decrease urine output despite lasix challenge, contacted nephrology to stat CRRT. Patient receiving continuous CCRT. Bipap at night.       Interval History/Significant Events: No significant events overnight.      Review of Systems   Unable to perform ROS: Mental status change     Objective:     Vital Signs (Most Recent):  Temp: 98.1 °F (36.7 °C) (09/22/18 0801)  Pulse: (!) 116 (09/22/18 0801)  Resp: (!) 21 (09/22/18 0801)  BP: 134/82 (09/22/18 0801)  SpO2: 99 % (09/22/18 0801) Vital Signs (24h Range):  Temp:  [96.8 °F (36 °C)-99 °F (37.2 °C)] 98.1 °F (36.7 °C)  Pulse:  [] 116  Resp:  [13-43] 21  SpO2:  [85 %-100 %] 99 %  BP: ()/() 134/82  Arterial Line BP: ()/(44-83) 134/63   Weight: 123 kg (271 lb 2.7 oz)  Body mass index is 46.55 kg/m².      Intake/Output Summary (Last 24 hours) at 9/22/2018 0900  Last data filed at 9/22/2018 0801  Gross per 24 hour   Intake 4458.14 ml   Output 7442 ml   Net -2983.86 ml       Physical Exam   Constitutional: She appears well-nourished.   HENT:   Head: Normocephalic and atraumatic.   Mouth/Throat: Oropharynx is clear and moist.   Eyes: Conjunctivae are normal. Pupils are equal, round, and reactive to light. Right eye exhibits no discharge. Left eye exhibits no discharge. No scleral icterus.   Neck: Trachea normal, normal range of motion and full passive range of motion without pain. Neck supple. No JVD present. No tracheal deviation present. No thyromegaly present.   Cardiovascular: Normal rate, regular rhythm, S1 normal, S2 normal, normal heart sounds and intact distal pulses. Exam reveals no gallop and no friction rub.   No murmur heard.  Pulmonary/Chest: Effort normal and breath sounds normal. No respiratory distress. She has no wheezes. She has no rales. She exhibits no tenderness.    Abdominal: Soft. Bowel sounds are normal. She exhibits no distension and no mass. There is no tenderness. There is no rebound and no guarding.   Musculoskeletal: Normal range of motion. She exhibits no tenderness or deformity.   Lymphadenopathy:     She has no cervical adenopathy.   Neurological: No cranial nerve deficit. Coordination normal.   Skin: Skin is warm and dry. No abrasion and no bruising noted.   Psychiatric: She has a normal mood and affect.   Vitals reviewed.      Vents:  Vent Mode: Spont (09/19/18 1406)  Set Rate: 0 bmp (09/19/18 1406)  Vt Set: 360 mL (09/19/18 1406)  Pressure Support: 5 cmH20 (09/19/18 1406)  PEEP/CPAP: 5 cmH20 (09/19/18 1406)  Oxygen Concentration (%): 60 (09/22/18 0605)  Peak Airway Pressure: 10 cmH2O (09/19/18 1406)  Plateau Pressure: 21 cmH20 (09/19/18 1406)  Total Ve: 4.59 mL (09/19/18 1406)  Negative Inspiratory Force (cm H2O): -24 (09/19/18 1251)  F/VT Ratio<105 (RSBI): (!) 44.92 (09/19/18 1251)  Lines/Drains/Airways     Central Venous Catheter Line                 Trialysis (Dialysis) Catheter 09/12/18 1326 right internal jugular 9 days          Arterial Line                 Arterial Line 09/15/18 1500 Left Radial 6 days          Pressure Ulcer                 Negative Pressure Wound Therapy  45 days          Peripheral Intravenous Line                 Midline Catheter Insertion/Assessment  - Single Lumen 09/17/18 1235 Right cephalic vein (lateral side of arm) 20g x 10cm 4 days              Significant Labs:    CBC/Anemia Profile:  Recent Labs   Lab  09/21/18   0419  09/21/18   1527  09/22/18   0210   WBC  18.13*  16.02*  14.24*  14.24*   HGB  7.1*  7.3*  6.8*  6.8*   HCT  24.3*  23.7*  22.1*  22.1*   PLT  209  213  193  193   MCV  84  84  82  82   RDW  19.8*  20.0*  19.9*  19.9*        Chemistries:  Recent Labs   Lab  09/21/18   0419   09/21/18   2141   09/22/18   0210  09/22/18   0440  09/22/18   0745   NA  143  143   < >  142  142  142   < >  142  140  140  142    K  4.8  4.8   < >  4.7  4.7  4.7   < >  4.5  4.4  4.4  4.4   CL  114*  114*   < >  111*  111*  111*   < >  109  108  108  109   CO2  19*  19*   < >  19*  19*  19*   < >  22*  23  23  24   BUN  22  22   < >  18  18  18   < >  11  9  9  7*   CREATININE  1.5*  1.5*   < >  1.3  1.3  1.3   < >  0.8  0.8  0.8  0.7   CALCIUM  9.3  9.3   < >  8.7  8.7  8.7   < >  8.5*  8.5*  8.5*  8.5*   ALBUMIN  1.8*  1.8*  1.8*   < >  1.9*  1.9*  1.9*   --   1.9*  1.9*  1.9*  1.9*   --    PROT  5.7*   --    --    --   5.7*   --    --    BILITOT  0.6   --    --    --   0.6   --    --    ALKPHOS  109   --    --    --   106   --    --    ALT  13   --    --    --   25   --    --    AST  21   --    --    --   51*   --    --    MG  1.9  1.9   < >  1.9  1.9  1.9   --   1.7  1.7  1.7   --    PHOS  4.9*  4.9*  4.9*   < >  4.5  4.5  4.5   --   3.1  2.7  2.7  2.7   --     < > = values in this interval not displayed.     Significant Imaging:  I have reviewed and interpreted all pertinent imaging results/findings within the past 24 hours.    Assessment/Plan:     Neuro   Encephalopathy, metabolic    --multifactorial: sepsis/uremia, medications  --non focal on exam  --weaned off sedation and levophed (9/17)  --awake and alert; following commands appropriately          Lumbar stenosis    --s/p lumbar fusion/hardware placement June 2018 with subsequent osteo and abscess.   --LSO brace ordered.  Per conversation with Neurosurgery, will need to wear brace while OOB          Derm   Alteration in skin integrity related to surgical incision    --s/p wound vac placement to lower back. Continue per neurosurgery        Pulmonary   * Acute hypercapnic respiratory failure    --2/2 pulmonary edema in setting of acute on chronic diastolic HF, volume overload and JEY  --Repeat CXR 9/21 grossly unchanged since from previous 9/20  --CRRT ran continuously overnight  --Bipap qhs, tolerating 10L NC throughout the day                 Pulmonary hypertension    --2/2 diastolic HF. No known lung disease  --Echo (9/13) PA systolic pressure estimated at 88 mm Hg  --reportedly on revatio at home.             Cardiac/Vascular   PAF (paroxysmal atrial fibrillation)    --Heparin gtt.  --Rate controlled.           CHF (congestive heart failure), NYHA class IV    --Prior ECHO shows diastolic dysfunction (7/2018)  --Echo (9/13) EF 55-60%; concentric remodeling with RV enlargement; no WMA  --Continuing SLED.        Coronary artery disease involving native coronary artery of native heart without angina pectoris    --hold BB in setting of recent shock  --hold statin for now  --s/p VIVIANA to Lcx in June 2017 and on plavix prior to admission. Holding in setting of possible upcoming procedures.             Hypertension    --holding antihypertensives in the setting of recent shock and borderline hypotension.         Renal/   JEY (acute kidney injury)    --most likely cardiorenal syndrome vs sepsis (ATN)  --retroperitoneal ultrasound on 9/9 consistent with medical renal disease.   --Nephrology following   --Patient to continue on SLED, will re-eval if can transition to SLED only nightly        ID   Osteomyelitis of Lumbar Spine    --see above        Septic shock    --secondary to spinal abscesses  --blood cultures with prevotella, repeat blood cultures ngtd  --gram stain from spinal abscess with prevotella  --currently off norepinephrine.   --per ID recs: continue zosyn, will anticipate 8 weeks of abx   --Not fully able to obtain source control as IR intervention (9/12) only able to partially drain one abscess.   --Washout with Neurosurgery pending hemodynamic stability        Gram-negative bacteremia    --noted on cultures from 9/9; cleared on 9/11; Positive for Prevotella  --See above          Spinal epidural abscess    --noted on MRI along with osteomyelitis.   --IR drained paraspinal abscess 9/13 .  --see septic shock for details.         Oncology   Anemia     --likely anemia of chronic disease. Trend Hgb for now  --no signs of acute blood loss  --transfuse for Hgb <7        Endocrine   Hypothyroidism    --continue synthroid        Diabetes mellitus, type II    --likely steroid induced and secondary to infection  --steroids weaned off 9/16  --continue insulin infusion (started 9/16)  --A1c 7.6, on lantus 30 units every evening outpatient          GI   GERD (gastroesophageal reflux disease)    --continue PPI          I spent >35 minutes reviewing patient records, examining, and counseling the patient with greater than 50% of the time spent with direct patient care and coordination.        Aquiles Dodge PA-C  Critical Care Medicine  [facility]

## 2018-09-22 NOTE — PROGRESS NOTES
[facility]  Neurosurgery  Progress Note    Subjective:     History of Present Illness: Ms Medina Frazier is an 82yoF with PMHx DMII, HTN, CHF, CAD, CHF, CKD, HLD, GERD, hypothyroidism,  s/p L2-3 TLIF on 6/5/18 by Dr. Gutiérrez at [facility], complicated by infection & subsequent washout &  hardware removal, who is transferred from [facility] for neurosurgical evaluation of L2-3 osteomyelitis.  Following her initial surgery, she was found to have a lumbar incision wound infection that was washed out on 7/2/18.  Surgical cultures grew E. Cloacae & a PICC was placed.  She was discharged on IV Cipro & a wound vac.  She then had hardware removal on 8/7.  She has had progressive worsening of pain & infection since that time.   Most recent CT Lspine shows L2-3 osteomyelitis with paraspinal fluid collection L1-3.      Post-Op Info:  Procedure(s) (LRB):  DEBRIDEMENT, WOUND, Lumbar wound washout (N/A)   2 Days Post-Op     Interval History: naeon    Medications:  Continuous Infusions:   sodium chloride 0.9% 200 mL/hr at 09/22/18 0600    heparin (porcine) in D5W 18.049 Units/kg/hr (09/22/18 0605)     Scheduled Meds:   albuterol-ipratropium  3 mL Nebulization Q4H    insulin detemir U-100  5 Units Subcutaneous QHS    levothyroxine  150 mcg Oral Before breakfast    miconazole NITRATE 2 %   Topical (Top) BID    pantoprazole  40 mg Oral Daily    piperacillin-tazobactam (ZOSYN) IVPB  4.5 g Intravenous Q8H     PRN Meds:sodium chloride, dextrose 50%, dextrose 50%, fentaNYL citrate (PF), glucagon (human recombinant), glucose, glucose, heparin (PORCINE), heparin (PORCINE), insulin aspart U-100, ondansetron, sodium phosphate IVPB, sodium phosphate IVPB, sodium phosphate IVPB     Review of Systems  Objective:     Weight: 123 kg (271 lb 2.7 oz)  Body mass index is 46.55 kg/m².  Vital Signs (Most Recent):  Temp: 98.1 °F (36.7 °C) (09/22/18 0605)  Pulse: 95 (09/22/18 0605)  Resp: 16 (09/22/18 0605)  BP: (!) 114/58 (09/22/18  0605)  SpO2: 99 % (09/22/18 0605) Vital Signs (24h Range):  Temp:  [96.8 °F (36 °C)-99 °F (37.2 °C)] 98.1 °F (36.7 °C)  Pulse:  [] 95  Resp:  [13-43] 16  SpO2:  [90 %-100 %] 99 %  BP: ()/() 114/58  Arterial Line BP: ()/(44-83) 111/56                 Oxygen Concentration (%):  [60] 60  Resp Rate Total:  [2 br/min-24 br/min] 16 br/min         Trialysis (Dialysis) Catheter 09/12/18 1326 right internal jugular (Active)   IV Device Securement sutures 9/22/2018  3:05 AM   Additional Site Signs no erythema;no warmth;no edema;no pain;no palpable cord;no streak formation;no drainage 9/22/2018  3:05 AM   Patency/Care flushed w/o difficulty 9/22/2018  3:05 AM   Site Assessment Clean;Dry;Intact;No redness;No swelling 9/22/2018  3:05 AM   Status Deaccessed 9/22/2018  3:05 AM   Flows Good 9/22/2018  3:05 AM   Dressing Intervention Dressing reinforced 9/22/2018  3:05 AM   Dressing Status Biopatch in place;Clean;Dry;New drainage 9/22/2018  3:05 AM   Dressing Change Due 09/22/18 9/22/2018  3:05 AM   Verification by X-ray Yes 9/21/2018  7:05 PM   Site Condition No complications 9/22/2018  3:05 AM   Dressing Occlusive 9/20/2018  3:05 AM   Daily Line Review Performed 9/22/2018  3:05 AM       Neurosurgery Physical Exam   [id number]  PERRL  FC in all 4s, wiggles toes bilaterally  SILT    Significant Labs:  Recent Labs   Lab  09/21/18   2141  09/21/18   2330  09/22/18   0210  09/22/18   0440   GLU  216*  216*  216*  200*  184*  165*  165*   NA  142  142  142  143  142  140  140   K  4.7  4.7  4.7  4.7  4.5  4.4  4.4   CL  111*  111*  111*  111*  109  108  108   CO2  19*  19*  19*  20*  22*  23  23   BUN  18  18  18  14  11  9  9   CREATININE  1.3  1.3  1.3  1.0  0.8  0.8  0.8   CALCIUM  8.7  8.7  8.7  8.5*  8.5*  8.5*  8.5*   MG  1.9  1.9  1.9   --   1.7  1.7  1.7     Recent Labs   Lab  09/21/18   0419  09/21/18   1527  09/22/18   0210   WBC  18.13*  16.02*  14.24*  14.24*   HGB  7.1*  7.3*   6.8*  6.8*   HCT  24.3*  23.7*  22.1*  22.1*   PLT  209  213  193  193     Recent Labs   Lab  09/21/18   0419   09/21/18   1527  09/21/18   2141  09/22/18   0210  09/22/18   0440   INR  1.2   --   1.2   --   1.2   --    APTT   --    < >  <21.0  24.2  23.2  23.5    < > = values in this interval not displayed.     Microbiology Results (last 7 days)     Procedure Component Value Units Date/Time    Blood culture [[id number]] Collected:  09/15/18 1805    Order Status:  Completed Specimen:  Blood from Peripheral, Upper Arm, Right Updated:  09/20/18 2012     Blood Culture, Routine No growth after 5 days.    Narrative:       Blood cultures from 2 different sites. 4 bottles total.  Please draw before starting antibiotics.    Blood culture [[id number]] Collected:  09/15/18 1819    Order Status:  Completed Specimen:  Blood from Peripheral, Upper Arm, Left Updated:  09/20/18 2012     Blood Culture, Routine No growth after 5 days.    Narrative:       Blood cultures x 2 different sites. 4 bottles total. Please  draw cultures before administering antibiotics.    Fungus culture [[id number]] Collected:  09/13/18 0834    Order Status:  Completed Specimen:  Abscess from Back Updated:  09/20/18 0729     Fungus (Mycology) Culture Culture in progress    Narrative:       Paraspinal abscess    Culture, Anaerobe [[id number]] Collected:  09/13/18 0834    Order Status:  Completed Specimen:  Abscess from Back Updated:  09/18/18 1340     Anaerobic Culture --     PREVOTELLA (B.) BIVIA  Many      Narrative:       lumbar paraspinal fluid collection drainage    Aerobic culture [[id number]] Collected:  09/13/18 0834    Order Status:  Completed Specimen:  Abscess from Back Updated:  09/17/18 0848     Aerobic Bacterial Culture No growth    Narrative:       lumbar paraspinal fluid collection drainage    Blood culture [[id number]] Collected:  09/11/18 1011    Order Status:  Completed Specimen:  Blood Updated:  09/16/18 1212     Blood Culture, Routine No  growth after 5 days.    Narrative:       Please draw from 2 separate sites 30 minutes apart. Thank you    Blood culture [[id number]] Collected:  09/11/18 1003    Order Status:  Completed Specimen:  Blood Updated:  09/16/18 1212     Blood Culture, Routine No growth after 5 days.            Significant Diagnostics:  CXR: reviewed, worsened from prior.    Assessment/Plan:     Spinal epidural abscess    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       -Extubated but still with tenuous clinical picture.  -Will discuss timing of intervention with staff.  -Continue diuresis for volume overload and pulmonary edema per MICU and Renal teams.  -Pt will need 2 stage surgery: washout and then instrumentation following.  -MRI with L2-3 osteodiscitis, large fluid collection in surgical bed, as well a prevertebral fluid collection L1,2 s/p IR drainage on 9/13   -Please wear LSO brace at all times  -Please hold Xarelto & Plavix   -Abx per infectious disease recs   -Continue wound care and wound vac            Thierno Carlos,   Neurosurgery  [facility]

## 2018-09-22 NOTE — NURSING
1015H- Patient heart rate is 140s, Blood pressure 140-150, Critical are team notified and stated to do EKG and will see patient.    1020H- Critical care team at the bedside and stated to give 6mg Adenosine.    1025H- Heart rate is still on 140s.     1030H- Dr Bautista stated to give 5mg of Metoprolo IV.    1035H- Heart rate is more stable 80s to 90s. Blood pressure is stable. Will monitor patient.

## 2018-09-22 NOTE — ASSESSMENT & PLAN NOTE
--Prior ECHO shows diastolic dysfunction (7/2018)  --Echo (9/13) EF 55-60%; concentric remodeling with RV enlargement; no WMA  --Continuing SLED.

## 2018-09-22 NOTE — PROGRESS NOTES
I personally saw and examined the patient on SCUF, tolerating treatment, reviewed her flowsheet. I also reviewed the chart and current medication.    > 3 liter negative last 34 hours.  Will continue SCUF for volume removal today to optimize her for surgery as she still has pulmonary edema on CXR. Target UF is 400/hr (net 200cc).

## 2018-09-22 NOTE — SUBJECTIVE & OBJECTIVE
Interval History/Significant Events: No significant events overnight.      Review of Systems   Unable to perform ROS: Mental status change     Objective:     Vital Signs (Most Recent):  Temp: 98.1 °F (36.7 °C) (09/22/18 0801)  Pulse: (!) 116 (09/22/18 0801)  Resp: (!) 21 (09/22/18 0801)  BP: 134/82 (09/22/18 0801)  SpO2: 99 % (09/22/18 0801) Vital Signs (24h Range):  Temp:  [96.8 °F (36 °C)-99 °F (37.2 °C)] 98.1 °F (36.7 °C)  Pulse:  [] 116  Resp:  [13-43] 21  SpO2:  [85 %-100 %] 99 %  BP: ()/() 134/82  Arterial Line BP: ()/(44-83) 134/63   Weight: 123 kg (271 lb 2.7 oz)  Body mass index is 46.55 kg/m².      Intake/Output Summary (Last 24 hours) at 9/22/2018 0900  Last data filed at 9/22/2018 0801  Gross per 24 hour   Intake 4458.14 ml   Output 7442 ml   Net -2983.86 ml       Physical Exam   Constitutional: She appears well-nourished.   HENT:   Head: Normocephalic and atraumatic.   Mouth/Throat: Oropharynx is clear and moist.   Eyes: Conjunctivae are normal. Pupils are equal, round, and reactive to light. Right eye exhibits no discharge. Left eye exhibits no discharge. No scleral icterus.   Neck: Trachea normal, normal range of motion and full passive range of motion without pain. Neck supple. No JVD present. No tracheal deviation present. No thyromegaly present.   Cardiovascular: Normal rate, regular rhythm, S1 normal, S2 normal, normal heart sounds and intact distal pulses. Exam reveals no gallop and no friction rub.   No murmur heard.  Pulmonary/Chest: Effort normal and breath sounds normal. No respiratory distress. She has no wheezes. She has no rales. She exhibits no tenderness.   Abdominal: Soft. Bowel sounds are normal. She exhibits no distension and no mass. There is no tenderness. There is no rebound and no guarding.   Musculoskeletal: Normal range of motion. She exhibits no tenderness or deformity.   Lymphadenopathy:     She has no cervical adenopathy.   Neurological: No cranial  nerve deficit. Coordination normal.   Skin: Skin is warm and dry. No abrasion and no bruising noted.   Psychiatric: She has a normal mood and affect.   Vitals reviewed.      Vents:  Vent Mode: Spont (09/19/18 1406)  Set Rate: 0 bmp (09/19/18 1406)  Vt Set: 360 mL (09/19/18 1406)  Pressure Support: 5 cmH20 (09/19/18 1406)  PEEP/CPAP: 5 cmH20 (09/19/18 1406)  Oxygen Concentration (%): 60 (09/22/18 0605)  Peak Airway Pressure: 10 cmH2O (09/19/18 1406)  Plateau Pressure: 21 cmH20 (09/19/18 1406)  Total Ve: 4.59 mL (09/19/18 1406)  Negative Inspiratory Force (cm H2O): -24 (09/19/18 1251)  F/VT Ratio<105 (RSBI): (!) 44.92 (09/19/18 1251)  Lines/Drains/Airways     Central Venous Catheter Line                 Trialysis (Dialysis) Catheter 09/12/18 1326 right internal jugular 9 days          Arterial Line                 Arterial Line 09/15/18 1500 Left Radial 6 days          Pressure Ulcer                 Negative Pressure Wound Therapy  45 days          Peripheral Intravenous Line                 Midline Catheter Insertion/Assessment  - Single Lumen 09/17/18 1235 Right cephalic vein (lateral side of arm) 20g x 10cm 4 days              Significant Labs:    CBC/Anemia Profile:  Recent Labs   Lab  09/21/18   0419  09/21/18   1527  09/22/18   0210   WBC  18.13*  16.02*  14.24*  14.24*   HGB  7.1*  7.3*  6.8*  6.8*   HCT  24.3*  23.7*  22.1*  22.1*   PLT  209  213  193  193   MCV  84  84  82  82   RDW  19.8*  20.0*  19.9*  19.9*        Chemistries:  Recent Labs   Lab  09/21/18   0419   09/21/18   2141   09/22/18   0210  09/22/18   0440  09/22/18   0745   NA  143  143   < >  142  142  142   < >  142  140  140  142   K  4.8  4.8   < >  4.7  4.7  4.7   < >  4.5  4.4  4.4  4.4   CL  114*  114*   < >  111*  111*  111*   < >  109  108  108  109   CO2  19*  19*   < >  19*  19*  19*   < >  22*  23  23  24   BUN  22  22   < >  18  18  18   < >  11  9  9  7*   CREATININE  1.5*  1.5*   < >  1.3  1.3  1.3   < >   0.8  0.8  0.8  0.7   CALCIUM  9.3  9.3   < >  8.7  8.7  8.7   < >  8.5*  8.5*  8.5*  8.5*   ALBUMIN  1.8*  1.8*  1.8*   < >  1.9*  1.9*  1.9*   --   1.9*  1.9*  1.9*  1.9*   --    PROT  5.7*   --    --    --   5.7*   --    --    BILITOT  0.6   --    --    --   0.6   --    --    ALKPHOS  109   --    --    --   106   --    --    ALT  13   --    --    --   25   --    --    AST  21   --    --    --   51*   --    --    MG  1.9  1.9   < >  1.9  1.9  1.9   --   1.7  1.7  1.7   --    PHOS  4.9*  4.9*  4.9*   < >  4.5  4.5  4.5   --   3.1  2.7  2.7  2.7   --     < > = values in this interval not displayed.     Significant Imaging:  I have reviewed and interpreted all pertinent imaging results/findings within the past 24 hours.

## 2018-09-22 NOTE — ASSESSMENT & PLAN NOTE
--noted on MRI along with osteomyelitis.   --IR drained paraspinal abscess 9/13 .  --see septic shock for details.

## 2018-09-22 NOTE — NURSING
RN informed CC team of pts MAP 60-61; Dr. Chaparro ordered to adjust UF rate; RN informed Dialysis RN Shahla to adjust UF rate from 400 to 200mL/hr. RN will continue to monitor

## 2018-09-22 NOTE — PROGRESS NOTES
CRRT:    tx ran for almost 40 hours until system clotted. Blood returned. CRRT restarted with a new machine.    Access: right IJ trialysis  UF rate set to 400ml/hr as ordered.

## 2018-09-22 NOTE — PLAN OF CARE
Problem: Patient Care Overview  Goal: Plan of Care Review  Outcome: Ongoing (interventions implemented as appropriate)  POC reviewed with pt and family at 1400. Pt verbalized understanding. Questions and concerns addressed. Patient had 1 unit PRBCPatient had episode of Tachycardia with heart rate of 140s, with stable blood pressure. Patient still on CRRT Will continue to monitor. See flowsheets for full assessment and VS info.

## 2018-09-23 NOTE — NURSING
RN informed nephrology pt's UF rate decreased to 200 ml/hr from 400 ml/hr due to pt's MAP 60-62 while at CRRT; Nephrology team ordered to paused CRRT treatment. RN will continue to monitor.

## 2018-09-23 NOTE — NURSING
1700H- Patient's BP 79/59, Map of 63, Heart rate on 90s. Patient is on CRRT UF of 300,. CRRT UF decreased to 200, Critical care was notified and stated to start patient on levophed while on CRRT, but patient's HR suddenly went up to 130s,Dialysis nurse was also notified. And stated to temporarily hold CRRT's UF.  Critical care team was notified and stated to call NP's number [phone]) for now because they are currently on rapid response. Dar NP was notified regarding patient's condition and stated to give Metoprolo 5mg IV stat. Medication was given.      1715H- [facility] RN at bedside and stated may restart CRRT once stable at 200 then gradually increase to 300UF.Albumin was also ordered by Nephro team as stated by [facility] RN. Will monitor patient.

## 2018-09-23 NOTE — SUBJECTIVE & OBJECTIVE
Interval History/Significant Events: No significant events overnight.      Review of Systems   Constitutional: Negative for chills and diaphoresis.   HENT: Negative for postnasal drip.    Eyes: Negative for visual disturbance.   Respiratory: Negative for shortness of breath.    Cardiovascular: Negative for chest pain.   Gastrointestinal: Negative for abdominal pain and constipation.   Genitourinary: Negative for hematuria.   Musculoskeletal: Negative for myalgias.   Skin: Negative for rash.   Neurological: Negative for headaches.   Hematological: Negative for adenopathy.     Objective:     Vital Signs (Most Recent):  Temp: 98.4 °F (36.9 °C) (09/23/18 0605)  Pulse: 91 (09/23/18 0605)  Resp: 20 (09/23/18 0605)  BP: 127/77 (09/23/18 0605)  SpO2: 100 % (09/23/18 0605) Vital Signs (24h Range):  Temp:  [97.7 °F (36.5 °C)-98.8 °F (37.1 °C)] 98.4 °F (36.9 °C)  Pulse:  [] 91  Resp:  [11-43] 20  SpO2:  [93 %-100 %] 100 %  BP: ()/(53-83) 127/77  Arterial Line BP: ()/(59-77) 102/77   Weight: 123 kg (271 lb 2.7 oz)  Body mass index is 46.55 kg/m².      Intake/Output Summary (Last 24 hours) at 9/23/2018 0744  Last data filed at 9/23/2018 0605  Gross per 24 hour   Intake 5627.07 ml   Output 7189 ml   Net -1561.93 ml       Physical Exam   Constitutional: She appears well-nourished.   HENT:   Head: Normocephalic and atraumatic.   Mouth/Throat: Oropharynx is clear and moist.   Eyes: Conjunctivae are normal. Pupils are equal, round, and reactive to light. Right eye exhibits no discharge. Left eye exhibits no discharge. No scleral icterus.   Neck: Trachea normal, normal range of motion and full passive range of motion without pain. Neck supple. No JVD present. No tracheal deviation present. No thyromegaly present.   Cardiovascular: Normal rate, regular rhythm, S1 normal, S2 normal, normal heart sounds and intact distal pulses. Exam reveals no gallop and no friction rub.   No murmur heard.  Pulmonary/Chest: Effort  normal and breath sounds normal. No respiratory distress. She has no wheezes. She has no rales. She exhibits no tenderness.   Abdominal: Soft. Bowel sounds are normal. She exhibits no distension and no mass. There is no tenderness. There is no rebound and no guarding.   Musculoskeletal: Normal range of motion. She exhibits no tenderness or deformity.   Lymphadenopathy:     She has no cervical adenopathy.   Neurological: No cranial nerve deficit. Coordination normal.   Skin: Skin is warm and dry. No abrasion and no bruising noted.   Psychiatric: She has a normal mood and affect.   Vitals reviewed.      Vents:  Vent Mode: Spont (09/19/18 1406)  Set Rate: 0 bmp (09/19/18 1406)  Vt Set: 360 mL (09/19/18 1406)  Pressure Support: 5 cmH20 (09/19/18 1406)  PEEP/CPAP: 5 cmH20 (09/19/18 1406)  Oxygen Concentration (%): 60 (09/23/18 0205)  Peak Airway Pressure: 10 cmH2O (09/19/18 1406)  Plateau Pressure: 21 cmH20 (09/19/18 1406)  Total Ve: 4.59 mL (09/19/18 1406)  Negative Inspiratory Force (cm H2O): -24 (09/19/18 1251)  F/VT Ratio<105 (RSBI): (!) 44.92 (09/19/18 1251)  Lines/Drains/Airways     Central Venous Catheter Line                 Trialysis (Dialysis) Catheter 09/12/18 1326 right internal jugular 10 days          Pressure Ulcer                 Negative Pressure Wound Therapy  46 days          Peripheral Intravenous Line                 Midline Catheter Insertion/Assessment  - Single Lumen 09/17/18 1235 Right cephalic vein (lateral side of arm) 20g x 10cm 5 days              Significant Labs:    CBC/Anemia Profile:  Recent Labs   Lab  09/22/18   0210  09/22/18   1224  09/23/18   0240   WBC  14.24*  14.24*  15.84*  10.57   HGB  6.8*  6.8*  8.5*  7.6*   HCT  22.1*  22.1*  26.6*  24.8*   PLT  193  193  183  158   MCV  82  82  81*  83   RDW  19.9*  19.9*  19.1*  19.5*        Chemistries:  Recent Labs   Lab  09/22/18   0210   09/22/18   1425   09/22/18   2155  09/22/18   2342  09/23/18   0240   NA  142   < >  141  141   141   < >  140  140  138   K  4.5   < >  4.3  4.3  4.3   < >  4.4  4.2  4.0   CL  109   < >  108  108  108   < >  109  110  109   CO2  22*   < >  19*  19*  19*   < >  19*  21*  21*   BUN  11   < >  11  11  11   < >  13  13  14   CREATININE  0.8   < >  0.8  0.8  0.8   < >  0.9  1.1  1.1   CALCIUM  8.5*   < >  8.4*  8.4*  8.4*   < >  8.4*  8.6*  8.1*   ALBUMIN  1.9*  1.9*   < >  2.0*  2.0*  2.0*   --   1.8*   --   1.8*  1.8*   PROT  5.7*   --    --    --    --    --   5.4*   BILITOT  0.6   --    --    --    --    --   0.7   ALKPHOS  106   --    --    --    --    --   100   ALT  25   --    --    --    --    --   43   AST  51*   --    --    --    --    --   71*   MG  1.7   < >  1.7  1.7  1.7   --   1.7   --   1.5*   PHOS  3.1   < >  2.9  2.9  2.9   --   3.0   --   3.8  3.8    < > = values in this interval not displayed.     Significant Imaging:  I have reviewed and interpreted all pertinent imaging results/findings within the past 24 hours.

## 2018-09-23 NOTE — NURSING
1815H- Patient's heart rate is on 130s while on CRRT and BIPAP, Michele YAP was notified and stated to monitor patient for no. No new order.

## 2018-09-23 NOTE — PLAN OF CARE
Problem: Patient Care Overview  Goal: Plan of Care Review  Outcome: Ongoing (interventions implemented as appropriate)  POC reviewed with pt and family at 1400. Pt verbalized understanding. Questions and concerns addressed. No acute events today. Pt progressing toward goals.  CRRT was restarted today, patient still on heparin drip with therapeutic goal. Will continue to monitor. See flowsheets for full assessment and VS info.

## 2018-09-23 NOTE — PROGRESS NOTES
[facility]  Neurosurgery  Progress Note    Subjective:     History of Present Illness: Ms Medina Frazier is an 82yoF with PMHx DMII, HTN, CHF, CAD, CHF, CKD, HLD, GERD, hypothyroidism,  s/p L2-3 TLIF on 6/5/18 by Dr. Gutiérrez at [facility], complicated by infection & subsequent washout &  hardware removal, who is transferred from [facility] for neurosurgical evaluation of L2-3 osteomyelitis.  Following her initial surgery, she was found to have a lumbar incision wound infection that was washed out on 7/2/18.  Surgical cultures grew E. Cloacae & a PICC was placed.  She was discharged on IV Cipro & a wound vac.  She then had hardware removal on 8/7.  She has had progressive worsening of pain & infection since that time.   Most recent CT Lspine shows L2-3 osteomyelitis with paraspinal fluid collection L1-3.      Post-Op Info:  Procedure(s) (LRB):  DEBRIDEMENT, WOUND, Lumbar wound washout (N/A)   3 Days Post-Op     Interval History: naeon    Medications:  Continuous Infusions:   sodium chloride 0.9% 200 mL/hr at 09/23/18 0400    heparin (porcine) in D5W 25.976 Units/kg/hr (09/23/18 0605)     Scheduled Meds:   albuterol-ipratropium  3 mL Nebulization Q4H    insulin detemir U-100  5 Units Subcutaneous QHS    levothyroxine  150 mcg Oral Before breakfast    metoprolol  5 mg Intravenous Q6H    miconazole NITRATE 2 %   Topical (Top) BID    pantoprazole  40 mg Oral Daily    piperacillin-tazobactam (ZOSYN) IVPB  4.5 g Intravenous Q8H     PRN Meds:sodium chloride, dextrose 50%, dextrose 50%, fentaNYL citrate (PF), glucagon (human recombinant), glucose, glucose, heparin (PORCINE), heparin (PORCINE), insulin aspart U-100, ondansetron, sodium phosphate IVPB, sodium phosphate IVPB, sodium phosphate IVPB     Review of Systems  Objective:     Weight: 123 kg (271 lb 2.7 oz)  Body mass index is 46.55 kg/m².  Vital Signs (Most Recent):  Temp: 98.4 °F (36.9 °C) (09/23/18 0605)  Pulse: 91 (09/23/18 0605)  Resp: 20  (09/23/18 0605)  BP: 127/77 (09/23/18 0605)  SpO2: 100 % (09/23/18 0605) Vital Signs (24h Range):  Temp:  [97.7 °F (36.5 °C)-98.8 °F (37.1 °C)] 98.4 °F (36.9 °C)  Pulse:  [] 91  Resp:  [11-43] 20  SpO2:  [85 %-100 %] 100 %  BP: ()/(53-83) 127/77  Arterial Line BP: ()/(56-77) 102/77                 Oxygen Concentration (%):  [60] 60  Resp Rate Total:  [11 br/min-22 br/min] 20 br/min         Trialysis (Dialysis) Catheter 09/12/18 1326 right internal jugular (Active)   IV Device Securement sutures 9/23/2018  3:05 AM   Additional Site Signs no erythema;no warmth;no edema;no pain;no palpable cord;no streak formation;no drainage 9/23/2018  3:05 AM   Patency/Care flushed w/o difficulty 9/23/2018  3:05 AM   Site Assessment Clean;Dry;Intact;No redness;No swelling 9/23/2018  3:05 AM   Status Deaccessed 9/23/2018  3:05 AM   Flows Good 9/23/2018  3:05 AM   Dressing Intervention Dressing reinforced 9/23/2018  3:05 AM   Dressing Status Biopatch in place;Clean;Dry;New drainage 9/23/2018  3:05 AM   Dressing Change Due 09/29/18 9/23/2018  3:05 AM   Verification by X-ray Yes 9/22/2018  7:05 PM   Site Condition No complications 9/23/2018  3:05 AM   Dressing Occlusive 9/23/2018  3:05 AM   Daily Line Review Performed 9/23/2018  3:05 AM       Neurosurgery Physical Exam   Awake, alert  PERRL  FC X4    Significant Labs:  Recent Labs   Lab  09/22/18   1425   09/22/18   2155  09/22/18   2342  09/23/18   0240   GLU  238*  238*  238*   < >  243*  234*  232*   NA  141  141  141   < >  140  140  138   K  4.3  4.3  4.3   < >  4.4  4.2  4.0   CL  108  108  108   < >  109  110  109   CO2  19*  19*  19*   < >  19*  21*  21*   BUN  11  11  11   < >  13  13  14   CREATININE  0.8  0.8  0.8   < >  0.9  1.1  1.1   CALCIUM  8.4*  8.4*  8.4*   < >  8.4*  8.6*  8.1*   MG  1.7  1.7  1.7   --   1.7   --   1.5*    < > = values in this interval not displayed.     Recent Labs   Lab  09/22/18   0210  09/22/18   1224  09/23/18    0240   WBC  14.24*  14.24*  15.84*  10.57   HGB  6.8*  6.8*  8.5*  7.6*   HCT  22.1*  22.1*  26.6*  24.8*   PLT  193  193  183  158     Recent Labs   Lab  09/21/18   1527   09/22/18   0210   09/22/18   2342  09/23/18   0240  09/23/18   0530   INR  1.2   --   1.2   --    --   1.2   --    APTT  <21.0   < >  23.2   < >  34.3*  37.1*  45.6*    < > = values in this interval not displayed.     Microbiology Results (last 7 days)     Procedure Component Value Units Date/Time    Blood culture [[id number]] Collected:  09/15/18 1805    Order Status:  Completed Specimen:  Blood from Peripheral, Upper Arm, Right Updated:  09/20/18 2012     Blood Culture, Routine No growth after 5 days.    Narrative:       Blood cultures from 2 different sites. 4 bottles total.  Please draw before starting antibiotics.    Blood culture [[id number]] Collected:  09/15/18 1819    Order Status:  Completed Specimen:  Blood from Peripheral, Upper Arm, Left Updated:  09/20/18 2012     Blood Culture, Routine No growth after 5 days.    Narrative:       Blood cultures x 2 different sites. 4 bottles total. Please  draw cultures before administering antibiotics.    Fungus culture [[id number]] Collected:  09/13/18 0834    Order Status:  Completed Specimen:  Abscess from Back Updated:  09/20/18 0729     Fungus (Mycology) Culture Culture in progress    Narrative:       Paraspinal abscess    Culture, Anaerobe [[id number]] Collected:  09/13/18 0834    Order Status:  Completed Specimen:  Abscess from Back Updated:  09/18/18 1340     Anaerobic Culture --     PREVOTELLA (B.) BIVIA  Many      Narrative:       lumbar paraspinal fluid collection drainage    Aerobic culture [[id number]] Collected:  09/13/18 0834    Order Status:  Completed Specimen:  Abscess from Back Updated:  09/17/18 0848     Aerobic Bacterial Culture No growth    Narrative:       lumbar paraspinal fluid collection drainage    Blood culture [[id number]] Collected:  09/11/18 1011    Order Status:   Completed Specimen:  Blood Updated:  09/16/18 1212     Blood Culture, Routine No growth after 5 days.    Narrative:       Please draw from 2 separate sites 30 minutes apart. Thank you    Blood culture [[id number]] Collected:  09/11/18 1003    Order Status:  Completed Specimen:  Blood Updated:  09/16/18 1212     Blood Culture, Routine No growth after 5 days.            Significant Diagnostics:  CXR: persistent right greater than left pulmonary edema.    Assessment/Plan:     Spinal epidural abscess    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       -Extubated but still with tenuous clinical picture.  -Will discuss timing of intervention with staff.  -Continue diuresis for volume overload and pulmonary edema per MICU and Renal teams.  -Pt will need 2 stage surgery: washout and then instrumentation following.  -MRI with L2-3 osteodiscitis, large fluid collection in surgical bed, as well a prevertebral fluid collection L1,2 s/p IR drainage on 9/13   -Please wear LSO brace at all times  -Please hold Xarelto & Plavix   -Abx per infectious disease recs   -Continue wound care and wound vac            Thierno Carlos, DO  Neurosurgery  [facility]

## 2018-09-23 NOTE — NURSING
1835H- Patient is restless and wants to remove BIPAP mask and stated that her breathing is better now. Patient was placed back on high flow nasal cannula. RT was notified.

## 2018-09-23 NOTE — PROGRESS NOTES
[facility]  Nephrology  Progress Note    Patient Name: Medina Frazier  MRN: [id number]  Admission Date: 9/8/2018  Hospital Length of Stay: 15 days  Attending Provider: Maribel Coy MD   Primary Care Physician: Hao Garcia MD  Principal Problem:Osteomyelitis    Subjective:     HPI: 83yo WF who was to have a lumbar fusion later in the month and was admitted due to abnormal pre-op labs.  Nephrology consulted for JEY (sCr 2.2 on admit, 3.1 at time of consult, normal baseline), also with a sodium of 119 (133 on August 10th). Patient appears somnolent, but wakes up and appropriately answers questions, per family she is at her baseline.  Patient is not complaining of increasing shortness of breath, she in on 3L O2 when seen, this is what she is on at home, she has history of LENA and is supposed to be on CPAP at night, but not compliant.  CXR reviewed and looks like pulmonary edema/congestion on admit.    Interval History:   Patient evaluated at bedside. Had dropped blood pressures yesterday in which dialysis had to be hold. Blood pressures today are back to normal range in 110-120 of SBP.     Review of patient's allergies indicates:   Allergen Reactions    Codeine Nausea Only    Penicillins Swelling     Able to take amoxil and cephalosporins      Current Facility-Administered Medications   Medication Frequency    0.9%  NaCl infusion (CRRT USE ONLY) Continuous    0.9%  NaCl infusion (for blood administration) Q24H PRN    albuterol-ipratropium 2.5 mg-0.5 mg/3 mL nebulizer solution 3 mL Q4H    dextrose 50% injection 12.5 g PRN    dextrose 50% injection 25 g PRN    fentaNYL citrate (PF) Syrg 25 mcg Q2H PRN    glucagon (human recombinant) injection 1 mg PRN    glucose chewable tablet 16 g PRN    glucose chewable tablet 24 g PRN    heparin 25,000 units in dextrose 5% (100 units/ml) IV bolus from bag - ADDITIONAL PRN BOLUS - 30 units/kg PRN    heparin 25,000 units in dextrose 5% (100 units/ml)  IV bolus from bag - ADDITIONAL PRN BOLUS - 60 units/kg PRN    heparin 25,000 units in dextrose 5% 250 mL (100 units/mL) infusion LOW INTENSITY nomogram - [facility] Continuous    insulin aspart U-100 pen 0-5 Units Q4H PRN    insulin aspart U-100 pen 3 Units TIDWM    insulin detemir U-100 pen 5 Units QHS    levothyroxine tablet 150 mcg Before breakfast    metoprolol tartrate (LOPRESSOR) tablet 25 mg QID    miconazole NITRATE 2 % top powder BID    ondansetron injection 4 mg Q8H PRN    pantoprazole suspension 40 mg Daily    piperacillin-tazobactam 4.5 g in sodium chloride 0.9% 100 mL IVPB (ready to mix system) Q8H    sodium phosphate 20.01 mmol in dextrose 5 % 250 mL IVPB PRN    sodium phosphate 30 mmol in dextrose 5 % 250 mL IVPB PRN    sodium phosphate 39.99 mmol in dextrose 5 % 250 mL IVPB PRN       Objective:     Vital Signs (Most Recent):  Temp: 99 °F (37.2 °C) (09/23/18 0901)  Pulse: 102 (09/23/18 0901)  Resp: (!) 22 (09/23/18 0901)  BP: 123/64 (09/23/18 0901)  SpO2: 97 % (09/23/18 0901)  O2 Device (Oxygen Therapy): High Flow nasal Cannula (09/23/18 0901) Vital Signs (24h Range):  Temp:  [97.7 °F (36.5 °C)-99 °F (37.2 °C)] 99 °F (37.2 °C)  Pulse:  [] 102  Resp:  [11-31] 22  SpO2:  [93 %-100 %] 97 %  BP: ()/(53-83) 123/64  Arterial Line BP: ()/(59-77) 102/77     Weight: 123 kg (271 lb 2.7 oz) (09/20/18 0505)  Body mass index is 46.55 kg/m².  Body surface area is 2.36 meters squared.    I/O last 3 completed shifts:  In: 8146 [I.V.:6956.4; Blood:316.7; IV Piggyback:873]  Out: 27362 [Other:74741]    Physical Exam   Constitutional: She appears ill. Nasal cannula in place.   HENT:   Head: Normocephalic.   Right Ear: External ear normal.   Left Ear: External ear normal.   Eyes: Right eye exhibits no discharge. Left eye exhibits no discharge.   Cardiovascular: Normal rate and regular rhythm.   Bilateral Sacral, upper and lower extremity edema   Pulmonary/Chest: She has rales.   Abdominal: Soft.    Musculoskeletal: She exhibits edema.   Neurological: She is alert.   Skin: Skin is warm.       Significant Labs:  ABGs:   Recent Labs   Lab  09/20/18   1432   PH  7.314*   PCO2  44.3   HCO3  22.5*   POCSATURATED  100   BE  -4     BMP:   Recent Labs   Lab  09/23/18   0240   GLU  232*   CL  109   CO2  21*   BUN  14   CREATININE  1.1   CALCIUM  8.1*   MG  1.5*     CBC:   Recent Labs   Lab  09/23/18   0240   WBC  10.57   RBC  2.98*   HGB  7.6*   HCT  24.8*   PLT  158   MCV  83   MCH  25.5*   MCHC  30.6*     CMP:   Recent Labs   Lab  09/23/18   0240   GLU  232*   CALCIUM  8.1*   ALBUMIN  1.8*  1.8*   PROT  5.4*   NA  138   K  4.0   CO2  21*   CL  109   BUN  14   CREATININE  1.1   ALKPHOS  100   ALT  43   AST  71*   BILITOT  0.7     All labs within the past 24 hours have been reviewed.       Assessment/Plan:     JEY (acute kidney injury)    Now with severe oliguria, likely multifactorial, the urine sodium of 10, could be consistent with CRS, but may still have an ATN component.    Lytes stable, bicarb down to 17, resp status gradually improving, SCUF x ~past 24hrs      Plan/Recommendations:  - Had dropped blood pressures yesterday in which dialysis had to be hold. Blood pressures today are back to normal range in 110-120 of SBP. Will start back on SCUF for volume removal.               Kamran Bradley  Nephrology  Fellow  [facility]    Pager [phone]

## 2018-09-23 NOTE — NURSING
RN informed CC team pt's arterial line inadvertently pulled by patient; no waveform and RN unable to pull blood from line; Dr. Albert Daugherty agreed to completely pull A-line out. RN will continue to monitor

## 2018-09-23 NOTE — PLAN OF CARE
Problem: Patient Care Overview  Goal: Plan of Care Review  Outcome: Outcome(s) achieved Date Met: 09/23/18  POC reviewed with Medina and daughter at 0500. Pt and daughter verbalized understanding. Questions and concerns addressed. No acute events overnight. Pt progressing toward goals. CRRT temporarily paused due to blood pressure issues.  Heparin drip titrated according to nomogram. Will continue to monitor. See flowsheets for full assessment and VS info

## 2018-09-23 NOTE — SUBJECTIVE & OBJECTIVE
Interval History:   Patient evaluated at bedside. Had dropped blood pressures yesterday in which dialysis had to be hold. Blood pressures today are back to normal range in 110-120 of SBP.     Review of patient's allergies indicates:   Allergen Reactions    Codeine Nausea Only    Penicillins Swelling     Able to take amoxil and cephalosporins      Current Facility-Administered Medications   Medication Frequency    0.9%  NaCl infusion (CRRT USE ONLY) Continuous    0.9%  NaCl infusion (for blood administration) Q24H PRN    albuterol-ipratropium 2.5 mg-0.5 mg/3 mL nebulizer solution 3 mL Q4H    dextrose 50% injection 12.5 g PRN    dextrose 50% injection 25 g PRN    fentaNYL citrate (PF) Syrg 25 mcg Q2H PRN    glucagon (human recombinant) injection 1 mg PRN    glucose chewable tablet 16 g PRN    glucose chewable tablet 24 g PRN    heparin 25,000 units in dextrose 5% (100 units/ml) IV bolus from bag - ADDITIONAL PRN BOLUS - 30 units/kg PRN    heparin 25,000 units in dextrose 5% (100 units/ml) IV bolus from bag - ADDITIONAL PRN BOLUS - 60 units/kg PRN    heparin 25,000 units in dextrose 5% 250 mL (100 units/mL) infusion LOW INTENSITY nomogram - [facility] Continuous    insulin aspart U-100 pen 0-5 Units Q4H PRN    insulin aspart U-100 pen 3 Units TIDWM    insulin detemir U-100 pen 5 Units QHS    levothyroxine tablet 150 mcg Before breakfast    metoprolol tartrate (LOPRESSOR) tablet 25 mg QID    miconazole NITRATE 2 % top powder BID    ondansetron injection 4 mg Q8H PRN    pantoprazole suspension 40 mg Daily    piperacillin-tazobactam 4.5 g in sodium chloride 0.9% 100 mL IVPB (ready to mix system) Q8H    sodium phosphate 20.01 mmol in dextrose 5 % 250 mL IVPB PRN    sodium phosphate 30 mmol in dextrose 5 % 250 mL IVPB PRN    sodium phosphate 39.99 mmol in dextrose 5 % 250 mL IVPB PRN       Objective:     Vital Signs (Most Recent):  Temp: 99 °F (37.2 °C) (09/23/18 0901)  Pulse: 102 (09/23/18 0901)  Resp:  (!) 22 (09/23/18 0901)  BP: 123/64 (09/23/18 0901)  SpO2: 97 % (09/23/18 0901)  O2 Device (Oxygen Therapy): High Flow nasal Cannula (09/23/18 0901) Vital Signs (24h Range):  Temp:  [97.7 °F (36.5 °C)-99 °F (37.2 °C)] 99 °F (37.2 °C)  Pulse:  [] 102  Resp:  [11-31] 22  SpO2:  [93 %-100 %] 97 %  BP: ()/(53-83) 123/64  Arterial Line BP: ()/(59-77) 102/77     Weight: 123 kg (271 lb 2.7 oz) (09/20/18 0505)  Body mass index is 46.55 kg/m².  Body surface area is 2.36 meters squared.    I/O last 3 completed shifts:  In: 8146 [I.V.:6956.4; Blood:316.7; IV Piggyback:873]  Out: 68284 [Other:42414]    Physical Exam   Constitutional: She appears ill. Nasal cannula in place.   HENT:   Head: Normocephalic.   Right Ear: External ear normal.   Left Ear: External ear normal.   Eyes: Right eye exhibits no discharge. Left eye exhibits no discharge.   Cardiovascular: Normal rate and regular rhythm.   Bilateral Sacral, upper and lower extremity edema   Pulmonary/Chest: She has rales.   Abdominal: Soft.   Musculoskeletal: She exhibits edema.   Neurological: She is alert.   Skin: Skin is warm.       Significant Labs:  ABGs:   Recent Labs   Lab  09/20/18   1432   PH  7.314*   PCO2  44.3   HCO3  22.5*   POCSATURATED  100   BE  -4     BMP:   Recent Labs   Lab  09/23/18   0240   GLU  232*   CL  109   CO2  21*   BUN  14   CREATININE  1.1   CALCIUM  8.1*   MG  1.5*     CBC:   Recent Labs   Lab  09/23/18   0240   WBC  10.57   RBC  2.98*   HGB  7.6*   HCT  24.8*   PLT  158   MCV  83   MCH  25.5*   MCHC  30.6*     CMP:   Recent Labs   Lab  09/23/18   0240   GLU  232*   CALCIUM  8.1*   ALBUMIN  1.8*  1.8*   PROT  5.4*   NA  138   K  4.0   CO2  21*   CL  109   BUN  14   CREATININE  1.1   ALKPHOS  100   ALT  43   AST  71*   BILITOT  0.7     All labs within the past 24 hours have been reviewed.

## 2018-09-23 NOTE — ASSESSMENT & PLAN NOTE
--noted on MRI along with osteomyelitis.   --IR drained paraspinal abscess 9/13 .  --blood cultures with prevotella, repeat blood cultures ngtd   --per ID recs: continue zosyn, will anticipate 8 weeks of abx   --Not fully able to obtain source control as IR intervention (9/12) only able to partially drain one abscess.   --Washout with Neurosurgery pending hemodynamic stability

## 2018-09-23 NOTE — ASSESSMENT & PLAN NOTE
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       -Extubated but still with tenuous clinical picture.  -Will discuss timing of intervention with staff.  -Continue diuresis for volume overload and pulmonary edema per MICU and Renal teams.  -Pt will need 2 stage surgery: washout and then instrumentation following.  -MRI with L2-3 osteodiscitis, large fluid collection in surgical bed, as well a prevertebral fluid collection L1,2 s/p IR drainage on 9/13   -Please wear LSO brace at all times  -Please hold Xarelto & Plavix   -Abx per infectious disease recs   -Continue wound care and wound vac

## 2018-09-23 NOTE — SUBJECTIVE & OBJECTIVE
Interval History: naeon    Medications:  Continuous Infusions:   sodium chloride 0.9% 200 mL/hr at 09/23/18 0400    heparin (porcine) in D5W 25.976 Units/kg/hr (09/23/18 0605)     Scheduled Meds:   albuterol-ipratropium  3 mL Nebulization Q4H    insulin detemir U-100  5 Units Subcutaneous QHS    levothyroxine  150 mcg Oral Before breakfast    metoprolol  5 mg Intravenous Q6H    miconazole NITRATE 2 %   Topical (Top) BID    pantoprazole  40 mg Oral Daily    piperacillin-tazobactam (ZOSYN) IVPB  4.5 g Intravenous Q8H     PRN Meds:sodium chloride, dextrose 50%, dextrose 50%, fentaNYL citrate (PF), glucagon (human recombinant), glucose, glucose, heparin (PORCINE), heparin (PORCINE), insulin aspart U-100, ondansetron, sodium phosphate IVPB, sodium phosphate IVPB, sodium phosphate IVPB     Review of Systems  Objective:     Weight: 123 kg (271 lb 2.7 oz)  Body mass index is 46.55 kg/m².  Vital Signs (Most Recent):  Temp: 98.4 °F (36.9 °C) (09/23/18 0605)  Pulse: 91 (09/23/18 0605)  Resp: 20 (09/23/18 0605)  BP: 127/77 (09/23/18 0605)  SpO2: 100 % (09/23/18 0605) Vital Signs (24h Range):  Temp:  [97.7 °F (36.5 °C)-98.8 °F (37.1 °C)] 98.4 °F (36.9 °C)  Pulse:  [] 91  Resp:  [11-43] 20  SpO2:  [85 %-100 %] 100 %  BP: ()/(53-83) 127/77  Arterial Line BP: ()/(56-77) 102/77                 Oxygen Concentration (%):  [60] 60  Resp Rate Total:  [11 br/min-22 br/min] 20 br/min         Trialysis (Dialysis) Catheter 09/12/18 1326 right internal jugular (Active)   IV Device Securement sutures 9/23/2018  3:05 AM   Additional Site Signs no erythema;no warmth;no edema;no pain;no palpable cord;no streak formation;no drainage 9/23/2018  3:05 AM   Patency/Care flushed w/o difficulty 9/23/2018  3:05 AM   Site Assessment Clean;Dry;Intact;No redness;No swelling 9/23/2018  3:05 AM   Status Deaccessed 9/23/2018  3:05 AM   Flows Good 9/23/2018  3:05 AM   Dressing Intervention Dressing reinforced 9/23/2018  3:05 AM    Dressing Status Biopatch in place;Clean;Dry;New drainage 9/23/2018  3:05 AM   Dressing Change Due 09/29/18 9/23/2018  3:05 AM   Verification by X-ray Yes 9/22/2018  7:05 PM   Site Condition No complications 9/23/2018  3:05 AM   Dressing Occlusive 9/23/2018  3:05 AM   Daily Line Review Performed 9/23/2018  3:05 AM       Neurosurgery Physical Exam   Awake, alert  PERRL  FC X4    Significant Labs:  Recent Labs   Lab  09/22/18   1425   09/22/18   2155  09/22/18   2342  09/23/18   0240   GLU  238*  238*  238*   < >  243*  234*  232*   NA  141  141  141   < >  140  140  138   K  4.3  4.3  4.3   < >  4.4  4.2  4.0   CL  108  108  108   < >  109  110  109   CO2  19*  19*  19*   < >  19*  21*  21*   BUN  11  11  11   < >  13  13  14   CREATININE  0.8  0.8  0.8   < >  0.9  1.1  1.1   CALCIUM  8.4*  8.4*  8.4*   < >  8.4*  8.6*  8.1*   MG  1.7  1.7  1.7   --   1.7   --   1.5*    < > = values in this interval not displayed.     Recent Labs   Lab  09/22/18   0210  09/22/18   1224  09/23/18   0240   WBC  14.24*  14.24*  15.84*  10.57   HGB  6.8*  6.8*  8.5*  7.6*   HCT  22.1*  22.1*  26.6*  24.8*   PLT  193  193  183  158     Recent Labs   Lab  09/21/18   1527   09/22/18   0210   09/22/18   2342  09/23/18   0240  09/23/18   0530   INR  1.2   --   1.2   --    --   1.2   --    APTT  <21.0   < >  23.2   < >  34.3*  37.1*  45.6*    < > = values in this interval not displayed.     Microbiology Results (last 7 days)     Procedure Component Value Units Date/Time    Blood culture [[id number]] Collected:  09/15/18 1805    Order Status:  Completed Specimen:  Blood from Peripheral, Upper Arm, Right Updated:  09/20/18 2012     Blood Culture, Routine No growth after 5 days.    Narrative:       Blood cultures from 2 different sites. 4 bottles total.  Please draw before starting antibiotics.    Blood culture [[id number]] Collected:  09/15/18 1819    Order Status:  Completed Specimen:  Blood from Peripheral, Upper Arm, Left  Updated:  09/20/18 2012     Blood Culture, Routine No growth after 5 days.    Narrative:       Blood cultures x 2 different sites. 4 bottles total. Please  draw cultures before administering antibiotics.    Fungus culture [[id number]] Collected:  09/13/18 0834    Order Status:  Completed Specimen:  Abscess from Back Updated:  09/20/18 0729     Fungus (Mycology) Culture Culture in progress    Narrative:       Paraspinal abscess    Culture, Anaerobe [[id number]] Collected:  09/13/18 0834    Order Status:  Completed Specimen:  Abscess from Back Updated:  09/18/18 1340     Anaerobic Culture --     PREVOTELLA (B.) BIVIA  Many      Narrative:       lumbar paraspinal fluid collection drainage    Aerobic culture [[id number]] Collected:  09/13/18 0834    Order Status:  Completed Specimen:  Abscess from Back Updated:  09/17/18 0848     Aerobic Bacterial Culture No growth    Narrative:       lumbar paraspinal fluid collection drainage    Blood culture [[id number]] Collected:  09/11/18 1011    Order Status:  Completed Specimen:  Blood Updated:  09/16/18 1212     Blood Culture, Routine No growth after 5 days.    Narrative:       Please draw from 2 separate sites 30 minutes apart. Thank you    Blood culture [[id number]] Collected:  09/11/18 1003    Order Status:  Completed Specimen:  Blood Updated:  09/16/18 1212     Blood Culture, Routine No growth after 5 days.            Significant Diagnostics:  CXR: persistent right greater than left pulmonary edema.

## 2018-09-23 NOTE — NURSING
1800H- Patient complained of having hard time breathing, Patient o2 saturation is 95% on highflow nasal cannula 9LPM. Patient's RR is between 28-32, Crackles noted on bilateral upper lungs. Blood pressure 110s MAP >65, Heart rate 100 with ongoing Albumin running at 50ml/hr, Critical care team was notified. Michele YAP stated to place patient on BIPAP. RT was notified.

## 2018-09-23 NOTE — PROGRESS NOTES
[facility]  Critical Care Medicine  Progress Note    Patient Name: Medina Frazier  MRN: [id number]  Admission Date: 9/8/2018  Hospital Length of Stay: 15 days  Code Status: Partial Code  Attending Provider: Maribel Coy MD  Primary Care Provider: Hao Garcia MD   Principal Problem: Osteomyelitis    Subjective:     HPI:  Mrs. Frazier is a 82 yr old female with history significant for diastolic HF, CAD s/p VIVIANA Lcx (June 2017), HTN, DM2, and lumbar stenosis s/p lumbar fusion in June 2018 with subsequent hardware infection and I&D on 7/2 and repeat lumbar I&D, hardware removal, decompression and wound vac placement to L2-L3 due to continued epidural abscess. She was discharged recently to a SNF for rehab and continued IV antibiotic therapy. She originally presented to [facility] on 9/8 from [facility] after labwork revealed JEY, hyponatremia. Additionally, CT lumbar spine obtained 9/5 was concerning for persistent osteomyelitis, epidural abscess in L2-L3 region.     She was transferred to [facility] on 9/8 for higher level of care and neurosurgery evaluation. Admission has been complicated by continued JEY with creatinine up trending from baseline 0.8 to 1.0 to 3.0 along with hyponatremia felt to be related to acute on chronic diastolic heart failure, volume overload. Nephrology was consulted and has been assisting with management. She was started on lasix 100 mg BID with minimal urine output response and creatinine slowly uptrending. Additionally, blood cultures obtained 9/9 are growing GNR's, awaiting speciation. Suspect this is enterobacter given continued osteomyelitis of L2-L3, complex fluid collection within laminectomy bed measuring 3x8x5 cm concerning for abscess, and additional prevertebral collection anterior to L1 and L2 vertebral bodies measuring 4x6x1 abutting the abdominal aorta concerning for abscess noted on MRI from 9/10. Neurosurgery were planning on repeat I&D for source  control but held off given the above medical issues with JEY, hyponatremia. She has been on vancomycin, cefepime since 9/9 and blood cultures from 9/11 are NGTD.     She was transferred to the MICU on the AM of 9/12 for acute hypercapnic respiratory failure with concern for aspiration event, worsening encephalopathy, and worsening JEY.    .          Hospital/ICU Course:  Upon transfer to MICU, Mrs. Frazier's respiratory and mental status worsened despite Bipap and lasix trial. She was intubated and she developed shock requiring low dose levophed, felt to be septic in setting of bacteremia. A trialysis line was placed for venous access, vasopressors, and possible need for CRRT. Arterial line placed for frequent ABGs. Cefepime and vancomycin continued as ID and neurosurgery following. Added on flagyl as micro lab thinks BCx 9/9 may be growing anaerobes. IR successfully drained epidural abscess (9/13). Continuing lasix trial as patient still appears volume overloaded. Norepinephrine requirements increasing on 9/14.  Discussed with neurosurgery, would need to be off vasopressors and hemodynamically stable prior to surgical intervention. Family meeting on 9/14, code status changed to DNR.  Weaned off vasopressors on 9/16.  Remains on ventilator due to significant pulmonary edema.  Aggressive diuresis started without significant response. Patient had an episode of A fib RVR and put on amiodarone drip on 9/17. CRRT started 9/17 however patient became hypotensive and bradycardic requiring levophed again. Discontinued amiodarone at that time. Patient successfully weaned off levophed. CRRT ran continuously 9/18. Mucus plug event (9/19) after patient's tube was suctioned. Patient became tachycardic and hypertensive with absent breath sounds bilaterally. Sats decreased into the 70s and tidal volumes less than 100. Patient was bagged and mucus plug was dislodged.  Passed SBT and extubated to bipap (9/19). CRRT discontinued and  pending diuresis trial.9/20 patient devolved shortness of breath overnight, repeated chest x-ray showed worsening pulmonary edema, patient has decrease urine output despite lasix challenge, contacted nephrology to stat CRRT. Patient receiving continuous CCRT. Bipap at night.       Interval History/Significant Events: No significant events overnight.      Review of Systems   Constitutional: Negative for chills and diaphoresis.   HENT: Negative for postnasal drip.    Eyes: Negative for visual disturbance.   Respiratory: Negative for shortness of breath.    Cardiovascular: Negative for chest pain.   Gastrointestinal: Negative for abdominal pain and constipation.   Genitourinary: Negative for hematuria.   Musculoskeletal: Negative for myalgias.   Skin: Negative for rash.   Neurological: Negative for headaches.   Hematological: Negative for adenopathy.     Objective:     Vital Signs (Most Recent):  Temp: 98.4 °F (36.9 °C) (09/23/18 0605)  Pulse: 91 (09/23/18 0605)  Resp: 20 (09/23/18 0605)  BP: 127/77 (09/23/18 0605)  SpO2: 100 % (09/23/18 0605) Vital Signs (24h Range):  Temp:  [97.7 °F (36.5 °C)-98.8 °F (37.1 °C)] 98.4 °F (36.9 °C)  Pulse:  [] 91  Resp:  [11-43] 20  SpO2:  [93 %-100 %] 100 %  BP: ()/(53-83) 127/77  Arterial Line BP: ()/(59-77) 102/77   Weight: 123 kg (271 lb 2.7 oz)  Body mass index is 46.55 kg/m².      Intake/Output Summary (Last 24 hours) at 9/23/2018 0744  Last data filed at 9/23/2018 0605  Gross per 24 hour   Intake 5627.07 ml   Output 7189 ml   Net -1561.93 ml       Physical Exam   Constitutional: She appears well-nourished.   HENT:   Head: Normocephalic and atraumatic.   Mouth/Throat: Oropharynx is clear and moist.   Eyes: Conjunctivae are normal. Pupils are equal, round, and reactive to light. Right eye exhibits no discharge. Left eye exhibits no discharge. No scleral icterus.   Neck: Trachea normal, normal range of motion and full passive range of motion without pain. Neck  supple. No JVD present. No tracheal deviation present. No thyromegaly present.   Cardiovascular: Normal rate, regular rhythm, S1 normal, S2 normal, normal heart sounds and intact distal pulses. Exam reveals no gallop and no friction rub.   No murmur heard.  Pulmonary/Chest: Effort normal and breath sounds normal. No respiratory distress. She has no wheezes. She has no rales. She exhibits no tenderness.   Abdominal: Soft. Bowel sounds are normal. She exhibits no distension and no mass. There is no tenderness. There is no rebound and no guarding.   Musculoskeletal: Normal range of motion. She exhibits no tenderness or deformity.   Lymphadenopathy:     She has no cervical adenopathy.   Neurological: No cranial nerve deficit. Coordination normal.   Skin: Skin is warm and dry. No abrasion and no bruising noted.   Psychiatric: She has a normal mood and affect.   Vitals reviewed.      Vents:  Vent Mode: Spont (09/19/18 1406)  Set Rate: 0 bmp (09/19/18 1406)  Vt Set: 360 mL (09/19/18 1406)  Pressure Support: 5 cmH20 (09/19/18 1406)  PEEP/CPAP: 5 cmH20 (09/19/18 1406)  Oxygen Concentration (%): 60 (09/23/18 0205)  Peak Airway Pressure: 10 cmH2O (09/19/18 1406)  Plateau Pressure: 21 cmH20 (09/19/18 1406)  Total Ve: 4.59 mL (09/19/18 1406)  Negative Inspiratory Force (cm H2O): -24 (09/19/18 1251)  F/VT Ratio<105 (RSBI): (!) 44.92 (09/19/18 1251)  Lines/Drains/Airways     Central Venous Catheter Line                 Trialysis (Dialysis) Catheter 09/12/18 1326 right internal jugular 10 days          Pressure Ulcer                 Negative Pressure Wound Therapy  46 days          Peripheral Intravenous Line                 Midline Catheter Insertion/Assessment  - Single Lumen 09/17/18 1235 Right cephalic vein (lateral side of arm) 20g x 10cm 5 days              Significant Labs:    CBC/Anemia Profile:  Recent Labs   Lab  09/22/18   0210  09/22/18   1224  09/23/18   0240   WBC  14.24*  14.24*  15.84*  10.57   HGB  6.8*  6.8*   8.5*  7.6*   HCT  22.1*  22.1*  26.6*  24.8*   PLT  193  193  183  158   MCV  82  82  81*  83   RDW  19.9*  19.9*  19.1*  19.5*        Chemistries:  Recent Labs   Lab  09/22/18   0210   09/22/18   1425   09/22/18   2155  09/22/18   2342  09/23/18   0240   NA  142   < >  141  141  141   < >  140  140  138   K  4.5   < >  4.3  4.3  4.3   < >  4.4  4.2  4.0   CL  109   < >  108  108  108   < >  109  110  109   CO2  22*   < >  19*  19*  19*   < >  19*  21*  21*   BUN  11   < >  11  11  11   < >  13  13  14   CREATININE  0.8   < >  0.8  0.8  0.8   < >  0.9  1.1  1.1   CALCIUM  8.5*   < >  8.4*  8.4*  8.4*   < >  8.4*  8.6*  8.1*   ALBUMIN  1.9*  1.9*   < >  2.0*  2.0*  2.0*   --   1.8*   --   1.8*  1.8*   PROT  5.7*   --    --    --    --    --   5.4*   BILITOT  0.6   --    --    --    --    --   0.7   ALKPHOS  106   --    --    --    --    --   100   ALT  25   --    --    --    --    --   43   AST  51*   --    --    --    --    --   71*   MG  1.7   < >  1.7  1.7  1.7   --   1.7   --   1.5*   PHOS  3.1   < >  2.9  2.9  2.9   --   3.0   --   3.8  3.8    < > = values in this interval not displayed.     Significant Imaging:  I have reviewed and interpreted all pertinent imaging results/findings within the past 24 hours.    Assessment/Plan:     Neuro   Encephalopathy, metabolic    --multifactorial: sepsis/uremia, medications  --non focal on exam  --weaned off sedation and levophed (9/17)  --awake and alert; following commands appropriately          Lumbar stenosis    --s/p lumbar fusion/hardware placement June 2018 with subsequent osteo and abscess.   --LSO brace ordered.  Per conversation with Neurosurgery, will need to wear brace while OOB          Derm   Alteration in skin integrity related to surgical incision    --s/p wound vac placement to lower back. Continue per neurosurgery        Pulmonary             Pulmonary hypertension    --2/2 diastolic HF. No known lung disease  --Echo (9/13) PA  systolic pressure estimated at 88 mm Hg  --reportedly on revatio at home.             Cardiac/Vascular   PAF (paroxysmal atrial fibrillation)    --Heparin gtt.  --Rate controlled. Continue metoprolol.          CHF (congestive heart failure), NYHA class IV    --Prior ECHO shows diastolic dysfunction (7/2018)  --Echo (9/13) EF 55-60%; concentric remodeling with RV enlargement; no WMA  --Continuing SLED.        Coronary artery disease involving native coronary artery of native heart without angina pectoris    --hold BB in setting of recent shock  --hold statin for now  --s/p VIVIANA to Lcx in June 2017 and on plavix prior to admission. Holding in setting of possible upcoming procedures.             Hypertension    --holding antihypertensives in the setting of recent shock and borderline hypotension.         Renal/   JEY (acute kidney injury)    --most likely cardiorenal syndrome vs sepsis (ATN)  --retroperitoneal ultrasound on 9/9 consistent with medical renal disease.   --Nephrology following   --Patient to continue on SLED, will re-eval if can transition to SLED only nightly        ID   * Osteomyelitis of Lumbar Spine    --see above        Gram-negative bacteremia    --noted on cultures from 9/9; cleared on 9/11; Positive for Prevotella  --See above          Spinal epidural abscess    --noted on MRI along with osteomyelitis.   --IR drained paraspinal abscess 9/13 .  --blood cultures with prevotella, repeat blood cultures ngtd   --per ID recs: continue zosyn, will anticipate 8 weeks of abx   --Not fully able to obtain source control as IR intervention (9/12) only able to partially drain one abscess.   --[place]out with Neurosurgery pending hemodynamic stability        Oncology   Anemia    --likely anemia of chronic disease. Trend Hgb for now  --no signs of acute blood loss  --transfuse for Hgb <7        Endocrine   Hypothyroidism    --continue synthroid        Diabetes mellitus, type II    --likely steroid induced and  secondary to infection  --steroids weaned off 9/16  --continue insulin infusion (started 9/16)  --A1c 7.6, on lantus 30 units every evening outpatient          GI   GERD (gastroesophageal reflux disease)    --continue PPI          I spent >35 minutes reviewing patient records, examining, and counseling the patient with greater than 50% of the time spent with direct patient care and coordination.        Aquiles Dodge PA-C  Critical Care Medicine  [facility]

## 2018-09-24 NOTE — PLAN OF CARE
18 1357   Final Note   Assessment Type Final Discharge Note   Discharge Disposition    Hospital Follow Up  Appt(s) scheduled? No   Discharge plans and expectations educations in teach back method with documentation complete? No   Right Care Referral Info   Post Acute Recommendation Other  ( in medical facility)   aFuzia Holland, RN, BSN, CCM  Case Management  [facility]  Ext. [phone]

## 2018-09-24 NOTE — NURSING TRANSFER
Nursing Transfer Note      9/24/2018     Transfer to 7069 from [id number]    Transfer via pts bed    Transfer with medicines, chart, and personal belongings    Transported by ELOISA Mukherjee and ELOISA Benavidez    Medicines sent: Insulins aspart and detemir, Zosyn, Heparin bag, miconazole powder    Chart send with patient: Yes    Notified: ELOISA Ward    Patient reassessed at: 9/24/18 0310H    Upon arrival to floor: Reassessed with receiving nurse, VS stable and no acute events during transport

## 2018-09-24 NOTE — EICU
06:52 Called into room-[facility] in the process of intubating patient HR 30 SP02 in the 50's 0.5mg epinephrine and 1mg atropine given IVP. 06:56  Tube in place with positive color change  HR 75 /52  8.0 ETT 23cms at the lip.  07:01 0.5mg of epinephrine IVP per MD orders  07:05 Preparing for arterial line. Norepinephrine started at 1mcg/kg/min  07:15 Arterial line in place in left femoral  BP showing 21/15, HR 74bpm levo at 3mcg/kg/min HR69  07:26 pt asystole

## 2018-09-24 NOTE — PROGRESS NOTES
[facility]  Neurosurgery  Progress Note    Subjective:     History of Present Illness: Ms Medina Frazier is an 82yoF with PMHx DMII, HTN, CHF, CAD, CHF, CKD, HLD, GERD, hypothyroidism,  s/p L2-3 TLIF on 6/5/18 by Dr. Gutiérrez at [facility], complicated by infection & subsequent washout &  hardware removal, who is transferred from [facility] for neurosurgical evaluation of L2-3 osteomyelitis.  Following her initial surgery, she was found to have a lumbar incision wound infection that was washed out on 7/2/18.  Surgical cultures grew E. Cloacae & a PICC was placed.  She was discharged on IV Cipro & a wound vac.  She then had hardware removal on 8/7.  She has had progressive worsening of pain & infection since that time.   Most recent CT Lspine shows L2-3 osteomyelitis with paraspinal fluid collection L1-3.      Post-Op Info:  Procedure(s) (LRB):  DEBRIDEMENT, WOUND, Lumbar wound washout (N/A)   4 Days Post-Op     Medications Prior to Admission   Medication Sig Dispense Refill Last Dose    acetaminophen (TYLENOL) 325 MG tablet Take 650 mg by mouth every 4 (four) hours as needed for Pain or Temperature greater than (100).   Past Week at Unknown time    albuterol (ACCUNEB) 0.63 mg/3 mL Nebu Take 0.63 mg by nebulization every 6 (six) hours as needed (sob/wheezing). Rescue    7/2/2018    atorvastatin (LIPITOR) 40 MG tablet Take 40 mg by mouth every evening.    9/8/2018 at Unknown time    bisacodyl (DULCOLAX, BISACODYL,) 10 mg Supp Place 10 mg rectally every 8 (eight) hours as needed (constipation).       calcium carbonate-simethicone (MAALOX ADVANCED) 1,000-60 mg Chew Take 1 tablet by mouth every 4 (four) hours as needed (heartburn).       carvedilol (COREG) 25 MG tablet Take 25 mg by mouth 2 (two) times daily.    9/8/2018 at Unknown time    clopidogrel (PLAVIX) 75 mg tablet Take 1 tablet (75 mg total) by mouth once daily. (Patient taking differently: Take 75 mg by mouth every morning. )   9/7/2018     diazePAM (VALIUM) 5 MG tablet Take 5 mg by mouth every 8 (eight) hours as needed (spasms).        DULoxetine (CYMBALTA) 30 MG capsule Take 30 mg by mouth once daily.   9/8/2018 at Unknown time    furosemide (LASIX) 20 MG tablet Take 20 mg by mouth every morning.    9/8/2018 at Unknown time    HYDROcodone-acetaminophen (NORCO) 7.5-325 mg per tablet Take 1 tablet by mouth every 6 (six) hours as needed for Pain.   9/7/2018    insulin glargine (LANTUS) 100 unit/mL injection Inject 30 Units into the skin every evening.    9/8/2018 at Unknown time    isosorbide dinitrate (ISORDIL) 20 MG tablet Take 20 mg by mouth 2 (two) times daily.    9/8/2018 at Unknown time    levothyroxine (SYNTHROID) 150 MCG tablet Take 150 mcg by mouth before breakfast.    9/8/2018 at Unknown time    magnesium hydroxide 400 mg/5 ml (MILK OF MAGNESIA) 400 mg/5 mL Susp Take 30 mLs by mouth daily as needed (constipation).        OMEGA-3/DHA/EPA/FISH OIL (OMEGA-3 FISH OIL ORAL) Take 2 capsules by mouth every evening.    9/6/2018    ondansetron (ZOFRAN) 8 MG tablet Take by mouth every 8 (eight) hours as needed for Nausea.   9/8/2018 at Unknown time    pantoprazole (PROTONIX) 40 MG tablet Take 40 mg by mouth every morning.    9/8/2018 at Unknown time    polyethylene glycol (GLYCOLAX) 17 gram PwPk Take 17 g by mouth every evening. Mix in 8 ounces of water.If stool is too loose,may use every other night   9/5/2018    rivaroxaban (XARELTO) 15 mg Tab Take 1 tablet (15 mg total) by mouth daily with dinner or evening meal.   9/8/2018 at Unknown time    sildenafil (REVATIO) 20 mg Tab Take 10 mg by mouth 3 (three) times daily.   9/8/2018 at Unknown time    spironolactone (ALDACTONE) 50 MG tablet Take 50 mg by mouth once daily.       ciprofloxacin, CIPRO,400mg/200ml D5W IVPB (CIPRO IN D5W) 400 mg/200 mL IVPB Inject 400 mg into the vein every 12 (twelve) hours.   9/7/2018    food supplemt, lactose-reduced (ENSURE ACTIVE LIGHT) Liqd Take by mouth as  needed. FOR LOW BLOOD SUGAR   7/2/2018    heparin flush,porcine,-0.9NaCl 100 unit/mL Kit Inject 5 mLs into the vein once daily. To each port   9/7/2018    nut.tx.gluc.intol,lac-free,soy (GLUCERNA SHAKE) Liqd Take by mouth as needed. TX BLOOD SUGAR LOWS OR HIGHS   7/2/2018 9/24: AF, HR tachy to 130 overnight, SBP droped to 79, ow VSS, wbc 13 from 10.5, PT/INR elevated, fluid balance positive 3000cc, abscess culture growing prevotella      Review of patient's allergies indicates:   Allergen Reactions    Codeine Nausea Only    Penicillins Swelling     Able to take amoxil and cephalosporins        Past Medical History:   Diagnosis Date    Allergic sinusitis     Anticoagulant long-term use     Arthritis     Asthma     CHF (congestive heart failure)     Chronic kidney disease     COPD (chronic obstructive pulmonary disease)     Coronary artery disease     Diabetes mellitus     Diabetic neuropathy 2/27/2013    Encounter for blood transfusion     General anesthetics causing adverse effect in therapeutic use     DELAYED EMERGENCE    GERD (gastroesophageal reflux disease)     HHD (hypertensive heart disease)     High cholesterol     [place] (hard of hearing)     Hypertension     Hyponatremia 9/8/2018    Hypothyroidism     Lumbar spinal stenosis     MRSA (methicillin resistant Staphylococcus aureus) infection 7/7/2018    MRSA infection     PAST H/O, CULTURE DONE 5/30/18 (+)    On home oxygen therapy     Osteoporosis     Pulmonary hypertension     Sleep apnea     STATES NOT USING C PAP    Spinal stenosis     Thyroid disease     TIA (transient ischemic attack)     UTI (urinary tract infection)     Venous insufficiency     Wears glasses     Wears partial dentures     UPPER FULL, LOWER PARTIAL     Past Surgical History:   Procedure Laterality Date    ANGIOGRAM-CORONARY N/A 6/2/2017    [facility] at [facility] CATH    ANGIOPLASTY  2008    CARDIAC STENTS    APPENDECTOMY       APPLICATION OF WOUND VACUUM-ASSISTED CLOSURE DEVICE N/A 7/2/2018    Procedure: APPLICATION, WOUND VAC;  Surgeon: Jeremie Gutiérrez Jr., MD;  Location: [facility] OR;  Service: Orthopedics;  Laterality: N/A;    APPLICATION OF WOUND VACUUM-ASSISTED CLOSURE DEVICE N/A 8/7/2018    Procedure: APPLICATION, WOUND VAC;  Surgeon: Jeremie Gutiérrez Jr., MD;  Location: [facility] OR;  Service: Orthopedics;  Laterality: N/A;    APPLICATION, DRESSING, WOUND Left 8/11/2013    Performed by Juwan Liu [facility] at [facility] OR    APPLICATION, WOUND VAC N/A 8/7/2018    Performed by Jeremie Gutiérrez Jr., MD at [facility] OR    APPLICATION, WOUND VAC N/A 7/2/2018    Performed by Jeremie Gutiérrez Jr., MD at [facility] OR    ARTHROPLASTY, KNEE, TOTAL Left 3/18/2013    Performed by [facility] at [facility] OR    ATHERECTOMY N/A 6/5/2017    Performed by Lokesh Thakur MD at [facility] CATH    BLADDER SUSPENSION      BONE GRAFT N/A 6/5/2018    Procedure: BONE GRAFT;  Surgeon: Jeremie Gutiérrez Jr., MD;  Location: [facility] OR;  Service: Orthopedics;  Laterality: N/A;    BONE GRAFT N/A 6/5/2018    Performed by Jeremie Gutiérrez Jr., MD at [facility] OR    CARDIAC SURGERY  1996    CABG    CARDIAC SURGERY      Stents    CORONARY ARTERY BYPASS GRAFT  1996    DEBRIDEMENT, WOUND, Lumbar wound washout N/A 9/20/2018    Performed by [facility] Redd [facility] at [facility] OR 2ND FLR    EXPLORATION, WOUND-lumbar N/A 8/7/2018    Performed by Jeremie Gutiérrez Jr., MD at [facility] OR    FRACTURE SURGERY Left     Lt hand    FRACTURE SURGERY Left 1993    Lt Knee    FUSION-TRANSLUMINAL LUMBAR INTERBODY (TLIF) L2-3 W/ INSTRUMENTATION N/A 6/5/2018    Performed by Jeremie Gutiérrez Jr., MD at [facility] OR    HEMORRHOID SURGERY      HEMORRHOID SURGERY      HYSTERECTOMY  1984    INCISION AND DRAINAGE N/A 7/2/2018    Procedure: INCISION AND DRAINAGE (I & D) LUMBAR SPINE W/ANTIBIOTIC BEAD PLACEMENT;  Surgeon: Jeremie Gutiérrez Jr., MD;  Location: [facility] OR;  Service:  Orthopedics;  Laterality: N/A;    INCISION AND DRAINAGE (I & D) LUMBAR SPINE W/ANTIBIOTIC BEAD PLACEMENT N/A 7/2/2018    Performed by Jeremie G[facility]érrez Jr., MD at [facility] OR    INCISION AND DRAINAGE (I & D)-EXTREMITY-LOWER Left 8/11/2013    Performed by [facility] at [facility] OR    INCISION AND DRAINAGE POSTERIOR LUMBAR SPINE  07/02/2018    INSERTION, PICC Right 7/2/2018    Performed by Jeremie Gutiérrez Jr., MD at [facility] OR    JOINT REPLACEMENT Left     KNEE SURGERY Left     POSTOP TKR INFECTION TX    LUMBAR EPIDURAL INJECTION      OPEN REDUCTION INTERNAL FIXATION-HIP TFN Left 9/21/2017    Performed by LOY Early II, MD at [facility] OR    PERIPHERALLY INSERTED CENTRAL CATHETER INSERTION Right 7/2/2018    Procedure: INSERTION, PICC;  Surgeon: Jeremie Gutiérrez Jr., MD;  Location: [facility] OR;  Service: Orthopedics;  Laterality: Right;    REMOVAL OF HARDWARE FROM SPINE N/A 8/7/2018    Procedure: REMOVAL, HARDWARE, SPINE;  Surgeon: Jeremie Gutiérrez Jr., MD;  Location: [facility] OR;  Service: Orthopedics;  Laterality: N/A;    REMOVAL, HARDWARE, SPINE N/A 8/7/2018    Performed by Jeremie Gutiérrez Jr., MD at [facility] OR    REMOVAL, PROSTHESIS, KNEE Left 11/20/2013    Performed by [facility] Liu [facility] at [facility] OR    THYROIDECTOMY, PARTIAL      TOTAL KNEE  PROSTHESIS REMOVAL W/ SPACER INSERTION Left     TRANSFORAMINAL LUMBAR INTERBODY FUSION (TLIF) OF SPINE WITH PERCUTANEOUS INSTRUMENTATION N/A 6/5/2018    Procedure: FUSION-TRANSLUMINAL LUMBAR INTERBODY (TLIF) L2-3 W/ INSTRUMENTATION;  Surgeon: Jeremie Gutiérrez Jr., MD;  Location: [facility] OR;  Service: Orthopedics;  Laterality: N/A;    WOUND EXPLORATION N/A 8/7/2018    Procedure: EXPLORATION, WOUND-lumbar;  Surgeon: Jeremie Gutiérrez Jr., MD;  Location: [facility] OR;  Service: Orthopedics;  Laterality: N/A;     Family History     Problem Relation (Age of Onset)    Diabetes Mother, Brother    Heart disease Father    Stroke Mother        Tobacco  Use    Smoking status: Former Smoker     Types: Cigarettes     Last attempt to quit: 3/13/1987     Years since quittin.5    Smokeless tobacco: Never Used   Substance and Sexual Activity    Alcohol use: No     Frequency: Never    Drug use: No    Sexual activity: No     Review of Systems   Neurological: Positive for tremors.     Objective:     Weight: 123 kg (271 lb 2.7 oz)  Body mass index is 46.55 kg/m².  Vital Signs (Most Recent):  Temp: 97 °F (36.1 °C) (18 0305)  Pulse: (!) 0 (18 07)  Resp: (!) 26 (18)  BP: 100/62 (18 0655)  SpO2: (!) 19 % (18 0715) Vital Signs (24h Range):  Temp:  [97 °F (36.1 °C)-98.6 °F (37 °C)] 97 °F (36.1 °C)  Pulse:  [0-130] 0  Resp:  [17-30] 26  SpO2:  [19 %-100 %] 19 %  BP: ()/(39-72) 100/62  Arterial Line BP: ()/(6-97)      Date 18 0700 - 18 0659   Shift 5147-5398 7988-8654 7242-2204 24 Hour Total   INTAKE   I.V.(mL/kg) 340.1(2.8)   340.1(2.8)   Shift Total(mL/kg) 340.1(2.8)   340.1(2.8)   OUTPUT   Shift Total(mL/kg)       Weight (kg) 123 123 123 123              Vent Mode: A/C  Oxygen Concentration (%):  [100] 100  Resp Rate Total:  [20 br/min-26 br/min] 24 br/min  Vt Set:  [380 mL] 380 mL  PEEP/CPAP:  [5 cmH20] 5 cmH20  Mean Airway Pressure:  [11 adC86-49 cmH20] 12 cmH20         Trialysis (Dialysis) Catheter 18 1326 right internal jugular (Active)   IV Device Securement sutures 2018  3:05 AM   Additional Site Signs no erythema;no warmth;no edema;no pain;no palpable cord;no streak formation;no drainage 2018  3:05 AM   Patency/Care flushed w/o difficulty 2018  3:05 AM   Site Assessment Clean;Dry;Intact;No redness;No swelling 2018  3:05 AM   Status Deaccessed 2018  3:05 AM   Flows Good 2018  3:05 AM   Dressing Intervention Dressing reinforced 2018  3:05 AM   Dressing Status Biopatch in place;Clean;Dry;New drainage 2018  3:05 AM   Dressing Change Due 18   3:05 AM   Verification by X-ray Yes 9/23/2018 11:05 PM   Site Condition No complications 9/24/2018  3:05 AM   Dressing Occlusive 9/24/2018  3:05 AM   Daily Line Review Performed 9/24/2018  3:05 AM       Neurosurgery Physical Exam     On examination this AM, the patient was  - awake but lethargic  - undergoing intense medical evaluation by primary team  - NSGY was unable to obtain a good examination            Significant Labs:  Recent Labs   Lab  09/23/18   1321  09/23/18   2200  09/24/18   0322  09/24/18   0550   GLU  228*  173*   --   166*   NA  140  139   --   137   K  4.3  4.4   --   4.7   CL  109  109   --   108   CO2  21*  15*   --   14*   BUN  17  19   --   20   CREATININE  1.4  1.5*   --   1.7*   CALCIUM  8.7  8.3*   --   8.5*   MG  1.6  1.8  1.7  1.6     Recent Labs   Lab  09/23/18   0240  09/24/18   0322   WBC  10.57  12.85*   HGB  7.6*  8.2*   HCT  24.8*  27.2*   PLT  158  143*     Recent Labs   Lab  09/23/18   0240  09/23/18   0530  09/24/18   0322  09/24/18   0551   INR  1.2   --   1.4*   --    APTT  37.1*  45.6*  86.1*  95.3*     Microbiology Results (last 7 days)     Procedure Component Value Units Date/Time    Blood culture [[id number]] Collected:  09/15/18 1805    Order Status:  Completed Specimen:  Blood from Peripheral, Upper Arm, Right Updated:  09/20/18 2012     Blood Culture, Routine No growth after 5 days.    Narrative:       Blood cultures from 2 different sites. 4 bottles total.  Please draw before starting antibiotics.    Blood culture [[id number]] Collected:  09/15/18 1819    Order Status:  Completed Specimen:  Blood from Peripheral, Upper Arm, Left Updated:  09/20/18 2012     Blood Culture, Routine No growth after 5 days.    Narrative:       Blood cultures x 2 different sites. 4 bottles total. Please  draw cultures before administering antibiotics.    Fungus culture [[id number]] Collected:  09/13/18 0834    Order Status:  Completed Specimen:  Abscess from Back Updated:  09/20/18 0745      Fungus (Mycology) Culture Culture in progress    Narrative:       Paraspinal abscess    Culture, Anaerobe [[id number]] Collected:  18 0834    Order Status:  Completed Specimen:  Abscess from Back Updated:  18 1340     Anaerobic Culture --     PREVOTELLA (B.) BIVIA  Many      Narrative:       lumbar paraspinal fluid collection drainage        Assessment/Plan:     Spinal epidural abscess    Ms Medina Frazier is an 82 year old woman with multiple comorbidities sp L2-3 TLIF on 18 at [facility], with surgical wound infection with E. cloacea sp washout & hardware removal, now with worsening osteomyelitis & paraspinal abscess despite weeks of treatment with IV Cipro, with culture aspirate growing Prevotella, acutely worsening this AM, being evaluated by Neurosurgery for wound washout procedure.    - patient  this AM in the setting of sepsis resistant to aggressive treatment and acute worsening.            Jeremie Verde MD  Neurosurgery  [facility]

## 2018-09-24 NOTE — SIGNIFICANT EVENT
Critical Care Medicine                                                                                       Death Note      Called to bedside by patient's nurse. Nursing supervisor notified. Family at bedside.  has been called and is also at bedside.  Patient is not responding to verbal or tactile stimuli. Patient does not have a papillary or corneal reflex. His/Her pupils are fixed and dilated. No heart or breath sounds on auscultation. No respirations. No palpable pulses.     Time of death:.729      Cause of Death: cardiac arrest

## 2018-09-24 NOTE — PROCEDURES
"Medina Frazier is a 82 y.o. female patient.    Temp: 97 °F (36.1 °C) (09/24/18 0305)  Pulse: (!) 0 (09/24/18 0730)  Resp: (!) 26 (09/24/18 0730)  BP: 100/62 (09/24/18 0655)  SpO2: (!) 19 % (09/24/18 0715)  Weight: 123 kg (271 lb 2.7 oz) (09/20/18 0505)  Height: 5' 4" (162.6 cm) (09/09/18 0947)       Arterial Line  Date/Time: 9/24/2018 7:15 AM  Performed by: Alvarez Stovall MD  Authorized by: Alvarez Stovall MD   Pre-op Diagnosis: shock  Post-operative diagnosis: shock  Consent Done: Emergent Situation  Preparation: Patient was prepped and draped in the usual sterile fashion.  Indications: hemodynamic monitoring  Location: left femoral  Description of findings: very small femoral artery with pulsatile flow   Needle gauge: 20  Seldinger technique: Seldinger technique used  Number of attempts: 1  Complications: No  Post-procedure: line sutured and dressing applied          Alvarez Stovall  9/24/2018  "

## 2018-09-24 NOTE — NURSING
RN informed FAB Navarro NP of CC team of pts  for 15 minutes after dose of Metoprolol; no new order given; RN will continue to  monitor

## 2018-09-24 NOTE — DISCHARGE SUMMARY
Discharge Summary  Critical Care Medicine      Admit Date: 2018    Date of Death: 2018    Time of Death: 729    Attending Physician: Dr. Stovall    Principal Diagnoses: Osteomyelitis    Preliminary Cause of Death: Osteomyelitis    Secondary Diagnoses:   Active Hospital Problems    Diagnosis  POA    *Osteomyelitis of Lumbar Spine [M86.9]  Yes    Spinal abscess [M46.20]  Yes    Gram-negative bacteremia [R78.81]  Yes    Acute hypercapnic respiratory failure [J96.02]  Yes    Encephalopathy, metabolic [G93.41]  No    Alteration in skin integrity related to surgical incision [R23.9]  Yes    Spinal epidural abscess [G06.1]  Yes    JEY (acute kidney injury) [N17.9]  Yes    PAF (paroxysmal atrial fibrillation) [I48.0]  Yes    Pulmonary hypertension [I27.20]  Yes     Elevating PA pressures noted at clinic  Admitted on Revatio        CHF (congestive heart failure), NYHA class IV [I50.9]  Yes     Chronic    Coronary artery disease involving native coronary artery of native heart without angina pectoris [I25.10]  Yes      Resolved Hospital Problems    Diagnosis Date Resolved POA    Septic shock [A41.9, R65.21] 2018 No    Hyponatremia [E87.1] 2018 Yes    Diabetic neuropathy [E11.40] 2018 Yes        Discharged Condition:     HPI:  Mrs. Frazier is a 82 year old female with history significant for HFpEF, CAD s/p VIVIANA Lcx (2017), pulmonary hypertension, HTN, DM2, and lumbar stenosis s/p lumbar fusion in 2018 with subsequent hardware infection and I&D on  and repeat lumbar I&D, hardware removal, decompression and wound vac placement to L2-L3 due to continued epidural abscess. She was discharged recently to a SNF for rehab and continued IV antibiotic therapy. [place] after labwork revealed JEY, hyponatremia. Additionally, CT lumbar spine obtained  was concerning for persistent osteomyelitis, epidural abscess in L2-L3  region.     She was transferred to [facility] on 9/8 for higher level of care and neurosurgery evaluation. Admission has been complicated by continued JEY with creatinine up trending from baseline 0.8 to 1.0 to 3.0 along with hyponatremia felt to be related to acute on chronic diastolic heart failure, volume overload. Nephrology was consulted and has been assisting with management. She was started on lasix 100 mg BID with minimal urine output response and creatinine slowly uptrending. Additionally, blood cultures obtained 9/9 are growing GNR's, awaiting speciation. Suspect this is enterobacter given continued osteomyelitis of L2-L3, complex fluid collection within laminectomy bed measuring 3x8x5 cm concerning for abscess, and additional prevertebral collection anterior to L1 and L2 vertebral bodies measuring 4x6x1 abutting the abdominal aorta concerning for abscess noted on MRI from 9/10. Neurosurgery were planning on repeat I&D for source control but held off given the above medical issues with JEY, hyponatremia. She has been on vancomycin, cefepime since 9/9 and blood cultures from 9/11 are NGTD.     She was transferred to the MICU on the AM of 9/12 for acute hypercapnic respiratory failure with concern for aspiration event, worsening encephalopathy, and worsening JEY.    Hospital/ICU Course:  Upon transfer to the ICU with Critical Care Medicine, Mrs. Frazier's respiratory and mental status worsened despite Bipap and lasix trial. She was intubated, and she developed shock requiring low dose norepinephrine, felt to be septic in setting of bacteremia. A trialysis line was placed for venous access, vasopressors, and possible need for CRRT. Arterial line placed for frequent ABGs. Cefepime and vancomycin continued as ID and neurosurgery following. Added on flagyl as micro lab thinks BCx 9/9 may be growing anaerobes. IR partially drained one epidural abscess (9/13).  Blood and abscess cultures grew Prevotella.  ID  recommended pip/tazo for 8 weeks. Continuing lasix trial as patient still appears volume overloaded. Norepinephrine requirements increasing on 9/14.  Discussed with neurosurgery, would need to be off vasopressors and hemodynamically stable prior to surgical intervention. Family meeting on 9/14, code status changed to partial DNR.  Weaned off vasopressors on 9/16.  Remained on ventilator due to significant pulmonary edema.  Aggressive diuresis started without significant response. Patient had an episode of A flutter and initiated on amiodarone drip on 9/17. CRRT also started 9/17 however patient became hypotensive and bradycardic requiring norephinephrine again. Discontinued amiodarone at that time. Patient later successfully weaned off norephinephrine.  Mucus plug event (9/19). Patient became tachycardic and hypertensive with absent breath sounds bilaterally. Oxygen saturations decreased into the 70s and tidal volumes less than 100. She was bagged and mucus plug was dislodged.  Passed SBT and extubated to bipap (9/19). CRRT held with diuretic challenge.  On 9/20, she developed shortness of breath overnight, repeated chest x-ray showed worsening pulmonary edema, decrease urine output despite lasix challenge.  Nephrology contacted and initiated on CRRT. Aflutter with rapid rate noted on 9/22, started on lopressor and later amiodarone infusion.  On 9/24 am, Ms. Frazier had an episode of acute hypotension and encephalopathy.  CRRT rinsed back and 1 L NS infused. She developed bradycardia with narrow complex along with agonal breathing.  Atropine administer.  She was subsequently intubated and started on norepinephrine infusion.  She is a partial code with no chest compressions or defibrillations, which was verified with patient's daughter who was at bedside during decompensation.  She subsequently developed asystole.  Time of death 0729.   Emma at bedside along with family.     RICKIE ROBLES, ACNP-BC  Critical Care  Medicine  [phone]

## 2018-09-24 NOTE — PROGRESS NOTES
Got called into patient's room by nurse due to hypotension (SBP 60s) around 6:40 am. CRRT was rinsed back and patient was given 1 L NS. Patient was unresponsive and noted to have agonal breathing. Pupils became dilated and minimally responsive. Patient was subsequently intubated and started on levophed. Per documentation, patient was partial code with no chest compressions or defibrillations. She was given atropine and epinephrine pushes. Pulse was unable to be palpated. Femoral arterial line was placed which was unable to be read a blood pressure. Patient then went into asystole on cardiac monitor.     Time of death was 0729 due to cardiac arrest.    I saw & examined the patient. I personally reviewed all pertinent data in Epic. I discussed the patient and management with the resident. I reviewed the residents note and agree with the documented findings and plan of care. Additionally:    82 yof with prolonged hospitalization. Seen on evening rounds yesterday evening at which time she was alert, verbal, answering my questions. Respirations were comfortable, non-labored at that time. She denied any pain.    I was called emergently to patient's bedside by Dr Marks this morning for acute decrease in level of alertness associated with hypotension. The patient had been rinsed back from CRRT & started on levophed. The patient had a heart rate of 32, with narrow complex. BP was 60 systolic. The patient had agonal breaths. Atropine administered. I intubated the patient emergently (see procedure note). Heart rate improved to 70. Levophed was titrated. Pulse appeared stronger with . Unfortunately BP deteriorated once more. A line placed & confirmed accurate blood pressure measurements.    Goals of care & limitations to care reviewed with patient's daughter who was at the bedside. The patient succumbed to her illness at 0729.    Diagnosis:  1) Shock  2) Acute hypoxemic respiratory failure    Critical Care Time: 30  minutes  Critical care was time spent personally by me on the following activities: evaluating this patient's organ dysfunction, development of treatment plan, discussing treatment plan with patient or surrogate and bedside caregivers, discussions with consultants, evaluation of patient's response to treatment, examination of patient, ordering and performing treatments and interventions, ordering and review of laboratory studies, ordering and review of radiographic studies, re-evaluation of patient's condition. This critical care time did not overlap with that of any other provider or involve time for any procedures.    Alvarez Stovall MD  [facility]

## 2018-09-24 NOTE — NURSING
Dr Cui pronounced pt - cause of death - cardiac arrest.  Family was calleed and continues to come in from out of town to see patient.  They are aware that post mortem changes will take place and assured nursing staff that family members are on their way.  Family given emotional support and bereavement basket.

## 2018-09-24 NOTE — NURSING
RN informed CC team of pts  for more than 10 minutes; MD Michele ordered Metoprolol 5mg IV; Given as ordered; RN will continue to monitor

## 2018-09-24 NOTE — NURSING TRANSFER
Nursing Transfer Note      9/24/2018     Transfer To: 340    Transfer via wheelchair    Transfer with cardiac monitoring    Transported by ELOISA Valdivia    Medicines sent: none    Chart send with patient: Yes    Notified: spouse ( at bedside)    Patient reassessed at: 09/24/18 @1400    Upon arrival to floor: Per hunter - pt will be connected to port tele for 340; call bell will  be placed in reach; bedside report reviewed with rec'ing RNKeila.    Pt left CMICU in wheelchair with our portable tele being transported by OLIVE Valdivia.  Pt left in stable condition, wife at side and verified all belongings are out of the room. Pt reports not having any pain at present nor SOB.

## 2018-09-24 NOTE — ASSESSMENT & PLAN NOTE
Ms Medina Frazier is an 82 year old woman with multiple comorbidities sp L2-3 TLIF on 18 at [facility], with surgical wound infection with E. cloacea sp washout & hardware removal, now with worsening osteomyelitis & paraspinal abscess despite weeks of treatment with IV Cipro, with culture aspirate growing Prevotella, acutely worsening this AM, being evaluated by Neurosurgery for wound washout procedure.    - patient  this AM in the setting of sepsis resistant to aggressive treatment and acute worsening.

## 2018-09-24 NOTE — PROCEDURES
"Medina Frazier is a 82 y.o. female patient.    Temp: 97 °F (36.1 °C) (09/24/18 0305)  Pulse: (!) 0 (09/24/18 0730)  Resp: (!) 26 (09/24/18 0730)  BP: 100/62 (09/24/18 0655)  SpO2: (!) 19 % (09/24/18 0715)  Weight: 123 kg (271 lb 2.7 oz) (09/20/18 0505)  Height: 5' 4" (162.6 cm) (09/09/18 0947)       Intubation  Date/Time: 9/24/2018 10:37 AM  Location procedure was performed: [facility]  Performed by: Alvarez Stovall MD  Authorized by: Alvarez Stovall MD   Pre-operative diagnosis: acute hypoxemic respiratory failure  Post-operative diagnosis: acute hypoxemic respiratory failure  Consent Done: Emergent Situation  Indications: respiratory failure,  hypoxemia and  airway protection  Description of findings: grade 1 view   Intubation method: video-assisted  Patient status: unconscious  Preoxygenation: BVM  Pretreatment medications: none  Sedation: none.  Paralytic: none  Tube size: 8.0 mm  Tube type: cuffed  Number of attempts: 1  Cords visualized: yes  Post-procedure assessment: CO2 detector  Breath sounds: equal and absent over the epigastrium  Cuff inflated: yes  ETT to lip: 23 cm  Tube secured with: ETT rangel  Complications: No  Estimated blood loss (mL): 0          Alvarez Stovall  9/24/2018  "

## 2018-09-24 NOTE — NURSING
Upon entering room at change of shift it was noted that CCS  by Dr Stovall  And pm rn Sylvia DAMIAN were running a code on this pt.  Intubation was done and an arterial line placed.  See notes per EICU.

## 2018-09-24 NOTE — PROGRESS NOTES
Pt intubated at bedside with no adverse reactions. See vent flowsheet for settings. Will continue to monitor.

## 2018-09-24 NOTE — SUBJECTIVE & OBJECTIVE
Medications Prior to Admission   Medication Sig Dispense Refill Last Dose    acetaminophen (TYLENOL) 325 MG tablet Take 650 mg by mouth every 4 (four) hours as needed for Pain or Temperature greater than (100).   Past Week at Unknown time    albuterol (ACCUNEB) 0.63 mg/3 mL Nebu Take 0.63 mg by nebulization every 6 (six) hours as needed (sob/wheezing). Rescue    7/2/2018    atorvastatin (LIPITOR) 40 MG tablet Take 40 mg by mouth every evening.    9/8/2018 at Unknown time    bisacodyl (DULCOLAX, BISACODYL,) 10 mg Supp Place 10 mg rectally every 8 (eight) hours as needed (constipation).       calcium carbonate-simethicone (MAALOX ADVANCED) 1,000-60 mg Chew Take 1 tablet by mouth every 4 (four) hours as needed (heartburn).       carvedilol (COREG) 25 MG tablet Take 25 mg by mouth 2 (two) times daily.    9/8/2018 at Unknown time    clopidogrel (PLAVIX) 75 mg tablet Take 1 tablet (75 mg total) by mouth once daily. (Patient taking differently: Take 75 mg by mouth every morning. )   9/7/2018    diazePAM (VALIUM) 5 MG tablet Take 5 mg by mouth every 8 (eight) hours as needed (spasms).        DULoxetine (CYMBALTA) 30 MG capsule Take 30 mg by mouth once daily.   9/8/2018 at Unknown time    furosemide (LASIX) 20 MG tablet Take 20 mg by mouth every morning.    9/8/2018 at Unknown time    HYDROcodone-acetaminophen (NORCO) 7.5-325 mg per tablet Take 1 tablet by mouth every 6 (six) hours as needed for Pain.   9/7/2018    insulin glargine (LANTUS) 100 unit/mL injection Inject 30 Units into the skin every evening.    9/8/2018 at Unknown time    isosorbide dinitrate (ISORDIL) 20 MG tablet Take 20 mg by mouth 2 (two) times daily.    9/8/2018 at Unknown time    levothyroxine (SYNTHROID) 150 MCG tablet Take 150 mcg by mouth before breakfast.    9/8/2018 at Unknown time    magnesium hydroxide 400 mg/5 ml (MILK OF MAGNESIA) 400 mg/5 mL Susp Take 30 mLs by mouth daily as needed (constipation).        OMEGA-3/DHA/EPA/FISH  OIL (OMEGA-3 FISH OIL ORAL) Take 2 capsules by mouth every evening.    9/6/2018    ondansetron (ZOFRAN) 8 MG tablet Take by mouth every 8 (eight) hours as needed for Nausea.   9/8/2018 at Unknown time    pantoprazole (PROTONIX) 40 MG tablet Take 40 mg by mouth every morning.    9/8/2018 at Unknown time    polyethylene glycol (GLYCOLAX) 17 gram PwPk Take 17 g by mouth every evening. Mix in 8 ounces of water.If stool is too loose,may use every other night   9/5/2018    rivaroxaban (XARELTO) 15 mg Tab Take 1 tablet (15 mg total) by mouth daily with dinner or evening meal.   9/8/2018 at Unknown time    sildenafil (REVATIO) 20 mg Tab Take 10 mg by mouth 3 (three) times daily.   9/8/2018 at Unknown time    spironolactone (ALDACTONE) 50 MG tablet Take 50 mg by mouth once daily.       ciprofloxacin, CIPRO,400mg/200ml D5W IVPB (CIPRO IN D5W) 400 mg/200 mL IVPB Inject 400 mg into the vein every 12 (twelve) hours.   9/7/2018    food supplemt, lactose-reduced (ENSURE ACTIVE LIGHT) Liqd Take by mouth as needed. FOR LOW BLOOD SUGAR   7/2/2018    heparin flush,porcine,-0.9NaCl 100 unit/mL Kit Inject 5 mLs into the vein once daily. To each port   9/7/2018    nut.tx.gluc.intol,lac-free,soy (GLUCERNA SHAKE) Liqd Take by mouth as needed. TX BLOOD SUGAR LOWS OR HIGHS   7/2/2018 9/24: AF, HR tachy to 130 overnight, SBP droped to 79, ow VSS, wbc 13 from 10.5, PT/INR elevated, fluid balance positive 3000cc, abscess culture growing prevotella      Review of patient's allergies indicates:   Allergen Reactions    Codeine Nausea Only    Penicillins Swelling     Able to take amoxil and cephalosporins        Past Medical History:   Diagnosis Date    Allergic sinusitis     Anticoagulant long-term use     Arthritis     Asthma     CHF (congestive heart failure)     Chronic kidney disease     COPD (chronic obstructive pulmonary disease)     Coronary artery disease     Diabetes mellitus     Diabetic neuropathy 2/27/2013     Encounter for blood transfusion     General anesthetics causing adverse effect in therapeutic use     DELAYED EMERGENCE    GERD (gastroesophageal reflux disease)     HHD (hypertensive heart disease)     High cholesterol     Jamul (hard of hearing)     Hypertension     Hyponatremia 9/8/2018    Hypothyroidism     Lumbar spinal stenosis     MRSA (methicillin resistant Staphylococcus aureus) infection 7/7/2018    MRSA infection     PAST H/O, CULTURE DONE 5/30/18 (+)    On home oxygen therapy     Osteoporosis     Pulmonary hypertension     Sleep apnea     STATES NOT USING C PAP    Spinal stenosis     Thyroid disease     TIA (transient ischemic attack)     UTI (urinary tract infection)     Venous insufficiency     Wears glasses     Wears partial dentures     UPPER FULL, LOWER PARTIAL     Past Surgical History:   Procedure Laterality Date    ANGIOGRAM-CORONARY N/A 6/2/2017    Performed by Yury Bailey [facility] at [facility] CATH    ANGIOPLASTY  2008    CARDIAC STENTS    APPENDECTOMY      APPLICATION OF WOUND VACUUM-ASSISTED CLOSURE DEVICE N/A 7/2/2018    Procedure: APPLICATION, WOUND VAC;  Surgeon: Jeremie Gutiérrez Jr., MD;  Location: [facility] OR;  Service: Orthopedics;  Laterality: N/A;    APPLICATION OF WOUND VACUUM-ASSISTED CLOSURE DEVICE N/A 8/7/2018    Procedure: APPLICATION, WOUND VAC;  Surgeon: Jeremie Gutiérrez Jr., MD;  Location: [facility] OR;  Service: Orthopedics;  Laterality: N/A;    APPLICATION, DRESSING, WOUND Left 8/11/2013    Performed by [facility]wan [facility] at [facility] OR    APPLICATION, WOUND VAC N/A 8/7/2018    Performed by Jeremie Gutiérrez Jr., MD at [facility] OR    APPLICATION, WOUND VAC N/A 7/2/2018    Performed by Jeremie G[facility]érrez Jr., MD at [facility] OR    ARTHROPLASTY, KNEE, TOTAL Left 3/18/2013    [facility] at [facility] OR    ATHERECTOMY N/A 6/5/2017    Performed by Lokesh Thakur MD at [facility] CATH    BLADDER SUSPENSION      BONE GRAFT N/A 6/5/2018     Procedure: BONE GRAFT;  Surgeon: Jeremie Gutiérrez Jr., MD;  Location[facility] OR;  Service: Orthopedics;  Laterality: N/A;    BONE GRAFT N/A 6/5/2018    Per[facility] by [facility] Jr[facility] at [facility] OR    CARDIAC SURGERY  1996    CABG    CARDIAC SURGERY      Stents    CORONARY ARTERY BYPASS GRAFT  1996    DEBRIDEMENT, WOUND, Lumbar wound washout N/A 9/20/2018    Performed by [facility]d [facility] at [facility] 2ND FLR    EXPLORATION, WOUND-lumbar N/A 8/7/2018    [facility] OR    FRACTURE SURGERY Left     Lt hand    FRACTURE SURGERY Left 1993    Lt Knee    FUSION-TRANSLUMINAL LUMBAR INTERBODY (TLIF) L2-3 W/ INSTRUMENTATION N/A 6/5/2018    [facility] at [facility] OR    HEMORRHOID SURGERY      HEMORRHOID SURGERY      HYSTERECTOMY  1984    INCISION AND DRAINAGE N/A 7/2/2018    Procedure: INCISION AND DRAINAGE (I & D) LUMBAR SPINE W/ANTIBIOTIC BEAD PLACEMENT;  Surgeon: Jeremie Gutiérrez Jr., MD;  Location: [facility] OR;  Service: Orthopedics;  Laterality: N/A;    INCISION AND DRAINAGE (I & D) LUMBAR SPINE W/ANTIBIOTIC BEAD PLACEMENT N/A 7/2/2018    Performed by Jerem[facility]rez Jr., [facility] at [facility] OR    INCISION AND DRAINAGE (I & D)-EXTREMITY-LOWER Left 8/11/2013    [facility] OR    INCISION AND DRAINAGE POSTERIOR LUMBAR SPINE  07/02/2018    INSERTION, PICC Right 7/2/2018    [facility] Jr[facility] at [facility] OR    JOINT REPLACEMENT Left     KNEE SURGERY Left     POSTOP TKR INFECTION TX    LUMBAR EPIDURAL INJECTION      OPEN REDUCTION INTERNAL FIXATION-HIP TFN Left 9/21/2017    Performed by LOY Early II, [facility] at [facility] OR    PERIPHERALLY INSERTED CENTRAL CATHETER INSERTION Right 7/2/2018    Procedure: INSERTION, PICC;  Surgeon: Jeremie Gutiérrez Jr., MD;  Location: [facility] OR;  Service: Orthopedics;  Laterality: Right;    REMOVAL OF HARDWARE FROM SPINE N/A 8/7/2018    Procedure:  REMOVAL, HARDWARE, SPINE;  Surgeon: Jeremie Gutiérrez Jr., MD;  Location: [facility] OR;  Service: Orthopedics;  Laterality: N/A;    REMOVAL, HARDWARE, SPINE N/A 2018    Performed by Jeremie G[facility]rez Jr., MD at [facility] OR    REMOVAL, PROSTHESIS, KNEE Left 2013    [facility]formed by [facility] at [facility] OR    THYROIDECTOMY, PARTIAL      TOTAL KNEE  PROSTHESIS REMOVAL W/ SPACER INSERTION Left     TRANSFORAMINAL LUMBAR INTERBODY FUSION (TLIF) OF SPINE WITH PERCUTANEOUS INSTRUMENTATION N/A 2018    Procedure: FUSION-TRANSLUMINAL LUMBAR INTERBODY (TLIF) L2-3 W/ INSTRUMENTATION;  Surgeon: Jeremie Gutiérrez Jr., MD;  Location: [facility] OR;  Service: Orthopedics;  Laterality: N/A;    WOUND EXPLORATION N/A 2018    Procedure: EXPLORATION, WOUND-lumbar;  Surgeon: Jeremie Gutiérrez Jr., MD;  Location: [facility] OR;  Service: Orthopedics;  Laterality: N/A;     Family History     Problem Relation (Age of Onset)    Diabetes Mother, Brother    Heart disease Father    Stroke Mother        Tobacco Use    Smoking status: Former Smoker     Types: Cigarettes     Last attempt to quit: 3/13/1987     Years since quittin.5    Smokeless tobacco: Never Used   Substance and Sexual Activity    Alcohol use: No     Frequency: Never    Drug use: No    Sexual activity: No     Review of Systems   Neurological: Positive for tremors.     Objective:     Weight: 123 kg (271 lb 2.7 oz)  Body mass index is 46.55 kg/m².  Vital Signs (Most Recent):  Temp: 97 °F (36.1 °C) (18 0305)  Pulse: (!) 0 (18 0730)  Resp: (!) 26 (18 0730)  BP: 100/62 (18 0655)  SpO2: (!) 19 % (18 0715) Vital Signs (24h Range):  Temp:  [97 °F (36.1 °C)-98.6 °F (37 °C)] 97 °F (36.1 °C)  Pulse:  [0-130] 0  Resp:  [17-30] 26  SpO2:  [19 %-100 %] 19 %  BP: ()/(39-72) 100/62  Arterial Line BP: ()/(6-97)      Date 18 0700 - 18 0659   Shift 8118-4817 7798-6311 3812-7037 24 Hour Total   INTAKE    I.V.(mL/kg) 340.1(2.8)   340.1(2.8)   Shift Total(mL/kg) 340.1(2.8)   340.1(2.8)   OUTPUT   Shift Total(mL/kg)       Weight (kg) 123 123 123 123              Vent Mode: A/C  Oxygen Concentration (%):  [100] 100  Resp Rate Total:  [20 br/min-26 br/min] 24 br/min  Vt Set:  [380 mL] 380 mL  PEEP/CPAP:  [5 cmH20] 5 cmH20  Mean Airway Pressure:  [11 ocL67-15 cmH20] 12 cmH20         Trialysis (Dialysis) Catheter 09/12/18 1326 right internal jugular (Active)   IV Device Securement sutures 9/24/2018  3:05 AM   Additional Site Signs no erythema;no warmth;no edema;no pain;no palpable cord;no streak formation;no drainage 9/24/2018  3:05 AM   Patency/Care flushed w/o difficulty 9/24/2018  3:05 AM   Site Assessment Clean;Dry;Intact;No redness;No swelling 9/24/2018  3:05 AM   Status Deaccessed 9/24/2018  3:05 AM   Flows Good 9/24/2018  3:05 AM   Dressing Intervention Dressing reinforced 9/24/2018  3:05 AM   Dressing Status Biopatch in place;Clean;Dry;New drainage 9/24/2018  3:05 AM   Dressing Change Due 09/29/18 9/24/2018  3:05 AM   Verification by X-ray Yes 9/23/2018 11:05 PM   Site Condition No complications 9/24/2018  3:05 AM   Dressing Occlusive 9/24/2018  3:05 AM   Daily Line Review Performed 9/24/2018  3:05 AM       Neurosurgery Physical Exam     On examination this AM, the patient was  - awake but lethargic  - undergoing intense medical evaluation by primary team  - NSGY was unable to obtain a good examination            Significant Labs:  Recent Labs   Lab  09/23/18   1321  09/23/18   2200  09/24/18   0322  09/24/18   0550   GLU  228*  173*   --   166*   NA  140  139   --   137   K  4.3  4.4   --   4.7   CL  109  109   --   108   CO2  21*  15*   --   14*   BUN  17  19   --   20   CREATININE  1.4  1.5*   --   1.7*   CALCIUM  8.7  8.3*   --   8.5*   MG  1.6  1.8  1.7  1.6     Recent Labs   Lab  09/23/18   0240  09/24/18   0322   WBC  10.57  12.85*   HGB  7.6*  8.2*   HCT  24.8*  27.2*   PLT  158  143*     Recent Labs    Lab  09/23/18   0240  09/23/18   0530  09/24/18   0322  09/24/18   0551   INR  1.2   --   1.4*   --    APTT  37.1*  45.6*  86.1*  95.3*     Microbiology Results (last 7 days)     Procedure Component Value Units Date/Time    Blood culture [[id number]] Collected:  09/15/18 1805    Order Status:  Completed Specimen:  Blood from Peripheral, Upper Arm, Right Updated:  09/20/18 2012     Blood Culture, Routine No growth after 5 days.    Narrative:       Blood cultures from 2 different sites. 4 bottles total.  Please draw before starting antibiotics.    Blood culture [[id number]] Collected:  09/15/18 1819    Order Status:  Completed Specimen:  Blood from Peripheral, Upper Arm, Left Updated:  09/20/18 2012     Blood Culture, Routine No growth after 5 days.    Narrative:       Blood cultures x 2 different sites. 4 bottles total. Please  draw cultures before administering antibiotics.    Fungus culture [[id number]] Collected:  09/13/18 0834    Order Status:  Completed Specimen:  Abscess from Back Updated:  09/20/18 0729     Fungus (Mycology) Culture Culture in progress    Narrative:       Paraspinal abscess    Culture, Anaerobe [[id number]] Collected:  09/13/18 0834    Order Status:  Completed Specimen:  Abscess from Back Updated:  09/18/18 1340     Anaerobic Culture --     PREVOTELLA (B.) BIVIA  Many      Narrative:       lumbar paraspinal fluid collection drainage

## 2018-09-24 NOTE — PROGRESS NOTES
0635: in pt room Map 57. UF turned off on CRRT. Dr. Flores notified of low BP. MD coming to bedside. Pt rinsed back on CRRT; gtiven 1L. BP remained low. Pt began agonal breathing and unresponsive.     Dr. Stovall @ bedside. Pt intubated. Pt given 1mg atropine, 1 halley bump, and 1 total of epi. Unable to obtain BP.  Levo started. Fem alisha placed. Levo mained out; unable to obtain BP. Pt went asystole.     Family @ [facility].  notified.

## 2018-09-24 NOTE — NURSING
After family was allowed to visit per their request until last family member from [place] was able to arrive ([id number]), post mortem care was rendered.  All lines were removed (R IJ Trialysis; R UA picc; L fem alisha) but decision was made to leave wd vac dressing in place.  All post mortem care was rendered within scope of practice.    Family was aware that pt was being placed in a morgue bag post care and they decided not to come back to see her again and stay until she would be taken to the morgue (since she would be a the bag).    Transport came and took pt to the morgue as charted

## 2018-10-17 LAB — FUNGUS SPEC CULT: NORMAL

## 2019-08-23 NOTE — ASSESSMENT & PLAN NOTE
Non-oliguric, likely multifactorial, the urine sodium of 10, could be consistent with CRS in this patient with what appears to be acute CHF exacerbation and volume overload, however would be careful reading too much in to this urinary sodium value, also consider ATN toxic secondary infection, ischemic ATN, also possibly vancomycin contributing to worsening sCr since admit further noted with WBCs in the urine of > 100, need to r/o infection which would be unlikely to cause an JEY (unless there is a bilateral pyelonephritis, which clinically does not fit, nor consistent with U/S findings), in short, if urine cultures negative need to consider AIN (outpatient abxs), absence of eosinophilia, rash or fever, may make a little less likely, but does not rule out.    Renal ultrasound unremarkable and negative for hydronephrosis.    Plan/Reccomendations:  1) increase lasix diuresis to 100mg IV BID  2) continue strict Is & Os and serial renal panels  3) obtain daily vanco through levels and dose appropriately for renal funtion  4) collect urine, spin down and manually review microscopy   No pertinent past medical history

## 2019-09-05 NOTE — PROGRESS NOTES
[facility]  Neurosurgery  Progress Note    Subjective:     History of Present Illness: Ms Medina Frazier is an 82yoF with PMHx DMII, HTN, CHF, CAD, CHF, CKD, HLD, GERD, hypothyroidism,  s/p L2-3 TLIF on 6/5/18 by Dr. Gutiérrez at [facility], complicated by infection & subsequent washout &  hardware removal, who is transferred from [facility] for neurosurgical evaluation of L2-3 osteomyelitis.  Following her initial surgery, she was found to have a lumbar incision wound infection that was washed out on 7/2/18.  Surgical cultures grew E. Cloacae & a PICC was placed.  She was discharged on IV Cipro & a wound vac.  She then had hardware removal on 8/7.  She has had progressive worsening of pain & infection since that time.   Most recent CT Lspine shows L2-3 osteomyelitis with paraspinal fluid collection L1-3.      Post-Op Info:  Procedure(s) (LRB):  DEBRIDEMENT, WOUND, Lumbar wound washout (N/A)   1 Day Post-Op     Interval History: naeon    Medications:  Continuous Infusions:  Scheduled Meds:   albuterol-ipratropium  3 mL Nebulization Q4H    insulin detemir U-100  5 Units Subcutaneous QHS    [START ON 9/22/2018] levothyroxine  150 mcg Oral Before breakfast    miconazole NITRATE 2 %   Topical (Top) BID    pantoprazole  40 mg Oral Daily    piperacillin-tazobactam (ZOSYN) IVPB  4.5 g Intravenous Q12H     PRN Meds:dextrose 50%, dextrose 50%, fentaNYL citrate (PF), glucagon (human recombinant), glucose, glucose, insulin aspart U-100, ondansetron, sodium phosphate IVPB, sodium phosphate IVPB, sodium phosphate IVPB     Review of Systems  Objective:     Weight: 123 kg (271 lb 2.7 oz)  Body mass index is 46.55 kg/m².  Vital Signs (Most Recent):  Temp: 97 °F (36.1 °C) (09/21/18 0900)  Pulse: (!) 115 (09/21/18 0900)  Resp: (!) 22 (09/21/18 0900)  BP: 120/60 (09/21/18 0900)  SpO2: 95 % (09/21/18 0900) Vital Signs (24h Range):  Temp:  [95.7 °F (35.4 °C)-98.2 °F (36.8 °C)] 97 °F (36.1 °C)  Pulse:  [] 115  Resp:   Pharmacy called asking is it okay to fill Tramadol since she is also on Xanax       # [phone] "[13-31] 22  SpO2:  [93 %-100 %] 95 %  BP: (111-152)/(59-98) 120/60  Arterial Line BP: ()/(45-79) 142/76     Date 09/21/18 0700 - 09/22/18 0659   Shift 9113-8306 4642-3235 1611-5159 24 Hour Total   INTAKE   I.V.(mL/kg) 750(6.1)   750(6.1)   Shift Total(mL/kg) 750(6.1)   750(6.1)   OUTPUT   Other 1115   1115   Shift Total(mL/kg) 1115(9.1)   1115(9.1)   Weight (kg) 123 123 123 123              Oxygen Concentration (%):  [60] 60         Urethral Catheter 09/13/18 1438 Temperature probe (Active)   Site Assessment Clean;Intact 9/21/2018  7:01 AM   Collection Container Urimeter 9/21/2018  7:01 AM   Securement Method secured to top of thigh w/ adhesive device 9/21/2018  7:01 AM   Catheter Care Performed yes 9/21/2018  7:01 AM   Reason for Continuing Urinary Catheterization Critically ill in ICU requiring intensive monitoring 9/21/2018  7:01 AM   CAUTI Prevention Bundle StatLock in place w 1" slack;No dependent loops or kinks;Drainage bag not overfilled (<2/3 full);Drainage bag below bladder;Drainage bag off the floor;Intact seal between catheter & drainage tubing;Green sheeting clip in use 9/21/2018  7:01 AM   Output (mL) 10 mL 9/19/2018  8:05 PM            Trialysis (Dialysis) Catheter 09/12/18 1326 right internal jugular (Active)   IV Device Securement sutures 9/21/2018  7:01 AM   Additional Site Signs no erythema;no warmth;no edema;no pain;no palpable cord;no streak formation;no drainage 9/21/2018  7:01 AM   Patency/Care flushed w/o difficulty 9/21/2018  7:01 AM   Site Assessment Clean;Dry;Intact;No redness;No swelling 9/21/2018  7:01 AM   Status Accessed 9/21/2018  7:01 AM   Flows Good 9/21/2018  7:01 AM   Dressing Intervention Dressing reinforced 9/21/2018  7:01 AM   Dressing Status Biopatch in place;Clean;Dry;Intact 9/21/2018  7:01 AM   Dressing Change Due 09/22/18 9/21/2018  7:01 AM   Verification by X-ray Yes 9/20/2018  7:05 PM   Site Condition No complications 9/21/2018  3:05 AM   Dressing Occlusive 9/20/2018 "  3:05 AM   Daily Line Review Performed 9/21/2018  3:05 AM       Neurosurgery Physical Exam     [id number]  PERRL  FC x4  CNII-XII grossly intact      Significant Labs:  Recent Labs   Lab  09/20/18   1426   09/20/18   2203  09/21/18   0419  09/21/18   0736   GLU  167*  167*   < >  169*  169*  162*  162*  160*   NA  142  142   < >  142  142  143  143  143   K  4.7  4.7   < >  4.8  4.8  4.8  4.8  4.9   CL  112*  112*   < >  112*  112*  114*  114*  114*   CO2  20*  20*   < >  19*  19*  19*  19*  17*   BUN  17  17   < >  19  19  22  22  22   CREATININE  1.2  1.2   < >  1.4  1.4  1.5*  1.5*  1.6*   CALCIUM  9.3  9.3   < >  9.5  9.5  9.3  9.3  9.3   MG  2.0  2.0   --   1.8  1.8  1.9  1.9   --     < > = values in this interval not displayed.     Recent Labs   Lab  09/20/18   0029  09/21/18   0419   WBC  17.36*  18.13*   HGB  7.6*  7.1*   HCT  24.2*  24.3*   PLT  221  209     Recent Labs   Lab  09/20/18   0029  09/21/18   0419   INR  1.2  1.2   APTT  22.3   --      Microbiology Results (last 7 days)     Procedure Component Value Units Date/Time    Blood culture [[id number]] Collected:  09/15/18 1805    Order Status:  Completed Specimen:  Blood from Peripheral, Upper Arm, Right Updated:  09/20/18 2012     Blood Culture, Routine No growth after 5 days.    Narrative:       Blood cultures from 2 different sites. 4 bottles total.  Please draw before starting antibiotics.    Blood culture [[id number]] Collected:  09/15/18 1819    Order Status:  Completed Specimen:  Blood from Peripheral, Upper Arm, Left Updated:  09/20/18 2012     Blood Culture, Routine No growth after 5 days.    Narrative:       Blood cultures x 2 different sites. 4 bottles total. Please  draw cultures before administering antibiotics.    Fungus culture [[id number]] Collected:  09/13/18 0834    Order Status:  Completed Specimen:  Abscess from Back Updated:  09/20/18 0729     Fungus (Mycology) Culture Culture in progress    Narrative:        Paraspinal abscess    Culture, Anaerobe [[id number]] Collected:  09/13/18 0834    Order Status:  Completed Specimen:  Abscess from Back Updated:  09/18/18 1340     Anaerobic Culture --     PREVOTELLA (B.) BIVIA  Many      Narrative:       lumbar paraspinal fluid collection drainage    Aerobic culture [[id number]] Collected:  09/13/18 0834    Order Status:  Completed Specimen:  Abscess from Back Updated:  09/17/18 0848     Aerobic Bacterial Culture No growth    Narrative:       lumbar paraspinal fluid collection drainage    Blood culture [[id number]] Collected:  09/11/18 1011    Order Status:  Completed Specimen:  Blood Updated:  09/16/18 1212     Blood Culture, Routine No growth after 5 days.    Narrative:       Please draw from 2 separate sites 30 minutes apart. Thank you    Blood culture [[id number]] Collected:  09/11/18 1003    Order Status:  Completed Specimen:  Blood Updated:  09/16/18 1212     Blood Culture, Routine No growth after 5 days.            Significant Diagnostics:      Assessment/Plan:     Spinal epidural abscess    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       -Worsened respiratory status secondary to pulmonary edema  -Pt remains too unstable for OR  -Continue diuresis for volume overload and pulmonary edema per MICU and Renal teams.  -Pt will need 2 stage surgery: washout and then instrumentation following.  -MRI with L2-3 osteodiscitis, large fluid collection in surgical bed, as well a prevertebral fluid collection L1,2 s/p IR drainage on 9/13   -Please wear LSO brace at all times  -Please hold Xarelto & Plavix   -Abx per infectious disease recs   -Continue wound care and wound vac  -Will reconsider surgery when more medically optimized.             Thierno Carlos, DO  Neurosurgery  [facility]

## 2020-06-04 NOTE — ASSESSMENT & PLAN NOTE
Now with severe oliguria, likely multifactorial, the urine sodium of 10, could be consistent with CRS, but may still have an ATN component.    Lytes stable, bicarb down to 17, resp status gradually improving, SCUF x ~past 24hrs      Plan/Recommendations:  - Had dropped blood pressures yesterday in which dialysis had to be hold. Blood pressures today are back to normal range in 110-120 of SBP. Will start back on SCUF for volume removal.      No

## 2023-12-07 NOTE — PROGRESS NOTES
Patient seen for follow up wound vac dressing change to midline back. Small amount of slough noted to wound bed, sutures noted to wound bed, bone exposure noted at center of wound bed, periwound intact, small serous drainage noted. Notified Sierra FREEDMAN with neurosurgery of bone exposure to wound base. PA okay for reapplication of wound vac at this time, restore silver contact layer applied over wound base with black sponge on top, bridged trac pad to right side of abdomen, no leak noted at 125mmHg. Patient tolerated care well. Wound care to follow prn, plan on changing wound vac twice a week. Nursing to continue care.     09/21/18 1228       Incision/Site 08/07/18 1535 Back midline vertical   Date First Assessed/Time First Assessed: 08/07/18 1535   Present Prior to Hospital Arrival?: Yes  Location: Back  Orientation: (c) midline  Incision Type: vertical  Closure Method: (c) Other (see comments)   Wound Image    Incision WDL ex   Drainage Amount Small   Drainage Characteristics/Odor Serous;No odor   Appearance Moist;Red;Slough;Bone   Red (%), Wound Tissue Color 90 %   Yellow (%), Wound Tissue Color 10 %   Periwound Area Intact   Wound Edges Open   Care Cleansed with:;Sterile normal saline   Dressing Changed  (wound vac change)   Dressing Change Due 09/25/18       Negative Pressure Wound Therapy    Placement Date/Time: 08/07/18 (c) 2606   Orientation: midline  Location: (c) Back  Additional Comments: continuous negative pressure at 125 mmhg   NPWT Type Vacuum Therapy   Therapy Setting NPWT Continuous therapy   Pressure Setting NPWT 125 mmHg   Therapy Interventions NPWT Dressing changed   Sponges Inserted NPWT Black  (restore contact layer to wound base)        Acute liver failure